# Patient Record
Sex: MALE | Race: WHITE | NOT HISPANIC OR LATINO | Employment: UNEMPLOYED | URBAN - NONMETROPOLITAN AREA
[De-identification: names, ages, dates, MRNs, and addresses within clinical notes are randomized per-mention and may not be internally consistent; named-entity substitution may affect disease eponyms.]

---

## 2017-01-01 ENCOUNTER — INFUSION (OUTPATIENT)
Dept: ONCOLOGY | Facility: HOSPITAL | Age: 62
End: 2017-01-01

## 2017-01-01 ENCOUNTER — OFFICE VISIT (OUTPATIENT)
Dept: ONCOLOGY | Facility: CLINIC | Age: 62
End: 2017-01-01

## 2017-01-01 ENCOUNTER — APPOINTMENT (OUTPATIENT)
Dept: GENERAL RADIOLOGY | Facility: HOSPITAL | Age: 62
End: 2017-01-01

## 2017-01-01 ENCOUNTER — APPOINTMENT (OUTPATIENT)
Dept: CT IMAGING | Facility: HOSPITAL | Age: 62
End: 2017-01-01

## 2017-01-01 ENCOUNTER — TELEPHONE (OUTPATIENT)
Dept: GASTROENTEROLOGY | Facility: CLINIC | Age: 62
End: 2017-01-01

## 2017-01-01 ENCOUNTER — HOSPITAL ENCOUNTER (OUTPATIENT)
Dept: OTHER | Facility: HOSPITAL | Age: 62
Setting detail: RADIATION/ONCOLOGY SERIES
Discharge: HOME | End: 2017-01-20
Attending: INTERNAL MEDICINE | Admitting: INTERNAL MEDICINE

## 2017-01-01 ENCOUNTER — HOSPITAL ENCOUNTER (INPATIENT)
Facility: HOSPITAL | Age: 62
LOS: 6 days | Discharge: HOME HEALTH | End: 2017-07-13
Attending: EMERGENCY MEDICINE | Admitting: FAMILY MEDICINE

## 2017-01-01 ENCOUNTER — ANESTHESIA (OUTPATIENT)
Dept: GASTROENTEROLOGY | Facility: HOSPITAL | Age: 62
End: 2017-01-01

## 2017-01-01 ENCOUNTER — TELEPHONE (OUTPATIENT)
Dept: OBSTETRICS AND GYNECOLOGY | Facility: CLINIC | Age: 62
End: 2017-01-01

## 2017-01-01 ENCOUNTER — LAB REQUISITION (OUTPATIENT)
Dept: LAB | Facility: HOSPITAL | Age: 62
End: 2017-01-01

## 2017-01-01 ENCOUNTER — APPOINTMENT (OUTPATIENT)
Dept: ONCOLOGY | Facility: HOSPITAL | Age: 62
End: 2017-01-01

## 2017-01-01 ENCOUNTER — APPOINTMENT (OUTPATIENT)
Dept: ONCOLOGY | Facility: CLINIC | Age: 62
End: 2017-01-01

## 2017-01-01 ENCOUNTER — TELEPHONE (OUTPATIENT)
Dept: ONCOLOGY | Facility: CLINIC | Age: 62
End: 2017-01-01

## 2017-01-01 ENCOUNTER — LAB (OUTPATIENT)
Dept: ONCOLOGY | Facility: HOSPITAL | Age: 62
End: 2017-01-01

## 2017-01-01 ENCOUNTER — ANESTHESIA EVENT (OUTPATIENT)
Dept: GASTROENTEROLOGY | Facility: HOSPITAL | Age: 62
End: 2017-01-01

## 2017-01-01 ENCOUNTER — DOCUMENTATION (OUTPATIENT)
Dept: RADIATION ONCOLOGY | Facility: HOSPITAL | Age: 62
End: 2017-01-01

## 2017-01-01 ENCOUNTER — HOSPITAL ENCOUNTER (EMERGENCY)
Facility: HOSPITAL | Age: 62
Discharge: HOME | End: 2017-08-25
Attending: EMERGENCY MEDICINE | Admitting: EMERGENCY MEDICINE

## 2017-01-01 ENCOUNTER — HOSPITAL ENCOUNTER (OUTPATIENT)
Dept: ULTRASOUND IMAGING | Facility: HOSPITAL | Age: 62
Discharge: HOME | End: 2017-03-29
Attending: INTERNAL MEDICINE

## 2017-01-01 ENCOUNTER — HOSPITAL ENCOUNTER (OUTPATIENT)
Dept: OTHER | Facility: HOSPITAL | Age: 62
Discharge: HOME | End: 2017-01-06
Attending: INTERNAL MEDICINE | Admitting: INTERNAL MEDICINE

## 2017-01-01 ENCOUNTER — OFFICE VISIT (OUTPATIENT)
Dept: SURGERY | Facility: CLINIC | Age: 62
End: 2017-01-01

## 2017-01-01 ENCOUNTER — OFFICE VISIT (OUTPATIENT)
Dept: CARDIAC SURGERY | Facility: CLINIC | Age: 62
End: 2017-01-01

## 2017-01-01 ENCOUNTER — OFFICE VISIT (OUTPATIENT)
Dept: FAMILY MEDICINE CLINIC | Facility: CLINIC | Age: 62
End: 2017-01-01

## 2017-01-01 ENCOUNTER — TELEPHONE (OUTPATIENT)
Dept: ONCOLOGY | Facility: HOSPITAL | Age: 62
End: 2017-01-01

## 2017-01-01 ENCOUNTER — APPOINTMENT (OUTPATIENT)
Dept: ULTRASOUND IMAGING | Facility: HOSPITAL | Age: 62
End: 2017-01-01

## 2017-01-01 ENCOUNTER — TELEPHONE (OUTPATIENT)
Dept: FAMILY MEDICINE CLINIC | Facility: CLINIC | Age: 62
End: 2017-01-01

## 2017-01-01 ENCOUNTER — PREP FOR SURGERY (OUTPATIENT)
Dept: OTHER | Facility: HOSPITAL | Age: 62
End: 2017-01-01

## 2017-01-01 ENCOUNTER — HOSPITAL ENCOUNTER (OUTPATIENT)
Dept: GENERAL RADIOLOGY | Facility: HOSPITAL | Age: 62
Discharge: HOME | End: 2017-08-01
Admitting: INTERNAL MEDICINE

## 2017-01-01 ENCOUNTER — HOSPITAL ENCOUNTER (INPATIENT)
Facility: HOSPITAL | Age: 62
LOS: 3 days | Discharge: HOME HEALTH | End: 2017-07-21
Attending: EMERGENCY MEDICINE | Admitting: FAMILY MEDICINE

## 2017-01-01 ENCOUNTER — OFFICE VISIT (OUTPATIENT)
Dept: GASTROENTEROLOGY | Facility: CLINIC | Age: 62
End: 2017-01-01

## 2017-01-01 ENCOUNTER — HOSPITAL ENCOUNTER (OUTPATIENT)
Dept: PHYSICAL THERAPY | Facility: HOSPITAL | Age: 62
Setting detail: THERAPIES SERIES
Discharge: HOME | End: 2017-01-20
Attending: FAMILY MEDICINE | Admitting: FAMILY MEDICINE

## 2017-01-01 ENCOUNTER — HOSPITAL ENCOUNTER (INPATIENT)
Facility: HOSPITAL | Age: 62
LOS: 2 days | Discharge: HOME HEALTH | End: 2017-08-10
Attending: FAMILY MEDICINE | Admitting: FAMILY MEDICINE

## 2017-01-01 ENCOUNTER — DOCUMENTATION (OUTPATIENT)
Dept: NUTRITION | Facility: HOSPITAL | Age: 62
End: 2017-01-01

## 2017-01-01 ENCOUNTER — HOSPITAL ENCOUNTER (OUTPATIENT)
Facility: HOSPITAL | Age: 62
Setting detail: HOSPITAL OUTPATIENT SURGERY
Discharge: HOME | End: 2017-03-29
Attending: INTERNAL MEDICINE | Admitting: INTERNAL MEDICINE

## 2017-01-01 VITALS
SYSTOLIC BLOOD PRESSURE: 120 MMHG | HEIGHT: 70 IN | OXYGEN SATURATION: 96 % | DIASTOLIC BLOOD PRESSURE: 68 MMHG | HEART RATE: 92 BPM

## 2017-01-01 VITALS
SYSTOLIC BLOOD PRESSURE: 146 MMHG | BODY MASS INDEX: 18.04 KG/M2 | DIASTOLIC BLOOD PRESSURE: 67 MMHG | HEIGHT: 70 IN | OXYGEN SATURATION: 97 % | RESPIRATION RATE: 19 BRPM | TEMPERATURE: 98.1 F | WEIGHT: 126 LBS | HEART RATE: 101 BPM

## 2017-01-01 VITALS
TEMPERATURE: 97.6 F | HEART RATE: 90 BPM | SYSTOLIC BLOOD PRESSURE: 141 MMHG | RESPIRATION RATE: 20 BRPM | DIASTOLIC BLOOD PRESSURE: 94 MMHG

## 2017-01-01 VITALS
SYSTOLIC BLOOD PRESSURE: 121 MMHG | BODY MASS INDEX: 23.99 KG/M2 | HEIGHT: 70 IN | HEART RATE: 69 BPM | TEMPERATURE: 97.2 F | OXYGEN SATURATION: 95 % | RESPIRATION RATE: 18 BRPM | WEIGHT: 167.55 LBS | DIASTOLIC BLOOD PRESSURE: 66 MMHG

## 2017-01-01 VITALS
BODY MASS INDEX: 21.42 KG/M2 | HEART RATE: 85 BPM | RESPIRATION RATE: 18 BRPM | TEMPERATURE: 97.4 F | HEIGHT: 70 IN | SYSTOLIC BLOOD PRESSURE: 148 MMHG | WEIGHT: 149.6 LBS | DIASTOLIC BLOOD PRESSURE: 92 MMHG

## 2017-01-01 VITALS
DIASTOLIC BLOOD PRESSURE: 61 MMHG | HEIGHT: 70 IN | WEIGHT: 171 LBS | SYSTOLIC BLOOD PRESSURE: 119 MMHG | HEART RATE: 86 BPM | TEMPERATURE: 97.7 F | OXYGEN SATURATION: 94 % | BODY MASS INDEX: 24.48 KG/M2

## 2017-01-01 VITALS
HEIGHT: 70 IN | BODY MASS INDEX: 23.91 KG/M2 | SYSTOLIC BLOOD PRESSURE: 120 MMHG | DIASTOLIC BLOOD PRESSURE: 70 MMHG | HEART RATE: 83 BPM | WEIGHT: 167 LBS | OXYGEN SATURATION: 99 %

## 2017-01-01 VITALS
BODY MASS INDEX: 24.91 KG/M2 | HEIGHT: 70 IN | DIASTOLIC BLOOD PRESSURE: 58 MMHG | SYSTOLIC BLOOD PRESSURE: 114 MMHG | WEIGHT: 174 LBS

## 2017-01-01 VITALS
TEMPERATURE: 98.1 F | RESPIRATION RATE: 16 BRPM | HEART RATE: 88 BPM | SYSTOLIC BLOOD PRESSURE: 120 MMHG | DIASTOLIC BLOOD PRESSURE: 65 MMHG

## 2017-01-01 VITALS
RESPIRATION RATE: 20 BRPM | HEART RATE: 76 BPM | SYSTOLIC BLOOD PRESSURE: 115 MMHG | DIASTOLIC BLOOD PRESSURE: 75 MMHG | TEMPERATURE: 98.1 F

## 2017-01-01 VITALS
RESPIRATION RATE: 20 BRPM | SYSTOLIC BLOOD PRESSURE: 129 MMHG | TEMPERATURE: 97.4 F | TEMPERATURE: 97.8 F | DIASTOLIC BLOOD PRESSURE: 74 MMHG | SYSTOLIC BLOOD PRESSURE: 131 MMHG | DIASTOLIC BLOOD PRESSURE: 62 MMHG | HEART RATE: 78 BPM | HEART RATE: 80 BPM | RESPIRATION RATE: 18 BRPM

## 2017-01-01 VITALS
RESPIRATION RATE: 18 BRPM | BODY MASS INDEX: 24.11 KG/M2 | TEMPERATURE: 98.4 F | WEIGHT: 168.4 LBS | HEIGHT: 70 IN | SYSTOLIC BLOOD PRESSURE: 128 MMHG | HEART RATE: 87 BPM | DIASTOLIC BLOOD PRESSURE: 61 MMHG

## 2017-01-01 VITALS — DIASTOLIC BLOOD PRESSURE: 61 MMHG | HEART RATE: 79 BPM | SYSTOLIC BLOOD PRESSURE: 108 MMHG | TEMPERATURE: 97.8 F

## 2017-01-01 VITALS
HEART RATE: 83 BPM | DIASTOLIC BLOOD PRESSURE: 60 MMHG | TEMPERATURE: 97.9 F | OXYGEN SATURATION: 99 % | SYSTOLIC BLOOD PRESSURE: 106 MMHG

## 2017-01-01 VITALS
RESPIRATION RATE: 16 BRPM | HEART RATE: 76 BPM | TEMPERATURE: 98 F | SYSTOLIC BLOOD PRESSURE: 124 MMHG | DIASTOLIC BLOOD PRESSURE: 65 MMHG

## 2017-01-01 VITALS
OXYGEN SATURATION: 97 % | HEART RATE: 83 BPM | DIASTOLIC BLOOD PRESSURE: 72 MMHG | SYSTOLIC BLOOD PRESSURE: 122 MMHG | HEIGHT: 70 IN | TEMPERATURE: 97.4 F | RESPIRATION RATE: 25 BRPM

## 2017-01-01 VITALS
SYSTOLIC BLOOD PRESSURE: 122 MMHG | RESPIRATION RATE: 18 BRPM | DIASTOLIC BLOOD PRESSURE: 64 MMHG | HEART RATE: 84 BPM | TEMPERATURE: 97.6 F

## 2017-01-01 VITALS
DIASTOLIC BLOOD PRESSURE: 68 MMHG | BODY MASS INDEX: 22.07 KG/M2 | SYSTOLIC BLOOD PRESSURE: 115 MMHG | OXYGEN SATURATION: 94 % | HEIGHT: 70 IN | RESPIRATION RATE: 18 BRPM | HEART RATE: 88 BPM | TEMPERATURE: 98.2 F | WEIGHT: 154.2 LBS

## 2017-01-01 VITALS
SYSTOLIC BLOOD PRESSURE: 108 MMHG | OXYGEN SATURATION: 93 % | DIASTOLIC BLOOD PRESSURE: 60 MMHG | WEIGHT: 177.3 LBS | HEIGHT: 70 IN | HEART RATE: 80 BPM | BODY MASS INDEX: 25.38 KG/M2

## 2017-01-01 VITALS
SYSTOLIC BLOOD PRESSURE: 118 MMHG | HEART RATE: 113 BPM | WEIGHT: 151 LBS | DIASTOLIC BLOOD PRESSURE: 60 MMHG | BODY MASS INDEX: 21.62 KG/M2 | OXYGEN SATURATION: 94 % | HEIGHT: 70 IN

## 2017-01-01 VITALS
RESPIRATION RATE: 20 BRPM | BODY MASS INDEX: 24.97 KG/M2 | HEART RATE: 84 BPM | WEIGHT: 174 LBS | SYSTOLIC BLOOD PRESSURE: 110 MMHG | TEMPERATURE: 98.2 F | DIASTOLIC BLOOD PRESSURE: 66 MMHG

## 2017-01-01 VITALS
RESPIRATION RATE: 20 BRPM | WEIGHT: 149.03 LBS | TEMPERATURE: 97.8 F | DIASTOLIC BLOOD PRESSURE: 75 MMHG | HEART RATE: 94 BPM | BODY MASS INDEX: 21.34 KG/M2 | HEIGHT: 70 IN | OXYGEN SATURATION: 93 % | SYSTOLIC BLOOD PRESSURE: 130 MMHG

## 2017-01-01 VITALS
TEMPERATURE: 97.8 F | HEART RATE: 80 BPM | SYSTOLIC BLOOD PRESSURE: 133 MMHG | DIASTOLIC BLOOD PRESSURE: 55 MMHG | RESPIRATION RATE: 18 BRPM

## 2017-01-01 VITALS
DIASTOLIC BLOOD PRESSURE: 76 MMHG | SYSTOLIC BLOOD PRESSURE: 120 MMHG | HEART RATE: 86 BPM | TEMPERATURE: 97.8 F | RESPIRATION RATE: 20 BRPM

## 2017-01-01 VITALS
TEMPERATURE: 97.5 F | DIASTOLIC BLOOD PRESSURE: 89 MMHG | SYSTOLIC BLOOD PRESSURE: 142 MMHG | HEART RATE: 98 BPM | RESPIRATION RATE: 24 BRPM | WEIGHT: 147.2 LBS | BODY MASS INDEX: 21.12 KG/M2

## 2017-01-01 VITALS
BODY MASS INDEX: 25.94 KG/M2 | SYSTOLIC BLOOD PRESSURE: 109 MMHG | RESPIRATION RATE: 18 BRPM | WEIGHT: 180.8 LBS | DIASTOLIC BLOOD PRESSURE: 54 MMHG | HEART RATE: 86 BPM | TEMPERATURE: 98.3 F

## 2017-01-01 VITALS
TEMPERATURE: 98.5 F | HEIGHT: 70 IN | OXYGEN SATURATION: 91 % | SYSTOLIC BLOOD PRESSURE: 128 MMHG | HEART RATE: 101 BPM | RESPIRATION RATE: 16 BRPM | WEIGHT: 140 LBS | DIASTOLIC BLOOD PRESSURE: 72 MMHG | BODY MASS INDEX: 20.04 KG/M2

## 2017-01-01 VITALS
HEART RATE: 76 BPM | DIASTOLIC BLOOD PRESSURE: 69 MMHG | TEMPERATURE: 98.1 F | SYSTOLIC BLOOD PRESSURE: 125 MMHG | RESPIRATION RATE: 16 BRPM

## 2017-01-01 VITALS
WEIGHT: 174.2 LBS | HEART RATE: 88 BPM | BODY MASS INDEX: 25 KG/M2 | DIASTOLIC BLOOD PRESSURE: 61 MMHG | SYSTOLIC BLOOD PRESSURE: 104 MMHG

## 2017-01-01 VITALS — DIASTOLIC BLOOD PRESSURE: 69 MMHG | HEART RATE: 74 BPM | TEMPERATURE: 98.1 F | SYSTOLIC BLOOD PRESSURE: 116 MMHG

## 2017-01-01 VITALS
BODY MASS INDEX: 24.44 KG/M2 | HEART RATE: 80 BPM | RESPIRATION RATE: 18 BRPM | SYSTOLIC BLOOD PRESSURE: 120 MMHG | WEIGHT: 170.7 LBS | DIASTOLIC BLOOD PRESSURE: 62 MMHG | HEIGHT: 70 IN | TEMPERATURE: 98.1 F

## 2017-01-01 DIAGNOSIS — F34.1 DYSTHYMIA: ICD-10-CM

## 2017-01-01 DIAGNOSIS — A04.72 CLOSTRIDIUM DIFFICILE COLITIS: ICD-10-CM

## 2017-01-01 DIAGNOSIS — C90.00 MULTIPLE MYELOMA, STAGE 3 (HCC): ICD-10-CM

## 2017-01-01 DIAGNOSIS — Z45.2 ENCOUNTER FOR VENOUS ACCESS DEVICE CARE: Primary | ICD-10-CM

## 2017-01-01 DIAGNOSIS — R18.8 CIRRHOSIS OF LIVER WITH ASCITES, UNSPECIFIED HEPATIC CIRRHOSIS TYPE (HCC): ICD-10-CM

## 2017-01-01 DIAGNOSIS — I73.9 PVD (PERIPHERAL VASCULAR DISEASE) (HCC): Primary | ICD-10-CM

## 2017-01-01 DIAGNOSIS — R26.9 ABNORMALITY OF GAIT AND MOBILITY: ICD-10-CM

## 2017-01-01 DIAGNOSIS — K72.10 END STAGE LIVER DISEASE (HCC): ICD-10-CM

## 2017-01-01 DIAGNOSIS — C90.00 MULTIPLE MYELOMA, STAGE 3 (HCC): Primary | ICD-10-CM

## 2017-01-01 DIAGNOSIS — R29.898 SEVERE MUSCLE DECONDITIONING: ICD-10-CM

## 2017-01-01 DIAGNOSIS — D64.9 ANEMIA, UNSPECIFIED TYPE: Primary | ICD-10-CM

## 2017-01-01 DIAGNOSIS — E46 MALNOURISHED (HCC): ICD-10-CM

## 2017-01-01 DIAGNOSIS — Z45.2 ENCOUNTER FOR VENOUS ACCESS DEVICE CARE: ICD-10-CM

## 2017-01-01 DIAGNOSIS — D64.9 ANEMIA, UNSPECIFIED TYPE: ICD-10-CM

## 2017-01-01 DIAGNOSIS — R64 CACHEXIA (HCC): ICD-10-CM

## 2017-01-01 DIAGNOSIS — Z78.9 IMPAIRED MOBILITY AND ADLS: ICD-10-CM

## 2017-01-01 DIAGNOSIS — K59.1 FUNCTIONAL DIARRHEA: ICD-10-CM

## 2017-01-01 DIAGNOSIS — R18.8 OTHER ASCITES: ICD-10-CM

## 2017-01-01 DIAGNOSIS — J90 RECURRENT RIGHT PLEURAL EFFUSION: ICD-10-CM

## 2017-01-01 DIAGNOSIS — E83.42 HYPOMAGNESEMIA: ICD-10-CM
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
## 2017-01-01 DIAGNOSIS — B18.2 CHRONIC HEPATITIS C WITHOUT HEPATIC COMA (HCC): ICD-10-CM

## 2017-01-01 DIAGNOSIS — R55 SYNCOPE, UNSPECIFIED SYNCOPE TYPE: ICD-10-CM

## 2017-01-01 DIAGNOSIS — K74.60 HEPATIC CIRRHOSIS, UNSPECIFIED HEPATIC CIRRHOSIS TYPE (HCC): ICD-10-CM

## 2017-01-01 DIAGNOSIS — R29.898 SEVERE MUSCLE DECONDITIONING: Primary | ICD-10-CM

## 2017-01-01 DIAGNOSIS — R10.30 LOWER ABDOMINAL PAIN: Primary | ICD-10-CM

## 2017-01-01 DIAGNOSIS — R11.2 INTRACTABLE VOMITING WITH NAUSEA, UNSPECIFIED VOMITING TYPE: ICD-10-CM

## 2017-01-01 DIAGNOSIS — S22.080D COMPRESSION FRACTURE OF T12 VERTEBRA, WITH ROUTINE HEALING, SUBSEQUENT ENCOUNTER: ICD-10-CM

## 2017-01-01 DIAGNOSIS — E87.6 HYPOKALEMIA: ICD-10-CM

## 2017-01-01 DIAGNOSIS — S33.5XXA SPRAIN OF LUMBAR SPINE, INITIAL ENCOUNTER: ICD-10-CM

## 2017-01-01 DIAGNOSIS — I73.9 PERIPHERAL VASCULAR DISEASE (HCC): Primary | ICD-10-CM

## 2017-01-01 DIAGNOSIS — D72.829 LEUKOCYTOSIS, UNSPECIFIED TYPE: ICD-10-CM

## 2017-01-01 DIAGNOSIS — E87.6 HYPOKALEMIA: Primary | ICD-10-CM

## 2017-01-01 DIAGNOSIS — Z74.09 IMPAIRED MOBILITY AND ADLS: ICD-10-CM

## 2017-01-01 DIAGNOSIS — R82.4 KETONURIA: ICD-10-CM

## 2017-01-01 DIAGNOSIS — E83.42 HYPOMAGNESEMIA SYNDROME: Primary | ICD-10-CM

## 2017-01-01 DIAGNOSIS — K74.60 CIRRHOSIS OF LIVER WITH ASCITES, UNSPECIFIED HEPATIC CIRRHOSIS TYPE (HCC): ICD-10-CM

## 2017-01-01 DIAGNOSIS — K40.90 UNILATERAL INGUINAL HERNIA WITHOUT OBSTRUCTION OR GANGRENE, RECURRENCE NOT SPECIFIED: ICD-10-CM

## 2017-01-01 DIAGNOSIS — A09 INFECTIOUS COLITIS: Primary | ICD-10-CM

## 2017-01-01 DIAGNOSIS — G89.3 CHRONIC PAIN DUE TO NEOPLASM: ICD-10-CM

## 2017-01-01 DIAGNOSIS — K40.90 UNILATERAL INGUINAL HERNIA WITHOUT OBSTRUCTION OR GANGRENE, RECURRENCE NOT SPECIFIED: Primary | ICD-10-CM

## 2017-01-01 DIAGNOSIS — K52.9 COLITIS: ICD-10-CM

## 2017-01-01 DIAGNOSIS — E78.00 PURE HYPERCHOLESTEROLEMIA: ICD-10-CM

## 2017-01-01 DIAGNOSIS — G89.29 CHRONIC MIDLINE BACK PAIN, UNSPECIFIED BACK LOCATION: ICD-10-CM

## 2017-01-01 DIAGNOSIS — F17.200 TOBACCO DEPENDENCE: ICD-10-CM

## 2017-01-01 DIAGNOSIS — K52.9 COLITIS: Primary | ICD-10-CM

## 2017-01-01 DIAGNOSIS — J96.01 ACUTE RESPIRATORY FAILURE WITH HYPOXIA (HCC): ICD-10-CM

## 2017-01-01 DIAGNOSIS — M62.81 MUSCLE WEAKNESS (GENERALIZED): ICD-10-CM

## 2017-01-01 DIAGNOSIS — Z71.6 TOBACCO ABUSE COUNSELING: ICD-10-CM

## 2017-01-01 DIAGNOSIS — M54.9 CHRONIC MIDLINE BACK PAIN, UNSPECIFIED BACK LOCATION: ICD-10-CM

## 2017-01-01 DIAGNOSIS — D69.6 THROMBOCYTOPENIA (HCC): ICD-10-CM

## 2017-01-01 DIAGNOSIS — N39.0 URINARY TRACT INFECTION, SITE UNSPECIFIED: Primary | ICD-10-CM

## 2017-01-01 DIAGNOSIS — Z74.09 IMPAIRED PHYSICAL MOBILITY: ICD-10-CM

## 2017-01-01 DIAGNOSIS — M47.812 DJD (DEGENERATIVE JOINT DISEASE), CERVICAL: ICD-10-CM

## 2017-01-01 DIAGNOSIS — M47.816 OSTEOARTHRITIS OF LUMBAR SPINE, UNSPECIFIED SPINAL OSTEOARTHRITIS COMPLICATION STATUS: ICD-10-CM

## 2017-01-01 LAB
A2 MACROGLOB SERPL-MCNC: 378 MG/DL (ref 110–276)
ABO + RH BLD: NORMAL
ABO GROUP BLD: NORMAL
ADV 40+41 DNA STL QL NAA+NON-PROBE: NOT DETECTED
AFP-TM SERPL-MCNC: 6.4 NG/ML (ref 0–8.3)
ALBUMIN 24H MFR UR ELPH: 25.6 %
ALBUMIN 24H MFR UR ELPH: 51.5 %
ALBUMIN 24H MFR UR ELPH: 57.6 %
ALBUMIN SERPL ELPH-MCNC: 2.6 G/DL (ref 2.9–4.4)
ALBUMIN SERPL-MCNC: 2.4 G/DL (ref 2.9–4.4)
ALBUMIN SERPL-MCNC: 2.5 G/DL (ref 2.9–4.4)
ALBUMIN SERPL-MCNC: 2.5 G/DL (ref 2.9–4.4)
ALBUMIN SERPL-MCNC: 2.5 G/DL (ref 3.4–4.8)
ALBUMIN SERPL-MCNC: 2.6 G/DL (ref 3.4–4.8)
ALBUMIN SERPL-MCNC: 2.7 G/DL (ref 3.4–4.8)
ALBUMIN SERPL-MCNC: 2.9 G/DL (ref 3.4–4.8)
ALBUMIN SERPL-MCNC: 2.9 G/DL (ref 3.4–4.8)
ALBUMIN SERPL-MCNC: 2.9 GM/DL (ref 3.4–4.8)
ALBUMIN SERPL-MCNC: 3 G/DL (ref 3.4–4.8)
ALBUMIN SERPL-MCNC: 3.1 G/DL (ref 3.4–4.8)
ALBUMIN SERPL-MCNC: 3.2 G/DL (ref 3.4–4.8)
ALBUMIN SERPL-MCNC: 3.3 G/DL (ref 3.4–4.8)
ALBUMIN SERPL-MCNC: ABNORMAL G/DL
ALBUMIN/GLOB SERPL: 0.8 G/DL (ref 1.1–1.8)
ALBUMIN/GLOB SERPL: 0.8 {RATIO} (ref 0.7–1.7)
ALBUMIN/GLOB SERPL: 0.8 {RATIO} (ref 0.7–1.7)
ALBUMIN/GLOB SERPL: 0.9 G/DL (ref 1.1–1.8)
ALBUMIN/GLOB SERPL: 0.9 {RATIO} (ref 0.7–1.7)
ALBUMIN/GLOB SERPL: 0.9 {RATIO} (ref 0.7–1.7)
ALBUMIN/GLOB SERPL: 1 G/DL (ref 1.1–1.8)
ALBUMIN/GLOB SERPL: 1.1 G/DL (ref 1.1–1.8)
ALBUMIN/GLOB SERPL: 1.2 G/DL (ref 1.1–1.8)
ALBUMIN/GLOB SERPL: ABNORMAL {RATIO}
ALP SERPL-CCNC: 101 U/L (ref 38–126)
ALP SERPL-CCNC: 102 U/L (ref 38–126)
ALP SERPL-CCNC: 102 U/L (ref 38–126)
ALP SERPL-CCNC: 105 U/L (ref 38–126)
ALP SERPL-CCNC: 105 U/L (ref 38–126)
ALP SERPL-CCNC: 106 U/L (ref 38–126)
ALP SERPL-CCNC: 107 U/L (ref 38–126)
ALP SERPL-CCNC: 108 U/L (ref 38–126)
ALP SERPL-CCNC: 112 U/L (ref 38–126)
ALP SERPL-CCNC: 114 U/L (ref 38–126)
ALP SERPL-CCNC: 115 U/L (ref 38–126)
ALP SERPL-CCNC: 125 U/L (ref 38–126)
ALP SERPL-CCNC: 132 U/L (ref 38–126)
ALP SERPL-CCNC: 150 U/L (ref 38–126)
ALP SERPL-CCNC: 151 U/L (ref 38–126)
ALP SERPL-CCNC: 159 U/L (ref 38–126)
ALP SERPL-CCNC: 86 U/L (ref 38–126)
ALP SERPL-CCNC: 92 U/L (ref 38–126)
ALP SERPL-CCNC: 94 U/L (ref 38–126)
ALP SERPL-CCNC: 95 U/L (ref 38–126)
ALP SERPL-CCNC: 96 U/L (ref 38–126)
ALP SERPL-CCNC: 96 U/L (ref 38–126)
ALP SERPL-CCNC: 97 U/L (ref 38–126)
ALP SERPL-CCNC: 97 U/L (ref 38–126)
ALPHA1 GLOB 24H MFR UR ELPH: 2.9 %
ALPHA1 GLOB 24H MFR UR ELPH: 3.6 %
ALPHA1 GLOB 24H MFR UR ELPH: 7.1 %
ALPHA1 GLOB FLD ELPH-MCNC: 0.3 G/DL (ref 0–0.4)
ALPHA1 GLOB FLD ELPH-MCNC: 0.4 G/DL (ref 0–0.4)
ALPHA1 GLOB FLD ELPH-MCNC: 0.4 G/DL (ref 0–0.4)
ALPHA1 GLOB FLD ELPH-MCNC: ABNORMAL G/DL
ALPHA1 GLOB SERPL ELPH-MCNC: 0.4 G/DL (ref 0–0.4)
ALPHA2 GLOB 24H MFR UR ELPH: 1 %
ALPHA2 GLOB 24H MFR UR ELPH: 2.9 %
ALPHA2 GLOB 24H MFR UR ELPH: 7.2 %
ALPHA2 GLOB SERPL ELPH-MCNC: 0.6 G/DL (ref 0.4–1)
ALPHA2 GLOB SERPL ELPH-MCNC: 0.6 G/DL (ref 0.4–1)
ALPHA2 GLOB SERPL ELPH-MCNC: 0.7 G/DL (ref 0.4–1)
ALPHA2 GLOB SERPL ELPH-MCNC: 0.7 G/DL (ref 0.4–1)
ALPHA2 GLOB SERPL ELPH-MCNC: ABNORMAL G/DL
ALT SERPL W P-5'-P-CCNC: 21 IU/L (ref 0–55)
ALT SERPL W P-5'-P-CCNC: 21 U/L (ref 21–72)
ALT SERPL W P-5'-P-CCNC: 22 U/L (ref 21–72)
ALT SERPL W P-5'-P-CCNC: 22 U/L (ref 21–72)
ALT SERPL W P-5'-P-CCNC: 24 U/L (ref 21–72)
ALT SERPL W P-5'-P-CCNC: 25 U/L (ref 21–72)
ALT SERPL W P-5'-P-CCNC: 26 U/L (ref 21–72)
ALT SERPL W P-5'-P-CCNC: 27 U/L (ref 21–72)
ALT SERPL W P-5'-P-CCNC: 29 U/L (ref 21–72)
ALT SERPL W P-5'-P-CCNC: 30 U/L (ref 21–72)
ALT SERPL W P-5'-P-CCNC: 31 U/L (ref 21–72)
ALT SERPL W P-5'-P-CCNC: 32 U/L (ref 21–72)
ALT SERPL W P-5'-P-CCNC: 34 U/L (ref 21–72)
ALT SERPL W P-5'-P-CCNC: 35 U/L (ref 21–72)
ALT SERPL W P-5'-P-CCNC: 37 U/L (ref 21–72)
ALT SERPL W P-5'-P-CCNC: 40 U/L (ref 21–72)
ALT SERPL W P-5'-P-CCNC: 41 U/L (ref 21–72)
ALT SERPL W P-5'-P-CCNC: 44 U/L (ref 21–72)
ALT SERPL W P-5'-P-CCNC: 45 U/L (ref 21–72)
ALT SERPL W P-5'-P-CCNC: 49 U/L (ref 21–72)
ALT SERPL-CCNC: 27 U/L (ref 21–72)
AMMONIA BLD-SCNC: 19 UMOL/L (ref 9–30)
AMMONIA BLD-SCNC: <9 UMOL/L (ref 9–30)
AMPHET+METHAMPHET UR QL: NEGATIVE
ANION GAP SERPL CALCULATED.3IONS-SCNC: 10 MMOL/L (ref 5–15)
ANION GAP SERPL CALCULATED.3IONS-SCNC: 10 MMOL/L (ref 5–15)
ANION GAP SERPL CALCULATED.3IONS-SCNC: 11 MMOL/L (ref 5–15)
ANION GAP SERPL CALCULATED.3IONS-SCNC: 13 MMOL/L (ref 5–15)
ANION GAP SERPL CALCULATED.3IONS-SCNC: 4 MMOL/L (ref 5–15)
ANION GAP SERPL CALCULATED.3IONS-SCNC: 5 MMOL/L (ref 5–15)
ANION GAP SERPL CALCULATED.3IONS-SCNC: 6 MMOL/L (ref 5–15)
ANION GAP SERPL CALCULATED.3IONS-SCNC: 7 MMOL/L (ref 5–15)
ANION GAP SERPL CALCULATED.3IONS-SCNC: 8 MMOL/L (ref 5–15)
ANION GAP SERPL CALCULATED.3IONS-SCNC: 9 MMOL/L (ref 5–15)
ANISOCYTOSIS BLD QL: NORMAL
APO A-I SERPL-MCNC: 153 MG/DL (ref 101–178)
APTT PPP: 56.6 SECONDS (ref 20–40.3)
APTT PPP: 69.6 SECONDS (ref 20–40.3)
AST SERPL-CCNC: 26 U/L (ref 17–59)
AST SERPL-CCNC: 28 U/L (ref 17–59)
AST SERPL-CCNC: 29 U/L (ref 17–59)
AST SERPL-CCNC: 29 U/L (ref 17–59)
AST SERPL-CCNC: 30 U/L (ref 17–59)
AST SERPL-CCNC: 32 U/L (ref 17–59)
AST SERPL-CCNC: 33 U/L (ref 17–59)
AST SERPL-CCNC: 34 U/L (ref 17–59)
AST SERPL-CCNC: 36 U/L (ref 17–59)
AST SERPL-CCNC: 37 U/L (ref 17–59)
AST SERPL-CCNC: 38 U/L (ref 17–59)
AST SERPL-CCNC: 39 U/L (ref 17–59)
AST SERPL-CCNC: 40 U/L (ref 17–59)
AST SERPL-CCNC: 42 U/L (ref 17–59)
AST SERPL-CCNC: 46 U/L (ref 17–59)
AST SERPL-CCNC: 47 U/L (ref 17–59)
AST SERPL-CCNC: 53 U/L (ref 17–59)
AST SERPL-CCNC: 62 U/L (ref 17–59)
AST SERPL-CCNC: 70 U/L (ref 17–59)
ASTRO TYP 1-8 RNA STL QL NAA+NON-PROBE: NOT DETECTED
B-GLOBULIN 24H MFR UR ELPH: 3.5 %
B-GLOBULIN MFR UR ELPH: 10.3 %
B-GLOBULIN MFR UR ELPH: 9.5 %
B-GLOBULIN SERPL ELPH-MCNC: 0.7 G/DL (ref 0.7–1.3)
B-GLOBULIN SERPL ELPH-MCNC: 0.7 G/DL (ref 0.7–1.3)
B-GLOBULIN SERPL ELPH-MCNC: 0.8 G/DL (ref 0.7–1.3)
B-GLOBULIN SERPL ELPH-MCNC: 0.8 G/DL (ref 0.7–1.3)
B-GLOBULIN SERPL ELPH-MCNC: ABNORMAL G/DL
B2 MICROGLOB SERPL-MCNC: 2.7 MG/L (ref 0.6–2.4)
B2 MICROGLOB SERPL-MCNC: 2.8 MG/L (ref 0.6–2.4)
B2 MICROGLOB SERPL-MCNC: 5.7 MG/L (ref 0.6–2.4)
BACTERIA FLD CULT: NORMAL
BACTERIA FLD CULT: NORMAL
BACTERIA SPEC AEROBE CULT: NORMAL
BACTERIA UR QL AUTO: ABNORMAL /HPF
BARBITURATES UR QL SCN: NEGATIVE
BASOPHILS # BLD AUTO: 0.01 10*3/MM3 (ref 0–0.2)
BASOPHILS # BLD AUTO: 0.02 10*3/MM3 (ref 0–0.2)
BASOPHILS # BLD AUTO: 0.02 X1000/UL (ref 0–0.2)
BASOPHILS # BLD AUTO: 0.03 10*3/MM3 (ref 0–0.2)
BASOPHILS # BLD AUTO: 0.04 10*3/MM3 (ref 0–0.2)
BASOPHILS # BLD AUTO: 0.04 10*3/MM3 (ref 0–0.2)
BASOPHILS NFR BLD AUTO: 0 % (ref 0–2)
BASOPHILS NFR BLD AUTO: 0.1 % (ref 0–2)
BASOPHILS NFR BLD AUTO: 0.2 % (ref 0–2)
BASOPHILS NFR BLD AUTO: 0.3 % (ref 0–2)
BASOPHILS NFR BLD AUTO: 0.4 % (ref 0–2)
BASOPHILS NFR BLD AUTO: 0.5 % (ref 0–2)
BASOPHILS NFR BLD AUTO: 0.5 % (ref 0–2)
BASOPHILS NFR BLD AUTO: 0.6 % (ref 0–2)
BASOPHILS NFR BLD AUTO: 0.6 % (ref 0–2)
BASOPHILS NFR BLD AUTO: 0.9 % (ref 0–2)
BENZODIAZ UR QL SCN: NEGATIVE
BH BB BLOOD EXPIRATION DATE: NORMAL
BH BB BLOOD TYPE BARCODE: 6200
BH BB DISPENSE STATUS: NORMAL
BH BB PRODUCT CODE: NORMAL
BH BB UNIT NUMBER: NORMAL
BH CV LOWER ARTERIAL LEFT ABI RATIO: 0.68
BH CV LOWER ARTERIAL LEFT DORSALIS PEDIS SYS MAX: 92 MMHG
BH CV LOWER ARTERIAL LEFT POST TIBIAL SYS MAX: 86 MMHG
BH CV LOWER ARTERIAL RIGHT ABI RATIO: 0.7
BH CV LOWER ARTERIAL RIGHT DORSALIS PEDIS SYS MAX: 94 MMHG
BH CV LOWER ARTERIAL RIGHT POST TIBIAL SYS MAX: 95 MMHG
BILIRUB SERPL-MCNC: 0.3 MG/DL (ref 0.2–1.3)
BILIRUB SERPL-MCNC: 0.3 MG/DL (ref 0.2–1.3)
BILIRUB SERPL-MCNC: 0.3 MG/DL (ref 0–1.2)
BILIRUB SERPL-MCNC: 0.4 MG/DL (ref 0.2–1.3)
BILIRUB SERPL-MCNC: 0.5 MG/DL (ref 0.2–1.3)
BILIRUB SERPL-MCNC: 0.6 MG/DL (ref 0.2–1.3)
BILIRUB SERPL-MCNC: 0.7 MG/DL (ref 0.2–1.3)
BILIRUB SERPL-MCNC: 0.8 MG/DL (ref 0.2–1.3)
BILIRUB SERPL-MCNC: 0.9 MG/DL (ref 0.2–1.3)
BILIRUB SERPL-MCNC: 1 MG/DL (ref 0.2–1.3)
BILIRUB SERPL-MCNC: 1.1 MG/DL (ref 0.2–1.3)
BILIRUB SERPL-MCNC: 1.2 MG/DL (ref 0.2–1.3)
BILIRUB SERPL-MCNC: 1.2 MG/DL (ref 0.2–1.3)
BILIRUB UR QL STRIP: ABNORMAL
BILIRUB UR QL STRIP: NEGATIVE
BLD GP AB SCN SERPL QL: NEGATIVE
BUN BLD-MCNC: 10 MG/DL (ref 7–21)
BUN BLD-MCNC: 10 MG/DL (ref 7–21)
BUN BLD-MCNC: 13 MG/DL (ref 7–21)
BUN BLD-MCNC: 14 MG/DL (ref 7–21)
BUN BLD-MCNC: 14 MG/DL (ref 7–21)
BUN BLD-MCNC: 15 MG/DL (ref 7–21)
BUN BLD-MCNC: 16 MG/DL (ref 7–21)
BUN BLD-MCNC: 16 MG/DL (ref 7–21)
BUN BLD-MCNC: 17 MG/DL (ref 7–21)
BUN BLD-MCNC: 18 MG/DL (ref 7–21)
BUN BLD-MCNC: 19 MG/DL (ref 7–21)
BUN BLD-MCNC: 19 MG/DL (ref 7–21)
BUN BLD-MCNC: 21 MG/DL (ref 7–21)
BUN BLD-MCNC: 22 MG/DL (ref 7–21)
BUN BLD-MCNC: 26 MG/DL (ref 7–21)
BUN BLD-MCNC: 28 MG/DL (ref 7–21)
BUN BLD-MCNC: 9 MG/DL (ref 7–21)
BUN SERPL-MCNC: 14 MG/DL (ref 7–21)
BUN/CREAT SERPL: 17.3 (ref 7–25)
BUN/CREAT SERPL: 17.4 (ref 7–25)
BUN/CREAT SERPL: 17.6 (ref 7–25)
BUN/CREAT SERPL: 18.8 (ref 7–25)
BUN/CREAT SERPL: 18.8 (ref 7–25)
BUN/CREAT SERPL: 19.1 (ref 7–25)
BUN/CREAT SERPL: 20.3 (ref 7–25)
BUN/CREAT SERPL: 20.5 (ref 7–25)
BUN/CREAT SERPL: 21.3 (ref 7–25)
BUN/CREAT SERPL: 21.3 (ref 7–25)
BUN/CREAT SERPL: 22.2 (ref 7–25)
BUN/CREAT SERPL: 22.4 (ref 7–25)
BUN/CREAT SERPL: 22.7 (ref 7–25)
BUN/CREAT SERPL: 23.3 (ref 7–25)
BUN/CREAT SERPL: 25.9 (ref 7–25)
BUN/CREAT SERPL: 25.9 (ref 7–25)
BUN/CREAT SERPL: 26.5 (ref 7–25)
BUN/CREAT SERPL: 28 (ref 7–25)
BUN/CREAT SERPL: 28.8 (ref 7–25)
BUN/CREAT SERPL: 28.8 (ref 7–25)
BUN/CREAT SERPL: 29.8 (ref 7–25)
BUN/CREAT SERPL: 30.6 (ref 7–25)
BUN/CREAT SERPL: 33.3 (ref 7–25)
BUN/CREAT SERPL: 36.2 (ref 7–25)
BUN/CREAT SERPL: 37.9 (ref 7–25)
C CAYETANENSIS DNA STL QL NAA+NON-PROBE: NOT DETECTED
C DIFF TOX GENS STL QL NAA+PROBE: DETECTED
CALCIUM SERPL-MCNC: 8.6 MG/DL (ref 8.4–10.2)
CALCIUM SPEC-SCNC: 7.6 MG/DL (ref 8.4–10.2)
CALCIUM SPEC-SCNC: 7.7 MG/DL (ref 8.4–10.2)
CALCIUM SPEC-SCNC: 7.8 MG/DL (ref 8.4–10.2)
CALCIUM SPEC-SCNC: 7.9 MG/DL (ref 8.4–10.2)
CALCIUM SPEC-SCNC: 8 MG/DL (ref 8.4–10.2)
CALCIUM SPEC-SCNC: 8.1 MG/DL (ref 8.4–10.2)
CALCIUM SPEC-SCNC: 8.3 MG/DL (ref 8.4–10.2)
CALCIUM SPEC-SCNC: 8.5 MG/DL (ref 8.4–10.2)
CALCIUM SPEC-SCNC: 8.7 MG/DL (ref 8.4–10.2)
CALCIUM SPEC-SCNC: 8.7 MG/DL (ref 8.4–10.2)
CALCIUM SPEC-SCNC: 8.8 MG/DL (ref 8.4–10.2)
CALCIUM SPEC-SCNC: 8.9 MG/DL (ref 8.4–10.2)
CALCIUM SPEC-SCNC: 9 MG/DL (ref 8.4–10.2)
CAMPY SP DNA.DIARRHEA STL QL NAA+PROBE: NOT DETECTED
CANNABINOIDS SERPL QL: NEGATIVE
CHLORIDE SERPL-SCNC: 100 MMOL/L (ref 95–110)
CHLORIDE SERPL-SCNC: 101 MMOL/L (ref 95–110)
CHLORIDE SERPL-SCNC: 102 MMOL/L (ref 95–110)
CHLORIDE SERPL-SCNC: 103 MMOL/L (ref 95–110)
CHLORIDE SERPL-SCNC: 103 MMOL/L (ref 95–110)
CHLORIDE SERPL-SCNC: 94 MMOL/L (ref 95–110)
CHLORIDE SERPL-SCNC: 98 MMOL/L (ref 95–110)
CHLORIDE SERPL-SCNC: 99 MMOL/L (ref 95–110)
CLARITY UR: ABNORMAL
CLARITY UR: ABNORMAL
CLARITY UR: CLEAR
CLARITY UR: CLEAR
CO2 SERPL-SCNC: 19 MMOL/L (ref 22–31)
CO2 SERPL-SCNC: 19 MMOL/L (ref 22–31)
CO2 SERPL-SCNC: 21 MMOL/L (ref 22–31)
CO2 SERPL-SCNC: 22 MMOL/L (ref 22–31)
CO2 SERPL-SCNC: 23 MMOL/L (ref 22–31)
CO2 SERPL-SCNC: 23 MMOL/L (ref 22–31)
CO2 SERPL-SCNC: 24 MMOL/L (ref 22–31)
CO2 SERPL-SCNC: 25 MMOL/L (ref 22–31)
CO2 SERPL-SCNC: 26 MMOL/L (ref 22–31)
CO2 SERPL-SCNC: 26 MMOL/L (ref 22–31)
CO2 SERPL-SCNC: 27 MMOL/L (ref 22–31)
COCAINE UR QL: NEGATIVE
COD CRY URNS QL: ABNORMAL /HPF
COLOR UR: ABNORMAL
COLOR UR: ABNORMAL
COLOR UR: YELLOW
COLOR UR: YELLOW
CONV AUTO IG#: 0.02 X1000/UL (ref 0.01–0.02)
CONV AUTO IG%: 0.4 % (ref 0–0.5)
CONV TOTAL COUNTED: 100
CRE SCREEN PCR: NOT DETECTED
CREAT BLD-MCNC: 0.43 MG/DL (ref 0.7–1.3)
CREAT BLD-MCNC: 0.47 MG/DL (ref 0.7–1.3)
CREAT BLD-MCNC: 0.47 MG/DL (ref 0.7–1.3)
CREAT BLD-MCNC: 0.48 MG/DL (ref 0.7–1.3)
CREAT BLD-MCNC: 0.49 MG/DL (ref 0.7–1.3)
CREAT BLD-MCNC: 0.51 MG/DL (ref 0.7–1.3)
CREAT BLD-MCNC: 0.52 MG/DL (ref 0.7–1.3)
CREAT BLD-MCNC: 0.54 MG/DL (ref 0.7–1.3)
CREAT BLD-MCNC: 0.57 MG/DL (ref 0.7–1.3)
CREAT BLD-MCNC: 0.57 MG/DL (ref 0.7–1.3)
CREAT BLD-MCNC: 0.58 MG/DL (ref 0.7–1.3)
CREAT BLD-MCNC: 0.58 MG/DL (ref 0.7–1.3)
CREAT BLD-MCNC: 0.59 MG/DL (ref 0.7–1.3)
CREAT BLD-MCNC: 0.62 MG/DL (ref 0.7–1.3)
CREAT BLD-MCNC: 0.66 MG/DL (ref 0.7–1.3)
CREAT BLD-MCNC: 0.67 MG/DL (ref 0.7–1.3)
CREAT BLD-MCNC: 0.69 MG/DL (ref 0.7–1.3)
CREAT BLD-MCNC: 0.72 MG/DL (ref 0.7–1.3)
CREAT BLD-MCNC: 0.75 MG/DL (ref 0.7–1.3)
CREAT BLD-MCNC: 0.8 MG/DL (ref 0.7–1.3)
CREAT BLD-MCNC: 0.83 MG/DL (ref 0.7–1.3)
CREAT BLD-MCNC: 0.86 MG/DL (ref 0.7–1.3)
CREAT BLD-MCNC: 0.93 MG/DL (ref 0.7–1.3)
CREAT BLD-MCNC: 1.04 MG/DL (ref 0.7–1.3)
CREAT BLD-MCNC: 1.08 MG/DL (ref 0.7–1.3)
CREAT SERPL-MCNC: 0.9 MG/DL (ref 0.7–1.3)
CREAT UR-MCNC: 56.2 MG/DL
CRP SERPL-MCNC: 2.4 MG/DL (ref 0–1)
CRYPTOSP STL CULT: NOT DETECTED
D-LACTATE SERPL-SCNC: 0.9 MMOL/L (ref 0.5–2)
DEPRECATED RDW RBC AUTO: 50.8 FL (ref 35.1–43.9)
DEPRECATED RDW RBC AUTO: 51.6 FL (ref 35.1–43.9)
DEPRECATED RDW RBC AUTO: 52.1 FL (ref 35.1–43.9)
DEPRECATED RDW RBC AUTO: 52.4 FL (ref 35.1–43.9)
DEPRECATED RDW RBC AUTO: 52.6 FL (ref 35.1–43.9)
DEPRECATED RDW RBC AUTO: 52.7 FL (ref 35.1–43.9)
DEPRECATED RDW RBC AUTO: 52.8 FL (ref 35.1–43.9)
DEPRECATED RDW RBC AUTO: 53.2 FL (ref 35.1–43.9)
DEPRECATED RDW RBC AUTO: 53.2 FL (ref 35.1–43.9)
DEPRECATED RDW RBC AUTO: 53.3 FL (ref 35.1–43.9)
DEPRECATED RDW RBC AUTO: 53.3 FL (ref 35.1–43.9)
DEPRECATED RDW RBC AUTO: 53.4 FL (ref 35.1–43.9)
DEPRECATED RDW RBC AUTO: 53.9 FL (ref 35.1–43.9)
DEPRECATED RDW RBC AUTO: 54.5 FL (ref 35.1–43.9)
DEPRECATED RDW RBC AUTO: 54.9 FL (ref 35.1–43.9)
DEPRECATED RDW RBC AUTO: 55.1 FL (ref 35.1–43.9)
DEPRECATED RDW RBC AUTO: 55.3 FL (ref 35.1–43.9)
DEPRECATED RDW RBC AUTO: 55.9 FL (ref 35.1–43.9)
DEPRECATED RDW RBC AUTO: 55.9 FL (ref 35.1–43.9)
DEPRECATED RDW RBC AUTO: 56.5 FL (ref 35.1–43.9)
DEPRECATED RDW RBC AUTO: 56.6 FL (ref 35.1–43.9)
DEPRECATED RDW RBC AUTO: 56.9 FL (ref 35.1–43.9)
DEPRECATED RDW RBC AUTO: 57.8 FL (ref 35.1–43.9)
DEPRECATED RDW RBC AUTO: 57.8 FL (ref 35.1–43.9)
DEPRECATED RDW RBC AUTO: 59 FL (ref 35.1–43.9)
DEPRECATED RDW RBC AUTO: 61 FL (ref 35.1–43.9)
E COLI DNA SPEC QL NAA+PROBE: NOT DETECTED
E HISTOLYT AG STL-ACNC: NOT DETECTED
EAEC PAA PLAS AGGR+AATA ST NAA+NON-PRB: NOT DETECTED
EC STX1+STX2 GENES STL QL NAA+NON-PROBE: NOT DETECTED
EOSINOPHIL # BLD AUTO: 0 10*3/MM3 (ref 0–0.7)
EOSINOPHIL # BLD AUTO: 0.01 10*3/MM3 (ref 0–0.7)
EOSINOPHIL # BLD AUTO: 0.03 10*3/MM3 (ref 0–0.7)
EOSINOPHIL # BLD AUTO: 0.05 10*3/MM3 (ref 0–0.7)
EOSINOPHIL # BLD AUTO: 0.06 10*3/MM3 (ref 0–0.7)
EOSINOPHIL # BLD AUTO: 0.06 10*3/MM3 (ref 0–0.7)
EOSINOPHIL # BLD AUTO: 0.1 10*3/MM3 (ref 0–0.7)
EOSINOPHIL # BLD AUTO: 0.12 10*3/MM3 (ref 0–0.7)
EOSINOPHIL # BLD AUTO: 0.14 10*3/MM3 (ref 0–0.7)
EOSINOPHIL # BLD AUTO: 0.17 10*3/MM3 (ref 0–0.7)
EOSINOPHIL # BLD AUTO: 0.19 10*3/MM3 (ref 0–0.7)
EOSINOPHIL # BLD AUTO: 0.2 10*3/MM3 (ref 0–0.7)
EOSINOPHIL # BLD AUTO: 0.2 10*3/MM3 (ref 0–0.7)
EOSINOPHIL # BLD AUTO: 0.26 10*3/MM3 (ref 0–0.7)
EOSINOPHIL # BLD AUTO: 0.27 10*3/MM3 (ref 0–0.7)
EOSINOPHIL # BLD AUTO: 0.27 10*3/MM3 (ref 0–0.7)
EOSINOPHIL # BLD AUTO: 0.28 10*3/MM3 (ref 0–0.7)
EOSINOPHIL # BLD AUTO: 0.29 10*3/MM3 (ref 0–0.7)
EOSINOPHIL # BLD AUTO: 0.37 10*3/MM3 (ref 0–0.7)
EOSINOPHIL # BLD AUTO: 0.38 10*3/MM3 (ref 0–0.7)
EOSINOPHIL # BLD AUTO: 0.38 10*3/MM3 (ref 0–0.7)
EOSINOPHIL # BLD AUTO: 0.39 10*3/MM3 (ref 0–0.7)
EOSINOPHIL # BLD AUTO: 0.39 X1000/UL (ref 0–0.7)
EOSINOPHIL # BLD AUTO: 0.42 10*3/MM3 (ref 0–0.7)
EOSINOPHIL # BLD MANUAL: 0.27 10*3/MM3 (ref 0–0.7)
EOSINOPHIL NFR BLD AUTO: 0 % (ref 0–7)
EOSINOPHIL NFR BLD AUTO: 0.1 % (ref 0–7)
EOSINOPHIL NFR BLD AUTO: 0.3 % (ref 0–7)
EOSINOPHIL NFR BLD AUTO: 0.4 % (ref 0–7)
EOSINOPHIL NFR BLD AUTO: 0.7 % (ref 0–7)
EOSINOPHIL NFR BLD AUTO: 1 % (ref 0–7)
EOSINOPHIL NFR BLD AUTO: 1.2 % (ref 0–7)
EOSINOPHIL NFR BLD AUTO: 1.4 % (ref 0–7)
EOSINOPHIL NFR BLD AUTO: 1.5 % (ref 0–7)
EOSINOPHIL NFR BLD AUTO: 2 % (ref 0–7)
EOSINOPHIL NFR BLD AUTO: 2.5 % (ref 0–7)
EOSINOPHIL NFR BLD AUTO: 2.5 % (ref 0–7)
EOSINOPHIL NFR BLD AUTO: 2.8 % (ref 0–7)
EOSINOPHIL NFR BLD AUTO: 3.2 % (ref 0–7)
EOSINOPHIL NFR BLD AUTO: 4 % (ref 0–7)
EOSINOPHIL NFR BLD AUTO: 4.7 % (ref 0–7)
EOSINOPHIL NFR BLD AUTO: 5.2 % (ref 0–7)
EOSINOPHIL NFR BLD AUTO: 6.2 % (ref 0–7)
EOSINOPHIL NFR BLD AUTO: 6.4 % (ref 0–7)
EOSINOPHIL NFR BLD AUTO: 7.4 % (ref 0–7)
EOSINOPHIL NFR BLD AUTO: 8 % (ref 0–7)
EOSINOPHIL NFR BLD AUTO: 8.2 % (ref 0–7)
EOSINOPHIL NFR BLD AUTO: 8.3 % (ref 0–7)
EOSINOPHIL NFR BLD AUTO: 8.5 % (ref 0–7)
EOSINOPHIL NFR BLD MANUAL: 10 % (ref 0–7)
EOSINOPHIL NFR BLD MANUAL: 6 % (ref 0–7)
EPEC EAE GENE STL QL NAA+NON-PROBE: NOT DETECTED
ERYTHROCYTE [DISTWIDTH] IN BLOOD BY AUTOMATED COUNT: 14.4 % (ref 11.5–14.5)
ERYTHROCYTE [DISTWIDTH] IN BLOOD BY AUTOMATED COUNT: 14.8 % (ref 11.5–14.5)
ERYTHROCYTE [DISTWIDTH] IN BLOOD BY AUTOMATED COUNT: 14.8 % (ref 11.5–14.5)
ERYTHROCYTE [DISTWIDTH] IN BLOOD BY AUTOMATED COUNT: 15 % (ref 11.5–14.5)
ERYTHROCYTE [DISTWIDTH] IN BLOOD BY AUTOMATED COUNT: 15.1 % (ref 11.5–14.5)
ERYTHROCYTE [DISTWIDTH] IN BLOOD BY AUTOMATED COUNT: 15.7 % (ref 11.5–14.5)
ERYTHROCYTE [DISTWIDTH] IN BLOOD BY AUTOMATED COUNT: 15.8 % (ref 11.5–14.5)
ERYTHROCYTE [DISTWIDTH] IN BLOOD BY AUTOMATED COUNT: 15.9 % (ref 11.5–14.5)
ERYTHROCYTE [DISTWIDTH] IN BLOOD BY AUTOMATED COUNT: 15.9 % (ref 11.5–14.5)
ERYTHROCYTE [DISTWIDTH] IN BLOOD BY AUTOMATED COUNT: 16.1 % (ref 11.5–14.5)
ERYTHROCYTE [DISTWIDTH] IN BLOOD BY AUTOMATED COUNT: 16.2 % (ref 11.5–14.5)
ERYTHROCYTE [DISTWIDTH] IN BLOOD BY AUTOMATED COUNT: 16.2 % (ref 11.5–14.5)
ERYTHROCYTE [DISTWIDTH] IN BLOOD BY AUTOMATED COUNT: 16.3 % (ref 11.5–14.5)
ERYTHROCYTE [DISTWIDTH] IN BLOOD BY AUTOMATED COUNT: 16.4 % (ref 11.5–14.5)
ERYTHROCYTE [DISTWIDTH] IN BLOOD BY AUTOMATED COUNT: 16.7 % (ref 11.5–14.5)
ERYTHROCYTE [DISTWIDTH] IN BLOOD BY AUTOMATED COUNT: 16.9 % (ref 11.5–14.5)
ERYTHROCYTE [DISTWIDTH] IN BLOOD BY AUTOMATED COUNT: 16.9 % (ref 11.5–14.5)
ERYTHROCYTE [DISTWIDTH] IN BLOOD BY AUTOMATED COUNT: 17 % (ref 11.5–14.5)
ERYTHROCYTE [DISTWIDTH] IN BLOOD BY AUTOMATED COUNT: 17 % (ref 11.5–14.5)
ERYTHROCYTE [DISTWIDTH] IN BLOOD BY AUTOMATED COUNT: 17.1 % (ref 11.5–14.5)
ERYTHROCYTE [DISTWIDTH] IN BLOOD BY AUTOMATED COUNT: 17.1 % (ref 11.5–14.5)
ERYTHROCYTE [DISTWIDTH] IN BLOOD BY AUTOMATED COUNT: 17.2 % (ref 11.5–14.5)
ERYTHROCYTE [DISTWIDTH] IN BLOOD: 17.6 % (ref 11.5–14.5)
ETEC LTA+ST1A+ST1B TOX ST NAA+NON-PROBE: NOT DETECTED
ETHANOL BLD-MCNC: <10 MG/DL (ref 0–10)
ETHANOL UR QL: <0.01 %
FIBROSIS SCORING:: ABNORMAL
FIBROSIS STAGE SERPL QL: ABNORMAL
G LAMBLIA DNA SPEC QL NAA+PROBE: NOT DETECTED
GAMMA GLOB 24H MFR UR ELPH: 22.8 %
GAMMA GLOB 24H MFR UR ELPH: 27.4 %
GAMMA GLOB 24H MFR UR ELPH: 66.9 %
GAMMA GLOB SERPL ELPH-MCNC: 1 G/DL (ref 0.4–1.8)
GAMMA GLOB SERPL ELPH-MCNC: 1.1 G/DL (ref 0.4–1.8)
GAMMA GLOB SERPL ELPH-MCNC: 1.2 G/DL (ref 0.4–1.8)
GAMMA GLOB SERPL ELPH-MCNC: 1.3 G/DL (ref 0.4–1.8)
GAMMA GLOB SERPL ELPH-MCNC: ABNORMAL G/DL
GFR SERPL CREATININE-BSD FRML MDRD: 106 ML/MIN/1.73 (ref 49–113)
GFR SERPL CREATININE-BSD FRML MDRD: 111 ML/MIN/1.73 (ref 49–113)
GFR SERPL CREATININE-BSD FRML MDRD: 116 ML/MIN/1.73 (ref 49–113)
GFR SERPL CREATININE-BSD FRML MDRD: 120 ML/MIN/1.73 (ref 60–113)
GFR SERPL CREATININE-BSD FRML MDRD: 122 ML/MIN/1.73 (ref 60–113)
GFR SERPL CREATININE-BSD FRML MDRD: 131 ML/MIN/1.73 (ref 60–113)
GFR SERPL CREATININE-BSD FRML MDRD: 139 ML/MIN/1.73 (ref 49–113)
GFR SERPL CREATININE-BSD FRML MDRD: 142 ML/MIN/1.73 (ref 49–113)
GFR SERPL CREATININE-BSD FRML MDRD: 142 ML/MIN/1.73 (ref 60–113)
GFR SERPL CREATININE-BSD FRML MDRD: 145 ML/MIN/1.73 (ref 49–113)
GFR SERPL CREATININE-BSD FRML MDRD: 145 ML/MIN/1.73 (ref 60–113)
GFR SERPL CREATININE-BSD FRML MDRD: 161 ML/MIN/1.73 (ref 49–113)
GFR SERPL CREATININE-BSD FRML MDRD: 177 ML/MIN/1.73 (ref 49–113)
GFR SERPL CREATININE-BSD FRML MDRD: 201 ML/MIN/1.73 (ref 49–113)
GFR SERPL CREATININE-BSD FRML MDRD: 69 ML/MIN/1.73 (ref 60–113)
GFR SERPL CREATININE-BSD FRML MDRD: 72 ML/MIN/1.73 (ref 49–113)
GFR SERPL CREATININE-BSD FRML MDRD: 82 ML/MIN/1.73 (ref 49–113)
GFR SERPL CREATININE-BSD FRML MDRD: 90 ML/MIN/1.73 (ref 49–113)
GFR SERPL CREATININE-BSD FRML MDRD: 94 ML/MIN/1.73 (ref 49–113)
GFR SERPL CREATININE-BSD FRML MDRD: 98 ML/MIN/1.73 (ref 49–113)
GFR SERPL CREATININE-BSD FRML MDRD: >150 ML/MIN/1.73 (ref 60–113)
GGT SERPL-CCNC: 50 IU/L (ref 0–65)
GLOBULIN SER CALC-MCNC: 2.7 G/DL (ref 2.2–3.9)
GLOBULIN SER CALC-MCNC: 3 G/DL (ref 2.2–3.9)
GLOBULIN SER CALC-MCNC: 3.2 G/DL (ref 2.2–3.9)
GLOBULIN SER CALC-MCNC: ABNORMAL G/DL
GLOBULIN SER-MCNC: 3.1 G/DL (ref 2.2–3.9)
GLOBULIN UR ELPH-MCNC: 2.6 GM/DL (ref 2.3–3.5)
GLOBULIN UR ELPH-MCNC: 2.7 GM/DL (ref 2.3–3.5)
GLOBULIN UR ELPH-MCNC: 2.8 GM/DL (ref 2.3–3.5)
GLOBULIN UR ELPH-MCNC: 2.9 GM/DL (ref 2.3–3.5)
GLOBULIN UR ELPH-MCNC: 3 GM/DL (ref 2.3–3.5)
GLOBULIN UR ELPH-MCNC: 3.1 GM/DL (ref 2.3–3.5)
GLOBULIN UR ELPH-MCNC: 3.3 GM/DL (ref 2.3–3.5)
GLUCOSE BLD-MCNC: 101 MG/DL (ref 60–100)
GLUCOSE BLD-MCNC: 101 MG/DL (ref 60–100)
GLUCOSE BLD-MCNC: 102 MG/DL (ref 60–100)
GLUCOSE BLD-MCNC: 107 MG/DL (ref 60–100)
GLUCOSE BLD-MCNC: 109 MG/DL (ref 60–100)
GLUCOSE BLD-MCNC: 112 MG/DL (ref 60–100)
GLUCOSE BLD-MCNC: 114 MG/DL (ref 60–100)
GLUCOSE BLD-MCNC: 122 MG/DL (ref 60–100)
GLUCOSE BLD-MCNC: 124 MG/DL (ref 60–100)
GLUCOSE BLD-MCNC: 129 MG/DL (ref 60–100)
GLUCOSE BLD-MCNC: 134 MG/DL (ref 60–100)
GLUCOSE BLD-MCNC: 140 MG/DL (ref 60–100)
GLUCOSE BLD-MCNC: 149 MG/DL (ref 60–100)
GLUCOSE BLD-MCNC: 151 MG/DL (ref 60–100)
GLUCOSE BLD-MCNC: 152 MG/DL (ref 60–100)
GLUCOSE BLD-MCNC: 154 MG/DL (ref 60–100)
GLUCOSE BLD-MCNC: 166 MG/DL (ref 60–100)
GLUCOSE BLD-MCNC: 194 MG/DL (ref 60–100)
GLUCOSE BLD-MCNC: 221 MG/DL (ref 60–100)
GLUCOSE BLD-MCNC: 247 MG/DL (ref 60–100)
GLUCOSE BLD-MCNC: 86 MG/DL (ref 60–100)
GLUCOSE BLD-MCNC: 88 MG/DL (ref 60–100)
GLUCOSE BLD-MCNC: 88 MG/DL (ref 60–100)
GLUCOSE BLD-MCNC: 90 MG/DL (ref 60–100)
GLUCOSE BLD-MCNC: 90 MG/DL (ref 60–100)
GLUCOSE SERPL-MCNC: 175 MG/DL (ref 60–100)
GLUCOSE UR STRIP-MCNC: NEGATIVE MG/DL
GRAM STN SPEC: NORMAL
GRANULOCYTES # BLD AUTO: 3.36 X1000/UL (ref 2–8.6)
GRANULOCYTES NFR BLD AUTO: 63.3 % (ref 37–80)
HAPTOGLOB SERPL-MCNC: 93 MG/DL (ref 34–200)
HCT VFR BLD AUTO: 22.5 % (ref 39–49)
HCT VFR BLD AUTO: 24.1 % (ref 39–49)
HCT VFR BLD AUTO: 26.3 % (ref 39–49)
HCT VFR BLD AUTO: 27.3 % (ref 39–49)
HCT VFR BLD AUTO: 28.1 % (ref 39–49)
HCT VFR BLD AUTO: 28.5 % (ref 39–49)
HCT VFR BLD AUTO: 28.7 % (ref 39–49)
HCT VFR BLD AUTO: 29.8 % (ref 39–49)
HCT VFR BLD AUTO: 30.3 % (ref 39–49)
HCT VFR BLD AUTO: 30.6 % (ref 39–49)
HCT VFR BLD AUTO: 30.7 % (ref 39–49)
HCT VFR BLD AUTO: 31 % (ref 39–49)
HCT VFR BLD AUTO: 31.2 % (ref 39–49)
HCT VFR BLD AUTO: 31.2 % (ref 39–49)
HCT VFR BLD AUTO: 31.5 % (ref 39–49)
HCT VFR BLD AUTO: 31.9 % (ref 39–49)
HCT VFR BLD AUTO: 31.9 % (ref 39–49)
HCT VFR BLD AUTO: 32.1 % (ref 39–49)
HCT VFR BLD AUTO: 32.3 % (ref 39–49)
HCT VFR BLD AUTO: 32.6 % (ref 39–49)
HCT VFR BLD AUTO: 33.4 % (ref 39–49)
HCT VFR BLD AUTO: 33.4 % (ref 39–49)
HCT VFR BLD AUTO: 33.7 % (ref 39–49)
HCT VFR BLD AUTO: 33.9 % (ref 39–49)
HCT VFR BLD AUTO: 34.4 % (ref 39–49)
HCT VFR BLD AUTO: 36.6 % (ref 39–49)
HCT VFR BLD CALC: 21.6 % (ref 39–49)
HCV AB SER QL: ABNORMAL
HCV GENTYP SERPL NAA+PROBE: NORMAL
HCV RNA SERPL NAA+PROBE-ACNC: NORMAL IU/ML
HGB BLD-MCNC: 10.2 G/DL (ref 13.7–17.3)
HGB BLD-MCNC: 10.4 G/DL (ref 13.7–17.3)
HGB BLD-MCNC: 10.5 G/DL (ref 13.7–17.3)
HGB BLD-MCNC: 10.6 G/DL (ref 13.7–17.3)
HGB BLD-MCNC: 10.6 G/DL (ref 13.7–17.3)
HGB BLD-MCNC: 10.7 G/DL (ref 13.7–17.3)
HGB BLD-MCNC: 10.7 G/DL (ref 13.7–17.3)
HGB BLD-MCNC: 10.8 G/DL (ref 13.7–17.3)
HGB BLD-MCNC: 10.9 G/DL (ref 13.7–17.3)
HGB BLD-MCNC: 11 G/DL (ref 13.7–17.3)
HGB BLD-MCNC: 11.1 G/DL (ref 13.7–17.3)
HGB BLD-MCNC: 11.3 G/DL (ref 13.7–17.3)
HGB BLD-MCNC: 11.5 G/DL (ref 13.7–17.3)
HGB BLD-MCNC: 11.5 G/DL (ref 13.7–17.3)
HGB BLD-MCNC: 11.7 G/DL (ref 13.7–17.3)
HGB BLD-MCNC: 12 G/DL (ref 13.7–17.3)
HGB BLD-MCNC: 13.2 G/DL (ref 13.7–17.3)
HGB BLD-MCNC: 7.4 GM/DL (ref 13.7–17.3)
HGB BLD-MCNC: 7.5 G/DL (ref 13.7–17.3)
HGB BLD-MCNC: 8.2 G/DL (ref 13.7–17.3)
HGB BLD-MCNC: 9 G/DL (ref 13.7–17.3)
HGB BLD-MCNC: 9.2 G/DL (ref 13.7–17.3)
HGB BLD-MCNC: 9.7 G/DL (ref 13.7–17.3)
HGB BLD-MCNC: 9.8 G/DL (ref 13.7–17.3)
HGB BLD-MCNC: 9.8 G/DL (ref 13.7–17.3)
HGB UR QL STRIP.AUTO: ABNORMAL
HGB UR QL STRIP.AUTO: ABNORMAL
HGB UR QL STRIP.AUTO: NEGATIVE
HGB UR QL STRIP.AUTO: NEGATIVE
HIV 1 & 2 AB SER-IMP: ABNORMAL
HIV 1 & 2 AB SER-IMP: ABNORMAL
HOLD SPECIMEN: NORMAL
HYALINE CASTS UR QL AUTO: ABNORMAL /LPF
IGA SERPL-MCNC: 120 MG/DL (ref 61–437)
IGA SERPL-MCNC: 23 MG/DL (ref 61–437)
IGA SERPL-MCNC: 23 MG/DL (ref 61–437)
IGA SERPL-MCNC: 50 MG/DL (ref 61–437)
IGA SERPL-MCNC: 79 MG/DL (ref 61–437)
IGG SERPL-MCNC: 1025 MG/DL (ref 700–1600)
IGG SERPL-MCNC: 1216 MG/DL (ref 700–1600)
IGG SERPL-MCNC: 1219 MG/DL (ref 700–1600)
IGG SERPL-MCNC: 1230 MG/DL (ref 700–1600)
IGG SERPL-MCNC: 708 MG/DL (ref 700–1600)
IGG SERPL-MCNC: 708 MG/DL (ref 700–1600)
IGM SERPL-MCNC: 17 MG/DL (ref 20–172)
IGM SERPL-MCNC: 17 MG/DL (ref 20–172)
IGM SERPL-MCNC: 23 MG/DL (ref 20–172)
IGM SERPL-MCNC: 38 MG/DL (ref 20–172)
IGM SERPL-MCNC: 60 MG/DL (ref 20–172)
IMM GRANULOCYTES # BLD: 0.01 10*3/MM3 (ref 0–0.02)
IMM GRANULOCYTES # BLD: 0.02 10*3/MM3 (ref 0–0.02)
IMM GRANULOCYTES # BLD: 0.03 10*3/MM3 (ref 0–0.02)
IMM GRANULOCYTES # BLD: 0.04 10*3/MM3 (ref 0–0.02)
IMM GRANULOCYTES # BLD: 0.05 10*3/MM3 (ref 0–0.02)
IMM GRANULOCYTES # BLD: 0.08 10*3/MM3 (ref 0–0.02)
IMM GRANULOCYTES NFR BLD: 0.1 % (ref 0–0.5)
IMM GRANULOCYTES NFR BLD: 0.2 % (ref 0–0.5)
IMM GRANULOCYTES NFR BLD: 0.3 % (ref 0–0.5)
IMM GRANULOCYTES NFR BLD: 0.4 % (ref 0–0.5)
IMM GRANULOCYTES NFR BLD: 0.5 % (ref 0–0.5)
IMM GRANULOCYTES NFR BLD: 0.5 % (ref 0–0.5)
IMM GRANULOCYTES NFR BLD: 0.8 % (ref 0–0.5)
IMP STRAIN: NOT DETECTED
INR PPP: 1.14 (ref 0.8–1.2)
INR PPP: 1.15 (ref 0.8–1.2)
INR PPP: 2.27 (ref 0.8–1.2)
INTERPRETATION SERPL IEP-IMP: ABNORMAL
INTERPRETATION UR IFE-IMP: ABNORMAL
INTERPRETATION UR IFE-IMP: ABNORMAL
KAPPA LC FREE SER-MCNC: 4.25 MG/DL (ref 0.33–1.94)
KAPPA LC SERPL-MCNC: 22.9 MG/L (ref 3.3–19.4)
KAPPA LC SERPL-MCNC: 32 MG/L (ref 3.3–19.4)
KAPPA LC SERPL-MCNC: 51.6 MG/L (ref 3.3–19.4)
KAPPA LC/LAMBDA SER: 0.02 {RATIO} (ref 0.26–1.65)
KAPPA LC/LAMBDA SER: 0.05 {RATIO} (ref 0.26–1.65)
KAPPA LC/LAMBDA SER: 0.07 {RATIO} (ref 0.26–1.65)
KAPPA LC/LAMBDA SER: 0.09 {RATIO} (ref 0.26–1.65)
KETONES UR QL STRIP: ABNORMAL
KETONES UR QL STRIP: NEGATIVE
KPC STRAIN: NOT DETECTED
L PNEUMO1 AG UR QL IA: NEGATIVE
LAB AP CASE REPORT: NORMAL
LAB AP DIAGNOSIS COMMENT: NORMAL
LABORATORY COMMENT REPORT: ABNORMAL
LAMBDA LC FREE SERPL-MCNC: 278 MG/DL (ref 0.57–2.63)
LAMBDA LC FREE SERPL-MCNC: 429.8 MG/L (ref 5.7–26.3)
LAMBDA LC FREE SERPL-MCNC: 485 MG/L (ref 5.71–26.3)
LAMBDA LC FREE SERPL-MCNC: 553.3 MG/L (ref 5.7–26.3)
LDH FLD-CCNC: 289 U/L
LDH SERPL-CCNC: 409 U/L (ref 313–618)
LDH SERPL-CCNC: 420 U/L (ref 313–618)
LDH SERPL-CCNC: 440 U/L (ref 313–618)
LEUKOCYTE ESTERASE UR QL STRIP.AUTO: ABNORMAL
LEUKOCYTE ESTERASE UR QL STRIP.AUTO: NEGATIVE
LIMITATIONS:: ABNORMAL
LIPASE SERPL-CCNC: 65 U/L (ref 23–300)
LIPASE SERPL-CCNC: 91 U/L (ref 23–300)
LIPASE SERPL-CCNC: 97 U/L (ref 23–300)
LIVER FIBR SCORE SERPL CALC.FIBROSURE: 0.57 (ref 0–0.21)
LYMPHOCYTES # BLD AUTO: 0.28 10*3/MM3 (ref 0.6–4.2)
LYMPHOCYTES # BLD AUTO: 0.64 10*3/MM3 (ref 0.6–4.2)
LYMPHOCYTES # BLD AUTO: 0.66 10*3/MM3 (ref 0.6–4.2)
LYMPHOCYTES # BLD AUTO: 0.81 10*3/MM3 (ref 0.6–4.2)
LYMPHOCYTES # BLD AUTO: 0.97 10*3/MM3 (ref 0.6–4.2)
LYMPHOCYTES # BLD AUTO: 0.98 10*3/MM3 (ref 0.6–4.2)
LYMPHOCYTES # BLD AUTO: 0.98 10*3/MM3 (ref 0.6–4.2)
LYMPHOCYTES # BLD AUTO: 0.99 10*3/MM3 (ref 0.6–4.2)
LYMPHOCYTES # BLD AUTO: 1.02 10*3/MM3 (ref 0.6–4.2)
LYMPHOCYTES # BLD AUTO: 1.09 10*3/MM3 (ref 0.6–4.2)
LYMPHOCYTES # BLD AUTO: 1.11 10*3/MM3 (ref 0.6–4.2)
LYMPHOCYTES # BLD AUTO: 1.12 10*3/MM3 (ref 0.6–4.2)
LYMPHOCYTES # BLD AUTO: 1.17 10*3/MM3 (ref 0.6–4.2)
LYMPHOCYTES # BLD AUTO: 1.18 10*3/MM3 (ref 0.6–4.2)
LYMPHOCYTES # BLD AUTO: 1.23 10*3/MM3 (ref 0.6–4.2)
LYMPHOCYTES # BLD AUTO: 1.23 X1000/UL (ref 0.6–4.2)
LYMPHOCYTES # BLD AUTO: 1.24 10*3/MM3 (ref 0.6–4.2)
LYMPHOCYTES # BLD AUTO: 1.24 10*3/MM3 (ref 0.6–4.2)
LYMPHOCYTES # BLD AUTO: 1.29 10*3/MM3 (ref 0.6–4.2)
LYMPHOCYTES # BLD AUTO: 1.3 10*3/MM3 (ref 0.6–4.2)
LYMPHOCYTES # BLD AUTO: 1.34 10*3/MM3 (ref 0.6–4.2)
LYMPHOCYTES # BLD AUTO: 1.41 10*3/MM3 (ref 0.6–4.2)
LYMPHOCYTES # BLD AUTO: 1.43 10*3/MM3 (ref 0.6–4.2)
LYMPHOCYTES # BLD AUTO: 1.44 10*3/MM3 (ref 0.6–4.2)
LYMPHOCYTES # BLD AUTO: 1.47 10*3/MM3 (ref 0.6–4.2)
LYMPHOCYTES # BLD AUTO: 1.57 10*3/MM3 (ref 0.6–4.2)
LYMPHOCYTES # BLD MANUAL: 1.24 10*3/MM3 (ref 0.6–4.2)
LYMPHOCYTES NFR BLD AUTO: 10.1 % (ref 10–50)
LYMPHOCYTES NFR BLD AUTO: 10.3 % (ref 10–50)
LYMPHOCYTES NFR BLD AUTO: 10.5 % (ref 10–50)
LYMPHOCYTES NFR BLD AUTO: 12.1 % (ref 10–50)
LYMPHOCYTES NFR BLD AUTO: 12.1 % (ref 10–50)
LYMPHOCYTES NFR BLD AUTO: 12.8 % (ref 10–50)
LYMPHOCYTES NFR BLD AUTO: 13.5 % (ref 10–50)
LYMPHOCYTES NFR BLD AUTO: 13.6 % (ref 10–50)
LYMPHOCYTES NFR BLD AUTO: 13.9 % (ref 10–50)
LYMPHOCYTES NFR BLD AUTO: 13.9 % (ref 10–50)
LYMPHOCYTES NFR BLD AUTO: 14.1 % (ref 10–50)
LYMPHOCYTES NFR BLD AUTO: 14.6 % (ref 10–50)
LYMPHOCYTES NFR BLD AUTO: 14.6 % (ref 10–50)
LYMPHOCYTES NFR BLD AUTO: 15 % (ref 10–50)
LYMPHOCYTES NFR BLD AUTO: 16.2 % (ref 10–50)
LYMPHOCYTES NFR BLD AUTO: 16.2 % (ref 10–50)
LYMPHOCYTES NFR BLD AUTO: 18.2 % (ref 10–50)
LYMPHOCYTES NFR BLD AUTO: 19.6 % (ref 10–50)
LYMPHOCYTES NFR BLD AUTO: 21.3 % (ref 10–50)
LYMPHOCYTES NFR BLD AUTO: 22.6 % (ref 10–50)
LYMPHOCYTES NFR BLD AUTO: 23.2 % (ref 10–50)
LYMPHOCYTES NFR BLD AUTO: 25.6 % (ref 10–50)
LYMPHOCYTES NFR BLD AUTO: 27.7 % (ref 10–50)
LYMPHOCYTES NFR BLD AUTO: 5.4 % (ref 10–50)
LYMPHOCYTES NFR BLD AUTO: 6.8 % (ref 10–50)
LYMPHOCYTES NFR BLD AUTO: 9 % (ref 10–50)
LYMPHOCYTES NFR BLD MANUAL: 24 % (ref 10–50)
LYMPHOCYTES NFR BLD MANUAL: 27 % (ref 10–50)
LYMPHOCYTES NFR BLD MANUAL: 8 % (ref 0–12)
Lab: ABNORMAL
Lab: NORMAL
M PROTEIN 24H MFR UR ELPH: 16.7 %
M PROTEIN 24H MFR UR ELPH: 20.9 %
M PROTEIN 24H MFR UR ELPH: 65 %
M PROTEIN 24H UR ELPH-MRATE: 197 MG/24 HR
M PROTEIN 24H UR ELPH-MRATE: 220 MG/24 HR
M PROTEIN 24H UR ELPH-MRATE: 4544.5 MG/24 HR
M PROTEIN SERPL ELPH-MCNC: 0.5 G/DL
M-SPIKE: 0.4 G/DL
M-SPIKE: 0.5 G/DL
M-SPIKE: ABNORMAL G/DL
M-SPIKE: ABNORMAL G/DL
MAGNESIUM SERPL-MCNC: 1.4 MG/DL (ref 1.6–2.3)
MAGNESIUM SERPL-MCNC: 1.5 MG/DL (ref 1.6–2.3)
MAGNESIUM SERPL-MCNC: 1.8 MG/DL (ref 1.6–2.3)
MCH RBC QN AUTO: 30 PG (ref 26.5–34)
MCH RBC QN AUTO: 30.1 PG (ref 26.5–34)
MCH RBC QN AUTO: 30.1 PG (ref 26.5–34)
MCH RBC QN AUTO: 30.2 PG (ref 26.5–34)
MCH RBC QN AUTO: 30.3 PG (ref 26.5–34)
MCH RBC QN AUTO: 30.4 PG (ref 26.5–34)
MCH RBC QN AUTO: 30.5 PG (ref 26.5–34)
MCH RBC QN AUTO: 30.6 PG (ref 26.5–34)
MCH RBC QN AUTO: 30.8 PG (ref 26.5–34)
MCH RBC QN AUTO: 31.3 PG (ref 26.5–34)
MCH RBC QN AUTO: 31.6 PG (ref 26.5–34)
MCH RBC QN AUTO: 32.3 PG (ref 26.5–34)
MCH RBC QN AUTO: 33 PG (ref 26.5–34)
MCH RBC QN AUTO: 33.4 PG (ref 26.5–34)
MCH RBC QN AUTO: 33.5 PG (ref 26.5–34)
MCH RBC QN AUTO: 33.6 PG (ref 26.5–34)
MCH RBC QN AUTO: 33.7 PG (ref 26.5–34)
MCH RBC QN AUTO: 33.7 PG (ref 26.5–34)
MCH RBC QN AUTO: 33.8 PG (ref 26.5–34)
MCH RBC QN AUTO: 34.3 PG (ref 26.5–34)
MCH RBC QN AUTO: 34.9 PG (ref 26.5–34)
MCH RBC QN: 34.4 PG (ref 26–34)
MCHC RBC AUTO-ENTMCNC: 33 G/DL (ref 31.5–36.3)
MCHC RBC AUTO-ENTMCNC: 33.3 G/DL (ref 31.5–36.3)
MCHC RBC AUTO-ENTMCNC: 33.3 G/DL (ref 31.5–36.3)
MCHC RBC AUTO-ENTMCNC: 33.5 G/DL (ref 31.5–36.3)
MCHC RBC AUTO-ENTMCNC: 33.6 G/DL (ref 31.5–36.3)
MCHC RBC AUTO-ENTMCNC: 33.7 G/DL (ref 31.5–36.3)
MCHC RBC AUTO-ENTMCNC: 33.8 G/DL (ref 31.5–36.3)
MCHC RBC AUTO-ENTMCNC: 34 G/DL (ref 31.5–36.3)
MCHC RBC AUTO-ENTMCNC: 34 G/DL (ref 31.5–36.3)
MCHC RBC AUTO-ENTMCNC: 34.1 G/DL (ref 31.5–36.3)
MCHC RBC AUTO-ENTMCNC: 34.2 G/DL (ref 31.5–36.3)
MCHC RBC AUTO-ENTMCNC: 34.4 G/DL (ref 31.5–36.3)
MCHC RBC AUTO-ENTMCNC: 34.5 G/DL (ref 31.5–36.3)
MCHC RBC AUTO-ENTMCNC: 34.6 G/DL (ref 31.5–36.3)
MCHC RBC AUTO-ENTMCNC: 34.9 G/DL (ref 31.5–36.3)
MCHC RBC AUTO-ENTMCNC: 34.9 G/DL (ref 31.5–36.3)
MCHC RBC AUTO-ENTMCNC: 35 G/DL (ref 31.5–36.3)
MCHC RBC AUTO-ENTMCNC: 35.2 G/DL (ref 31.5–36.3)
MCHC RBC AUTO-ENTMCNC: 36.1 G/DL (ref 31.5–36.3)
MCHC RBC-ENTMCNC: 34.3 GM/DL (ref 31.5–36.3)
MCV RBC AUTO: 100.4 FL (ref 80–98)
MCV RBC AUTO: 100.8 FL (ref 80–98)
MCV RBC AUTO: 87.6 FL (ref 80–98)
MCV RBC AUTO: 88.2 FL (ref 80–98)
MCV RBC AUTO: 88.3 FL (ref 80–98)
MCV RBC AUTO: 88.4 FL (ref 80–98)
MCV RBC AUTO: 88.4 FL (ref 80–98)
MCV RBC AUTO: 88.6 FL (ref 80–98)
MCV RBC AUTO: 88.7 FL (ref 80–98)
MCV RBC AUTO: 89.3 FL (ref 80–98)
MCV RBC AUTO: 89.4 FL (ref 80–98)
MCV RBC AUTO: 89.4 FL (ref 80–98)
MCV RBC AUTO: 90.1 FL (ref 80–98)
MCV RBC AUTO: 90.1 FL (ref 80–98)
MCV RBC AUTO: 90.2 FL (ref 80–98)
MCV RBC AUTO: 90.4 FL (ref 80–98)
MCV RBC AUTO: 90.9 FL (ref 80–98)
MCV RBC AUTO: 91 FL (ref 80–98)
MCV RBC AUTO: 94.5 FL (ref 80–98)
MCV RBC AUTO: 94.6 FL (ref 80–98)
MCV RBC AUTO: 96.5 FL (ref 80–98)
MCV RBC AUTO: 97.9 FL (ref 80–98)
MCV RBC AUTO: 98.9 FL (ref 80–98)
MCV RBC AUTO: 99 FL (ref 80–98)
MCV RBC AUTO: 99.6 FL (ref 80–98)
MCV RBC AUTO: 99.7 FL (ref 80–98)
MCV RBC: 100.5 FL (ref 80–98)
METHADONE UR QL SCN: NEGATIVE
MONOCYTES # BLD AUTO: 0.15 10*3/MM3 (ref 0–0.9)
MONOCYTES # BLD AUTO: 0.28 X1000/UL (ref 0–0.9)
MONOCYTES # BLD AUTO: 0.3 10*3/MM3 (ref 0–0.9)
MONOCYTES # BLD AUTO: 0.34 10*3/MM3 (ref 0–0.9)
MONOCYTES # BLD AUTO: 0.37 10*3/MM3 (ref 0–0.9)
MONOCYTES # BLD AUTO: 0.37 10*3/MM3 (ref 0–0.9)
MONOCYTES # BLD AUTO: 0.38 10*3/MM3 (ref 0–0.9)
MONOCYTES # BLD AUTO: 0.45 10*3/MM3 (ref 0–0.9)
MONOCYTES # BLD AUTO: 0.46 10*3/MM3 (ref 0–0.9)
MONOCYTES # BLD AUTO: 0.49 10*3/MM3 (ref 0–0.9)
MONOCYTES # BLD AUTO: 0.49 10*3/MM3 (ref 0–0.9)
MONOCYTES # BLD AUTO: 0.51 10*3/MM3 (ref 0–0.9)
MONOCYTES # BLD AUTO: 0.66 10*3/MM3 (ref 0–0.9)
MONOCYTES # BLD AUTO: 0.68 10*3/MM3 (ref 0–0.9)
MONOCYTES # BLD AUTO: 0.69 10*3/MM3 (ref 0–0.9)
MONOCYTES # BLD AUTO: 0.7 10*3/MM3 (ref 0–0.9)
MONOCYTES # BLD AUTO: 0.72 10*3/MM3 (ref 0–0.9)
MONOCYTES # BLD AUTO: 0.77 10*3/MM3 (ref 0–0.9)
MONOCYTES # BLD AUTO: 0.8 10*3/MM3 (ref 0–0.9)
MONOCYTES # BLD AUTO: 0.81 10*3/MM3 (ref 0–0.9)
MONOCYTES # BLD AUTO: 0.83 10*3/MM3 (ref 0–0.9)
MONOCYTES # BLD AUTO: 0.92 10*3/MM3 (ref 0–0.9)
MONOCYTES # BLD AUTO: 0.99 10*3/MM3 (ref 0–0.9)
MONOCYTES # BLD AUTO: 1.06 10*3/MM3 (ref 0–0.9)
MONOCYTES # BLD AUTO: 1.16 10*3/MM3 (ref 0–0.9)
MONOCYTES # BLD AUTO: 1.19 10*3/MM3 (ref 0–0.9)
MONOCYTES # BLD AUTO: 1.23 10*3/MM3 (ref 0–0.9)
MONOCYTES NFR BLD AUTO: 10.2 % (ref 0–12)
MONOCYTES NFR BLD AUTO: 11 % (ref 0–12)
MONOCYTES NFR BLD AUTO: 11.1 % (ref 0–12)
MONOCYTES NFR BLD AUTO: 14.8 % (ref 0–12)
MONOCYTES NFR BLD AUTO: 3.6 % (ref 0–12)
MONOCYTES NFR BLD AUTO: 5.3 % (ref 0–12)
MONOCYTES NFR BLD AUTO: 5.4 % (ref 0–12)
MONOCYTES NFR BLD AUTO: 5.6 % (ref 0–12)
MONOCYTES NFR BLD AUTO: 6.7 % (ref 0–12)
MONOCYTES NFR BLD AUTO: 6.7 % (ref 0–12)
MONOCYTES NFR BLD AUTO: 6.8 % (ref 0–12)
MONOCYTES NFR BLD AUTO: 6.9 % (ref 0–12)
MONOCYTES NFR BLD AUTO: 7.5 % (ref 0–12)
MONOCYTES NFR BLD AUTO: 7.6 % (ref 0–12)
MONOCYTES NFR BLD AUTO: 7.7 % (ref 0–12)
MONOCYTES NFR BLD AUTO: 7.8 % (ref 0–12)
MONOCYTES NFR BLD AUTO: 8.1 % (ref 0–12)
MONOCYTES NFR BLD AUTO: 8.2 % (ref 0–12)
MONOCYTES NFR BLD AUTO: 8.5 % (ref 0–12)
MONOCYTES NFR BLD AUTO: 8.6 % (ref 0–12)
MONOCYTES NFR BLD AUTO: 8.6 % (ref 0–12)
MONOCYTES NFR BLD AUTO: 9.2 % (ref 0–12)
MONOCYTES NFR BLD AUTO: 9.4 % (ref 0–12)
MONOCYTES NFR BLD MANUAL: 5 % (ref 0–12)
MUCOUS THREADS URNS QL MICRO: ABNORMAL /HPF
MYCOPLASMAE PNEUMONIAE BY PCR: NEGATIVE
NDM STRAIN: NOT DETECTED
NECROINFLAMM ACTIVITY SCORING:: ABNORMAL
NECROINFLAMMATORY ACT GRADE SERPL QL: ABNORMAL
NECROINFLAMMATORY ACT SCORE SERPL: 0.12 (ref 0–0.17)
NEUTROPHILS # BLD AUTO: 10.81 10*3/MM3 (ref 2–8.6)
NEUTROPHILS # BLD AUTO: 11.35 10*3/MM3 (ref 2–8.6)
NEUTROPHILS # BLD AUTO: 19.12 10*3/MM3 (ref 2–8.6)
NEUTROPHILS # BLD AUTO: 2.52 10*3/MM3 (ref 2–8.6)
NEUTROPHILS # BLD AUTO: 2.58 10*3/MM3 (ref 2–8.6)
NEUTROPHILS # BLD AUTO: 2.7 10*3/MM3 (ref 2–8.6)
NEUTROPHILS # BLD AUTO: 2.76 10*3/MM3 (ref 2–8.6)
NEUTROPHILS # BLD AUTO: 3.17 10*3/MM3 (ref 2–8.6)
NEUTROPHILS # BLD AUTO: 3.42 10*3/MM3 (ref 2–8.6)
NEUTROPHILS # BLD AUTO: 3.45 10*3/MM3 (ref 2–8.6)
NEUTROPHILS # BLD AUTO: 3.5 10*3/MM3 (ref 2–8.6)
NEUTROPHILS # BLD AUTO: 3.6 10*3/MM3 (ref 2–8.6)
NEUTROPHILS # BLD AUTO: 3.97 10*3/MM3 (ref 2–8.6)
NEUTROPHILS # BLD AUTO: 4.94 10*3/MM3 (ref 2–8.6)
NEUTROPHILS # BLD AUTO: 5.21 10*3/MM3 (ref 2–8.6)
NEUTROPHILS # BLD AUTO: 5.56 10*3/MM3 (ref 2–8.6)
NEUTROPHILS # BLD AUTO: 5.69 10*3/MM3 (ref 2–8.6)
NEUTROPHILS # BLD AUTO: 5.9 10*3/MM3 (ref 2–8.6)
NEUTROPHILS # BLD AUTO: 6.01 10*3/MM3 (ref 2–8.6)
NEUTROPHILS # BLD AUTO: 6.24 10*3/MM3 (ref 2–8.6)
NEUTROPHILS # BLD AUTO: 7.14 10*3/MM3 (ref 2–8.6)
NEUTROPHILS # BLD AUTO: 7.31 10*3/MM3 (ref 2–8.6)
NEUTROPHILS # BLD AUTO: 8.26 10*3/MM3 (ref 2–8.6)
NEUTROPHILS # BLD AUTO: 8.37 10*3/MM3 (ref 2–8.6)
NEUTROPHILS # BLD AUTO: 8.87 10*3/MM3 (ref 2–8.6)
NEUTROPHILS # BLD AUTO: 9.97 10*3/MM3 (ref 2–8.6)
NEUTROPHILS NFR BLD AUTO: 54.8 % (ref 37–80)
NEUTROPHILS NFR BLD AUTO: 55.3 % (ref 37–80)
NEUTROPHILS NFR BLD AUTO: 61.1 % (ref 37–80)
NEUTROPHILS NFR BLD AUTO: 63.1 % (ref 37–80)
NEUTROPHILS NFR BLD AUTO: 63.8 % (ref 37–80)
NEUTROPHILS NFR BLD AUTO: 66.4 % (ref 37–80)
NEUTROPHILS NFR BLD AUTO: 68.6 % (ref 37–80)
NEUTROPHILS NFR BLD AUTO: 70.3 % (ref 37–80)
NEUTROPHILS NFR BLD AUTO: 71.5 % (ref 37–80)
NEUTROPHILS NFR BLD AUTO: 72.7 % (ref 37–80)
NEUTROPHILS NFR BLD AUTO: 73.1 % (ref 37–80)
NEUTROPHILS NFR BLD AUTO: 73.4 % (ref 37–80)
NEUTROPHILS NFR BLD AUTO: 74.1 % (ref 37–80)
NEUTROPHILS NFR BLD AUTO: 74.4 % (ref 37–80)
NEUTROPHILS NFR BLD AUTO: 74.4 % (ref 37–80)
NEUTROPHILS NFR BLD AUTO: 76.2 % (ref 37–80)
NEUTROPHILS NFR BLD AUTO: 76.3 % (ref 37–80)
NEUTROPHILS NFR BLD AUTO: 77.5 % (ref 37–80)
NEUTROPHILS NFR BLD AUTO: 78.1 % (ref 37–80)
NEUTROPHILS NFR BLD AUTO: 78.2 % (ref 37–80)
NEUTROPHILS NFR BLD AUTO: 82.4 % (ref 37–80)
NEUTROPHILS NFR BLD AUTO: 82.5 % (ref 37–80)
NEUTROPHILS NFR BLD AUTO: 83.1 % (ref 37–80)
NEUTROPHILS NFR BLD AUTO: 87.7 % (ref 37–80)
NEUTROPHILS NFR BLD AUTO: 88.8 % (ref 37–80)
NEUTROPHILS NFR BLD MANUAL: 58 % (ref 37–80)
NEUTS BAND NFR BLD MANUAL: 1 % (ref 0–5)
NEUTS SEG NFR BLD MANUAL: 60 % (ref 37–80)
NITRITE UR QL STRIP: NEGATIVE
NITRITE UR QL STRIP: POSITIVE
NOROVIRUS GI+II RNA STL QL NAA+NON-PROBE: NOT DETECTED
NRBC BLD AUTO-RTO: 0 % (ref 0–0.2)
NRBC BLD MANUAL-RTO: 0 /100 WBC (ref 0–0)
NRBC SPEC MANUAL: 0 X1000/UL
OPIATES UR QL: NEGATIVE
OSMOLALITY UR: 525 MOSM/KG (ref 38–1400)
OVALOCYTES BLD QL SMEAR: NORMAL
OXA 48 STRAIN: NOT DETECTED
OXYCODONE UR QL SCN: NEGATIVE
P SHIGELLOIDES DNA STL QL NAA+NON-PROBE: NOT DETECTED
PATH REPORT.FINAL DX SPEC: NORMAL
PATH REPORT.GROSS SPEC: NORMAL
PH UR STRIP.AUTO: 5.5 [PH] (ref 5–9)
PH UR STRIP.AUTO: 5.5 [PH] (ref 5–9)
PH UR STRIP.AUTO: 7 [PH] (ref 5–9)
PH UR STRIP.AUTO: 7.5 [PH] (ref 5–9)
PHOSPHATE SERPL-MCNC: 3.3 MG/DL (ref 2.4–4.4)
PLAT MORPH BLD: NORMAL
PLATELET # BLD AUTO: 106 10*3/MM3 (ref 150–450)
PLATELET # BLD AUTO: 106 10*3/MM3 (ref 150–450)
PLATELET # BLD AUTO: 110 10*3/MM3 (ref 150–450)
PLATELET # BLD AUTO: 112 10*3/MM3 (ref 150–450)
PLATELET # BLD AUTO: 114 10*3/MM3 (ref 150–450)
PLATELET # BLD AUTO: 115 10*3/MM3 (ref 150–450)
PLATELET # BLD AUTO: 115 10*3/MM3 (ref 150–450)
PLATELET # BLD AUTO: 117 10*3/MM3 (ref 150–450)
PLATELET # BLD AUTO: 117 10*3/MM3 (ref 150–450)
PLATELET # BLD AUTO: 118 10*3/MM3 (ref 150–450)
PLATELET # BLD AUTO: 120 10*3/MM3 (ref 150–450)
PLATELET # BLD AUTO: 123 10*3/MM3 (ref 150–450)
PLATELET # BLD AUTO: 124 10*3/MM3 (ref 150–450)
PLATELET # BLD AUTO: 129 10*3/MM3 (ref 150–450)
PLATELET # BLD AUTO: 129 10*3/MM3 (ref 150–450)
PLATELET # BLD AUTO: 130 10*3/MM3 (ref 150–450)
PLATELET # BLD AUTO: 133 10*3/MM3 (ref 150–450)
PLATELET # BLD AUTO: 134 10*3/MM3 (ref 150–450)
PLATELET # BLD AUTO: 136 10*3/MM3 (ref 150–450)
PLATELET # BLD AUTO: 148 10*3/MM3 (ref 150–450)
PLATELET # BLD AUTO: 157 10*3/MM3 (ref 150–450)
PLATELET # BLD AUTO: 161 10*3/MM3 (ref 150–450)
PLATELET # BLD AUTO: 55 10*3/MM3 (ref 150–450)
PLATELET # BLD AUTO: 58 10*3/MM3 (ref 150–450)
PLATELET # BLD AUTO: 72 10*3/MM3 (ref 150–450)
PLATELET # BLD AUTO: 98 10*3/MM3 (ref 150–450)
PLATELET # BLD: 112 X1000/MM3 (ref 150–450)
PLATELET BLD QL SMEAR: ABNORMAL
PMV BLD AUTO: 10.2 FL (ref 8–12)
PMV BLD AUTO: 10.5 FL (ref 8–12)
PMV BLD AUTO: 10.5 FL (ref 8–12)
PMV BLD AUTO: 10.6 FL (ref 8–12)
PMV BLD AUTO: 10.7 FL (ref 8–12)
PMV BLD AUTO: 10.7 FL (ref 8–12)
PMV BLD AUTO: 10.8 FL (ref 8–12)
PMV BLD AUTO: 10.9 FL (ref 8–12)
PMV BLD AUTO: 10.9 FL (ref 8–12)
PMV BLD AUTO: 11.1 FL (ref 8–12)
PMV BLD AUTO: 11.1 FL (ref 8–12)
PMV BLD AUTO: 11.2 FL (ref 8–12)
PMV BLD AUTO: 11.3 FL (ref 8–12)
PMV BLD AUTO: 11.6 FL (ref 8–12)
PMV BLD AUTO: 11.6 FL (ref 8–12)
PMV BLD AUTO: 11.9 FL (ref 8–12)
PMV BLD AUTO: 12.1 FL (ref 8–12)
PMV BLD AUTO: 12.1 FL (ref 8–12)
PMV BLD AUTO: 12.4 FL (ref 8–12)
PMV BLD AUTO: ABNORMAL FL (ref 8–12)
PMV BLD: 9.4 FL (ref 8–12)
POTASSIUM BLD-SCNC: 2.4 MMOL/L (ref 3.5–5.1)
POTASSIUM BLD-SCNC: 2.6 MMOL/L (ref 3.5–5.1)
POTASSIUM BLD-SCNC: 3.3 MMOL/L (ref 3.5–5.1)
POTASSIUM BLD-SCNC: 3.4 MMOL/L (ref 3.5–5.1)
POTASSIUM BLD-SCNC: 3.5 MMOL/L (ref 3.5–5.1)
POTASSIUM BLD-SCNC: 3.6 MMOL/L (ref 3.5–5.1)
POTASSIUM BLD-SCNC: 3.7 MMOL/L (ref 3.5–5.1)
POTASSIUM BLD-SCNC: 3.8 MMOL/L (ref 3.5–5.1)
POTASSIUM BLD-SCNC: 3.9 MMOL/L (ref 3.5–5.1)
POTASSIUM BLD-SCNC: 4 MMOL/L (ref 3.5–5.1)
POTASSIUM BLD-SCNC: 4.1 MMOL/L (ref 3.5–5.1)
POTASSIUM BLD-SCNC: 4.2 MMOL/L (ref 3.5–5.1)
POTASSIUM BLD-SCNC: 4.3 MMOL/L (ref 3.5–5.1)
POTASSIUM BLD-SCNC: 4.3 MMOL/L (ref 3.5–5.1)
POTASSIUM BLD-SCNC: 4.7 MMOL/L (ref 3.5–5.1)
POTASSIUM BLD-SCNC: 4.8 MMOL/L (ref 3.5–5.1)
POTASSIUM SERPL-SCNC: 4.1 MMOL/L (ref 3.5–5.1)
PROT 24H UR-MRATE: 1051 MG/24 HR (ref 30–150)
PROT 24H UR-MRATE: 1182.2 MG/24 HR (ref 30–150)
PROT 24H UR-MRATE: 6991.6 MG/24 HR (ref 30–150)
PROT FLD-MCNC: <2 G/DL
PROT SERPL-MCNC: 5.1 G/DL (ref 6.3–8.6)
PROT SERPL-MCNC: 5.2 G/DL (ref 6.3–8.6)
PROT SERPL-MCNC: 5.2 G/DL (ref 6.3–8.6)
PROT SERPL-MCNC: 5.2 G/DL (ref 6–8.5)
PROT SERPL-MCNC: 5.3 G/DL (ref 6.3–8.6)
PROT SERPL-MCNC: 5.4 G/DL (ref 6.3–8.6)
PROT SERPL-MCNC: 5.4 G/DL (ref 6–8.5)
PROT SERPL-MCNC: 5.5 G/DL (ref 6.3–8.6)
PROT SERPL-MCNC: 5.6 G/DL (ref 6.3–8.6)
PROT SERPL-MCNC: 5.6 G/DL (ref 6.3–8.6)
PROT SERPL-MCNC: 5.7 G/DL (ref 6.3–8.6)
PROT SERPL-MCNC: 5.7 G/DL (ref 6.3–8.6)
PROT SERPL-MCNC: 5.7 G/DL (ref 6–8.5)
PROT SERPL-MCNC: 5.7 G/DL (ref 6–8.5)
PROT SERPL-MCNC: 5.8 G/DL (ref 6.3–8.6)
PROT SERPL-MCNC: 5.8 G/DL (ref 6.3–8.6)
PROT SERPL-MCNC: 5.9 G/DL (ref 6.3–8.6)
PROT SERPL-MCNC: 6 G/DL (ref 6.3–8.6)
PROT SERPL-MCNC: 6.1 G/DL (ref 6.3–8.6)
PROT SERPL-MCNC: 6.1 GM/DL (ref 6.3–8.6)
PROT SERPL-MCNC: 6.2 G/DL (ref 6.3–8.6)
PROT SERPL-MCNC: 6.2 G/DL (ref 6.3–8.6)
PROT SERPL-MCNC: ABNORMAL G/DL
PROT UR QL STRIP: ABNORMAL
PROT UR-MCNC: 116.8 MG/DL
PROT UR-MCNC: 499.4 MG/DL
PROT UR-MCNC: 51.4 MG/DL
PROT UR-MCNC: 87 MG/DL
PROTHROMBIN TIME: 14.5 SECONDS (ref 11.1–15.3)
PROTHROMBIN TIME: 14.6 SECONDS (ref 11.1–15.3)
PROTHROMBIN TIME: 25.2 SECONDS (ref 11.1–15.3)
RBC # BLD AUTO: 2.24 10*6/MM3 (ref 4.37–5.74)
RBC # BLD AUTO: 2.39 10*6/MM3 (ref 4.37–5.74)
RBC # BLD AUTO: 2.66 10*6/MM3 (ref 4.37–5.74)
RBC # BLD AUTO: 2.74 10*6/MM3 (ref 4.37–5.74)
RBC # BLD AUTO: 2.88 10*6/MM3 (ref 4.37–5.74)
RBC # BLD AUTO: 2.91 10*6/MM3 (ref 4.37–5.74)
RBC # BLD AUTO: 2.97 10*6/MM3 (ref 4.37–5.74)
RBC # BLD AUTO: 3.01 10*6/MM3 (ref 4.37–5.74)
RBC # BLD AUTO: 3.17 10*6/MM3 (ref 4.37–5.74)
RBC # BLD AUTO: 3.25 10*6/MM3 (ref 4.37–5.74)
RBC # BLD AUTO: 3.35 10*6/MM3 (ref 4.37–5.74)
RBC # BLD AUTO: 3.46 10*6/MM3 (ref 4.37–5.74)
RBC # BLD AUTO: 3.49 10*6/MM3 (ref 4.37–5.74)
RBC # BLD AUTO: 3.5 10*6/MM3 (ref 4.37–5.74)
RBC # BLD AUTO: 3.53 10*6/MM3 (ref 4.37–5.74)
RBC # BLD AUTO: 3.54 10*6/MM3 (ref 4.37–5.74)
RBC # BLD AUTO: 3.56 10*6/MM3 (ref 4.37–5.74)
RBC # BLD AUTO: 3.57 10*6/MM3 (ref 4.37–5.74)
RBC # BLD AUTO: 3.62 10*6/MM3 (ref 4.37–5.74)
RBC # BLD AUTO: 3.66 10*6/MM3 (ref 4.37–5.74)
RBC # BLD AUTO: 3.73 10*6/MM3 (ref 4.37–5.74)
RBC # BLD AUTO: 3.74 10*6/MM3 (ref 4.37–5.74)
RBC # BLD AUTO: 3.78 10*6/MM3 (ref 4.37–5.74)
RBC # BLD AUTO: 3.8 10*6/MM3 (ref 4.37–5.74)
RBC # BLD AUTO: 3.9 10*6/MM3 (ref 4.37–5.74)
RBC # BLD AUTO: 4.18 10*6/MM3 (ref 4.37–5.74)
RBC # BLD: 2.15 MEGA/MM3 (ref 4.37–5.74)
RBC # UR: ABNORMAL /HPF
RBC MORPH BLD: ABNORMAL
RBC MORPH BLD: NORMAL
REF LAB TEST METHOD: ABNORMAL
RH BLD: POSITIVE
RV RNA STL NAA+PROBE: NOT DETECTED
S PNEUM AG SPEC QL LA: NEGATIVE
SALMONELLA DNA SPEC QL NAA+PROBE: NOT DETECTED
SAPO I+II+IV+V RNA STL QL NAA+NON-PROBE: NOT DETECTED
SHIGELLA SP+EIEC IPAH ST NAA+NON-PROBE: NOT DETECTED
SMALL PLATELETS BLD QL SMEAR: NORMAL
SODIUM BLD-SCNC: 128 MMOL/L (ref 137–145)
SODIUM BLD-SCNC: 129 MMOL/L (ref 137–145)
SODIUM BLD-SCNC: 129 MMOL/L (ref 137–145)
SODIUM BLD-SCNC: 130 MMOL/L (ref 137–145)
SODIUM BLD-SCNC: 131 MMOL/L (ref 137–145)
SODIUM BLD-SCNC: 132 MMOL/L (ref 137–145)
SODIUM BLD-SCNC: 133 MMOL/L (ref 137–145)
SODIUM BLD-SCNC: 134 MMOL/L (ref 137–145)
SODIUM BLD-SCNC: 134 MMOL/L (ref 137–145)
SODIUM BLD-SCNC: 135 MMOL/L (ref 137–145)
SODIUM BLD-SCNC: 135 MMOL/L (ref 137–145)
SODIUM SERPL-SCNC: 134 MMOL/L (ref 137–145)
SODIUM UR-SCNC: 161 MMOL/L (ref 30–90)
SP GR FLD: 1.01
SP GR UR STRIP: 1.01 (ref 1–1.03)
SP GR UR STRIP: 1.01 (ref 1–1.03)
SP GR UR STRIP: 1.02 (ref 1–1.03)
SP GR UR STRIP: 1.03 (ref 1–1.03)
SQUAMOUS #/AREA URNS HPF: ABNORMAL /HPF
TEST INFORMATION: NORMAL
TOTAL IGE SMQN RAST: 982 IU/ML (ref 0–100)
TOTAL VOLUME: 1400 ML
TROPONIN I SERPL-MCNC: 0.07 NG/ML
UNIT  ABO: NORMAL
UNIT  RH: NORMAL
UPPER ARTERIAL LEFT ARM BRACHIAL SYS MAX: 129 MMHG
UPPER ARTERIAL RIGHT ARM BRACHIAL SYS MAX: 135 MMHG
UROBILINOGEN UR QL STRIP: ABNORMAL
V CHOLERAE DNA SPEC QL NAA+PROBE: NOT DETECTED
VARIANT LYMPHS NFR BLD MANUAL: 1 % (ref 0–0)
VIBRIO DNA SPEC NAA+PROBE: NOT DETECTED
VIM STRAIN: NOT DETECTED
WBC # BLD: 5.3 X1000/UL (ref 3.2–9.8)
WBC MORPH BLD: NORMAL
WBC NRBC COR # BLD: 10.71 10*3/MM3 (ref 3.2–9.8)
WBC NRBC COR # BLD: 11.14 10*3/MM3 (ref 3.2–9.8)
WBC NRBC COR # BLD: 11.64 10*3/MM3 (ref 3.2–9.8)
WBC NRBC COR # BLD: 12.08 10*3/MM3 (ref 3.2–9.8)
WBC NRBC COR # BLD: 13.11 10*3/MM3 (ref 3.2–9.8)
WBC NRBC COR # BLD: 13.67 10*3/MM3 (ref 3.2–9.8)
WBC NRBC COR # BLD: 21.54 10*3/MM3 (ref 3.2–9.8)
WBC NRBC COR # BLD: 4.11 10*3/MM3 (ref 3.2–9.8)
WBC NRBC COR # BLD: 4.38 10*3/MM3 (ref 3.2–9.8)
WBC NRBC COR # BLD: 4.58 10*3/MM3 (ref 3.2–9.8)
WBC NRBC COR # BLD: 4.6 10*3/MM3 (ref 3.2–9.8)
WBC NRBC COR # BLD: 4.66 10*3/MM3 (ref 3.2–9.8)
WBC NRBC COR # BLD: 4.9 10*3/MM3 (ref 3.2–9.8)
WBC NRBC COR # BLD: 4.96 10*3/MM3 (ref 3.2–9.8)
WBC NRBC COR # BLD: 4.99 10*3/MM3 (ref 3.2–9.8)
WBC NRBC COR # BLD: 5.74 10*3/MM3 (ref 3.2–9.8)
WBC NRBC COR # BLD: 5.98 10*3/MM3 (ref 3.2–9.8)
WBC NRBC COR # BLD: 6.33 10*3/MM3 (ref 3.2–9.8)
WBC NRBC COR # BLD: 7.18 10*3/MM3 (ref 3.2–9.8)
WBC NRBC COR # BLD: 7.34 10*3/MM3 (ref 3.2–9.8)
WBC NRBC COR # BLD: 7.6 10*3/MM3 (ref 3.2–9.8)
WBC NRBC COR # BLD: 8.07 10*3/MM3 (ref 3.2–9.8)
WBC NRBC COR # BLD: 8.26 10*3/MM3 (ref 3.2–9.8)
WBC NRBC COR # BLD: 8.5 10*3/MM3 (ref 3.2–9.8)
WBC NRBC COR # BLD: 9.6 10*3/MM3 (ref 3.2–9.8)
WBC NRBC COR # BLD: 9.6 10*3/MM3 (ref 3.2–9.8)
WBC UR QL AUTO: ABNORMAL /HPF
WHOLE BLOOD HOLD SPECIMEN: NORMAL
YERSINIA STL CULT: NOT DETECTED

## 2017-01-01 PROCEDURE — 83883 ASSAY NEPHELOMETRY NOT SPEC: CPT | Performed by: INTERNAL MEDICINE

## 2017-01-01 PROCEDURE — 97110 THERAPEUTIC EXERCISES: CPT

## 2017-01-01 PROCEDURE — 83735 ASSAY OF MAGNESIUM: CPT

## 2017-01-01 PROCEDURE — G8987 SELF CARE CURRENT STATUS: HCPCS

## 2017-01-01 PROCEDURE — P9016 RBC LEUKOCYTES REDUCED: HCPCS | Performed by: INTERNAL MEDICINE

## 2017-01-01 PROCEDURE — 99213 OFFICE O/P EST LOW 20 MIN: CPT | Performed by: FAMILY MEDICINE

## 2017-01-01 PROCEDURE — 72100 X-RAY EXAM L-S SPINE 2/3 VWS: CPT

## 2017-01-01 PROCEDURE — 85025 COMPLETE CBC W/AUTO DIFF WBC: CPT | Performed by: INTERNAL MEDICINE

## 2017-01-01 PROCEDURE — 97530 THERAPEUTIC ACTIVITIES: CPT

## 2017-01-01 PROCEDURE — 71010 HC CHEST PA OR AP: CPT

## 2017-01-01 PROCEDURE — 25010000002 BORTEZOMIB PER 0.1 MG: Performed by: INTERNAL MEDICINE

## 2017-01-01 PROCEDURE — 85025 COMPLETE CBC W/AUTO DIFF WBC: CPT | Performed by: FAMILY MEDICINE

## 2017-01-01 PROCEDURE — 80053 COMPREHEN METABOLIC PANEL: CPT | Performed by: FAMILY MEDICINE

## 2017-01-01 PROCEDURE — 84132 ASSAY OF SERUM POTASSIUM: CPT | Performed by: FAMILY MEDICINE

## 2017-01-01 PROCEDURE — 25010000002 HEPARIN FLUSH (PORCINE) 100 UNIT/ML SOLUTION: Performed by: FAMILY MEDICINE

## 2017-01-01 PROCEDURE — 80307 DRUG TEST PRSMV CHEM ANLYZR: CPT | Performed by: INTERNAL MEDICINE

## 2017-01-01 PROCEDURE — 77075 RADEX OSSEOUS SURVEY COMPL: CPT

## 2017-01-01 PROCEDURE — 80053 COMPREHEN METABOLIC PANEL: CPT | Performed by: INTERNAL MEDICINE

## 2017-01-01 PROCEDURE — 25010000002 MORPHINE SULFATE (PF) 2 MG/ML SOLUTION: Performed by: FAMILY MEDICINE

## 2017-01-01 PROCEDURE — 84165 PROTEIN E-PHORESIS SERUM: CPT | Performed by: INTERNAL MEDICINE

## 2017-01-01 PROCEDURE — 97535 SELF CARE MNGMENT TRAINING: CPT

## 2017-01-01 PROCEDURE — 84166 PROTEIN E-PHORESIS/URINE/CSF: CPT | Performed by: INTERNAL MEDICINE

## 2017-01-01 PROCEDURE — 25010000002 THIAMINE PER 100 MG: Performed by: FAMILY MEDICINE

## 2017-01-01 PROCEDURE — 84155 ASSAY OF PROTEIN SERUM: CPT | Performed by: FAMILY MEDICINE

## 2017-01-01 PROCEDURE — 25010000002 ONDANSETRON PER 1 MG: Performed by: FAMILY MEDICINE

## 2017-01-01 PROCEDURE — 99212 OFFICE O/P EST SF 10 MIN: CPT | Performed by: SURGERY

## 2017-01-01 PROCEDURE — 99252 IP/OBS CONSLTJ NEW/EST SF 35: CPT | Performed by: SURGERY

## 2017-01-01 PROCEDURE — 25010000002 MIDAZOLAM PER 1 MG: Performed by: NURSE ANESTHETIST, CERTIFIED REGISTERED

## 2017-01-01 PROCEDURE — 63710000001 DEXAMETHASONE PER 0.25 MG: Performed by: FAMILY MEDICINE

## 2017-01-01 PROCEDURE — 82977 ASSAY OF GGT: CPT | Performed by: INTERNAL MEDICINE

## 2017-01-01 PROCEDURE — 87581 M.PNEUMON DNA AMP PROBE: CPT | Performed by: FAMILY MEDICINE

## 2017-01-01 PROCEDURE — G8979 MOBILITY GOAL STATUS: HCPCS | Performed by: PHYSICAL THERAPIST

## 2017-01-01 PROCEDURE — 93010 ELECTROCARDIOGRAM REPORT: CPT | Performed by: INTERNAL MEDICINE

## 2017-01-01 PROCEDURE — 83605 ASSAY OF LACTIC ACID: CPT | Performed by: FAMILY MEDICINE

## 2017-01-01 PROCEDURE — 82785 ASSAY OF IGE: CPT | Performed by: INTERNAL MEDICINE

## 2017-01-01 PROCEDURE — 99222 1ST HOSP IP/OBS MODERATE 55: CPT | Performed by: FAMILY MEDICINE

## 2017-01-01 PROCEDURE — 97162 PT EVAL MOD COMPLEX 30 MIN: CPT | Performed by: PHYSICAL THERAPIST

## 2017-01-01 PROCEDURE — 87522 HEPATITIS C REVRS TRNSCRPJ: CPT | Performed by: INTERNAL MEDICINE

## 2017-01-01 PROCEDURE — 94799 UNLISTED PULMONARY SVC/PX: CPT

## 2017-01-01 PROCEDURE — 25810000003 SODIUM CHLORIDE 0.9 % WITH KCL 20 MEQ 20-0.9 MEQ/L-% SOLUTION: Performed by: FAMILY MEDICINE

## 2017-01-01 PROCEDURE — 83690 ASSAY OF LIPASE: CPT | Performed by: EMERGENCY MEDICINE

## 2017-01-01 PROCEDURE — 82140 ASSAY OF AMMONIA: CPT | Performed by: FAMILY MEDICINE

## 2017-01-01 PROCEDURE — 99214 OFFICE O/P EST MOD 30 MIN: CPT | Performed by: INTERNAL MEDICINE

## 2017-01-01 PROCEDURE — 93005 ELECTROCARDIOGRAM TRACING: CPT | Performed by: EMERGENCY MEDICINE

## 2017-01-01 PROCEDURE — 96401 CHEMO ANTI-NEOPL SQ/IM: CPT | Performed by: INTERNAL MEDICINE

## 2017-01-01 PROCEDURE — 74176 CT ABD & PELVIS W/O CONTRAST: CPT

## 2017-01-01 PROCEDURE — 25010000002 LEVOFLOXACIN PER 250 MG: Performed by: EMERGENCY MEDICINE

## 2017-01-01 PROCEDURE — 84315 BODY FLUID SPECIFIC GRAVITY: CPT | Performed by: FAMILY MEDICINE

## 2017-01-01 PROCEDURE — 84484 ASSAY OF TROPONIN QUANT: CPT | Performed by: EMERGENCY MEDICINE

## 2017-01-01 PROCEDURE — 94760 N-INVAS EAR/PLS OXIMETRY 1: CPT

## 2017-01-01 PROCEDURE — 81001 URINALYSIS AUTO W/SCOPE: CPT | Performed by: EMERGENCY MEDICINE

## 2017-01-01 PROCEDURE — 85025 COMPLETE CBC W/AUTO DIFF WBC: CPT | Performed by: EMERGENCY MEDICINE

## 2017-01-01 PROCEDURE — 99213 OFFICE O/P EST LOW 20 MIN: CPT | Performed by: NURSE PRACTITIONER

## 2017-01-01 PROCEDURE — 25010000002 HEPARIN FLUSH (PORCINE) 100 UNIT/ML SOLUTION: Performed by: INTERNAL MEDICINE

## 2017-01-01 PROCEDURE — 99284 EMERGENCY DEPT VISIT MOD MDM: CPT

## 2017-01-01 PROCEDURE — 82570 ASSAY OF URINE CREATININE: CPT | Performed by: INTERNAL MEDICINE

## 2017-01-01 PROCEDURE — 88342 IMHCHEM/IMCYTCHM 1ST ANTB: CPT | Performed by: PATHOLOGY

## 2017-01-01 PROCEDURE — 99215 OFFICE O/P EST HI 40 MIN: CPT | Performed by: INTERNAL MEDICINE

## 2017-01-01 PROCEDURE — 75989 ABSCESS DRAINAGE UNDER X-RAY: CPT

## 2017-01-01 PROCEDURE — 43239 EGD BIOPSY SINGLE/MULTIPLE: CPT | Performed by: INTERNAL MEDICINE

## 2017-01-01 PROCEDURE — 85610 PROTHROMBIN TIME: CPT | Performed by: EMERGENCY MEDICINE

## 2017-01-01 PROCEDURE — 25010000002 ENOXAPARIN PER 10 MG: Performed by: INTERNAL MEDICINE

## 2017-01-01 PROCEDURE — 36591 DRAW BLOOD OFF VENOUS DEVICE: CPT | Performed by: INTERNAL MEDICINE

## 2017-01-01 PROCEDURE — 80053 COMPREHEN METABOLIC PANEL: CPT | Performed by: EMERGENCY MEDICINE

## 2017-01-01 PROCEDURE — 82247 BILIRUBIN TOTAL: CPT | Performed by: INTERNAL MEDICINE

## 2017-01-01 PROCEDURE — 84157 ASSAY OF PROTEIN OTHER: CPT | Performed by: FAMILY MEDICINE

## 2017-01-01 PROCEDURE — 96401 CHEMO ANTI-NEOPL SQ/IM: CPT

## 2017-01-01 PROCEDURE — 25010000002 HYDROMORPHONE PER 4 MG: Performed by: FAMILY MEDICINE

## 2017-01-01 PROCEDURE — 81050 URINALYSIS VOLUME MEASURE: CPT | Performed by: INTERNAL MEDICINE

## 2017-01-01 PROCEDURE — 96402 CHEMO HORMON ANTINEOPL SQ/IM: CPT | Performed by: INTERNAL MEDICINE

## 2017-01-01 PROCEDURE — 97116 GAIT TRAINING THERAPY: CPT

## 2017-01-01 PROCEDURE — G0480 DRUG TEST DEF 1-7 CLASSES: HCPCS

## 2017-01-01 PROCEDURE — 80307 DRUG TEST PRSMV CHEM ANLYZR: CPT | Performed by: EMERGENCY MEDICINE

## 2017-01-01 PROCEDURE — 36415 COLL VENOUS BLD VENIPUNCTURE: CPT

## 2017-01-01 PROCEDURE — 25010000002 MORPHINE PER 10 MG: Performed by: EMERGENCY MEDICINE

## 2017-01-01 PROCEDURE — 99232 SBSQ HOSP IP/OBS MODERATE 35: CPT | Performed by: FAMILY MEDICINE

## 2017-01-01 PROCEDURE — 87205 SMEAR GRAM STAIN: CPT | Performed by: STUDENT IN AN ORGANIZED HEALTH CARE EDUCATION/TRAINING PROGRAM

## 2017-01-01 PROCEDURE — 71020 HC CHEST PA AND LATERAL: CPT

## 2017-01-01 PROCEDURE — 86900 BLOOD TYPING SEROLOGIC ABO: CPT | Performed by: INTERNAL MEDICINE

## 2017-01-01 PROCEDURE — 87086 URINE CULTURE/COLONY COUNT: CPT | Performed by: FAMILY MEDICINE

## 2017-01-01 PROCEDURE — 25010000002 MIDAZOLAM PER 1 MG: Performed by: RADIOLOGY

## 2017-01-01 PROCEDURE — 93005 ELECTROCARDIOGRAM TRACING: CPT | Performed by: FAMILY MEDICINE

## 2017-01-01 PROCEDURE — 25010000002 FENTANYL CITRATE (PF) 100 MCG/2ML SOLUTION: Performed by: RADIOLOGY

## 2017-01-01 PROCEDURE — 45385 COLONOSCOPY W/LESION REMOVAL: CPT | Performed by: INTERNAL MEDICINE

## 2017-01-01 PROCEDURE — 25010000002 PROPOFOL 10 MG/ML EMULSION: Performed by: NURSE ANESTHETIST, CERTIFIED REGISTERED

## 2017-01-01 PROCEDURE — 84300 ASSAY OF URINE SODIUM: CPT | Performed by: FAMILY MEDICINE

## 2017-01-01 PROCEDURE — 84156 ASSAY OF PROTEIN URINE: CPT | Performed by: INTERNAL MEDICINE

## 2017-01-01 PROCEDURE — 0W993ZZ DRAINAGE OF RIGHT PLEURAL CAVITY, PERCUTANEOUS APPROACH: ICD-10-PCS | Performed by: RADIOLOGY

## 2017-01-01 PROCEDURE — 99213 OFFICE O/P EST LOW 20 MIN: CPT | Performed by: STUDENT IN AN ORGANIZED HEALTH CARE EDUCATION/TRAINING PROGRAM

## 2017-01-01 PROCEDURE — P9041 ALBUMIN (HUMAN),5%, 50ML: HCPCS | Performed by: FAMILY MEDICINE

## 2017-01-01 PROCEDURE — 85007 BL SMEAR W/DIFF WBC COUNT: CPT | Performed by: INTERNAL MEDICINE

## 2017-01-01 PROCEDURE — 99238 HOSP IP/OBS DSCHRG MGMT 30/<: CPT | Performed by: FAMILY MEDICINE

## 2017-01-01 PROCEDURE — 87999 UNLISTED MICROBIOLOGY PX: CPT | Performed by: FAMILY MEDICINE

## 2017-01-01 PROCEDURE — 81001 URINALYSIS AUTO W/SCOPE: CPT | Performed by: FAMILY MEDICINE

## 2017-01-01 PROCEDURE — 84460 ALANINE AMINO (ALT) (SGPT): CPT | Performed by: INTERNAL MEDICINE

## 2017-01-01 PROCEDURE — 70450 CT HEAD/BRAIN W/O DYE: CPT

## 2017-01-01 PROCEDURE — 86334 IMMUNOFIX E-PHORESIS SERUM: CPT | Performed by: INTERNAL MEDICINE

## 2017-01-01 PROCEDURE — 83735 ASSAY OF MAGNESIUM: CPT | Performed by: FAMILY MEDICINE

## 2017-01-01 PROCEDURE — 25010000002 ALBUMIN HUMAN 5% PER 50 ML: Performed by: FAMILY MEDICINE

## 2017-01-01 PROCEDURE — G0463 HOSPITAL OUTPT CLINIC VISIT: HCPCS | Performed by: INTERNAL MEDICINE

## 2017-01-01 PROCEDURE — G8988 SELF CARE GOAL STATUS: HCPCS

## 2017-01-01 PROCEDURE — 87902 NFCT AGT GNTYP ALYS HEP C: CPT | Performed by: INTERNAL MEDICINE

## 2017-01-01 PROCEDURE — 86335 IMMUNFIX E-PHORSIS/URINE/CSF: CPT | Performed by: INTERNAL MEDICINE

## 2017-01-01 PROCEDURE — 74177 CT ABD & PELVIS W/CONTRAST: CPT

## 2017-01-01 PROCEDURE — 25010000002 MAGNESIUM SULFATE PER 500 MG OF MAGNESIUM: Performed by: FAMILY MEDICINE

## 2017-01-01 PROCEDURE — 84155 ASSAY OF PROTEIN SERUM: CPT | Performed by: INTERNAL MEDICINE

## 2017-01-01 PROCEDURE — 0 IOPAMIDOL 61 % SOLUTION: Performed by: EMERGENCY MEDICINE

## 2017-01-01 PROCEDURE — 82232 ASSAY OF BETA-2 PROTEIN: CPT | Performed by: INTERNAL MEDICINE

## 2017-01-01 PROCEDURE — 99223 1ST HOSP IP/OBS HIGH 75: CPT | Performed by: STUDENT IN AN ORGANIZED HEALTH CARE EDUCATION/TRAINING PROGRAM

## 2017-01-01 PROCEDURE — 36591 DRAW BLOOD OFF VENOUS DEVICE: CPT

## 2017-01-01 PROCEDURE — 97116 GAIT TRAINING THERAPY: CPT | Performed by: PHYSICAL THERAPIST

## 2017-01-01 PROCEDURE — 85007 BL SMEAR W/DIFF WBC COUNT: CPT | Performed by: EMERGENCY MEDICINE

## 2017-01-01 PROCEDURE — G8979 MOBILITY GOAL STATUS: HCPCS

## 2017-01-01 PROCEDURE — 88305 TISSUE EXAM BY PATHOLOGIST: CPT | Performed by: INTERNAL MEDICINE

## 2017-01-01 PROCEDURE — 25010000002 LEVOFLOXACIN PER 250 MG: Performed by: FAMILY MEDICINE

## 2017-01-01 PROCEDURE — 88305 TISSUE EXAM BY PATHOLOGIST: CPT | Performed by: PATHOLOGY

## 2017-01-01 PROCEDURE — 86923 COMPATIBILITY TEST ELECTRIC: CPT | Performed by: INTERNAL MEDICINE

## 2017-01-01 PROCEDURE — 82140 ASSAY OF AMMONIA: CPT | Performed by: INTERNAL MEDICINE

## 2017-01-01 PROCEDURE — 83615 LACTATE (LD) (LDH) ENZYME: CPT | Performed by: INTERNAL MEDICINE

## 2017-01-01 PROCEDURE — 93005 ELECTROCARDIOGRAM TRACING: CPT | Performed by: INTERNAL MEDICINE

## 2017-01-01 PROCEDURE — 83615 LACTATE (LD) (LDH) ENZYME: CPT | Performed by: FAMILY MEDICINE

## 2017-01-01 PROCEDURE — 87449 NOS EACH ORGANISM AG IA: CPT | Performed by: FAMILY MEDICINE

## 2017-01-01 PROCEDURE — 76700 US EXAM ABDOM COMPLETE: CPT

## 2017-01-01 PROCEDURE — 99255 IP/OBS CONSLTJ NEW/EST HI 80: CPT | Performed by: INTERNAL MEDICINE

## 2017-01-01 PROCEDURE — 84100 ASSAY OF PHOSPHORUS: CPT

## 2017-01-01 PROCEDURE — 0W9G3ZZ DRAINAGE OF PERITONEAL CAVITY, PERCUTANEOUS APPROACH: ICD-10-PCS | Performed by: RADIOLOGY

## 2017-01-01 PROCEDURE — 72040 X-RAY EXAM NECK SPINE 2-3 VW: CPT

## 2017-01-01 PROCEDURE — 36415 COLL VENOUS BLD VENIPUNCTURE: CPT | Performed by: INTERNAL MEDICINE

## 2017-01-01 PROCEDURE — 86901 BLOOD TYPING SEROLOGIC RH(D): CPT | Performed by: INTERNAL MEDICINE

## 2017-01-01 PROCEDURE — 83735 ASSAY OF MAGNESIUM: CPT | Performed by: STUDENT IN AN ORGANIZED HEALTH CARE EDUCATION/TRAINING PROGRAM

## 2017-01-01 PROCEDURE — 87015 SPECIMEN INFECT AGNT CONCNTJ: CPT | Performed by: STUDENT IN AN ORGANIZED HEALTH CARE EDUCATION/TRAINING PROGRAM

## 2017-01-01 PROCEDURE — G8978 MOBILITY CURRENT STATUS: HCPCS | Performed by: PHYSICAL THERAPIST

## 2017-01-01 PROCEDURE — 97166 OT EVAL MOD COMPLEX 45 MIN: CPT

## 2017-01-01 PROCEDURE — 88342 IMHCHEM/IMCYTCHM 1ST ANTB: CPT | Performed by: INTERNAL MEDICINE

## 2017-01-01 PROCEDURE — 25010000002 KETOROLAC TROMETHAMINE PER 15 MG: Performed by: EMERGENCY MEDICINE

## 2017-01-01 PROCEDURE — 87070 CULTURE OTHR SPECIMN AEROBIC: CPT | Performed by: STUDENT IN AN ORGANIZED HEALTH CARE EDUCATION/TRAINING PROGRAM

## 2017-01-01 PROCEDURE — 82105 ALPHA-FETOPROTEIN SERUM: CPT | Performed by: INTERNAL MEDICINE

## 2017-01-01 PROCEDURE — 25010000002 CEFTRIAXONE: Performed by: EMERGENCY MEDICINE

## 2017-01-01 PROCEDURE — 87070 CULTURE OTHR SPECIMN AEROBIC: CPT | Performed by: EMERGENCY MEDICINE

## 2017-01-01 PROCEDURE — 87086 URINE CULTURE/COLONY COUNT: CPT | Performed by: EMERGENCY MEDICINE

## 2017-01-01 PROCEDURE — 87040 BLOOD CULTURE FOR BACTERIA: CPT | Performed by: FAMILY MEDICINE

## 2017-01-01 PROCEDURE — 80053 COMPREHEN METABOLIC PANEL: CPT

## 2017-01-01 PROCEDURE — 25010000002 FUROSEMIDE PER 20 MG: Performed by: FAMILY MEDICINE

## 2017-01-01 PROCEDURE — P9046 ALBUMIN (HUMAN), 25%, 20 ML: HCPCS | Performed by: FAMILY MEDICINE

## 2017-01-01 PROCEDURE — 97163 PT EVAL HIGH COMPLEX 45 MIN: CPT

## 2017-01-01 PROCEDURE — 72072 X-RAY EXAM THORAC SPINE 3VWS: CPT

## 2017-01-01 PROCEDURE — 87081 CULTURE SCREEN ONLY: CPT | Performed by: FAMILY MEDICINE

## 2017-01-01 PROCEDURE — 96365 THER/PROPH/DIAG IV INF INIT: CPT

## 2017-01-01 PROCEDURE — 87899 AGENT NOS ASSAY W/OPTIC: CPT | Performed by: FAMILY MEDICINE

## 2017-01-01 PROCEDURE — 99232 SBSQ HOSP IP/OBS MODERATE 35: CPT | Performed by: STUDENT IN AN ORGANIZED HEALTH CARE EDUCATION/TRAINING PROGRAM

## 2017-01-01 PROCEDURE — 25010000002 ONDANSETRON PER 1 MG: Performed by: EMERGENCY MEDICINE

## 2017-01-01 PROCEDURE — 99213 OFFICE O/P EST LOW 20 MIN: CPT | Performed by: INTERNAL MEDICINE

## 2017-01-01 PROCEDURE — 76942 ECHO GUIDE FOR BIOPSY: CPT

## 2017-01-01 PROCEDURE — 85025 COMPLETE CBC W/AUTO DIFF WBC: CPT

## 2017-01-01 PROCEDURE — 25010000002 MAGNESIUM SULFATE 2 GM/50ML SOLUTION: Performed by: FAMILY MEDICINE

## 2017-01-01 PROCEDURE — 25010000002 ALBUMIN HUMAN 25% PER 50 ML: Performed by: FAMILY MEDICINE

## 2017-01-01 PROCEDURE — 83935 ASSAY OF URINE OSMOLALITY: CPT | Performed by: FAMILY MEDICINE

## 2017-01-01 PROCEDURE — 87205 SMEAR GRAM STAIN: CPT | Performed by: EMERGENCY MEDICINE

## 2017-01-01 PROCEDURE — G8978 MOBILITY CURRENT STATUS: HCPCS

## 2017-01-01 PROCEDURE — 83010 ASSAY OF HAPTOGLOBIN QUANT: CPT | Performed by: INTERNAL MEDICINE

## 2017-01-01 PROCEDURE — 96366 THER/PROPH/DIAG IV INF ADDON: CPT

## 2017-01-01 PROCEDURE — 86140 C-REACTIVE PROTEIN: CPT | Performed by: FAMILY MEDICINE

## 2017-01-01 PROCEDURE — 99212 OFFICE O/P EST SF 10 MIN: CPT | Performed by: NURSE PRACTITIONER

## 2017-01-01 PROCEDURE — 85730 THROMBOPLASTIN TIME PARTIAL: CPT | Performed by: EMERGENCY MEDICINE

## 2017-01-01 PROCEDURE — 74000 HC ABDOMEN KUB: CPT

## 2017-01-01 PROCEDURE — 36430 TRANSFUSION BLD/BLD COMPNT: CPT | Performed by: INTERNAL MEDICINE

## 2017-01-01 PROCEDURE — 87015 SPECIMEN INFECT AGNT CONCNTJ: CPT | Performed by: EMERGENCY MEDICINE

## 2017-01-01 PROCEDURE — 86850 RBC ANTIBODY SCREEN: CPT | Performed by: INTERNAL MEDICINE

## 2017-01-01 RX ORDER — SPIRONOLACTONE 50 MG/1
50 TABLET, FILM COATED ORAL 2 TIMES DAILY
Status: DISCONTINUED | OUTPATIENT
Start: 2017-01-01 | End: 2017-01-01 | Stop reason: HOSPADM

## 2017-01-01 RX ORDER — FUROSEMIDE 40 MG/1
40 TABLET ORAL DAILY
Status: DISCONTINUED | OUTPATIENT
Start: 2017-01-01 | End: 2017-01-01 | Stop reason: HOSPADM

## 2017-01-01 RX ORDER — SODIUM CHLORIDE 0.9 % (FLUSH) 0.9 %
10 SYRINGE (ML) INJECTION AS NEEDED
Status: DISCONTINUED | OUTPATIENT
Start: 2017-01-01 | End: 2017-01-01 | Stop reason: HOSPADM

## 2017-01-01 RX ORDER — DICLOFENAC SODIUM 1 MG/ML
1 SOLUTION/ DROPS OPHTHALMIC 2 TIMES DAILY
Qty: 2.5 ML | Refills: 0 | Status: SHIPPED | OUTPATIENT
Start: 2017-01-01 | End: 2017-01-01

## 2017-01-01 RX ORDER — SODIUM, POTASSIUM,MAG SULFATES 17.5-3.13G
1 SOLUTION, RECONSTITUTED, ORAL ORAL EVERY 12 HOURS
Qty: 1 BOTTLE | Refills: 0 | Status: SHIPPED | OUTPATIENT
Start: 2017-01-01 | End: 2017-01-01 | Stop reason: CLARIF

## 2017-01-01 RX ORDER — BORTEZOMIB 3.5 MG/1
1.04 INJECTION, POWDER, LYOPHILIZED, FOR SOLUTION INTRAVENOUS; SUBCUTANEOUS ONCE
Status: COMPLETED | OUTPATIENT
Start: 2017-01-01 | End: 2017-01-01

## 2017-01-01 RX ORDER — SODIUM CHLORIDE 0.9 % (FLUSH) 0.9 %
10 SYRINGE (ML) INJECTION AS NEEDED
Status: CANCELLED | OUTPATIENT
Start: 2017-01-01

## 2017-01-01 RX ORDER — PROCHLORPERAZINE MALEATE 10 MG
10 TABLET ORAL EVERY 6 HOURS PRN
Qty: 60 TABLET | Refills: 1 | Status: SHIPPED | OUTPATIENT
Start: 2017-01-01 | End: 2017-01-01 | Stop reason: SDUPTHER

## 2017-01-01 RX ORDER — POTASSIUM CHLORIDE 750 MG/1
40 CAPSULE, EXTENDED RELEASE ORAL AS NEEDED
Status: DISCONTINUED | OUTPATIENT
Start: 2017-01-01 | End: 2017-01-01 | Stop reason: HOSPADM

## 2017-01-01 RX ORDER — GABAPENTIN 400 MG/1
400 CAPSULE ORAL 3 TIMES DAILY
Status: DISCONTINUED | OUTPATIENT
Start: 2017-01-01 | End: 2017-01-01 | Stop reason: HOSPADM

## 2017-01-01 RX ORDER — NALOXONE HCL 0.4 MG/ML
0.4 VIAL (ML) INJECTION
Status: CANCELLED | OUTPATIENT
Start: 2017-01-01

## 2017-01-01 RX ORDER — LEVOFLOXACIN 750 MG/1
750 TABLET ORAL EVERY 24 HOURS
Qty: 6 TABLET | Refills: 0 | Status: SHIPPED | OUTPATIENT
Start: 2017-01-01 | End: 2017-01-01 | Stop reason: HOSPADM

## 2017-01-01 RX ORDER — ONDANSETRON 2 MG/ML
4 INJECTION INTRAMUSCULAR; INTRAVENOUS EVERY 6 HOURS PRN
Status: DISCONTINUED | OUTPATIENT
Start: 2017-01-01 | End: 2017-01-01 | Stop reason: HOSPADM

## 2017-01-01 RX ORDER — ONDANSETRON 2 MG/ML
4 INJECTION INTRAMUSCULAR; INTRAVENOUS EVERY 6 HOURS PRN
Status: CANCELLED | OUTPATIENT
Start: 2017-01-01

## 2017-01-01 RX ORDER — SUCRALFATE ORAL 1 G/10ML
1 SUSPENSION ORAL DAILY
Status: DISCONTINUED | OUTPATIENT
Start: 2017-01-01 | End: 2017-01-01 | Stop reason: HOSPADM

## 2017-01-01 RX ORDER — GABAPENTIN 400 MG/1
400 CAPSULE ORAL 3 TIMES DAILY
Qty: 90 CAPSULE | Refills: 2 | Status: SHIPPED | OUTPATIENT
Start: 2017-01-01 | End: 2017-01-01 | Stop reason: HOSPADM

## 2017-01-01 RX ORDER — ATORVASTATIN CALCIUM 10 MG/1
10 TABLET, FILM COATED ORAL DAILY
Status: DISCONTINUED | OUTPATIENT
Start: 2017-01-01 | End: 2017-01-01 | Stop reason: HOSPADM

## 2017-01-01 RX ORDER — SODIUM CHLORIDE 0.9 % (FLUSH) 0.9 %
1-10 SYRINGE (ML) INJECTION AS NEEDED
Status: DISCONTINUED | OUTPATIENT
Start: 2017-01-01 | End: 2017-01-01 | Stop reason: HOSPADM

## 2017-01-01 RX ORDER — ONDANSETRON 4 MG/1
4 TABLET, FILM COATED ORAL EVERY 8 HOURS PRN
Qty: 30 TABLET | Refills: 0 | Status: SHIPPED | OUTPATIENT
Start: 2017-01-01

## 2017-01-01 RX ORDER — POTASSIUM CHLORIDE 750 MG/1
40 CAPSULE, EXTENDED RELEASE ORAL ONCE
Status: COMPLETED | OUTPATIENT
Start: 2017-01-01 | End: 2017-01-01

## 2017-01-01 RX ORDER — NAPROXEN 500 MG/1
500 TABLET ORAL 2 TIMES DAILY WITH MEALS
Status: DISCONTINUED | OUTPATIENT
Start: 2017-01-01 | End: 2017-01-01 | Stop reason: HOSPADM

## 2017-01-01 RX ORDER — SODIUM CHLORIDE AND POTASSIUM CHLORIDE 150; 900 MG/100ML; MG/100ML
75 INJECTION, SOLUTION INTRAVENOUS CONTINUOUS
Status: DISCONTINUED | OUTPATIENT
Start: 2017-01-01 | End: 2017-01-01

## 2017-01-01 RX ORDER — POTASSIUM CHLORIDE 1.5 G/1.77G
40 POWDER, FOR SOLUTION ORAL ONCE
Status: COMPLETED | OUTPATIENT
Start: 2017-01-01 | End: 2017-01-01

## 2017-01-01 RX ORDER — NALOXONE HCL 0.4 MG/ML
0.4 VIAL (ML) INJECTION
Status: DISCONTINUED | OUTPATIENT
Start: 2017-01-01 | End: 2017-01-01

## 2017-01-01 RX ORDER — SODIUM CHLORIDE 9 MG/ML
125 INJECTION, SOLUTION INTRAVENOUS CONTINUOUS
Status: DISCONTINUED | OUTPATIENT
Start: 2017-01-01 | End: 2017-01-01

## 2017-01-01 RX ORDER — SODIUM CHLORIDE 1000 MG
2 TABLET, SOLUBLE MISCELLANEOUS 2 TIMES DAILY WITH MEALS
Status: DISCONTINUED | OUTPATIENT
Start: 2017-01-01 | End: 2017-01-01 | Stop reason: HOSPADM

## 2017-01-01 RX ORDER — LORAZEPAM 2 MG/ML
2 INJECTION INTRAMUSCULAR
Status: DISCONTINUED | OUTPATIENT
Start: 2017-01-01 | End: 2017-01-01

## 2017-01-01 RX ORDER — PROCHLORPERAZINE MALEATE 10 MG
10 TABLET ORAL EVERY 6 HOURS PRN
Status: DISCONTINUED | OUTPATIENT
Start: 2017-01-01 | End: 2017-01-01 | Stop reason: HOSPADM

## 2017-01-01 RX ORDER — MAGNESIUM SULFATE HEPTAHYDRATE 40 MG/ML
2 INJECTION, SOLUTION INTRAVENOUS ONCE
Status: COMPLETED | OUTPATIENT
Start: 2017-01-01 | End: 2017-01-01

## 2017-01-01 RX ORDER — POTASSIUM CHLORIDE 7.45 MG/ML
10 INJECTION INTRAVENOUS
Status: DISCONTINUED | OUTPATIENT
Start: 2017-01-01 | End: 2017-01-01 | Stop reason: SDUPTHER

## 2017-01-01 RX ORDER — NALOXONE HCL 0.4 MG/ML
0.4 VIAL (ML) INJECTION
Status: DISCONTINUED | OUTPATIENT
Start: 2017-01-01 | End: 2017-01-01 | Stop reason: HOSPADM

## 2017-01-01 RX ORDER — 0.9 % SODIUM CHLORIDE 0.9 %
10 VIAL (ML) INJECTION EVERY 12 HOURS SCHEDULED
Status: DISCONTINUED | OUTPATIENT
Start: 2017-01-01 | End: 2017-01-01

## 2017-01-01 RX ORDER — SUCRALFATE ORAL 1 G/10ML
1 SUSPENSION ORAL DAILY
Qty: 30 G | Refills: 3 | Status: SHIPPED | OUTPATIENT
Start: 2017-01-01 | End: 2017-01-01 | Stop reason: SDUPTHER

## 2017-01-01 RX ORDER — KETOROLAC TROMETHAMINE 30 MG/ML
30 INJECTION, SOLUTION INTRAMUSCULAR; INTRAVENOUS ONCE
Status: COMPLETED | OUTPATIENT
Start: 2017-01-01 | End: 2017-01-01

## 2017-01-01 RX ORDER — HYDROMORPHONE HYDROCHLORIDE 2 MG/1
2 TABLET ORAL 3 TIMES DAILY PRN
Qty: 90 TABLET | Refills: 0 | Status: SHIPPED | OUTPATIENT
Start: 2017-01-01

## 2017-01-01 RX ORDER — METRONIDAZOLE 500 MG/1
500 TABLET ORAL EVERY 8 HOURS SCHEDULED
Qty: 18 TABLET | Refills: 0 | Status: SHIPPED | OUTPATIENT
Start: 2017-01-01 | End: 2017-01-01 | Stop reason: HOSPADM

## 2017-01-01 RX ORDER — SODIUM CHLORIDE 9 MG/ML
75 INJECTION, SOLUTION INTRAVENOUS CONTINUOUS
Status: DISCONTINUED | OUTPATIENT
Start: 2017-01-01 | End: 2017-01-01

## 2017-01-01 RX ORDER — ACYCLOVIR 400 MG/1
400 TABLET ORAL DAILY
COMMUNITY
Start: 2017-01-01 | End: 2017-01-01 | Stop reason: SDUPTHER

## 2017-01-01 RX ORDER — BORTEZOMIB 3.5 MG/1
1.04 INJECTION, POWDER, LYOPHILIZED, FOR SOLUTION INTRAVENOUS; SUBCUTANEOUS ONCE
Status: CANCELLED | OUTPATIENT
Start: 2017-01-01

## 2017-01-01 RX ORDER — LEVOFLOXACIN 750 MG/1
750 TABLET ORAL EVERY 24 HOURS
Status: DISCONTINUED | OUTPATIENT
Start: 2017-01-01 | End: 2017-01-01 | Stop reason: HOSPADM

## 2017-01-01 RX ORDER — POTASSIUM CHLORIDE 29.8 MG/ML
20 INJECTION INTRAVENOUS
Status: DISCONTINUED | OUTPATIENT
Start: 2017-01-01 | End: 2017-01-01

## 2017-01-01 RX ORDER — ACYCLOVIR 400 MG/1
400 TABLET ORAL DAILY
Qty: 30 TABLET | Refills: 0 | Status: SHIPPED | OUTPATIENT
Start: 2017-01-01 | End: 2017-01-01 | Stop reason: HOSPADM

## 2017-01-01 RX ORDER — LORAZEPAM 2 MG/1
2 TABLET ORAL
Status: DISCONTINUED | OUTPATIENT
Start: 2017-01-01 | End: 2017-01-01

## 2017-01-01 RX ORDER — ATORVASTATIN CALCIUM 10 MG/1
10 TABLET, FILM COATED ORAL DAILY
Qty: 30 TABLET | Refills: 3 | Status: SHIPPED | OUTPATIENT
Start: 2017-01-01 | End: 2017-01-01 | Stop reason: ALTCHOICE

## 2017-01-01 RX ORDER — POTASSIUM CHLORIDE 7.45 MG/ML
10 INJECTION INTRAVENOUS
Status: DISCONTINUED | OUTPATIENT
Start: 2017-01-01 | End: 2017-01-01 | Stop reason: HOSPADM

## 2017-01-01 RX ORDER — SULFAMETHOXAZOLE AND TRIMETHOPRIM 800; 160 MG/1; MG/1
1 TABLET ORAL ONCE
Status: COMPLETED | OUTPATIENT
Start: 2017-01-01 | End: 2017-01-01

## 2017-01-01 RX ORDER — METRONIDAZOLE 500 MG/1
500 TABLET ORAL 3 TIMES DAILY
Qty: 21 TABLET | Refills: 0 | Status: SHIPPED | OUTPATIENT
Start: 2017-01-01 | End: 2017-01-01

## 2017-01-01 RX ORDER — POTASSIUM CHLORIDE 1.5 G/1.77G
20 POWDER, FOR SOLUTION ORAL DAILY
Status: DISCONTINUED | OUTPATIENT
Start: 2017-01-01 | End: 2017-01-01 | Stop reason: HOSPADM

## 2017-01-01 RX ORDER — SERTRALINE HYDROCHLORIDE 100 MG/1
200 TABLET, FILM COATED ORAL DAILY
Qty: 60 TABLET | Refills: 2 | Status: SHIPPED | OUTPATIENT
Start: 2017-01-01 | End: 2017-01-01 | Stop reason: SDUPTHER

## 2017-01-01 RX ORDER — SODIUM CHLORIDE AND POTASSIUM CHLORIDE 150; 900 MG/100ML; MG/100ML
75 INJECTION, SOLUTION INTRAVENOUS CONTINUOUS
Status: CANCELLED | OUTPATIENT
Start: 2017-01-01

## 2017-01-01 RX ORDER — POTASSIUM CHLORIDE 20 MEQ/1
20 TABLET, EXTENDED RELEASE ORAL DAILY
Qty: 30 TABLET | Refills: 0 | Status: SHIPPED | OUTPATIENT
Start: 2017-01-01

## 2017-01-01 RX ORDER — MIDAZOLAM HYDROCHLORIDE 1 MG/ML
INJECTION INTRAMUSCULAR; INTRAVENOUS
Status: COMPLETED | OUTPATIENT
Start: 2017-01-01 | End: 2017-01-01

## 2017-01-01 RX ORDER — MAGNESIUM SULFATE HEPTAHYDRATE 40 MG/ML
2 INJECTION, SOLUTION INTRAVENOUS ONCE
Status: CANCELLED | OUTPATIENT
Start: 2017-01-01

## 2017-01-01 RX ORDER — BISACODYL 10 MG
10 SUPPOSITORY, RECTAL RECTAL DAILY PRN
Status: DISCONTINUED | OUTPATIENT
Start: 2017-01-01 | End: 2017-01-01 | Stop reason: HOSPADM

## 2017-01-01 RX ORDER — ALBUMIN (HUMAN) 12.5 G/50ML
25 SOLUTION INTRAVENOUS ONCE
Status: COMPLETED | OUTPATIENT
Start: 2017-01-01 | End: 2017-01-01

## 2017-01-01 RX ORDER — FAMOTIDINE 20 MG/1
20 TABLET, FILM COATED ORAL 2 TIMES DAILY
Status: DISCONTINUED | OUTPATIENT
Start: 2017-01-01 | End: 2017-01-01 | Stop reason: HOSPADM

## 2017-01-01 RX ORDER — FUROSEMIDE 10 MG/ML
40 INJECTION INTRAMUSCULAR; INTRAVENOUS ONCE
Status: DISCONTINUED | OUTPATIENT
Start: 2017-01-01 | End: 2017-01-01 | Stop reason: HOSPADM

## 2017-01-01 RX ORDER — DEXAMETHASONE 4 MG/1
4 TABLET ORAL
Status: DISCONTINUED | OUTPATIENT
Start: 2017-01-01 | End: 2017-01-01 | Stop reason: HOSPADM

## 2017-01-01 RX ORDER — SERTRALINE HYDROCHLORIDE 100 MG/1
100 TABLET, FILM COATED ORAL DAILY
Qty: 30 TABLET | Refills: 2 | Status: SHIPPED | OUTPATIENT
Start: 2017-01-01 | End: 2017-01-01 | Stop reason: SDUPTHER

## 2017-01-01 RX ORDER — ACYCLOVIR 400 MG/1
400 TABLET ORAL DAILY
Status: DISCONTINUED | OUTPATIENT
Start: 2017-01-01 | End: 2017-01-01 | Stop reason: HOSPADM

## 2017-01-01 RX ORDER — DEXTROSE AND SODIUM CHLORIDE 5; .45 G/100ML; G/100ML
30 INJECTION, SOLUTION INTRAVENOUS CONTINUOUS PRN
Status: DISCONTINUED | OUTPATIENT
Start: 2017-01-01 | End: 2017-01-01 | Stop reason: HOSPADM

## 2017-01-01 RX ORDER — CIPROFLOXACIN 750 MG/1
750 TABLET, FILM COATED ORAL 2 TIMES DAILY
Qty: 12 TABLET | Refills: 0 | Status: SHIPPED | OUTPATIENT
Start: 2017-01-01 | End: 2017-01-01

## 2017-01-01 RX ORDER — 0.9 % SODIUM CHLORIDE 0.9 %
10 VIAL (ML) INJECTION ONCE
Status: DISCONTINUED | OUTPATIENT
Start: 2017-01-01 | End: 2017-01-01 | Stop reason: HOSPADM

## 2017-01-01 RX ORDER — SODIUM CHLORIDE 9 MG/ML
125 INJECTION, SOLUTION INTRAVENOUS CONTINUOUS
Status: DISCONTINUED | OUTPATIENT
Start: 2017-01-01 | End: 2017-01-01 | Stop reason: HOSPADM

## 2017-01-01 RX ORDER — METRONIDAZOLE 500 MG/1
500 TABLET ORAL EVERY 8 HOURS SCHEDULED
Status: DISCONTINUED | OUTPATIENT
Start: 2017-01-01 | End: 2017-01-01

## 2017-01-01 RX ORDER — RANITIDINE 75 MG/1
150 TABLET, FILM COATED ORAL 2 TIMES DAILY
Qty: 120 TABLET | Refills: 2 | Status: SHIPPED | OUTPATIENT
Start: 2017-01-01

## 2017-01-01 RX ORDER — DEXAMETHASONE 4 MG/1
4 TABLET ORAL
COMMUNITY
Start: 2017-01-01 | End: 2017-01-01 | Stop reason: HOSPADM

## 2017-01-01 RX ORDER — NICOTINE 21 MG/24HR
1 PATCH, TRANSDERMAL 24 HOURS TRANSDERMAL EVERY 24 HOURS
Status: CANCELLED | OUTPATIENT
Start: 2017-01-01

## 2017-01-01 RX ORDER — NICOTINE 21 MG/24HR
1 PATCH, TRANSDERMAL 24 HOURS TRANSDERMAL EVERY 24 HOURS
Status: DISCONTINUED | OUTPATIENT
Start: 2017-01-01 | End: 2017-01-01 | Stop reason: HOSPADM

## 2017-01-01 RX ORDER — FUROSEMIDE 10 MG/ML
20 INJECTION INTRAMUSCULAR; INTRAVENOUS ONCE
Status: COMPLETED | OUTPATIENT
Start: 2017-01-01 | End: 2017-01-01

## 2017-01-01 RX ORDER — LEVOFLOXACIN 5 MG/ML
750 INJECTION, SOLUTION INTRAVENOUS ONCE
Status: COMPLETED | OUTPATIENT
Start: 2017-01-01 | End: 2017-01-01

## 2017-01-01 RX ORDER — LORAZEPAM 1 MG/1
1 TABLET ORAL
Status: DISCONTINUED | OUTPATIENT
Start: 2017-01-01 | End: 2017-01-01

## 2017-01-01 RX ORDER — MORPHINE SULFATE 4 MG/ML
4 INJECTION, SOLUTION INTRAMUSCULAR; INTRAVENOUS ONCE
Status: COMPLETED | OUTPATIENT
Start: 2017-01-01 | End: 2017-01-01

## 2017-01-01 RX ORDER — POLYETHYLENE GLYCOL 3350 17 G/17G
17 POWDER, FOR SOLUTION ORAL DAILY
Status: DISCONTINUED | OUTPATIENT
Start: 2017-01-01 | End: 2017-01-01 | Stop reason: HOSPADM

## 2017-01-01 RX ORDER — LEVOFLOXACIN 5 MG/ML
500 INJECTION, SOLUTION INTRAVENOUS EVERY 24 HOURS
Status: DISCONTINUED | OUTPATIENT
Start: 2017-01-01 | End: 2017-01-01

## 2017-01-01 RX ORDER — FAMOTIDINE 20 MG/1
20 TABLET, FILM COATED ORAL DAILY
Status: DISCONTINUED | OUTPATIENT
Start: 2017-01-01 | End: 2017-01-01 | Stop reason: HOSPADM

## 2017-01-01 RX ORDER — POTASSIUM CHLORIDE 1.5 G/1.77G
40 POWDER, FOR SOLUTION ORAL AS NEEDED
Status: DISCONTINUED | OUTPATIENT
Start: 2017-01-01 | End: 2017-01-01 | Stop reason: HOSPADM

## 2017-01-01 RX ORDER — PROCHLORPERAZINE MALEATE 10 MG
10 TABLET ORAL EVERY 6 HOURS PRN
Qty: 60 TABLET | Refills: 1 | Status: SHIPPED | OUTPATIENT
Start: 2017-01-01

## 2017-01-01 RX ORDER — BORTEZOMIB 3.5 MG/1
2 INJECTION, POWDER, LYOPHILIZED, FOR SOLUTION INTRAVENOUS; SUBCUTANEOUS ONCE
Status: COMPLETED | OUTPATIENT
Start: 2017-01-01 | End: 2017-01-01

## 2017-01-01 RX ORDER — MORPHINE SULFATE 2 MG/ML
1 INJECTION, SOLUTION INTRAMUSCULAR; INTRAVENOUS EVERY 4 HOURS PRN
Status: DISCONTINUED | OUTPATIENT
Start: 2017-01-01 | End: 2017-01-01

## 2017-01-01 RX ORDER — SULFAMETHOXAZOLE AND TRIMETHOPRIM 800; 160 MG/1; MG/1
400 TABLET ORAL
COMMUNITY
Start: 2017-01-01 | End: 2017-01-01

## 2017-01-01 RX ORDER — SERTRALINE HYDROCHLORIDE 100 MG/1
200 TABLET, FILM COATED ORAL DAILY
Qty: 60 TABLET | Refills: 2 | Status: SHIPPED | OUTPATIENT
Start: 2017-01-01

## 2017-01-01 RX ORDER — ACETAMINOPHEN 325 MG/1
650 TABLET ORAL EVERY 4 HOURS PRN
Status: CANCELLED | OUTPATIENT
Start: 2017-01-01

## 2017-01-01 RX ORDER — MORPHINE SULFATE 2 MG/ML
1 INJECTION, SOLUTION INTRAMUSCULAR; INTRAVENOUS EVERY 4 HOURS PRN
Status: DISCONTINUED | OUTPATIENT
Start: 2017-01-01 | End: 2017-01-01 | Stop reason: HOSPADM

## 2017-01-01 RX ORDER — LORAZEPAM 2 MG/ML
1 INJECTION INTRAMUSCULAR
Status: DISCONTINUED | OUTPATIENT
Start: 2017-01-01 | End: 2017-01-01

## 2017-01-01 RX ORDER — NYSTATIN AND TRIAMCINOLONE ACETONIDE 100000; 1 [USP'U]/G; MG/G
OINTMENT TOPICAL EVERY 12 HOURS SCHEDULED
Status: DISCONTINUED | OUTPATIENT
Start: 2017-01-01 | End: 2017-01-01 | Stop reason: HOSPADM

## 2017-01-01 RX ORDER — POTASSIUM CHLORIDE 750 MG/1
40 CAPSULE, EXTENDED RELEASE ORAL ONCE
Status: CANCELLED | OUTPATIENT
Start: 2017-01-01

## 2017-01-01 RX ORDER — SODIUM CHLORIDE 0.9 % (FLUSH) 0.9 %
1-10 SYRINGE (ML) INJECTION AS NEEDED
Status: CANCELLED | OUTPATIENT
Start: 2017-01-01

## 2017-01-01 RX ORDER — RANITIDINE HCL 75 MG
75 TABLET ORAL 2 TIMES DAILY
Qty: 60 TABLET | Refills: 3 | Status: SHIPPED | OUTPATIENT
Start: 2017-01-01 | End: 2017-01-01

## 2017-01-01 RX ORDER — VARENICLINE TARTRATE 0.5 MG/1
0.5 TABLET, FILM COATED ORAL 2 TIMES DAILY
Qty: 30 TABLET | Refills: 0 | Status: SHIPPED | OUTPATIENT
Start: 2017-01-01 | End: 2017-01-01 | Stop reason: HOSPADM

## 2017-01-01 RX ORDER — ACETAMINOPHEN 325 MG/1
650 TABLET ORAL EVERY 4 HOURS PRN
Status: DISCONTINUED | OUTPATIENT
Start: 2017-01-01 | End: 2017-01-01 | Stop reason: HOSPADM

## 2017-01-01 RX ORDER — ONDANSETRON 2 MG/ML
4 INJECTION INTRAMUSCULAR; INTRAVENOUS ONCE
Status: COMPLETED | OUTPATIENT
Start: 2017-01-01 | End: 2017-01-01

## 2017-01-01 RX ORDER — MORPHINE SULFATE 2 MG/ML
2 INJECTION, SOLUTION INTRAMUSCULAR; INTRAVENOUS EVERY 4 HOURS PRN
Status: DISCONTINUED | OUTPATIENT
Start: 2017-01-01 | End: 2017-01-01

## 2017-01-01 RX ORDER — LEVOFLOXACIN 750 MG/1
750 TABLET ORAL EVERY 24 HOURS
Status: DISCONTINUED | OUTPATIENT
Start: 2017-01-01 | End: 2017-01-01

## 2017-01-01 RX ORDER — HYDROCODONE BITARTRATE AND ACETAMINOPHEN 5; 325 MG/1; MG/1
1 TABLET ORAL EVERY 8 HOURS PRN
Status: DISCONTINUED | OUTPATIENT
Start: 2017-01-01 | End: 2017-01-01 | Stop reason: HOSPADM

## 2017-01-01 RX ORDER — MIDAZOLAM HYDROCHLORIDE 1 MG/ML
INJECTION INTRAMUSCULAR; INTRAVENOUS AS NEEDED
Status: DISCONTINUED | OUTPATIENT
Start: 2017-01-01 | End: 2017-01-01 | Stop reason: SURG

## 2017-01-01 RX ORDER — ATORVASTATIN CALCIUM 10 MG/1
10 TABLET, FILM COATED ORAL DAILY
Qty: 30 TABLET | Refills: 3 | Status: SHIPPED | OUTPATIENT
Start: 2017-01-01 | End: 2017-01-01 | Stop reason: SDUPTHER

## 2017-01-01 RX ORDER — FENTANYL CITRATE 50 UG/ML
INJECTION, SOLUTION INTRAMUSCULAR; INTRAVENOUS
Status: COMPLETED | OUTPATIENT
Start: 2017-01-01 | End: 2017-01-01

## 2017-01-01 RX ORDER — HYDROCODONE BITARTRATE AND ACETAMINOPHEN 5; 325 MG/1; MG/1
1 TABLET ORAL EVERY 6 HOURS PRN
Status: DISCONTINUED | OUTPATIENT
Start: 2017-01-01 | End: 2017-01-01

## 2017-01-01 RX ORDER — ALBUMIN, HUMAN INJ 5% 5 %
12.5 SOLUTION INTRAVENOUS ONCE
Status: COMPLETED | OUTPATIENT
Start: 2017-01-01 | End: 2017-01-01

## 2017-01-01 RX ORDER — LIDOCAINE HYDROCHLORIDE 10 MG/ML
0.1 INJECTION, SOLUTION EPIDURAL; INFILTRATION; INTRACAUDAL; PERINEURAL ONCE AS NEEDED
Status: CANCELLED | OUTPATIENT
Start: 2017-01-01

## 2017-01-01 RX ORDER — RANITIDINE 75 MG/1
TABLET, FILM COATED ORAL
COMMUNITY
Start: 2017-01-01 | End: 2017-01-01 | Stop reason: SDUPTHER

## 2017-01-01 RX ORDER — SPIRONOLACTONE 50 MG/1
50 TABLET, FILM COATED ORAL 2 TIMES DAILY
Qty: 60 TABLET | Refills: 0 | Status: SHIPPED | OUTPATIENT
Start: 2017-01-01 | End: 2017-01-01 | Stop reason: SDUPTHER

## 2017-01-01 RX ORDER — POLYETHYLENE GLYCOL 3350 17 G/17G
17 POWDER, FOR SOLUTION ORAL DAILY
Qty: 527 G | Refills: 1 | Status: SHIPPED | OUTPATIENT
Start: 2017-01-01

## 2017-01-01 RX ORDER — BISACODYL 5 MG/1
5 TABLET, DELAYED RELEASE ORAL DAILY PRN
Status: DISCONTINUED | OUTPATIENT
Start: 2017-01-01 | End: 2017-01-01 | Stop reason: HOSPADM

## 2017-01-01 RX ORDER — PROPOFOL 10 MG/ML
VIAL (ML) INTRAVENOUS AS NEEDED
Status: DISCONTINUED | OUTPATIENT
Start: 2017-01-01 | End: 2017-01-01 | Stop reason: SURG

## 2017-01-01 RX ORDER — FUROSEMIDE 40 MG/1
40 TABLET ORAL DAILY
Qty: 30 TABLET | Refills: 0 | Status: SHIPPED | OUTPATIENT
Start: 2017-01-01 | End: 2017-01-01 | Stop reason: SDUPTHER

## 2017-01-01 RX ORDER — FUROSEMIDE 40 MG/1
40 TABLET ORAL DAILY
Qty: 30 TABLET | Refills: 2 | Status: SHIPPED | OUTPATIENT
Start: 2017-01-01

## 2017-01-01 RX ORDER — METRONIDAZOLE 500 MG/1
500 TABLET ORAL EVERY 8 HOURS SCHEDULED
Status: DISCONTINUED | OUTPATIENT
Start: 2017-01-01 | End: 2017-01-01 | Stop reason: HOSPADM

## 2017-01-01 RX ORDER — SULFAMETHOXAZOLE AND TRIMETHOPRIM 800; 160 MG/1; MG/1
1 TABLET ORAL 2 TIMES DAILY
Qty: 20 TABLET | Refills: 0 | Status: SHIPPED | OUTPATIENT
Start: 2017-01-01

## 2017-01-01 RX ORDER — FAMOTIDINE 40 MG/1
40 TABLET, FILM COATED ORAL DAILY
Status: DISCONTINUED | OUTPATIENT
Start: 2017-01-01 | End: 2017-01-01 | Stop reason: HOSPADM

## 2017-01-01 RX ORDER — GABAPENTIN 400 MG/1
400 CAPSULE ORAL 3 TIMES DAILY
Qty: 90 CAPSULE | Refills: 2 | Status: SHIPPED | OUTPATIENT
Start: 2017-01-01 | End: 2017-01-01 | Stop reason: SDUPTHER

## 2017-01-01 RX ORDER — LORAZEPAM 2 MG/1
2 TABLET ORAL EVERY 6 HOURS PRN
Status: DISCONTINUED | OUTPATIENT
Start: 2017-01-01 | End: 2017-01-01 | Stop reason: HOSPADM

## 2017-01-01 RX ORDER — LEVOFLOXACIN 750 MG/1
750 TABLET ORAL EVERY 24 HOURS
Qty: 4 TABLET | Refills: 0 | Status: ON HOLD | OUTPATIENT
Start: 2017-01-01 | End: 2017-01-01

## 2017-01-01 RX ORDER — SPIRONOLACTONE 50 MG/1
50 TABLET, FILM COATED ORAL 2 TIMES DAILY
Qty: 60 TABLET | Refills: 0 | Status: SHIPPED | OUTPATIENT
Start: 2017-01-01 | End: 2017-08-30

## 2017-01-01 RX ORDER — ACETAMINOPHEN 325 MG/1
650 TABLET ORAL ONCE
Status: DISCONTINUED | OUTPATIENT
Start: 2017-01-01 | End: 2017-01-01 | Stop reason: HOSPADM

## 2017-01-01 RX ORDER — DEXTROSE AND SODIUM CHLORIDE 5; .45 G/100ML; G/100ML
30 INJECTION, SOLUTION INTRAVENOUS CONTINUOUS PRN
Status: CANCELLED | OUTPATIENT
Start: 2017-01-01

## 2017-01-01 RX ORDER — HYDRALAZINE HYDROCHLORIDE 20 MG/ML
10 INJECTION INTRAMUSCULAR; INTRAVENOUS EVERY 6 HOURS PRN
Status: DISCONTINUED | OUTPATIENT
Start: 2017-01-01 | End: 2017-01-01 | Stop reason: HOSPADM

## 2017-01-01 RX ORDER — LIDOCAINE HYDROCHLORIDE 10 MG/ML
0.1 INJECTION, SOLUTION EPIDURAL; INFILTRATION; INTRACAUDAL; PERINEURAL ONCE AS NEEDED
Status: DISCONTINUED | OUTPATIENT
Start: 2017-01-01 | End: 2017-01-01 | Stop reason: HOSPADM

## 2017-01-01 RX ORDER — POTASSIUM CHLORIDE 750 MG/1
20 CAPSULE, EXTENDED RELEASE ORAL ONCE
Status: COMPLETED | OUTPATIENT
Start: 2017-01-01 | End: 2017-01-01

## 2017-01-01 RX ORDER — METRONIDAZOLE 500 MG/1
500 TABLET ORAL 3 TIMES DAILY
Qty: 18 TABLET | Refills: 0 | Status: SHIPPED | OUTPATIENT
Start: 2017-01-01 | End: 2017-01-01

## 2017-01-01 RX ORDER — SUCRALFATE ORAL 1 G/10ML
1 SUSPENSION ORAL DAILY
Qty: 30 G | Refills: 3 | Status: SHIPPED | OUTPATIENT
Start: 2017-01-01

## 2017-01-01 RX ORDER — MORPHINE SULFATE 2 MG/ML
1 INJECTION, SOLUTION INTRAMUSCULAR; INTRAVENOUS EVERY 4 HOURS PRN
Status: CANCELLED | OUTPATIENT
Start: 2017-01-01 | End: 2017-01-01

## 2017-01-01 RX ORDER — POLYETHYLENE GLYCOL 3350 17 G/17G
17 POWDER, FOR SOLUTION ORAL DAILY
Qty: 527 G | Refills: 1 | Status: SHIPPED | OUTPATIENT
Start: 2017-01-01 | End: 2017-01-01 | Stop reason: SDUPTHER

## 2017-01-01 RX ADMIN — ATORVASTATIN CALCIUM 10 MG: 10 TABLET, FILM COATED ORAL at 09:45

## 2017-01-01 RX ADMIN — LEVOFLOXACIN 750 MG: 750 TABLET, FILM COATED ORAL at 11:41

## 2017-01-01 RX ADMIN — HYDROCODONE BITARTRATE AND ACETAMINOPHEN 1 TABLET: 5; 325 TABLET ORAL at 04:09

## 2017-01-01 RX ADMIN — METRONIDAZOLE 500 MG: 500 TABLET ORAL at 13:10

## 2017-01-01 RX ADMIN — HYDROMORPHONE HYDROCHLORIDE 1 MG: 1 INJECTION, SOLUTION INTRAMUSCULAR; INTRAVENOUS; SUBCUTANEOUS at 15:15

## 2017-01-01 RX ADMIN — SODIUM CHLORIDE, PRESERVATIVE FREE 500 UNITS: 5 INJECTION INTRAVENOUS at 17:42

## 2017-01-01 RX ADMIN — ACYCLOVIR 400 MG: 400 TABLET ORAL at 08:36

## 2017-01-01 RX ADMIN — BORTEZOMIB 2 MG: 3.5 INJECTION, POWDER, LYOPHILIZED, FOR SOLUTION INTRAVENOUS; SUBCUTANEOUS at 14:45

## 2017-01-01 RX ADMIN — MORPHINE SULFATE 2 MG: 2 INJECTION, SOLUTION INTRAMUSCULAR; INTRAVENOUS at 02:30

## 2017-01-01 RX ADMIN — Medication: at 10:13

## 2017-01-01 RX ADMIN — POTASSIUM CHLORIDE 20 MEQ: 750 CAPSULE, EXTENDED RELEASE ORAL at 15:49

## 2017-01-01 RX ADMIN — ATORVASTATIN CALCIUM 10 MG: 10 TABLET, FILM COATED ORAL at 08:02

## 2017-01-01 RX ADMIN — SPIRONOLACTONE 50 MG: 50 TABLET, FILM COATED ORAL at 10:07

## 2017-01-01 RX ADMIN — BORTEZOMIB 2 MG: 3.5 INJECTION, POWDER, LYOPHILIZED, FOR SOLUTION INTRAVENOUS; SUBCUTANEOUS at 11:56

## 2017-01-01 RX ADMIN — MORPHINE SULFATE 2 MG: 2 INJECTION, SOLUTION INTRAMUSCULAR; INTRAVENOUS at 23:05

## 2017-01-01 RX ADMIN — FUROSEMIDE 40 MG: 40 TABLET ORAL at 21:36

## 2017-01-01 RX ADMIN — NICOTINE 1 PATCH: 21 PATCH TRANSDERMAL at 17:10

## 2017-01-01 RX ADMIN — METRONIDAZOLE 500 MG: 500 TABLET ORAL at 13:09

## 2017-01-01 RX ADMIN — NAPROXEN 500 MG: 500 TABLET ORAL at 09:45

## 2017-01-01 RX ADMIN — KETOROLAC TROMETHAMINE 30 MG: 30 INJECTION, SOLUTION INTRAMUSCULAR; INTRAVENOUS at 00:11

## 2017-01-01 RX ADMIN — NYSTATIN AND TRIAMCINOLONE ACETONIDE: 100000; 1 OINTMENT TOPICAL at 21:40

## 2017-01-01 RX ADMIN — ALBUMIN (HUMAN) 25 G: 0.25 INJECTION, SOLUTION INTRAVENOUS at 15:15

## 2017-01-01 RX ADMIN — METRONIDAZOLE 500 MG: 500 TABLET ORAL at 22:35

## 2017-01-01 RX ADMIN — FAMOTIDINE 40 MG: 40 TABLET ORAL at 08:37

## 2017-01-01 RX ADMIN — BORTEZOMIB 2 MG: 3.5 INJECTION, POWDER, LYOPHILIZED, FOR SOLUTION INTRAVENOUS; SUBCUTANEOUS at 12:03

## 2017-01-01 RX ADMIN — SODIUM CHLORIDE TAB 1 GM 2 G: 1 TAB at 10:07

## 2017-01-01 RX ADMIN — GABAPENTIN 400 MG: 400 CAPSULE ORAL at 20:19

## 2017-01-01 RX ADMIN — SPIRONOLACTONE 50 MG: 50 TABLET, FILM COATED ORAL at 17:44

## 2017-01-01 RX ADMIN — NICOTINE 1 PATCH: 21 PATCH TRANSDERMAL at 05:54

## 2017-01-01 RX ADMIN — ACYCLOVIR 400 MG: 400 TABLET ORAL at 08:23

## 2017-01-01 RX ADMIN — SULFAMETHOXAZOLE AND TRIMETHOPRIM 160 MG: 800; 160 TABLET ORAL at 23:31

## 2017-01-01 RX ADMIN — POTASSIUM CHLORIDE 40 MEQ: 750 CAPSULE, EXTENDED RELEASE ORAL at 08:46

## 2017-01-01 RX ADMIN — SUCRALFATE 1 G: 1 SUSPENSION ORAL at 09:47

## 2017-01-01 RX ADMIN — Medication 10 ML: at 12:07

## 2017-01-01 RX ADMIN — Medication: at 12:56

## 2017-01-01 RX ADMIN — SPIRONOLACTONE 50 MG: 50 TABLET, FILM COATED ORAL at 08:23

## 2017-01-01 RX ADMIN — METRONIDAZOLE 500 MG: 500 TABLET ORAL at 21:43

## 2017-01-01 RX ADMIN — ACYCLOVIR 400 MG: 400 TABLET ORAL at 08:43

## 2017-01-01 RX ADMIN — FUROSEMIDE 40 MG: 40 TABLET ORAL at 10:07

## 2017-01-01 RX ADMIN — SODIUM CHLORIDE, PRESERVATIVE FREE 500 UNITS: 5 INJECTION INTRAVENOUS at 12:21

## 2017-01-01 RX ADMIN — ACYCLOVIR 400 MG: 400 TABLET ORAL at 08:22

## 2017-01-01 RX ADMIN — Medication: at 20:03

## 2017-01-01 RX ADMIN — DEXAMETHASONE 4 MG: 4 TABLET ORAL at 09:37

## 2017-01-01 RX ADMIN — IOPAMIDOL 100 ML: 612 INJECTION, SOLUTION INTRAVENOUS at 00:50

## 2017-01-01 RX ADMIN — GABAPENTIN 400 MG: 400 CAPSULE ORAL at 15:03

## 2017-01-01 RX ADMIN — LEVOFLOXACIN 500 MG: 5 INJECTION, SOLUTION INTRAVENOUS at 01:02

## 2017-01-01 RX ADMIN — Medication: at 18:25

## 2017-01-01 RX ADMIN — FUROSEMIDE 40 MG: 40 TABLET ORAL at 08:43

## 2017-01-01 RX ADMIN — SPIRONOLACTONE 50 MG: 50 TABLET, FILM COATED ORAL at 08:27

## 2017-01-01 RX ADMIN — MORPHINE SULFATE 2 MG: 2 INJECTION, SOLUTION INTRAMUSCULAR; INTRAVENOUS at 01:13

## 2017-01-01 RX ADMIN — HYDROMORPHONE HYDROCHLORIDE 1 MG: 1 INJECTION, SOLUTION INTRAMUSCULAR; INTRAVENOUS; SUBCUTANEOUS at 13:47

## 2017-01-01 RX ADMIN — ATORVASTATIN CALCIUM 10 MG: 10 TABLET, FILM COATED ORAL at 08:42

## 2017-01-01 RX ADMIN — SUCRALFATE 1 G: 1 SUSPENSION ORAL at 08:27

## 2017-01-01 RX ADMIN — ONDANSETRON 4 MG: 2 INJECTION INTRAMUSCULAR; INTRAVENOUS at 12:03

## 2017-01-01 RX ADMIN — SODIUM CHLORIDE TAB 1 GM 2 G: 1 TAB at 08:42

## 2017-01-01 RX ADMIN — ACYCLOVIR 400 MG: 400 TABLET ORAL at 08:02

## 2017-01-01 RX ADMIN — Medication: at 15:26

## 2017-01-01 RX ADMIN — Medication 10 ML: at 11:41

## 2017-01-01 RX ADMIN — METRONIDAZOLE 500 MG: 500 INJECTION, SOLUTION INTRAVENOUS at 05:16

## 2017-01-01 RX ADMIN — FAMOTIDINE 40 MG: 40 TABLET ORAL at 09:37

## 2017-01-01 RX ADMIN — SODIUM CHLORIDE, PRESERVATIVE FREE 500 UNITS: 5 INJECTION INTRAVENOUS at 10:01

## 2017-01-01 RX ADMIN — FAMOTIDINE 20 MG: 20 TABLET ORAL at 10:07

## 2017-01-01 RX ADMIN — SPIRONOLACTONE 50 MG: 50 TABLET, FILM COATED ORAL at 21:35

## 2017-01-01 RX ADMIN — METRONIDAZOLE 500 MG: 500 TABLET ORAL at 15:33

## 2017-01-01 RX ADMIN — SUCRALFATE 1 G: 1 SUSPENSION ORAL at 08:23

## 2017-01-01 RX ADMIN — SODIUM CHLORIDE 75 ML/HR: 9 INJECTION, SOLUTION INTRAVENOUS at 05:14

## 2017-01-01 RX ADMIN — POTASSIUM CHLORIDE 40 MEQ: 750 CAPSULE, EXTENDED RELEASE ORAL at 06:15

## 2017-01-01 RX ADMIN — BORTEZOMIB 2 MG: 3.5 INJECTION, POWDER, LYOPHILIZED, FOR SOLUTION INTRAVENOUS; SUBCUTANEOUS at 13:39

## 2017-01-01 RX ADMIN — GABAPENTIN 400 MG: 400 CAPSULE ORAL at 08:42

## 2017-01-01 RX ADMIN — GABAPENTIN 400 MG: 400 CAPSULE ORAL at 15:33

## 2017-01-01 RX ADMIN — FAMOTIDINE 20 MG: 20 TABLET ORAL at 17:46

## 2017-01-01 RX ADMIN — SERTRALINE HYDROCHLORIDE 200 MG: 50 TABLET ORAL at 08:38

## 2017-01-01 RX ADMIN — FUROSEMIDE 40 MG: 40 TABLET ORAL at 08:24

## 2017-01-01 RX ADMIN — HYDROCODONE BITARTRATE AND ACETAMINOPHEN 1 TABLET: 5; 325 TABLET ORAL at 00:16

## 2017-01-01 RX ADMIN — SODIUM CHLORIDE, PRESERVATIVE FREE 500 UNITS: 5 INJECTION INTRAVENOUS at 13:48

## 2017-01-01 RX ADMIN — NICOTINE 1 PATCH: 21 PATCH TRANSDERMAL at 21:36

## 2017-01-01 RX ADMIN — SODIUM CHLORIDE TAB 1 GM 2 G: 1 TAB at 08:27

## 2017-01-01 RX ADMIN — FAMOTIDINE 40 MG: 40 TABLET ORAL at 08:02

## 2017-01-01 RX ADMIN — ACYCLOVIR 400 MG: 400 TABLET ORAL at 09:38

## 2017-01-01 RX ADMIN — FAMOTIDINE 20 MG: 20 TABLET ORAL at 17:26

## 2017-01-01 RX ADMIN — SODIUM CHLORIDE, PRESERVATIVE FREE 500 UNITS: 5 INJECTION INTRAVENOUS at 10:47

## 2017-01-01 RX ADMIN — Medication 10 ML: at 12:06

## 2017-01-01 RX ADMIN — HYDROCODONE BITARTRATE AND ACETAMINOPHEN 1 TABLET: 5; 325 TABLET ORAL at 15:38

## 2017-01-01 RX ADMIN — NICOTINE 1 PATCH: 21 PATCH TRANSDERMAL at 17:04

## 2017-01-01 RX ADMIN — SPIRONOLACTONE 50 MG: 50 TABLET, FILM COATED ORAL at 18:30

## 2017-01-01 RX ADMIN — LEVOFLOXACIN 750 MG: 750 TABLET, FILM COATED ORAL at 08:27

## 2017-01-01 RX ADMIN — NAPROXEN 500 MG: 500 TABLET ORAL at 17:15

## 2017-01-01 RX ADMIN — NYSTATIN AND TRIAMCINOLONE ACETONIDE: 100000; 1 OINTMENT TOPICAL at 08:52

## 2017-01-01 RX ADMIN — SUCRALFATE 1 G: 1 SUSPENSION ORAL at 08:43

## 2017-01-01 RX ADMIN — GABAPENTIN 400 MG: 400 CAPSULE ORAL at 22:09

## 2017-01-01 RX ADMIN — POTASSIUM CHLORIDE 40 MEQ: 1.5 POWDER, FOR SOLUTION ORAL at 11:06

## 2017-01-01 RX ADMIN — Medication: at 17:09

## 2017-01-01 RX ADMIN — SPIRONOLACTONE 50 MG: 50 TABLET, FILM COATED ORAL at 08:22

## 2017-01-01 RX ADMIN — SPIRONOLACTONE 50 MG: 50 TABLET, FILM COATED ORAL at 17:23

## 2017-01-01 RX ADMIN — HYDROCODONE BITARTRATE AND ACETAMINOPHEN 1 TABLET: 5; 325 TABLET ORAL at 17:03

## 2017-01-01 RX ADMIN — Medication 10 ML: at 08:56

## 2017-01-01 RX ADMIN — ACYCLOVIR 400 MG: 400 TABLET ORAL at 10:06

## 2017-01-01 RX ADMIN — SUCRALFATE 1 G: 1 SUSPENSION ORAL at 10:10

## 2017-01-01 RX ADMIN — NICOTINE 1 PATCH: 21 PATCH TRANSDERMAL at 05:16

## 2017-01-01 RX ADMIN — ATORVASTATIN CALCIUM 10 MG: 10 TABLET, FILM COATED ORAL at 08:27

## 2017-01-01 RX ADMIN — NICOTINE 1 PATCH: 21 PATCH TRANSDERMAL at 18:06

## 2017-01-01 RX ADMIN — MORPHINE SULFATE 2 MG: 2 INJECTION, SOLUTION INTRAMUSCULAR; INTRAVENOUS at 04:14

## 2017-01-01 RX ADMIN — FAMOTIDINE 40 MG: 40 TABLET ORAL at 21:36

## 2017-01-01 RX ADMIN — Medication 10 ML: at 14:57

## 2017-01-01 RX ADMIN — SODIUM CHLORIDE, PRESERVATIVE FREE 500 UNITS: 5 INJECTION INTRAVENOUS at 10:35

## 2017-01-01 RX ADMIN — SODIUM CHLORIDE, PRESERVATIVE FREE 500 UNITS: 5 INJECTION INTRAVENOUS at 12:27

## 2017-01-01 RX ADMIN — GABAPENTIN 400 MG: 400 CAPSULE ORAL at 08:02

## 2017-01-01 RX ADMIN — SERTRALINE HYDROCHLORIDE 200 MG: 50 TABLET ORAL at 09:52

## 2017-01-01 RX ADMIN — SPIRONOLACTONE 50 MG: 50 TABLET, FILM COATED ORAL at 09:46

## 2017-01-01 RX ADMIN — BORTEZOMIB 2 MG: 3.5 INJECTION, POWDER, LYOPHILIZED, FOR SOLUTION INTRAVENOUS; SUBCUTANEOUS at 10:58

## 2017-01-01 RX ADMIN — FUROSEMIDE 40 MG: 40 TABLET ORAL at 08:22

## 2017-01-01 RX ADMIN — MORPHINE SULFATE 2 MG: 2 INJECTION, SOLUTION INTRAMUSCULAR; INTRAVENOUS at 05:55

## 2017-01-01 RX ADMIN — Medication: at 08:52

## 2017-01-01 RX ADMIN — Medication: at 22:09

## 2017-01-01 RX ADMIN — SPIRONOLACTONE 50 MG: 50 TABLET, FILM COATED ORAL at 08:36

## 2017-01-01 RX ADMIN — SODIUM CHLORIDE, PRESERVATIVE FREE 500 UNITS: 5 INJECTION INTRAVENOUS at 10:08

## 2017-01-01 RX ADMIN — SODIUM CHLORIDE TAB 1 GM 2 G: 1 TAB at 17:51

## 2017-01-01 RX ADMIN — Medication 10 ML: at 13:11

## 2017-01-01 RX ADMIN — MORPHINE SULFATE 2 MG: 2 INJECTION, SOLUTION INTRAMUSCULAR; INTRAVENOUS at 14:31

## 2017-01-01 RX ADMIN — SPIRONOLACTONE 50 MG: 50 TABLET, FILM COATED ORAL at 17:09

## 2017-01-01 RX ADMIN — NICOTINE 1 PATCH: 21 PATCH TRANSDERMAL at 17:44

## 2017-01-01 RX ADMIN — ACYCLOVIR 400 MG: 400 TABLET ORAL at 09:52

## 2017-01-01 RX ADMIN — SUCRALFATE 1 G: 1 SUSPENSION ORAL at 08:37

## 2017-01-01 RX ADMIN — SUCRALFATE 1 G: 1 SUSPENSION ORAL at 09:53

## 2017-01-01 RX ADMIN — MORPHINE SULFATE 2 MG: 2 INJECTION, SOLUTION INTRAMUSCULAR; INTRAVENOUS at 04:08

## 2017-01-01 RX ADMIN — Medication: at 15:36

## 2017-01-01 RX ADMIN — SODIUM CHLORIDE, PRESERVATIVE FREE 500 UNITS: 5 INJECTION INTRAVENOUS at 10:25

## 2017-01-01 RX ADMIN — SERTRALINE HYDROCHLORIDE 200 MG: 50 TABLET ORAL at 08:27

## 2017-01-01 RX ADMIN — ALBUMIN HUMAN 12.5 G: 0.05 INJECTION, SOLUTION INTRAVENOUS at 06:23

## 2017-01-01 RX ADMIN — NYSTATIN AND TRIAMCINOLONE ACETONIDE: 100000; 1 OINTMENT TOPICAL at 12:44

## 2017-01-01 RX ADMIN — GABAPENTIN 400 MG: 400 CAPSULE ORAL at 09:53

## 2017-01-01 RX ADMIN — ATORVASTATIN CALCIUM 10 MG: 10 TABLET, FILM COATED ORAL at 10:06

## 2017-01-01 RX ADMIN — NYSTATIN AND TRIAMCINOLONE ACETONIDE: 100000; 1 OINTMENT TOPICAL at 09:00

## 2017-01-01 RX ADMIN — FUROSEMIDE 40 MG: 40 TABLET ORAL at 09:53

## 2017-01-01 RX ADMIN — MIDAZOLAM 1 MG: 1 INJECTION INTRAMUSCULAR; INTRAVENOUS at 11:22

## 2017-01-01 RX ADMIN — FAMOTIDINE 20 MG: 20 TABLET ORAL at 18:30

## 2017-01-01 RX ADMIN — SUCRALFATE 1 G: 1 SUSPENSION ORAL at 21:35

## 2017-01-01 RX ADMIN — ONDANSETRON 4 MG: 2 INJECTION INTRAMUSCULAR; INTRAVENOUS at 10:47

## 2017-01-01 RX ADMIN — SERTRALINE HYDROCHLORIDE 200 MG: 50 TABLET ORAL at 09:45

## 2017-01-01 RX ADMIN — ACYCLOVIR 400 MG: 400 TABLET ORAL at 09:45

## 2017-01-01 RX ADMIN — MIDAZOLAM 1 MG: 1 INJECTION INTRAMUSCULAR; INTRAVENOUS at 11:27

## 2017-01-01 RX ADMIN — SPIRONOLACTONE 50 MG: 50 TABLET, FILM COATED ORAL at 17:03

## 2017-01-01 RX ADMIN — METRONIDAZOLE 500 MG: 500 TABLET ORAL at 05:46

## 2017-01-01 RX ADMIN — Medication: at 20:58

## 2017-01-01 RX ADMIN — Medication: at 17:15

## 2017-01-01 RX ADMIN — POTASSIUM CHLORIDE 40 MEQ: 750 CAPSULE, EXTENDED RELEASE ORAL at 02:08

## 2017-01-01 RX ADMIN — SPIRONOLACTONE 50 MG: 50 TABLET, FILM COATED ORAL at 08:41

## 2017-01-01 RX ADMIN — LEVOFLOXACIN 750 MG: 750 TABLET, FILM COATED ORAL at 21:28

## 2017-01-01 RX ADMIN — ACYCLOVIR 400 MG: 400 TABLET ORAL at 21:37

## 2017-01-01 RX ADMIN — MORPHINE SULFATE 2 MG: 2 INJECTION, SOLUTION INTRAMUSCULAR; INTRAVENOUS at 14:56

## 2017-01-01 RX ADMIN — DEXAMETHASONE 4 MG: 4 TABLET ORAL at 08:22

## 2017-01-01 RX ADMIN — DEXAMETHASONE 4 MG: 4 TABLET ORAL at 08:23

## 2017-01-01 RX ADMIN — GABAPENTIN 400 MG: 400 CAPSULE ORAL at 08:27

## 2017-01-01 RX ADMIN — Medication 10 ML: at 10:08

## 2017-01-01 RX ADMIN — ENOXAPARIN SODIUM 40 MG: 40 INJECTION SUBCUTANEOUS at 08:37

## 2017-01-01 RX ADMIN — FAMOTIDINE 20 MG: 20 TABLET ORAL at 08:38

## 2017-01-01 RX ADMIN — BORTEZOMIB 2 MG: 3.5 INJECTION, POWDER, LYOPHILIZED, FOR SOLUTION INTRAVENOUS; SUBCUTANEOUS at 10:29

## 2017-01-01 RX ADMIN — SODIUM CHLORIDE TAB 1 GM 2 G: 1 TAB at 18:30

## 2017-01-01 RX ADMIN — METRONIDAZOLE 500 MG: 500 TABLET ORAL at 05:24

## 2017-01-01 RX ADMIN — SODIUM CHLORIDE 125 ML/HR: 9 INJECTION, SOLUTION INTRAVENOUS at 12:02

## 2017-01-01 RX ADMIN — NICOTINE 1 PATCH: 21 PATCH TRANSDERMAL at 17:15

## 2017-01-01 RX ADMIN — Medication 20 ML: at 00:15

## 2017-01-01 RX ADMIN — ACYCLOVIR 400 MG: 400 TABLET ORAL at 08:27

## 2017-01-01 RX ADMIN — SPIRONOLACTONE 50 MG: 50 TABLET, FILM COATED ORAL at 08:44

## 2017-01-01 RX ADMIN — DEXAMETHASONE 4 MG: 4 TABLET ORAL at 08:02

## 2017-01-01 RX ADMIN — SUCRALFATE 1 G: 1 SUSPENSION ORAL at 08:45

## 2017-01-01 RX ADMIN — Medication: at 08:24

## 2017-01-01 RX ADMIN — BORTEZOMIB 2 MG: 3.5 INJECTION, POWDER, LYOPHILIZED, FOR SOLUTION INTRAVENOUS; SUBCUTANEOUS at 11:09

## 2017-01-01 RX ADMIN — ENOXAPARIN SODIUM 40 MG: 40 INJECTION SUBCUTANEOUS at 15:28

## 2017-01-01 RX ADMIN — ATORVASTATIN CALCIUM 10 MG: 10 TABLET, FILM COATED ORAL at 08:36

## 2017-01-01 RX ADMIN — Medication 10 ML: at 10:56

## 2017-01-01 RX ADMIN — LEVOFLOXACIN 750 MG: 750 TABLET, FILM COATED ORAL at 13:09

## 2017-01-01 RX ADMIN — ONDANSETRON 4 MG: 2 INJECTION INTRAMUSCULAR; INTRAVENOUS at 01:26

## 2017-01-01 RX ADMIN — SUCRALFATE 1 G: 1 SUSPENSION ORAL at 17:27

## 2017-01-01 RX ADMIN — BORTEZOMIB 2 MG: 3.5 INJECTION, POWDER, LYOPHILIZED, FOR SOLUTION INTRAVENOUS; SUBCUTANEOUS at 09:30

## 2017-01-01 RX ADMIN — Medication 10 ML: at 09:00

## 2017-01-01 RX ADMIN — FUROSEMIDE 20 MG: 10 INJECTION, SOLUTION INTRAVENOUS at 15:49

## 2017-01-01 RX ADMIN — LEVOFLOXACIN 500 MG: 5 INJECTION, SOLUTION INTRAVENOUS at 21:40

## 2017-01-01 RX ADMIN — SERTRALINE HYDROCHLORIDE 200 MG: 50 TABLET ORAL at 08:42

## 2017-01-01 RX ADMIN — ENOXAPARIN SODIUM 40 MG: 40 INJECTION SUBCUTANEOUS at 08:42

## 2017-01-01 RX ADMIN — Medication 10 ML: at 08:36

## 2017-01-01 RX ADMIN — POTASSIUM CHLORIDE 20 MEQ: 1.5 POWDER, FOR SOLUTION ORAL at 11:31

## 2017-01-01 RX ADMIN — FOLIC ACID 100 ML/HR: 5 INJECTION, SOLUTION INTRAMUSCULAR; INTRAVENOUS; SUBCUTANEOUS at 08:41

## 2017-01-01 RX ADMIN — SPIRONOLACTONE 50 MG: 50 TABLET, FILM COATED ORAL at 08:38

## 2017-01-01 RX ADMIN — MORPHINE SULFATE 1 MG: 2 INJECTION, SOLUTION INTRAMUSCULAR; INTRAVENOUS at 22:08

## 2017-01-01 RX ADMIN — NYSTATIN AND TRIAMCINOLONE ACETONIDE: 100000; 1 OINTMENT TOPICAL at 21:14

## 2017-01-01 RX ADMIN — NYSTATIN AND TRIAMCINOLONE ACETONIDE: 100000; 1 OINTMENT TOPICAL at 10:12

## 2017-01-01 RX ADMIN — FUROSEMIDE 40 MG: 40 TABLET ORAL at 08:42

## 2017-01-01 RX ADMIN — SPIRONOLACTONE 50 MG: 50 TABLET, FILM COATED ORAL at 17:18

## 2017-01-01 RX ADMIN — MORPHINE SULFATE 1 MG: 2 INJECTION, SOLUTION INTRAMUSCULAR; INTRAVENOUS at 04:40

## 2017-01-01 RX ADMIN — HYDROCODONE BITARTRATE AND ACETAMINOPHEN 1 TABLET: 5; 325 TABLET ORAL at 10:06

## 2017-01-01 RX ADMIN — Medication: at 17:22

## 2017-01-01 RX ADMIN — BORTEZOMIB 2 MG: 3.5 INJECTION, POWDER, LYOPHILIZED, FOR SOLUTION INTRAVENOUS; SUBCUTANEOUS at 10:44

## 2017-01-01 RX ADMIN — METRONIDAZOLE 500 MG: 500 INJECTION, SOLUTION INTRAVENOUS at 05:17

## 2017-01-01 RX ADMIN — SPIRONOLACTONE 50 MG: 50 TABLET, FILM COATED ORAL at 08:02

## 2017-01-01 RX ADMIN — IOPAMIDOL 93 ML: 612 INJECTION, SOLUTION INTRAVENOUS at 13:28

## 2017-01-01 RX ADMIN — MIDAZOLAM 0.5 MG: 1 INJECTION INTRAMUSCULAR; INTRAVENOUS at 12:58

## 2017-01-01 RX ADMIN — FUROSEMIDE 40 MG: 40 TABLET ORAL at 09:46

## 2017-01-01 RX ADMIN — Medication: at 08:40

## 2017-01-01 RX ADMIN — DEXAMETHASONE 4 MG: 4 TABLET ORAL at 09:45

## 2017-01-01 RX ADMIN — SPIRONOLACTONE 50 MG: 50 TABLET, FILM COATED ORAL at 09:53

## 2017-01-01 RX ADMIN — SERTRALINE HYDROCHLORIDE 200 MG: 50 TABLET ORAL at 09:35

## 2017-01-01 RX ADMIN — ATORVASTATIN CALCIUM 10 MG: 10 TABLET, FILM COATED ORAL at 08:22

## 2017-01-01 RX ADMIN — LEVOFLOXACIN 500 MG: 5 INJECTION, SOLUTION INTRAVENOUS at 21:39

## 2017-01-01 RX ADMIN — FUROSEMIDE 40 MG: 40 TABLET ORAL at 09:37

## 2017-01-01 RX ADMIN — FAMOTIDINE 20 MG: 20 TABLET ORAL at 09:53

## 2017-01-01 RX ADMIN — MORPHINE SULFATE 2 MG: 2 INJECTION, SOLUTION INTRAMUSCULAR; INTRAVENOUS at 18:05

## 2017-01-01 RX ADMIN — SPIRONOLACTONE 50 MG: 50 TABLET, FILM COATED ORAL at 17:46

## 2017-01-01 RX ADMIN — SERTRALINE HYDROCHLORIDE 200 MG: 50 TABLET ORAL at 08:44

## 2017-01-01 RX ADMIN — ATORVASTATIN CALCIUM 10 MG: 10 TABLET, FILM COATED ORAL at 21:36

## 2017-01-01 RX ADMIN — GABAPENTIN 400 MG: 400 CAPSULE ORAL at 09:37

## 2017-01-01 RX ADMIN — POTASSIUM CHLORIDE 40 MEQ: 750 CAPSULE, EXTENDED RELEASE ORAL at 22:03

## 2017-01-01 RX ADMIN — MORPHINE SULFATE 2 MG: 2 INJECTION, SOLUTION INTRAMUSCULAR; INTRAVENOUS at 09:59

## 2017-01-01 RX ADMIN — MORPHINE SULFATE 2 MG: 2 INJECTION, SOLUTION INTRAMUSCULAR; INTRAVENOUS at 21:47

## 2017-01-01 RX ADMIN — METRONIDAZOLE 500 MG: 500 INJECTION, SOLUTION INTRAVENOUS at 13:20

## 2017-01-01 RX ADMIN — METRONIDAZOLE 500 MG: 500 INJECTION, SOLUTION INTRAVENOUS at 21:29

## 2017-01-01 RX ADMIN — ONDANSETRON 4 MG: 2 INJECTION INTRAMUSCULAR; INTRAVENOUS at 13:09

## 2017-01-01 RX ADMIN — BORTEZOMIB 2 MG: 3.5 INJECTION, POWDER, LYOPHILIZED, FOR SOLUTION INTRAVENOUS; SUBCUTANEOUS at 12:25

## 2017-01-01 RX ADMIN — LORAZEPAM 2 MG: 2 TABLET ORAL at 00:01

## 2017-01-01 RX ADMIN — BORTEZOMIB 2 MG: 3.5 INJECTION, POWDER, LYOPHILIZED, FOR SOLUTION INTRAVENOUS; SUBCUTANEOUS at 12:46

## 2017-01-01 RX ADMIN — FUROSEMIDE 40 MG: 40 TABLET ORAL at 08:36

## 2017-01-01 RX ADMIN — Medication 10 ML: at 10:10

## 2017-01-01 RX ADMIN — FOLIC ACID 100 ML/HR: 5 INJECTION, SOLUTION INTRAMUSCULAR; INTRAVENOUS; SUBCUTANEOUS at 23:53

## 2017-01-01 RX ADMIN — HYDROMORPHONE HYDROCHLORIDE 1 MG: 1 INJECTION, SOLUTION INTRAMUSCULAR; INTRAVENOUS; SUBCUTANEOUS at 21:02

## 2017-01-01 RX ADMIN — METRONIDAZOLE 500 MG: 500 TABLET ORAL at 05:54

## 2017-01-01 RX ADMIN — METRONIDAZOLE 500 MG: 500 INJECTION, SOLUTION INTRAVENOUS at 22:47

## 2017-01-01 RX ADMIN — BORTEZOMIB 2 MG: 3.5 INJECTION, POWDER, LYOPHILIZED, FOR SOLUTION INTRAVENOUS; SUBCUTANEOUS at 10:10

## 2017-01-01 RX ADMIN — GABAPENTIN 400 MG: 400 CAPSULE ORAL at 20:03

## 2017-01-01 RX ADMIN — POTASSIUM CHLORIDE 40 MEQ: 1.5 POWDER, FOR SOLUTION ORAL at 09:15

## 2017-01-01 RX ADMIN — NAPROXEN 500 MG: 500 TABLET ORAL at 17:23

## 2017-01-01 RX ADMIN — POLYETHYLENE GLYCOL 3350 17 G: 17 POWDER, FOR SOLUTION ORAL at 21:46

## 2017-01-01 RX ADMIN — FUROSEMIDE 40 MG: 40 TABLET ORAL at 08:02

## 2017-01-01 RX ADMIN — SERTRALINE HYDROCHLORIDE 200 MG: 50 TABLET ORAL at 21:36

## 2017-01-01 RX ADMIN — SPIRONOLACTONE 50 MG: 50 TABLET, FILM COATED ORAL at 17:26

## 2017-01-01 RX ADMIN — GABAPENTIN 400 MG: 400 CAPSULE ORAL at 08:36

## 2017-01-01 RX ADMIN — GABAPENTIN 400 MG: 400 CAPSULE ORAL at 20:43

## 2017-01-01 RX ADMIN — HYDROCODONE BITARTRATE AND ACETAMINOPHEN 1 TABLET: 5; 325 TABLET ORAL at 08:43

## 2017-01-01 RX ADMIN — PROPOFOL 50 MG: 10 INJECTION, EMULSION INTRAVENOUS at 11:22

## 2017-01-01 RX ADMIN — LEVOFLOXACIN 750 MG: 750 TABLET, FILM COATED ORAL at 21:43

## 2017-01-01 RX ADMIN — Medication 10 ML: at 09:10

## 2017-01-01 RX ADMIN — ATORVASTATIN CALCIUM 10 MG: 10 TABLET, FILM COATED ORAL at 08:23

## 2017-01-01 RX ADMIN — LEVOFLOXACIN 750 MG: 750 TABLET, FILM COATED ORAL at 10:08

## 2017-01-01 RX ADMIN — POTASSIUM CHLORIDE 40 MEQ: 750 CAPSULE, EXTENDED RELEASE ORAL at 17:02

## 2017-01-01 RX ADMIN — GABAPENTIN 400 MG: 400 CAPSULE ORAL at 08:22

## 2017-01-01 RX ADMIN — SUCRALFATE 1 G: 1 SUSPENSION ORAL at 08:02

## 2017-01-01 RX ADMIN — GABAPENTIN 400 MG: 400 CAPSULE ORAL at 15:26

## 2017-01-01 RX ADMIN — Medication: at 09:00

## 2017-01-01 RX ADMIN — Medication 10 ML: at 10:25

## 2017-01-01 RX ADMIN — METRONIDAZOLE 500 MG: 500 TABLET ORAL at 23:05

## 2017-01-01 RX ADMIN — SODIUM CHLORIDE, PRESERVATIVE FREE 500 UNITS: 5 INJECTION INTRAVENOUS at 11:47

## 2017-01-01 RX ADMIN — SODIUM CHLORIDE, PRESERVATIVE FREE 500 UNITS: 5 INJECTION INTRAVENOUS at 15:20

## 2017-01-01 RX ADMIN — SPIRONOLACTONE 50 MG: 50 TABLET, FILM COATED ORAL at 18:05

## 2017-01-01 RX ADMIN — SERTRALINE HYDROCHLORIDE 200 MG: 50 TABLET ORAL at 17:26

## 2017-01-01 RX ADMIN — NICOTINE 1 PATCH: 21 PATCH TRANSDERMAL at 05:46

## 2017-01-01 RX ADMIN — FAMOTIDINE 20 MG: 20 TABLET ORAL at 17:51

## 2017-01-01 RX ADMIN — SODIUM CHLORIDE 10 ML: 9 INJECTION, SOLUTION INTRAMUSCULAR; INTRAVENOUS; SUBCUTANEOUS at 12:21

## 2017-01-01 RX ADMIN — FENTANYL CITRATE 25 MCG: 50 INJECTION, SOLUTION INTRAMUSCULAR; INTRAVENOUS at 12:58

## 2017-01-01 RX ADMIN — PROPOFOL 30 MG: 10 INJECTION, EMULSION INTRAVENOUS at 11:28

## 2017-01-01 RX ADMIN — GABAPENTIN 400 MG: 400 CAPSULE ORAL at 17:46

## 2017-01-01 RX ADMIN — HYDROMORPHONE HYDROCHLORIDE 1 MG: 1 INJECTION, SOLUTION INTRAMUSCULAR; INTRAVENOUS; SUBCUTANEOUS at 09:22

## 2017-01-01 RX ADMIN — Medication 10 ML: at 15:19

## 2017-01-01 RX ADMIN — Medication 10 ML: at 21:02

## 2017-01-01 RX ADMIN — Medication: at 09:49

## 2017-01-01 RX ADMIN — Medication: at 21:44

## 2017-01-01 RX ADMIN — SPIRONOLACTONE 50 MG: 50 TABLET, FILM COATED ORAL at 09:37

## 2017-01-01 RX ADMIN — GABAPENTIN 400 MG: 400 CAPSULE ORAL at 21:28

## 2017-01-01 RX ADMIN — GABAPENTIN 400 MG: 400 CAPSULE ORAL at 17:03

## 2017-01-01 RX ADMIN — BISACODYL 5 MG: 5 TABLET, COATED ORAL at 05:16

## 2017-01-01 RX ADMIN — DEXTROSE AND SODIUM CHLORIDE 30 ML/HR: 5; 450 INJECTION, SOLUTION INTRAVENOUS at 10:55

## 2017-01-01 RX ADMIN — MORPHINE SULFATE 1 MG: 2 INJECTION, SOLUTION INTRAMUSCULAR; INTRAVENOUS at 21:38

## 2017-01-01 RX ADMIN — GABAPENTIN 400 MG: 400 CAPSULE ORAL at 21:36

## 2017-01-01 RX ADMIN — Medication: at 17:46

## 2017-01-01 RX ADMIN — LEVOFLOXACIN 750 MG: 750 TABLET, FILM COATED ORAL at 12:27

## 2017-01-01 RX ADMIN — CEFTRIAXONE 1 G: 1 INJECTION, POWDER, FOR SOLUTION INTRAMUSCULAR; INTRAVENOUS at 23:31

## 2017-01-01 RX ADMIN — MORPHINE SULFATE 2 MG: 2 INJECTION, SOLUTION INTRAMUSCULAR; INTRAVENOUS at 20:43

## 2017-01-01 RX ADMIN — POLYETHYLENE GLYCOL 3350 17 G: 17 POWDER, FOR SOLUTION ORAL at 09:57

## 2017-01-01 RX ADMIN — MORPHINE SULFATE 4 MG: 4 INJECTION, SOLUTION INTRAMUSCULAR; INTRAVENOUS at 01:25

## 2017-01-01 RX ADMIN — SODIUM CHLORIDE, PRESERVATIVE FREE 500 UNITS: 5 INJECTION INTRAVENOUS at 10:22

## 2017-01-01 RX ADMIN — GABAPENTIN 400 MG: 400 CAPSULE ORAL at 21:29

## 2017-01-01 RX ADMIN — GABAPENTIN 400 MG: 400 CAPSULE ORAL at 08:24

## 2017-01-01 RX ADMIN — GABAPENTIN 400 MG: 400 CAPSULE ORAL at 09:46

## 2017-01-01 RX ADMIN — LEVOFLOXACIN 750 MG: 5 INJECTION, SOLUTION INTRAVENOUS at 02:00

## 2017-01-01 RX ADMIN — SERTRALINE HYDROCHLORIDE 200 MG: 50 TABLET ORAL at 08:22

## 2017-01-01 RX ADMIN — MAGNESIUM SULFATE HEPTAHYDRATE 2 G: 40 INJECTION, SOLUTION INTRAVENOUS at 15:29

## 2017-01-01 RX ADMIN — DEXAMETHASONE 4 MG: 4 TABLET ORAL at 08:37

## 2017-01-01 RX ADMIN — ACETAMINOPHEN 650 MG: 325 TABLET ORAL at 20:32

## 2017-01-01 RX ADMIN — GABAPENTIN 400 MG: 400 CAPSULE ORAL at 17:08

## 2017-01-01 RX ADMIN — SODIUM CHLORIDE, PRESERVATIVE FREE 500 UNITS: 5 INJECTION INTRAVENOUS at 12:06

## 2017-01-01 RX ADMIN — FUROSEMIDE 40 MG: 40 TABLET ORAL at 08:38

## 2017-01-01 RX ADMIN — GABAPENTIN 400 MG: 400 CAPSULE ORAL at 21:14

## 2017-01-01 RX ADMIN — GABAPENTIN 400 MG: 400 CAPSULE ORAL at 21:43

## 2017-01-01 RX ADMIN — NAPROXEN 500 MG: 500 TABLET ORAL at 08:22

## 2017-01-01 RX ADMIN — FAMOTIDINE 40 MG: 40 TABLET ORAL at 09:45

## 2017-01-01 RX ADMIN — SERTRALINE HYDROCHLORIDE 200 MG: 50 TABLET ORAL at 08:02

## 2017-01-01 RX ADMIN — METRONIDAZOLE 500 MG: 500 INJECTION, SOLUTION INTRAVENOUS at 04:58

## 2017-01-01 RX ADMIN — Medication 10 ML: at 01:26

## 2017-01-01 RX ADMIN — BORTEZOMIB 2 MG: 3.5 INJECTION, POWDER, LYOPHILIZED, FOR SOLUTION INTRAVENOUS; SUBCUTANEOUS at 11:40

## 2017-01-01 RX ADMIN — SERTRALINE HYDROCHLORIDE 200 MG: 50 TABLET ORAL at 08:36

## 2017-01-01 RX ADMIN — SODIUM CHLORIDE, PRESERVATIVE FREE 500 UNITS: 5 INJECTION INTRAVENOUS at 12:07

## 2017-01-01 RX ADMIN — FAMOTIDINE 40 MG: 40 TABLET ORAL at 08:24

## 2017-01-01 RX ADMIN — ATORVASTATIN CALCIUM 10 MG: 10 TABLET, FILM COATED ORAL at 09:53

## 2017-01-01 RX ADMIN — NAPROXEN 500 MG: 500 TABLET ORAL at 09:50

## 2017-01-01 RX ADMIN — METRONIDAZOLE 500 MG: 500 TABLET ORAL at 14:28

## 2017-01-01 RX ADMIN — METRONIDAZOLE 500 MG: 500 INJECTION, SOLUTION INTRAVENOUS at 11:11

## 2017-01-01 RX ADMIN — METRONIDAZOLE 500 MG: 500 TABLET ORAL at 21:14

## 2017-01-01 RX ADMIN — ATORVASTATIN CALCIUM 10 MG: 10 TABLET, FILM COATED ORAL at 09:35

## 2017-01-01 RX ADMIN — SERTRALINE HYDROCHLORIDE 200 MG: 50 TABLET ORAL at 08:24

## 2017-01-01 RX ADMIN — SERTRALINE HYDROCHLORIDE 200 MG: 50 TABLET ORAL at 10:06

## 2017-01-01 RX ADMIN — NYSTATIN AND TRIAMCINOLONE ACETONIDE: 100000; 1 OINTMENT TOPICAL at 20:19

## 2017-01-01 RX ADMIN — GABAPENTIN 400 MG: 400 CAPSULE ORAL at 18:05

## 2017-01-01 RX ADMIN — FAMOTIDINE 20 MG: 20 TABLET ORAL at 08:27

## 2017-01-01 RX ADMIN — MORPHINE SULFATE 2 MG: 2 INJECTION, SOLUTION INTRAMUSCULAR; INTRAVENOUS at 14:32

## 2017-01-01 RX ADMIN — MORPHINE SULFATE 2 MG: 2 INJECTION, SOLUTION INTRAMUSCULAR; INTRAVENOUS at 21:39

## 2017-01-01 RX ADMIN — NICOTINE 1 PATCH: 21 PATCH TRANSDERMAL at 17:02

## 2017-01-01 RX ADMIN — FUROSEMIDE 40 MG: 40 TABLET ORAL at 17:26

## 2017-01-01 RX ADMIN — METRONIDAZOLE 500 MG: 500 TABLET ORAL at 05:56

## 2017-01-01 RX ADMIN — SODIUM CHLORIDE, PRESERVATIVE FREE 500 UNITS: 5 INJECTION INTRAVENOUS at 11:40

## 2017-01-01 RX ADMIN — FAMOTIDINE 40 MG: 40 TABLET ORAL at 08:22

## 2017-01-01 RX ADMIN — Medication 10 ML: at 10:01

## 2017-01-01 RX ADMIN — Medication 10 ML: at 10:22

## 2017-01-01 RX ADMIN — Medication: at 14:10

## 2017-01-01 RX ADMIN — GABAPENTIN 400 MG: 400 CAPSULE ORAL at 15:36

## 2017-01-01 RX ADMIN — BORTEZOMIB 2 MG: 3.5 INJECTION, POWDER, LYOPHILIZED, FOR SOLUTION INTRAVENOUS; SUBCUTANEOUS at 11:47

## 2017-01-01 RX ADMIN — GABAPENTIN 400 MG: 400 CAPSULE ORAL at 10:06

## 2017-01-01 RX ADMIN — SODIUM CHLORIDE, PRESERVATIVE FREE 10 ML: 5 INJECTION INTRAVENOUS at 10:46

## 2017-01-01 RX ADMIN — MORPHINE SULFATE 4 MG: 4 INJECTION, SOLUTION INTRAMUSCULAR; INTRAVENOUS at 12:03

## 2017-01-01 RX ADMIN — LEVOFLOXACIN 750 MG: 750 TABLET, FILM COATED ORAL at 08:42

## 2017-01-01 RX ADMIN — FAMOTIDINE 20 MG: 20 TABLET ORAL at 08:41

## 2017-01-01 RX ADMIN — GABAPENTIN 400 MG: 400 CAPSULE ORAL at 17:23

## 2017-01-01 RX ADMIN — POTASSIUM CHLORIDE AND SODIUM CHLORIDE 75 ML/HR: 900; 150 INJECTION, SOLUTION INTRAVENOUS at 15:29

## 2017-01-01 RX ADMIN — METRONIDAZOLE 500 MG: 500 INJECTION, SOLUTION INTRAVENOUS at 20:43

## 2017-01-01 RX ADMIN — SPIRONOLACTONE 50 MG: 50 TABLET, FILM COATED ORAL at 17:15

## 2017-01-01 RX ADMIN — BORTEZOMIB 2 MG: 3.5 INJECTION, POWDER, LYOPHILIZED, FOR SOLUTION INTRAVENOUS; SUBCUTANEOUS at 11:20

## 2017-01-01 RX ADMIN — NAPROXEN 500 MG: 500 TABLET ORAL at 17:09

## 2017-01-01 RX ADMIN — FUROSEMIDE 40 MG: 40 TABLET ORAL at 08:27

## 2017-01-01 RX ADMIN — HYDROMORPHONE HYDROCHLORIDE 1 MG: 1 INJECTION, SOLUTION INTRAMUSCULAR; INTRAVENOUS; SUBCUTANEOUS at 15:52

## 2017-01-01 RX ADMIN — FAMOTIDINE 20 MG: 20 TABLET ORAL at 08:43

## 2017-01-01 RX ADMIN — HYDROMORPHONE HYDROCHLORIDE 1 MG: 1 INJECTION, SOLUTION INTRAMUSCULAR; INTRAVENOUS; SUBCUTANEOUS at 01:58

## 2017-01-01 RX ADMIN — Medication: at 17:04

## 2017-01-01 RX ADMIN — NICOTINE 1 PATCH: 21 PATCH TRANSDERMAL at 18:20

## 2017-01-12 PROBLEM — I73.9 PERIPHERAL VASCULAR DISEASE (HCC): Status: ACTIVE | Noted: 2017-01-01

## 2017-01-12 NOTE — PATIENT INSTRUCTIONS
If signs and symptoms of ischemia should occur including but not limited to pale/blue discoloration of limb, increasing pain with ambulation or at rest, or a non-healing wound. Patient is to notify Heart and Vascular center for immediate evaluation.    Return 6mos - JENSEN

## 2017-01-12 NOTE — MR AVS SNAPSHOT
Mickey Tracy Lelo   1/12/2017 1:40 PM   Office Visit    Dept Phone:  [phone]   Encounter #:  [id number]    Provider:  ALYSHA Branch   Department:  [facility] CARDIOTHORACIC AND VASCULAR SURGERY                Your Full Care Plan              Today's Medication Changes          These changes are accurate as of: 1/12/17  3:24 PM.  If you have any questions, ask your nurse or doctor.               Medication(s)that have changed:     sucralfate 1 GM/10ML suspension   Commonly known as:  CARAFATE   Take 10 mL by mouth Daily.   What changed:  Another medication with the same name was removed. Continue taking this medication, and follow the directions you see here.   Changed by:  Ara Bach MD         Stop taking medication(s)listed here:     HEARTBURN TREATMENT 24 HOUR 15 MG capsule   Generic drug:  lansoprazole   Stopped by:  ALYSHA Branch           polyethylene glycol 236 G solution   Commonly known as:  GoLYTELY   Stopped by:  ALYSHA Branch                Where to Get Your Medications      These medications were sent to [facility] - [address] - [phone]  - [phone] [address] [address]     Phone:  [phone]     raNITIdine 75 MG tablet                  Your Updated Medication List          This list is accurate as of: 1/12/17  3:24 PM.  Always use your most recent med list.                atorvastatin 10 MG tablet   Commonly known as:  LIPITOR   TAKE 1 TABLET BY MOUTH DAILY       furosemide 40 MG tablet   Commonly known as:  LASIX       gabapentin 400 MG capsule   Commonly known as:  NEURONTIN   Take 1 capsule by mouth 3 (Three) Times a Day.       raNITIdine 75 MG tablet   Commonly known as:  ZANTAC   Take 1 tablet by mouth 2 (Two) Times a Day.       sertraline 100 MG tablet   Commonly known as:  ZOLOFT   Take 1 tablet by mouth Daily.       spironolactone 50 MG tablet   Commonly  known as:  ALDACTONE       sucralfate 1 GM/10ML suspension   Commonly known as:  CARAFATE   Take 10 mL by mouth Daily.               You Were Diagnosed With        Codes Comments    Peripheral vascular disease    -  Primary ICD-10-CM: I73.9  ICD-9-CM: 443.9       Instructions    If signs and symptoms of ischemia should occur including but not limited to pale/blue discoloration of limb, increasing pain with ambulation or at rest, or a non-healing wound. Patient is to notify Heart and Vascular center for immediate evaluation.    Return 6mos - JENSEN         Patient Instructions History      Upcoming Appointments     Visit Type Date Time Department    OFFICE VISIT 1/12/2017  1:40 PM MG[facility] CT VAS SURGERY MAD    OFFICE VISIT 1/18/2017 10:00 AM MGW GEN SURGERY [facility]    LAB 1/23/2017 10:00 AM  MAD OP INFUSION    FOLLOW UP 1/27/2017  9:30 AM MGW ONC [place]    LAB 1/27/2017  9:00 AM  MAD OP INFUSION    INFUSION 1 HR 1/27/2017 10:00 AM [facility] OP INFUSION    OFFICE VISIT 2/8/2017  1:45 PM MGW FM RESIDENT MAD    OFFICE VISIT 2/16/2017 10:30 AM MGW GASTROENT  [facility] JENSEN 7/12/2017 10:00 AM MGW CT VAS SURGERY MAD    OFFICE VISIT 7/12/2017 10:40 AM MGW CT VAS SURGERY MAD      MyChart Signup     Our records indicate that you have declined [facility] [employer] signup. If you would like to sign up for [employer], please email [facility] [email or web address] [email or web address] or call [phone] to obtain an activation code.             Other Info from Your Visit           Your Appointments     Jan 18, 2017 10:00 AM CST   Office Visit with Moy Garner MD   [facility] MEDICAL [facility] GENERAL SURGERY (--)    [address] [place] [address] Dr  [facility] [address] [place] [address] [phone]           Arrive 15 minutes prior to appointment.            Jan 23, 2017 10:00 AM CST   LAB with [facility] OP INFU PROCEDURE CHAIR   [facility] OP INFUSION ([place])    [address] [facility] [address] [phone]            Jan  "27, 2017  9:00 AM CST   LAB with NURSE NISHA VALERA   [facility] OP INFUSION ([place])    [address] [place] [address] [phone]            Jan 27, 2017  9:30 AM CST   Follow Up with Rafa Mendoza MD   [facility] HEMATOLOGY AND ONCOLOGY ([address] [address] [phone]           Arrive 15 minutes prior to appointment.            Jan 27, 2017 10:00 AM CST   INFUSION with NISHA VALERA OP INFU CHAIR 1   [facility] OP INFUSION ([place])    [address] [place] [address] [phone]              Allergies     Codeine      Other      MRSA COLONIZATION    Penicillins        Reason for Visit     Peripheral Vascular Disease 6 month follow up       Vital Signs     Blood Pressure Pulse Temperature Height Weight Oxygen Saturation    119/61 (BP Location: Left arm) 86 97.7 °F (36.5 °C) (Oral) 70\" (177.8 cm) 171 lb (77.6 kg) 94%    Body Mass Index Smoking Status                24.54 kg/m2 Current Every Day Smoker          Problems and Diagnoses Noted     Peripheral vascular disease        "

## 2017-01-13 NOTE — PROGRESS NOTES
Subjective   Patient ID: Mickey Lind is a 61 y.o. male is here today for follow-up PVD.    History of Present Illness  The following portions of the patient's history were reviewed and updated as appropriate: allergies, current medications, past family history, past medical history, past social history, past surgical history and problem list.  Peripheral Vascular Disease:  Claudication less than 50 feet, No rest pain, No ischemic tissue loss  PCP: Tyson    61yr old male with PMH significant for chronic severe thrombocytopenia, splenomegaly, cirrhosis, Stg 4 Hep C, alcoholism.  Referred for lifestyle limiting claudication.  Currently not optimal candidate for endovascular intervention secondary to thrombocytopenia and the need for use of antiplatelet medications. No ischemic tissue loss. Now with loss of function of LLE causing falls. Recent dx of Multiple Myeloma stage III, workup for bone marrow transplant per [place].     11/30/15: JENSEN: RIGHT 0.45 Bi/Monophasic (dp/pt) LEFT 0.51 Bi/Mono/Absent (pt)  3/8/16: JENSNE: RIGHT 0.50 Bi/Monophasic (dp/pt) LEFT 0.37 Bi/Mono/Absent (pt)  6/16/16: JENSEN: RIGHT 0.49 Bi/Monophasic (pop-pt) LEFT 0.39 Bi/Mono/Absent (pt)  1/12/17: JENSEN: RIGHT 0.12 Bi/Monophasic (pop-pt) LEFT 0.08 Bi/Mono/Absent (pt)      Current Outpatient Prescriptions:   •  atorvastatin (LIPITOR) 10 MG tablet, TAKE 1 TABLET BY MOUTH DAILY, Disp: 30 tablet, Rfl: 3  •  furosemide (LASIX) 40 MG tablet, Take 40 mg by mouth daily., Disp: , Rfl:   •  gabapentin (NEURONTIN) 400 MG capsule, Take 1 capsule by mouth 3 (Three) Times a Day., Disp: 90 capsule, Rfl: 2  •  sertraline (ZOLOFT) 100 MG tablet, Take 1 tablet by mouth Daily., Disp: 30 tablet, Rfl: 2  •  spironolactone (ALDACTONE) 50 MG tablet, Take 50 mg by mouth 2 (two) times a day., Disp: , Rfl:   •  sucralfate (CARAFATE) 1 GM/10ML suspension, Take 10 mL by mouth Daily., Disp: 30 g, Rfl: 3  •  raNITIdine (ZANTAC) 75 MG tablet, Take 1 tablet by mouth 2 (Two)  Times a Day., Disp: 60 tablet, Rfl: 3    Review of Systems   Constitution: Positive for weakness.   Cardiovascular: Positive for claudication.   Respiratory: Negative for shortness of breath.    Hematologic/Lymphatic: Negative for bleeding problem.   Skin: Positive for color change.   Musculoskeletal: Positive for falls and muscle weakness.   Neurological: Positive for loss of balance and numbness. Negative for paresthesias.   All other systems reviewed and are negative.       Objective   Physical Exam   Constitutional: He is oriented to person, place, and time. He appears cachectic.   Neck: Carotid bruit is not present.   Cardiovascular: Normal rate.    Pulses:       Dorsalis pedis pulses are 1+ on the right side, and 1+ on the left side.        Posterior tibial pulses are 1+ on the right side, and 0 on the left side.   Pulmonary/Chest: Effort normal.   Abdominal: Soft. He exhibits no distension.   Musculoskeletal: Normal range of motion.   Neurological: He is alert and oriented to person, place, and time. No cranial nerve deficit.   Skin: Skin is warm and dry. There is pallor.   Vitals reviewed.    Hospital Outpatient Visit on 01/06/2017   Component Date Value Ref Range Status   • WBC 01/06/2017 5.3  3.2 - 9.8 x1000/uL Final   • RBC 01/06/2017 2.15* 4.37 - 5.74 jacinto/mm3 Final   • Hemoglobin 01/06/2017 7.4* 13.7 - 17.3 gm/dl Final   • Hematocrit 01/06/2017 21.6* 39.0 - 49.0 % Final   • MCV 01/06/2017 100.5* 80.0 - 98.0 fl Final   • MCH 01/06/2017 34.4* 26.0 - 34.0 pg Final   • MCHC 01/06/2017 34.3  31.5 - 36.3 gm/dl Final   • RDW 01/06/2017 17.6* 11.5 - 14.5 % Final   • Platelets 01/06/2017 112* 150 - 450 x1000/mm3 Final   • MPV 01/06/2017 9.4  8.0 - 12.0 fl Final   • Neutrophil Rel % 01/06/2017 63.3  37.0 - 80.0 % Final   • Lymphocyte Rel % 01/06/2017 23.2  10.0 - 50.0 % Final   • Monocyte Rel % 01/06/2017 5.3  0.0 - 12.0 % Final   • Eosinophil Rel % 01/06/2017 7.4* 0.0 - 7.0 % Final   • Basophil Rel % 01/06/2017  0.4  0.0 - 2.0 % Final   • Immature Granulocyte Rel % 01/06/2017 0.40  0.00 - 0.50 % Final   • Neutrophils Absolute 01/06/2017 3.36  2.00 - 8.60 x1000/uL Final   • Lymphocytes Absolute 01/06/2017 1.23  0.60 - 4.20 x1000/uL Final   • Monocytes Absolute 01/06/2017 0.28  0.00 - 0.90 x1000/uL Final   • Eosinophils Absolute 01/06/2017 0.39  0.00 - 0.70 x1000/uL Final   • Basophils Absolute 01/06/2017 0.02  0.00 - 0.20 x1000/uL Final   • Immature Granulocytes Absolute 01/06/2017 0.020  0.005 - 0.022 x1000/uL Final   • nRBC 01/06/2017 0.0  0.0 - 0.2 % Final   • nRBC 01/06/2017 0.000  x1000/uL Final   • Sodium 01/06/2017 134* 137 - 145 mmol/L Final   • Potassium 01/06/2017 4.1  3.5 - 5.1 mmol/L Final   • Chloride 01/06/2017 102  95 - 110 mmol/L Final   • CO2 01/06/2017 24  22 - 31 mmol/L Final   • Anion Gap 01/06/2017 8.0  5.0 - 15.0 mmol/L Final   • Glucose 01/06/2017 175* 60 - 100 mg/dl Final   • BUN 01/06/2017 14  7 - 21 mg/dl Final   • Creatinine 01/06/2017 0.9  0.7 - 1.3 mg/dl Final   • GFR MDRD Non  01/06/2017 86  49 - 113 mL/min/1.73 sq.M Final    Comment: Invalid if creatinine is changing or the patient is on dialysis. Use AA  result if patient is -American, non AA result otherwise.     • GFR MDRD  01/06/2017 103  49 - 113 mL/min/1.73 sq.M Final   • Calcium 01/06/2017 8.6  8.4 - 10.2 mg/dl Final   • Total Protein 01/06/2017 6.1* 6.3 - 8.6 gm/dl Final   • Albumin 01/06/2017 2.9* 3.4 - 4.8 gm/dl Final   • Total Bilirubin 01/06/2017 0.7  0.2 - 1.3 mg/dl Final   • Alkaline Phosphatase 01/06/2017 95  38 - 126 U/L Final   • AST (SGOT) 01/06/2017 30  17 - 59 U/L Final   • ALT (SGPT) 01/06/2017 27  21 - 72 U/L Final   • LDH 01/06/2017 420  313 - 618 U/L Final   • Neutrophil Rel % 01/06/2017 60  37 - 80 % Final   • Lymphocyte Rel % 01/06/2017 24  10 - 50 % Final   • Monocyte Rel % 01/06/2017 5  0 - 12 % Final   • Eosinophil Rel % 01/06/2017 10* 0 - 7 % Final   • Atypical Lymphocytes Rel  % 01/06/2017 1* 0 - 0 % Final   • RBC Morphology 01/06/2017 Mod Anisocytosis Few Polychromia Few Tear Drop Cells Few Ovalocytes   Final   • Platelet Estimate 01/06/2017 Decreased   Final   • Total Counted 01/06/2017 100   Final   • IgG 01/06/2017 1230  700 - 1600 mg/dL Final    Comment: Performed at:  [address]  [id number]  : Jerad Mcgregor PhD, Phone:  [phone]     • Beta-2 01/06/2017 5.7* 0.6 - 2.4 mg/L Final    Comment: Performed at:  [address]  [id number]  : Jerad Mcgregor PhD, Phone:  [phone]     • Free Light Chain, Kappa 01/06/2017 4.25* 0.33 - 1.94 mg/dL Final   • Free Lambda Light Chains 01/06/2017 278.00* 0.57 - 2.63 mg/dL Final   • Kappa/Lambda FluidC Ratio 01/06/2017 0.02* 0.26 - 1.65 Final    Comment: Performed by [facility],  [address] [place] [address] [phone]  [email or web address], Eric Saleh MD - Lab. Director     • IgG 01/06/2017 1219  700 - 1600 mg/dL Final   • IgA 01/06/2017 50* 61 - 437 mg/dL Final    Result confirmed on concentration.   • IgM 01/06/2017 23  20 - 172 mg/dL Final    Result confirmed on concentration.   • Total Protein 01/06/2017 5.7* 6.0 - 8.5 g/dL Final   • Albumin 01/06/2017 2.6* 2.9 - 4.4 g/dL Final   • Alpha-1-Globulin 01/06/2017 0.4  0.0 - 0.4 g/dL Final   • Alpha-2-Globulin 01/06/2017 0.6  0.4 - 1.0 g/dL Final   • Beta Globulin 01/06/2017 0.8  0.7 - 1.3 g/dL Final   • Gamma Globulin 01/06/2017 1.2  0.4 - 1.8 g/dL Final   • M-Eduardo 01/06/2017 0.5* Not Observed g/dL Final    M-SPIKE INCLUDES BOTH MONOCLONAL PROTEINS.   • Globulin 01/06/2017 3.1  2.2 - 3.9 g/dL Final   • A/G Ratio 01/06/2017 0.9  0.7 - 1.7 Final   • Immunofixation Reflex, Serum 01/06/2017 Comment   Final    Comment: Immunofixation shows IgG monoclonal protein with lambda light chains  specificity and monoclonal free lambda light chains (Bence-Harris  Protein).     • Please note 01/06/2017  Comment   Final    Comment: Protein electrophoresis scan will follow via computer, mail, or   delivery.  Performed at:  [facility] [employer]  [id number]  : Jerad Mcgregor PhD, Phone:  [phone]     • Total Protein, Urine 01/09/2017 499.4  Not Estab. mg/dL Final    Comment: Results confirmed on  dilution.     • Protein, 24H Urine 01/09/2017 6991.6* 30.0 - 150.0 mg/24 hr Final   • Albumin, U 01/09/2017 25.6  % Final   • Alpha-1-Globulin, U 01/09/2017 2.9  % Final   • Alpha-2-Globulin, U 01/09/2017 1.0  % Final   • Beta Globulin, U 01/09/2017 3.5  % Final   • Gamma Globulin, Urine 01/09/2017 66.9  % Final   • M-Eduardo, % 01/09/2017 65.0* Not Observed % Final   • M-Eduardo, mg/24 hr 01/09/2017 4544.5* Not Observed mg/24 hr Final   • Please note 01/09/2017 Comment   Final    Comment: Protein electrophoresis scan will follow via computer, mail, or   delivery.     • Total Volume 01/09/2017 1400  mL Final    Comment: Performed at:  [address]  [id number]  : Jerad Mcgregor PhD, Phone:  [phone]     Admission on 12/14/2016, Discharged on 12/14/2016   Component Date Value Ref Range Status   • WBC 12/14/2016 4.1  3.2 - 9.8 x1000/uL Final   • RBC 12/14/2016 2.54* 4.37 - 5.74 jacinto/mm3 Final   • Hemoglobin 12/14/2016 8.5* 13.7 - 17.3 gm/dl Final   • Hematocrit 12/14/2016 25.2* 39.0 - 49.0 % Final   • MCV 12/14/2016 99.2* 80.0 - 98.0 fl Final   • MCH 12/14/2016 33.5  26.0 - 34.0 pg Final   • MCHC 12/14/2016 33.7  31.5 - 36.3 gm/dl Final   • RDW 12/14/2016 18.8* 11.5 - 14.5 % Final   • Platelets 12/14/2016 100* 150 - 450 x1000/mm3 Final   • MPV 12/14/2016 9.8  8.0 - 12.0 fl Final   • Neutrophil Rel % 12/14/2016 58.4  37.0 - 80.0 % Final   • Lymphocyte Rel % 12/14/2016 20.6  10.0 - 50.0 % Final   • Monocyte Rel % 12/14/2016 8.5  0.0 - 12.0 % Final   • Eosinophil Rel % 12/14/2016 11.6* 0.0 - 7.0 % Final   • Basophil Rel %  12/14/2016 0.7  0.0 - 2.0 % Final   • Immature Granulocyte Rel % 12/14/2016 0.20  0.00 - 0.50 % Final   • Neutrophils Absolute 12/14/2016 2.41  2.00 - 8.60 x1000/uL Final   • Lymphocytes Absolute 12/14/2016 0.85  0.60 - 4.20 x1000/uL Final   • Monocytes Absolute 12/14/2016 0.35  0.00 - 0.90 x1000/uL Final   • Eosinophils Absolute 12/14/2016 0.48  0.00 - 0.70 x1000/uL Final   • Basophils Absolute 12/14/2016 0.03  0.00 - 0.20 x1000/uL Final   • Immature Granulocytes Absolute 12/14/2016 0.010  0.005 - 0.022 x1000/uL Final   • nRBC 12/14/2016 0.0  0.0 - 0.2 % Final   • nRBC 12/14/2016 0.000  x1000/uL Final   • Protime 12/14/2016 15.4* 11.1 - 15.3 seconds Final   • INR 12/14/2016 1.2  0.8 - 1.2 Final    Comment: Therapeutic range for most indications is 2.0 - 3.0 INR or 2.5 - 3.5 for  mechanical   heart valves.     • Sodium 12/14/2016 134* 137 - 145 mmol/L Final   • Potassium 12/14/2016 4.3  3.5 - 5.1 mmol/L Final   • Chloride 12/14/2016 98  95 - 110 mmol/L Final   • CO2 12/14/2016 27  22 - 31 mmol/L Final   • Anion Gap 12/14/2016 9.0  5.0 - 15.0 mmol/L Final   • Glucose 12/14/2016 123* 60 - 100 mg/dl Final   • BUN 12/14/2016 17  7 - 21 mg/dl Final   • Creatinine 12/14/2016 0.9  0.7 - 1.3 mg/dl Final   • GFR MDRD Non  12/14/2016 86  49 - 113 mL/min/1.73 sq.M Final    Comment: Invalid if creatinine is changing or the patient is on dialysis. Use AA  result if patient is -American, non AA result otherwise.     • GFR MDRD  12/14/2016 103  49 - 113 mL/min/1.73 sq.M Final   • Calcium 12/14/2016 8.9  8.4 - 10.2 mg/dl Final   • Total Protein 12/14/2016 6.4  6.3 - 8.6 gm/dl Final   • Albumin 12/14/2016 3.0* 3.4 - 4.8 gm/dl Final   • Total Bilirubin 12/14/2016 0.8  0.2 - 1.3 mg/dl Final   • Alkaline Phosphatase 12/14/2016 90  38 - 126 U/L Final   • AST (SGOT) 12/14/2016 35  17 - 59 U/L Final   • ALT (SGPT) 12/14/2016 31  21 - 72 U/L Final   • Lipase 12/14/2016 303* 23 - 300 U/L Final   •  Color, UA 12/14/2016 YELLOW  STRAW,YELLOW,DK YELLOW,KIRA Final   • Appearance 12/14/2016 CLEAR  CLEAR Final   • Specific Gravity, UA 12/14/2016 1.010  1.003 - 1.030 Final   • pH, UA 12/14/2016 7.0  5.0 - 9.0 pH Units Final    pH Normal: 5.0 - 9.0    • Leukocytes, UA 12/14/2016 NEGATIVE  NEGATIVE Final   • Nitrite, UA 12/14/2016 NEGATIVE  NEGATIVE Final   • Protein, UA 12/14/2016 2+* NEGATIVE Final   • Glucose, Urine 12/14/2016 NEGATIVE  NEGATIVE mg/dl Final   • Ketones, UA 12/14/2016 NEGATIVE  NEGATIVE Final   • Urobilinogen, UA 12/14/2016 2.0* 0.2 EU/dl Final   • Blood, UA 12/14/2016 NEGATIVE  NEGATIVE Final   • Lactate 12/14/2016 1.0  0.5 - 2.0 mmol/L Final   • WBC, UA 12/14/2016 0-2  NONE SEEN,0-2,3-5  /HPF Final   • RBC, UA 12/14/2016 NONE SEEN  NONE SEEN,0-2,3-5  /HPF Final   • Epithelial cells 12/14/2016 3-5  NONE SEEN,0-2,3-5  /HPF Final   • Bacteria, UA 12/14/2016 NONE SEEN  NONE SEEN Final   • PTT 12/14/2016 33.6  20.0 - 40.3 seconds Final    The recommended Heparin therapeutic range is 68 - 97 seconds.   • DOES A PREVIOUS ABORH EXIST? 12/14/2016 PREVIOUS TYPE ON FILE   Final   • ABO Type 12/14/2016 A   Final   • RH type 12/14/2016 POS   Final   • Antibody Screen Interpretation 12/14/2016 NEG   Final         Assessment/Plan   Independent Review of Radiographic Studies:    Detailed discussion regarding risks, benefits, and treatment plan.  Patient understands, agrees, and wishes to proceed with plan.  Progressing slowly. Currently under evaluation for bone marrow transplant for multiple myeloma.    1. Peripheral vascular disease  Severe peripheral arterial occlusive disease to BLE. NO ischemic tissue loss. Waveforms stable from previous. Findings discussed with Dr. Douglas.  Currently revascularization is not the best option as he is extremely high risk d/t comorbidities. Will continue to follow if able to receive transplant and respond appropriately will consider to proceed with invasive evaluation at  that time.  - Ankle Brachial Index; Future (6mos)  If signs and symptoms of ischemia should occur including but not limited to pale/blue discoloration of limb, increasing pain with ambulation or at rest, or a non-healing wound. Patient is to notify Heart and Vascular center for immediate evaluation.

## 2017-01-18 PROBLEM — Z45.2 ENCOUNTER FOR VENOUS ACCESS DEVICE CARE: Status: ACTIVE | Noted: 2017-01-01

## 2017-01-18 NOTE — MR AVS SNAPSHOT
Mickey Tracy Lelo   1/18/2017 10:00 AM   Office Visit    Dept Phone:  [phone]   Encounter #:  [id number]    Provider:  Moy Garner MD   Department:  [facility] GENERAL SURGERY                Your Full Care Plan              Your Updated Medication List          This list is accurate as of: 1/18/17 10:40 AM.  Always use your most recent med list.                acyclovir 400 MG tablet   Commonly known as:  ZOVIRAX       atorvastatin 10 MG tablet   Commonly known as:  LIPITOR   TAKE 1 TABLET BY MOUTH DAILY       dexamethasone 4 MG tablet   Commonly known as:  DECADRON       furosemide 40 MG tablet   Commonly known as:  LASIX       gabapentin 400 MG capsule   Commonly known as:  NEURONTIN   Take 1 capsule by mouth 3 (Three) Times a Day.       raNITIdine 75 MG tablet   Commonly known as:  ZANTAC   Take 1 tablet by mouth 2 (Two) Times a Day.       sertraline 100 MG tablet   Commonly known as:  ZOLOFT   Take 1 tablet by mouth Daily.       spironolactone 50 MG tablet   Commonly known as:  ALDACTONE       sucralfate 1 GM/10ML suspension   Commonly known as:  CARAFATE   Take 10 mL by mouth Daily.       sulfamethoxazole-trimethoprim 800-160 MG per tablet   Commonly known as:  BACTRIM DS,SEPTRA DS               Instructions     None    Patient Instructions History      Upcoming Appointments     Visit Type Date Time Department    OFFICE VISIT 1/18/2017 10:00 AM [facility] GEN SURGERY MAD    LAB 1/23/2017 10:00 AM  MAD OP INFUSION    FOLLOW UP 1/27/2017  9:30 AM MG[facility] ONC [place]    LAB 1/27/2017  9:00 AM  MAD OP INFUSION    INFUSION 1 HR 1/27/2017 10:00 AM  MAD OP INFUSION    OFFICE VISIT 2/8/2017  1:45 PM MGW FM RESIDENT MAD    OFFICE VISIT 2/16/2017 10:30 AM MGW GASTROENT  MAD    OFFICE VISIT 5/17/2017  9:20 AM MGW GEN SURGERY MAD     MAD JENSEN 7/12/2017 10:00 AM MGW CT VAS SURGERY MAD    OFFICE VISIT 7/12/2017 10:40 AM MGW CT VAS SURGERY MAD      MyChart Signup     Our  "records indicate that you have declined [facility] signup. If you would like to sign up for [employer], please email [email or web address] or call [phone] to obtain an activation code.             Other Info from Your Visit           Your Appointments     Jan 23, 2017 10:00 AM CST   LAB with  MORAIMA OP INFU PROCEDURE CHAIR   [facility] OP INFUSION ([address] [address] [phone]            Jan 27, 2017  9:00 AM CST   LAB with NURSE  MORAIMA   [facility] OP INFUSION ([address] [address] [phone]            Jan 27, 2017  9:30 AM CST   Follow Up with Rafa Mendoza MD   [facility] HEMATOLOGY AND ONCOLOGY ([address]  [place] [address] [phone]           Arrive 15 minutes prior to appointment.            Jan 27, 2017 10:00 AM CST   INFUSION with  MORAIMA OP INFU CHAIR 1   [facility] OP INFUSION ([address] [address] [phone]            Feb 08, 2017  1:45 PM CST   Office Visit with Ara Bach MD   [facility] FAMILY MEDICINE (--)    [address] [place] [address] [phone]           Arrive 15 minutes prior to appointment.              Allergies     Codeine      Other      MRSA COLONIZATION    Penicillins        Reason for Visit     Follow-up Recheck right inguinal hernia      Vital Signs     Blood Pressure Height Weight Body Mass Index Smoking Status       114/58 70\" (177.8 cm) 174 lb (78.9 kg) 24.97 kg/m2 Current Every Day Smoker         "

## 2017-01-18 NOTE — PROGRESS NOTES
Chief Complaint   Patient presents with   • Follow-up     Recheck right inguinal hernia     HPI    Currently undergoing treatment for lymphoma. Considered for bone marrow transplantation. Hernia is unchanged since last visit. No hx of incarceration or obstruction. Felt to be a poor operative candidate.    Past Medical History   Diagnosis Date   • Abdominal pain    • Alcoholic liver damage    • Alcoholic polyneuropathy    • Amblyopia      refractive os   • Ascites    • Ascites    • Astigmatism    • Cellulitis of left lower leg    • Chronic hepatitis C    • Chronic viral hepatitis C    • Cirrhosis of liver    • Claudication    • Encounter for immunization    • Generalized edema    • History of colon polyps    • Hypermetropia    • Inguinal hernia      - Rih      • Low back pain    • Major depressive disorder      recurrent , moderate   • Pain in joint, lower leg    • Peripheral vascular disease    • Recurrent major depressive episodes    • Tinea unguium    • Tobacco use      Past Surgical History   Procedure Laterality Date   • Cardiac catheterization       (Successful PCI to a totally occluded RCA with a 2.5 x 28 and a 2.5 x 8 mm Xience stent. Nonobstructive disease in the left coronary artery system.)   11/24/2012    • Colonoscopy  10/13/2016   • Endoscopy  10/13/2016       Current Outpatient Prescriptions:     •  atorvastatin (LIPITOR) 10 MG tablet, TAKE 1 TABLET BY MOUTH DAILY, Disp: 30 tablet, Rfl: 3  •  furosemide (LASIX) 40 MG tablet, Take 40 mg by mouth daily., Disp: , Rfl:   •  gabapentin (NEURONTIN) 400 MG capsule, Take 1 capsule by mouth 3 (Three) Times a Day., Disp: 90 capsule, Rfl: 2  •  raNITIdine (ZANTAC) 75 MG tablet, Take 1 tablet by mouth 2 (Two) Times a Day., Disp: 60 tablet, Rfl: 3  •  sertraline (ZOLOFT) 100 MG tablet, Take 1 tablet by mouth Daily., Disp: 30 tablet, Rfl: 2  •  spironolactone (ALDACTONE) 50 MG tablet, Take 50 mg by mouth 2 (two) times a day., Disp: , Rfl:   •  sucralfate (CARAFATE)  1 GM/10ML suspension, Take 10 mL by mouth Daily., Disp: 30 g, Rfl: 3  •  acyclovir (ZOVIRAX) 400 MG tablet, Take 400 mg by mouth., Disp: , Rfl:   •  dexamethasone (DECADRON) 4 MG tablet, Take 4 mg by mouth., Disp: , Rfl:   •  sulfamethoxazole-trimethoprim (BACTRIM DS,SEPTRA DS) 800-160 MG per tablet, Take 400 mg by mouth., Disp: , Rfl:     Allergies   Allergen Reactions   • Codeine    • Other      MRSA COLONIZATION   • Penicillins      Family History   Problem Relation Age of Onset   • Cancer Mother    • Cancer Father    • Other Father      ischemic heart disease   • Other Son      depressive disorder   • Diabetes Maternal Grandmother    • Cancer Paternal Grandfather        Social History   • Marital status:      Social History Main Topics   • Smoking status: Current Every Day Smoker   • Smokeless tobacco: No      Comment: Smokes approx 1 ppd of Cigarettes.smoking since last 45 yr   • Alcohol use No      Comment: no alcohol since October 2015   • Drug use: No     Physical Exam   Abdominal: Soft. Bowel sounds are normal. He exhibits distension (with ascites). He exhibits no mass. There is no tenderness. There is no rebound and no guarding. A hernia is present. Hernia confirmed positive in the right inguinal area.     ASSESSMENT    Mickey was seen today for follow-up.    Diagnoses and all orders for this visit:    Unilateral inguinal hernia without obstruction or gangrene, recurrence not specified      PLAN  1. Recheck in 4 months  2. Poor operative candidate    Signed by Moy Garner MD

## 2017-01-18 NOTE — LETTER
January 18, 2017     Kenton Mehta MD  [facility] [address] [place] [address]    Patient: Mickey Lind   YOB: 1955   Date of Visit: 1/18/2017       Dear Dr. Tyson MD:    Thank you for referring Mickey Lind to me for evaluation. Below are the relevant portions of my assessment and plan of care.    If you have questions, please do not hesitate to call me. I look forward to following Mickey along with you.         Sincerely,        Moy Garner MD        CC: No Recipients  Moy Garner MD  1/18/2017 11:46 PM  Signed  Chief Complaint   Patient presents with   • Follow-up     Recheck right inguinal hernia     HPI    Currently undergoing treatment for lymphoma. Considered for bone marrow transplantation. Hernia is unchanged since last visit. No hx of incarceration or obstruction. Felt to be a poor operative candidate.    Past Medical History   Diagnosis Date   • Abdominal pain    • Alcoholic liver damage    • Alcoholic polyneuropathy    • Amblyopia      refractive os   • Ascites    • Ascites    • Astigmatism    • Cellulitis of left lower leg    • Chronic hepatitis C    • Chronic viral hepatitis C    • Cirrhosis of liver    • Claudication    • Encounter for immunization    • Generalized edema    • History of colon polyps    • Hypermetropia    • Inguinal hernia      - Rih      • Low back pain    • Major depressive disorder      recurrent , moderate   • Pain in joint, lower leg    • Peripheral vascular disease    • Recurrent major depressive episodes    • Tinea unguium    • Tobacco use      Past Surgical History   Procedure Laterality Date   • Cardiac catheterization       (Successful PCI to a totally occluded RCA with a 2.5 x 28 and a 2.5 x 8 mm Xience stent. Nonobstructive disease in the left coronary artery system.)   11/24/2012    • Colonoscopy  10/13/2016   • Endoscopy  10/13/2016       Current Outpatient Prescriptions:     •  atorvastatin (LIPITOR) 10 MG tablet, TAKE 1 TABLET BY MOUTH DAILY,  Disp: 30 tablet, Rfl: 3  •  furosemide (LASIX) 40 MG tablet, Take 40 mg by mouth daily., Disp: , Rfl:   •  gabapentin (NEURONTIN) 400 MG capsule, Take 1 capsule by mouth 3 (Three) Times a Day., Disp: 90 capsule, Rfl: 2  •  raNITIdine (ZANTAC) 75 MG tablet, Take 1 tablet by mouth 2 (Two) Times a Day., Disp: 60 tablet, Rfl: 3  •  sertraline (ZOLOFT) 100 MG tablet, Take 1 tablet by mouth Daily., Disp: 30 tablet, Rfl: 2  •  spironolactone (ALDACTONE) 50 MG tablet, Take 50 mg by mouth 2 (two) times a day., Disp: , Rfl:   •  sucralfate (CARAFATE) 1 GM/10ML suspension, Take 10 mL by mouth Daily., Disp: 30 g, Rfl: 3  •  acyclovir (ZOVIRAX) 400 MG tablet, Take 400 mg by mouth., Disp: , Rfl:   •  dexamethasone (DECADRON) 4 MG tablet, Take 4 mg by mouth., Disp: , Rfl:   •  sulfamethoxazole-trimethoprim (BACTRIM DS,SEPTRA DS) 800-160 MG per tablet, Take 400 mg by mouth., Disp: , Rfl:     Allergies   Allergen Reactions   • Codeine    • Other      MRSA COLONIZATION   • Penicillins      Family History   Problem Relation Age of Onset   • Cancer Mother    • Cancer Father    • Other Father      ischemic heart disease   • Other Son      depressive disorder   • Diabetes Maternal Grandmother    • Cancer Paternal Grandfather        Social History   • Marital status:      Social History Main Topics   • Smoking status: Current Every Day Smoker   • Smokeless tobacco: No      Comment: Smokes approx 1 ppd of Cigarettes.smoking since last 45 yr   • Alcohol use No      Comment: no alcohol since October 2015   • Drug use: No     Physical Exam   Abdominal: Soft. Bowel sounds are normal. He exhibits distension (with ascites). He exhibits no mass. There is no tenderness. There is no rebound and no guarding. A hernia is present. Hernia confirmed positive in the right inguinal area.     ASSESSMENT    Mickey was seen today for follow-up.    Diagnoses and all orders for this visit:    Unilateral inguinal hernia without obstruction or gangrene,  recurrence not specified      PLAN  1. Recheck in 4 months  2. Poor operative candidate    Signed by Moy Garner MD

## 2017-01-20 PROBLEM — C90.00 MULTIPLE MYELOMA, STAGE 3 (HCC): Status: ACTIVE | Noted: 2017-01-01

## 2017-01-26 NOTE — PROGRESS NOTES
DATE OF VISIT: 1/26/2017    REASON FOR VISIT:  Newly diagnosed IgG lambda multiple myeloma on treatment.    HISTORY OF PRESENT ILLNESS:    61-year-old male with a past medical history significant for chronic thrombocytopenia and splenomegaly due to hepatitis C, status posttreatment with Harvoni  by Dr. David was diagnosed with a bone marrow biopsy by Dr. Villagran with a multiple myeloma in December 2016.  Patient was last seen in clinic on January 11, 2017 at that point patient was referred to [facility] bone marrow transplant team to get evaluated if his candidate for bone marrow transplant.  Patient started his treatment for multiple myeloma with Velcade and Decadron on January 23, 2017.  He is here to get day 4 of Velcade today.  Complains of shortness of breath with exertion.  Complains of excessive fatigue.  Denies any blood in the stool or urine.  Denies any new skin lesions.    PAST MEDICAL HISTORY:    Past Medical History   Diagnosis Date   • Abdominal pain    • Alcoholic liver damage    • Alcoholic polyneuropathy    • Amblyopia      refractive os   • Ascites    • Ascites    • Astigmatism    • Cellulitis of left lower leg    • Chronic hepatitis C    • Chronic viral hepatitis C    • Cirrhosis of liver    • Claudication    • Encounter for immunization    • Generalized edema    • History of colon polyps    • Hypermetropia    • Inguinal hernia      - Rih      • Low back pain    • Major depressive disorder      recurrent , moderate   • Pain in joint, lower leg    • Peripheral vascular disease    • Recurrent major depressive episodes    • Tinea unguium    • Tobacco use        SOCIAL HISTORY:    Social History   Substance Use Topics   • Smoking status: Current Every Day Smoker   • Smokeless tobacco: None      Comment: Smokes approx 1 ppd of Cigarettes.smoking since last 45 yr   • Alcohol use No      Comment: no alcohol since October 2015       Surgical History :  Past Surgical History   Procedure Laterality Date  "  • Cardiac catheterization       (Successful PCI to a totally occluded RCA with a 2.5 x 28 and a 2.5 x 8 mm Xience stent. Nonobstructive disease in the left coronary artery system.)   11/24/2012    • Colonoscopy  10/13/2016   • Endoscopy  10/13/2016       ALLERGIES:    Allergies   Allergen Reactions   • Codeine    • Other      MRSA COLONIZATION   • Penicillins        REVIEW OF SYSTEMS:      CONSTITUTIONAL: Positive for severe fatigue.  No fever, chills, or night sweats.     HEENT:  No epistaxis, mouth sores, or difficulty swallowing.    RESPIRATORY:  Denies any shortness of breath or cough at present.    CARDIOVASCULAR:  No chest pain or palpitations.    GASTROINTESTINAL:  Plans of chronic generalized abdominal pain.  Complains of nausea and episode of vomiting with her last Velcade injection.  No blood in the stool.    GENITOURINARY:  No dysuria or hematuria.    MUSCULOSKELETAL:  Complains of chronic leg pain.  Denies any new back pain.    NEUROLOGICAL: Positive chronic tingling and numbness in the lower extremity. No new headache or dizziness.     LYMPHATICS:  Denies any abnormal swollen and anywhere in the body.    SKIN:  Denies any new skin rash.    PHYSICAL EXAMINATION:      VITAL SIGNS:    Visit Vitals   • /61   • Pulse 87   • Temp 98.4 °F (36.9 °C)   • Resp 18   • Ht 70\" (177.8 cm)   • Wt 168 lb 6.4 oz (76.4 kg)   • BMI 24.16 kg/m2       GENERAL:  Not in any distress.     EYES:  Mild pallor. No icterus.    NECK:  No adenopathy.    RESPIRATORY:  Fair air entry bilaterally. No rhonchi or wheezing.    CARDIOVASCULAR:  S1, S2. Regular rate and rhythm.    ABDOMEN:  Soft, distended tenderness on deep palpation which is underlies. Bowel sounds present.    EXTREMITIES:  No edema.    NEUROLOGICAL:  Alert, awake, oriented x3. No deficits.    DIAGNOSTIC DATA:    GLUCOSE   Date Value Ref Range Status   01/26/2017 194 (H) 60 - 100 mg/dL Final   01/06/2017 175 (H) 60 - 100 mg/dl Final     SODIUM   Date Value Ref " Range Status   01/26/2017 135 (L) 137 - 145 mmol/L Final   01/06/2017 134 (L) 137 - 145 mmol/L Final     POTASSIUM   Date Value Ref Range Status   01/26/2017 4.3 3.5 - 5.1 mmol/L Final   01/06/2017 4.1 3.5 - 5.1 mmol/L Final     CO2   Date Value Ref Range Status   01/26/2017 19.0 (L) 22.0 - 31.0 mmol/L Final   01/06/2017 24 22 - 31 mmol/L Final     CHLORIDE   Date Value Ref Range Status   01/26/2017 103 95 - 110 mmol/L Final   01/06/2017 102 95 - 110 mmol/L Final     ANION GAP   Date Value Ref Range Status   01/26/2017 13.0 5.0 - 15.0 mmol/L Final   01/06/2017 8.0 5.0 - 15.0 mmol/L Final     CREATININE   Date Value Ref Range Status   01/26/2017 1.04 0.70 - 1.30 mg/dL Final   01/06/2017 0.9 0.7 - 1.3 mg/dl Final     BUN   Date Value Ref Range Status   01/26/2017 18 7 - 21 mg/dL Final   01/06/2017 14 7 - 21 mg/dl Final     BUN/CREATININE RATIO   Date Value Ref Range Status   01/26/2017 17.3 7.0 - 25.0 Final     CALCIUM   Date Value Ref Range Status   01/26/2017 8.5 8.4 - 10.2 mg/dL Final   01/06/2017 8.6 8.4 - 10.2 mg/dl Final     EGFR NON  AMER   Date Value Ref Range Status   01/26/2017 72 49 - 113 mL/min/1.73 Final     ALKALINE PHOSPHATASE   Date Value Ref Range Status   01/26/2017 92 38 - 126 U/L Final   01/06/2017 95 38 - 126 U/L Final     TOTAL PROTEIN   Date Value Ref Range Status   01/26/2017 6.0 (L) 6.3 - 8.6 g/dL Final   01/06/2017 6.1 (L) 6.3 - 8.6 gm/dl Final     ALT (SGPT)   Date Value Ref Range Status   01/26/2017 27 21 - 72 U/L Final   01/06/2017 27 21 - 72 U/L Final     AST (SGOT)   Date Value Ref Range Status   01/26/2017 29 17 - 59 U/L Final   01/06/2017 30 17 - 59 U/L Final     TOTAL BILIRUBIN   Date Value Ref Range Status   01/26/2017 0.5 0.2 - 1.3 mg/dL Final   01/06/2017 0.7 0.2 - 1.3 mg/dl Final     ALBUMIN   Date Value Ref Range Status   01/26/2017 3.00 (L) 3.40 - 4.80 g/dL Final   01/06/2017 2.9 (L) 3.4 - 4.8 gm/dl Final   01/06/2017 2.6 (L) 2.9 - 4.4 g/dL Final     GLOBULIN   Date Value  Ref Range Status   01/26/2017 3.0 2.3 - 3.5 gm/dL Final     A/G RATIO   Date Value Ref Range Status   01/26/2017 1.0 (L) 1.1 - 1.8 g/dL Final   01/06/2017 0.9 0.7 - 1.7 Final     Lab Results   Component Value Date    WBC 5.74 01/26/2017    HGB 8.2 (L) 01/26/2017    HCT 24.1 (L) 01/26/2017    .8 (H) 01/26/2017     (L) 01/26/2017     Lab Results   Component Value Date    NEUTROABS 3.50 01/26/2017    IRON 95 07/19/2016    TIBC 331 07/19/2016    LABIRON 28.7 07/19/2016    FERRITIN 657.0 (H) 11/11/2016    WXIXGALA20 636 07/19/2016    FOLATE >20.00 07/19/2016       Pathology :   Bone marrow biopsy (12/9/16) :  FINAL DIAGNOSIS:  PERIPHERAL SMEAR, REVIEW:       ANEMIA, MODERATE, WITH INADEQUATE RETICULOCYTE RESPONSE.        BONE MARROW ASPIRATION AND BIOPSY FROM LEFT POSTERIOR ILIAC CREST:       PLASMA CELL DYSCRASIA, CONSISTENT WITH MULTIPLE MYELOMA.          ASSESSMENT AND PLAN:        1.  IgG lambda multiple myeloma, ISS stage III.  Patient was diagnosed in December 2016 with a bone marrow biopsy.  Patient was recently started on Velcade and dexamethasone on January 23, 2017.  Patient is here to get cycle 1 day 4 of Velcade.  Plan is continue with the same chemotherapy for 2 cycles and repeat myeloma workup at that point.  Patient was also evaluated at [facility] bone marrow transplant team, and is not deemed candidate for bone marrow transplant.  We will see him back in clinic on day 1 of cycle 2.  Patient was encourage to continue taking his acyclovir, Bactrim and Decadron as prescribed.  Patient was instructed to come to the emergency room if he starts having fever greater than 100.4    2.  Symptomatic anemia with a hemoglobin of 8.2.  Since patient is complaining of severe fatigue and exertional shortness of breath which is progressively getting worse.  We will go head with 1 unit of PRBC today.  Patient was explained risk and benefit of blood transfusion and is willing to undergo blood  transfusion.    3.  Nausea and episode of vomiting: Secondary to Velcade.  We will send a prescription for Compazine to his pharmacy.    4.  Prescriptions: Patient is enough prescription for Bactrim, acyclovir and Decadron.    5. Health maintenance: Unfortunately patient continues to smoke, patient was counseled about smoking cessation.  He had a colonoscopy last 5 years.  He remains full code.     Rafa Mendoza MD  1/26/2017  11:34 AM

## 2017-01-26 NOTE — MR AVS SNAPSHOT
Mickey Lind   1/26/2017 10:00 AM   Office Visit    Dept Phone:  [phone]   Encounter #:  [id number]    Provider:  Rafa Mendoza MD   Department:  [facility] HEMATOLOGY AND ONCOLOGY                Your Full Care Plan              Today's Medication Changes          These changes are accurate as of: 1/26/17  3:31 PM.  If you have any questions, ask your nurse or doctor.               New Medication(s)Ordered:     prochlorperazine 10 MG tablet   Commonly known as:  COMPAZINE   Take 1 tablet by mouth Every 6 (Six) Hours As Needed for nausea or vomiting.   Started by:  Rafa Mendoza MD            Where to Get Your Medications      These medications were sent to [facility] - [place], [place] - [address] [facility] - [phone]  - [phone] [address] [address]     Phone:  [phone]     prochlorperazine 10 MG tablet                  Your Updated Medication List          This list is accurate as of: 1/26/17  3:31 PM.  Always use your most recent med list.                acyclovir 400 MG tablet   Commonly known as:  ZOVIRAX       atorvastatin 10 MG tablet   Commonly known as:  LIPITOR   TAKE 1 TABLET BY MOUTH DAILY       dexamethasone 4 MG tablet   Commonly known as:  DECADRON       furosemide 40 MG tablet   Commonly known as:  LASIX       gabapentin 400 MG capsule   Commonly known as:  NEURONTIN   Take 1 capsule by mouth 3 (Three) Times a Day.       prochlorperazine 10 MG tablet   Commonly known as:  COMPAZINE   Take 1 tablet by mouth Every 6 (Six) Hours As Needed for nausea or vomiting.       raNITIdine 75 MG tablet   Commonly known as:  ZANTAC   Take 1 tablet by mouth 2 (Two) Times a Day.       sertraline 100 MG tablet   Commonly known as:  ZOLOFT   Take 1 tablet by mouth Daily.       spironolactone 50 MG tablet   Commonly known as:  ALDACTONE       sucralfate 1 GM/10ML suspension   Commonly known as:  CARAFATE   Take 10 mL by mouth Daily.       sulfamethoxazole-trimethoprim 800-160 MG per tablet   Commonly known as:  BACTRIM DS,SEPTRA DS               You Were Diagnosed With        Codes Comments    Anemia, unspecified type    -  Primary ICD-10-CM: D64.9  ICD-9-CM: 285.9       Instructions     None    Patient Instructions History      Upcoming Appointments     Visit Type Date Time Department    FOLLOW UP 1/26/2017 10:00 AM [facility] ONC [facility]    VAD FLUSH 1/26/2017  9:30 AM  [facility] OP INFUSION    INFUSION 1 HR 1/26/2017 11:15 AM  [facility] OP INFUSION    INFUSION 1 HR 1/30/2017  9:15 AM [facility] OP INFUSION    OFFICE VISIT 2/8/2017  1:45 PM [facility] FM RESIDENT MAD    OFFICE VISIT 2/16/2017 10:30 AM [facility] GASTROENT  MAD    OFFICE VISIT 5/17/2017  9:20 AM [facility] GEN SURGERY MAD     MAD JENSEN 7/12/2017 10:00 AM MGW CT VAS SURGERY MAD    OFFICE VISIT 7/12/2017 10:40 AM MG[facility] CT VAS SURGERY MAD      MyChart Signup     Our records indicate that you have declined [facility] MyChart signup. If you would like to sign up for [place], please email [email or web address] or call [phone] to obtain an activation code.             Other Info from Your Visit           Your Appointments     Jan 30, 2017  9:15 AM CST   INFUSION with [facility] OP INFU CHAIR 6   [facility])    [address] [place] [address] [phone]            Feb 08, 2017  1:45 PM CST   Office Visit with Ara Bach MD   [facility] FAMILY MEDICINE (--)    [address] [facility] [address] [phone]           Arrive 15 minutes prior to appointment.            Feb 16, 2017 10:30 AM CST   Office Visit with Jose Daniel David MD   [facility] GASTROENTEROLOGY (--)    [address] [place] [address] [address]  [address] [place] [id number] [phone]           Arrive 15 minutes prior to appointment.            May 17, 2017  9:20 AM CDT   Office Visit with Moy Garner MD   [facility] GENERAL  "SURGERY (--)    [address] [place] [address] [address]  [address]  [facility] [address] [phone]           Arrive 15 minutes prior to appointment.            Jul 12, 2017 10:00 AM CDT   VASCULAR ULTRASOUND VISIT with [facility]ORA[facility] JENSEN ROOM   [facility] CARDIOTHORACIC AND VASCULAR SURGERY (--)    [address] [place] [address] [address]  [address] [place] [address] [phone]              Allergies     Codeine      Other      MRSA COLONIZATION    Penicillins        Reason for Visit     Follow-up           Vital Signs     Blood Pressure Pulse Temperature Respirations Height Weight    128/61 87 98.4 °F (36.9 °C) 18 70\" (177.8 cm) 168 lb 6.4 oz (76.4 kg)    Body Mass Index Smoking Status                24.16 kg/m2 Current Every Day Smoker          Problems and Diagnoses Noted     Anemia    -  Primary        "

## 2017-02-08 NOTE — PROGRESS NOTES
I have reviewed the notes, assessments, and/or procedures performed. I concur with her/his documentation of Mickey Lind.     Andres Dodd, DO

## 2017-02-08 NOTE — PROGRESS NOTES
Subjective:     Mickey Lind is a 61 y.o. male who presents for follow up for Ig G lambda multiple myeloma, stage 3 followed by Dr. Mendoza (currently on chemo), chronic thrombocytopenia and splenomegaly due to hepatitis C, status posttreatment with Harvoni by Dr. David was diagnosed with a bone marrow biopsy by Dr. Villagran with a multiple myeloma in December 2016. Oon January 11, 2017 patient was referred to [place] bone marrow transplant team to get evaluated if his candidate for bone marrow transplant and was considered to be a poor candidate. Patient started his treatment for multiple myeloma with Velcade and Decadron on January 23, 2017.  Complains of excessive fatigue. Denies any blood in the stool or urine. Denies any new skin lesions.     Back Pain: Patient presents for presents with low back problems.  Symptoms have been present for several months and include pain in lower back (aching in character; 8/10 in severity). Initial inciting event: none. Symptoms are worst: morning. Alleviating factors identifiable by patient are sitting. Exacerbating factors identifiable by patient are bending backwards, bending forwards, bending sideways, running, walking and walking uphill. Treatments so far initiated by patient: neurontin Previous lower back problems: yes, seondary to MM. Previous workup: on file. Previous treatments: see med list.        Preventative:  Over the past 2 weeks, have you felt down, depressed, or hopeless?No   Over the past 2 weeks, have you felt little interest or pleasure in doing things?No  Clinical depression screening refused by patient.Not Indicated     On osteoporosis therapy?Not Indicated     Past Medical Hx:  Past Medical History   Diagnosis Date   • Abdominal pain    • Alcoholic liver damage    • Alcoholic polyneuropathy    • Amblyopia      refractive os   • Ascites    • Ascites    • Astigmatism    • Cellulitis of left lower leg    • Chronic hepatitis C    • Chronic viral  hepatitis C    • Cirrhosis of liver    • Claudication    • Encounter for immunization    • Generalized edema    • History of colon polyps    • Hypermetropia    • Inguinal hernia      - Rih      • Low back pain    • Major depressive disorder      recurrent , moderate   • Pain in joint, lower leg    • Peripheral vascular disease    • Recurrent major depressive episodes    • Tinea unguium    • Tobacco use        Past Surgical Hx:  Past Surgical History   Procedure Laterality Date   • Cardiac catheterization       (Successful PCI to a totally occluded RCA with a 2.5 x 28 and a 2.5 x 8 mm Xience stent. Nonobstructive disease in the left coronary artery system.)   11/24/2012    • Colonoscopy  10/13/2016   • Endoscopy  10/13/2016       Health Maintenance:  Health Maintenance   Topic Date Due   • LIPID PANEL  10/26/2016   • COLONOSCOPY  10/13/2026   • TDAP/TD VACCINES (2 - Td) 11/08/2026   • PNEUMOCOCCAL VACCINE (19-64 MEDIUM RISK)  Completed   • HEPATITIS C SCREENING  Completed   • INFLUENZA VACCINE  Completed   • ZOSTER VACCINE  Completed       Current Meds:    Current Outpatient Prescriptions:   •  acyclovir (ZOVIRAX) 400 MG tablet, Take 400 mg by mouth., Disp: , Rfl:   •  atorvastatin (LIPITOR) 10 MG tablet, Take 1 tablet by mouth Daily., Disp: 30 tablet, Rfl: 3  •  dexamethasone (DECADRON) 4 MG tablet, Take 4 mg by mouth., Disp: , Rfl:   •  furosemide (LASIX) 40 MG tablet, Take 40 mg by mouth daily., Disp: , Rfl:   •  gabapentin (NEURONTIN) 400 MG capsule, Take 1 capsule by mouth 3 (Three) Times a Day., Disp: 90 capsule, Rfl: 2  •  prochlorperazine (COMPAZINE) 10 MG tablet, Take 1 tablet by mouth Every 6 (Six) Hours As Needed for nausea or vomiting., Disp: 60 tablet, Rfl: 1  •  sertraline (ZOLOFT) 100 MG tablet, Take 1 tablet by mouth Daily., Disp: 30 tablet, Rfl: 2  •  spironolactone (ALDACTONE) 50 MG tablet, Take 50 mg by mouth 2 (two) times a day., Disp: , Rfl:   •  sucralfate (CARAFATE) 1 GM/10ML suspension, Take  10 mL by mouth Daily., Disp: 30 g, Rfl: 3  •  sulfamethoxazole-trimethoprim (BACTRIM DS,SEPTRA DS) 800-160 MG per tablet, Take 400 mg by mouth., Disp: , Rfl:     Allergies:  Codeine; Other; and Penicillins    Family Hx:  Family History   Problem Relation Age of Onset   • Cancer Mother    • Cancer Father    • Other Father      ischemic heart disease   • Other Son      depressive disorder   • Diabetes Maternal Grandmother    • Cancer Paternal Grandfather         Social History:  Social History     Social History   • Marital status:      Spouse name: N/A   • Number of children: N/A   • Years of education: N/A     Occupational History   • Not on file.     Social History Main Topics   • Smoking status: Current Every Day Smoker   • Smokeless tobacco: Not on file      Comment: Smokes approx 1 ppd of Cigarettes.smoking since last 45 yr   • Alcohol use No      Comment: no alcohol since October 2015   • Drug use: Not on file   • Sexual activity: Not on file     Other Topics Concern   • Not on file     Social History Narrative       Review of Systems   Constitutional: Positive for fatigue. Negative for activity change, appetite change and fever.   HENT: Negative for ear pain and sore throat.    Eyes: Negative for pain and visual disturbance.   Respiratory: Negative for cough and shortness of breath.    Cardiovascular: Negative for chest pain and palpitations.   Gastrointestinal: Positive for abdominal distention and nausea. Negative for abdominal pain, blood in stool, constipation, rectal pain and vomiting.   Endocrine: Negative for cold intolerance and heat intolerance.   Genitourinary: Negative for difficulty urinating, dysuria and hematuria.   Musculoskeletal: Positive for back pain and myalgias. Negative for arthralgias and gait problem.   Skin: Negative for color change and rash.   Neurological: Negative for dizziness, weakness and headaches.   Hematological: Negative for adenopathy. Does not bruise/bleed easily.  "  Psychiatric/Behavioral: Positive for decreased concentration and dysphoric mood. Negative for agitation, confusion, sleep disturbance and suicidal ideas.         Objective:     Visit Vitals   • /70   • Pulse 83   • Ht 70\" (177.8 cm)   • Wt 167 lb (75.8 kg)   • SpO2 99%   • BMI 23.96 kg/m2       Physical Exam   Constitutional: He is oriented to person, place, and time. He appears well-developed and well-nourished.   Patient limited to his walking. He is needing assistance with his walking. Gait disturbance noted today in office.   HENT:   Head: Normocephalic and atraumatic.   Eyes: EOM are normal. Pupils are equal, round, and reactive to light.   Neck: Normal range of motion. Neck supple.   Cardiovascular: Normal rate, regular rhythm and normal heart sounds.    Pulmonary/Chest: Effort normal and breath sounds normal.   Abdominal: Soft. Bowel sounds are normal. He exhibits no distension. There is no tenderness. There is no rebound.   Musculoskeletal:        Lumbar back: He exhibits decreased range of motion, tenderness, bony tenderness and pain. He exhibits no swelling, no edema, no deformity, no laceration, no spasm and normal pulse.   Neurological: He is alert and oriented to person, place, and time.   Skin: Skin is warm.   Psychiatric: He has a normal mood and affect. His behavior is normal. Judgment and thought content normal.   Nursing note and vitals reviewed.       Assessment/Plan:   Mickey was seen today for liver follow-up and pain.    Diagnoses and all orders for this visit:    Multiple myeloma, stage 3    Dysthymia    Hepatic cirrhosis, unspecified hepatic cirrhosis type    End stage liver disease    Unilateral inguinal hernia without obstruction or gangrene, recurrence not specified    Pure hypercholesterolemia    Other orders  -     gabapentin (NEURONTIN) 400 MG capsule; Take 1 capsule by mouth 3 (Three) Times a Day.  -     sertraline (ZOLOFT) 100 MG tablet; Take 1 tablet by mouth Daily.  -     " atorvastatin (LIPITOR) 10 MG tablet; Take 1 tablet by mouth Daily.      Will hold off on PT until he is treated for his MM with chemo as they are making him more fatigue. Script for wheelchair given today. Refills on meds given today.   Advised to follow Dr. Mendoza's recommendations. Patient was encourage to continue taking his acyclovir, Bactrim and Decadron as prescribed. Patient was instructed to come to the emergency room if he starts having fever greater than 100.4     Unfortunately patient continues to smoke, patient was counseled about smoking cessation. He had a colonoscopy last 5 years. He remains full code.    Follow-up:     Return in about 3 months (around 5/8/2017) for Recheck.      GOALS:  Control hernia    Preventative:  Male Preventative: Exercises regularly  Vaccines:   Tetanus vaccine: up to date  Annual influenza vaccine: up to date       Smoking cessation counseling was provided.  occasional/rare  eat more fruits and vegetables, decrease soda or juice intake, increase water intake, increase physical activity, reduce screen time, reduce portion size, cut out extra servings, reduce fast food intake, family to eat at dinner table more often, keep TV off during meals, plan meals, eat breakfast and have 3 meals a day    RISK SCORE: 4          This document has been electronically signed by Ara Bach MD on February 8, 2017 2:48 PM    Ara Bach MD, RODOLFO, PGY-2  [address] [address] [phone]

## 2017-02-08 NOTE — PROGRESS NOTES
Case discussed with family medicine resident and agree with assessment and plan.     Andres Dodd, DO

## 2017-02-13 NOTE — PROGRESS NOTES
DATE OF VISIT: 2/13/2017    REASON FOR VISIT:  Newly diagnosed IgG lambda multiple myeloma on treatment.    HISTORY OF PRESENT ILLNESS:    61-year-old male with a past medical history significant for chronic thrombocytopenia and splenomegaly due to hepatitis C, status posttreatment with Harvoni  by Dr. David was diagnosed with a bone marrow biopsy by Dr. Mitchell with a multiple myeloma in December 2016.  Patient started his treatment for multiple myeloma with Velcade and Decadron on January 23, 2017.  Patient is here to get his second cycle day 1 of Velcade today.  Denies any blood in the stool or urine.  Complains of constipation.  No new headache or dizziness.      PAST MEDICAL HISTORY:    Past Medical History   Diagnosis Date   • Abdominal pain    • Alcoholic liver damage    • Alcoholic polyneuropathy    • Amblyopia      refractive os   • Ascites    • Ascites    • Astigmatism    • Cellulitis of left lower leg    • Chronic hepatitis C    • Chronic viral hepatitis C    • Cirrhosis of liver    • Claudication    • Encounter for immunization    • Generalized edema    • History of colon polyps    • Hypermetropia    • Inguinal hernia      - Rih      • Low back pain    • Major depressive disorder      recurrent , moderate   • Pain in joint, lower leg    • Peripheral vascular disease    • Recurrent major depressive episodes    • Tinea unguium    • Tobacco use        SOCIAL HISTORY:    Social History   Substance Use Topics   • Smoking status: Current Every Day Smoker   • Smokeless tobacco: None      Comment: Smokes approx 1 ppd of Cigarettes.smoking since last 45 yr   • Alcohol use No      Comment: no alcohol since October 2015       Surgical History :  Past Surgical History   Procedure Laterality Date   • Cardiac catheterization       (Successful PCI to a totally occluded RCA with a 2.5 x 28 and a 2.5 x 8 mm Xience stent. Nonobstructive disease in the left coronary artery system.)   11/24/2012    • Colonoscopy   "10/13/2016   • Endoscopy  10/13/2016       ALLERGIES:    Allergies   Allergen Reactions   • Codeine    • Other      MRSA COLONIZATION   • Penicillins        REVIEW OF SYSTEMS:      CONSTITUTIONAL: Positive for severe fatigue.  No fever, chills, or night sweats.     HEENT:  No epistaxis, mouth sores, or difficulty swallowing.    RESPIRATORY:  Denies any shortness of breath or cough at present.    CARDIOVASCULAR:  No chest pain or palpitations.    GASTROINTESTINAL:  Complains of chronic generalized abdominal pain.  Complains of nausea and episode of vomiting with her last Velcade injection.  No blood in the stool.    GENITOURINARY:  No dysuria or hematuria.    MUSCULOSKELETAL:  Complains of chronic leg pain.  Denies any new back pain.    NEUROLOGICAL: Positive chronic tingling and numbness in the lower extremity. No new headache or dizziness.     LYMPHATICS:  Denies any abnormal swollen and anywhere in the body.    SKIN:  Denies any new skin rash.    PHYSICAL EXAMINATION:      VITAL SIGNS:    Visit Vitals   • /62   • Pulse 80   • Temp 98.1 °F (36.7 °C)   • Resp 18   • Ht 70\" (177.8 cm)   • Wt 170 lb 11.2 oz (77.4 kg)   • BMI 24.49 kg/m2       GENERAL:  Not in any distress.     EYES:  Mild pallor. No icterus.    NECK:  No adenopathy.    RESPIRATORY:  Fair air entry bilaterally. No rhonchi or wheezing.    CARDIOVASCULAR:  S1, S2. Regular rate and rhythm.    ABDOMEN:  Soft, mild tenderness on deep palpation which is unchanged. Bowel sounds present.    EXTREMITIES:  No edema.    NEUROLOGICAL:  Alert, awake, oriented x3. No motor or sensory deficits.    DIAGNOSTIC DATA:    GLUCOSE   Date Value Ref Range Status   02/13/2017 101 (H) 60 - 100 mg/dL Final   01/06/2017 175 (H) 60 - 100 mg/dl Final     SODIUM   Date Value Ref Range Status   02/13/2017 133 (L) 137 - 145 mmol/L Final   01/06/2017 134 (L) 137 - 145 mmol/L Final     POTASSIUM   Date Value Ref Range Status   02/13/2017 3.8 3.5 - 5.1 mmol/L Final   01/06/2017 " 4.1 3.5 - 5.1 mmol/L Final     CO2   Date Value Ref Range Status   02/13/2017 24.0 22.0 - 31.0 mmol/L Final   01/06/2017 24 22 - 31 mmol/L Final     CHLORIDE   Date Value Ref Range Status   02/13/2017 101 95 - 110 mmol/L Final   01/06/2017 102 95 - 110 mmol/L Final     ANION GAP   Date Value Ref Range Status   02/13/2017 8.0 5.0 - 15.0 mmol/L Final   01/06/2017 8.0 5.0 - 15.0 mmol/L Final     CREATININE   Date Value Ref Range Status   02/13/2017 0.72 0.70 - 1.30 mg/dL Final   01/06/2017 0.9 0.7 - 1.3 mg/dl Final     BUN   Date Value Ref Range Status   02/13/2017 16 7 - 21 mg/dL Final   01/06/2017 14 7 - 21 mg/dl Final     BUN/CREATININE RATIO   Date Value Ref Range Status   02/13/2017 22.2 7.0 - 25.0 Final     CALCIUM   Date Value Ref Range Status   02/13/2017 8.7 8.4 - 10.2 mg/dL Final   01/06/2017 8.6 8.4 - 10.2 mg/dl Final     EGFR NON  AMER   Date Value Ref Range Status   02/13/2017 111 49 - 113 mL/min/1.73 Final     ALKALINE PHOSPHATASE   Date Value Ref Range Status   02/13/2017 112 38 - 126 U/L Final   01/06/2017 95 38 - 126 U/L Final     TOTAL PROTEIN   Date Value Ref Range Status   02/13/2017 6.1 (L) 6.3 - 8.6 g/dL Final   01/06/2017 6.1 (L) 6.3 - 8.6 gm/dl Final     ALT (SGPT)   Date Value Ref Range Status   02/13/2017 29 21 - 72 U/L Final   01/06/2017 27 21 - 72 U/L Final     AST (SGOT)   Date Value Ref Range Status   02/13/2017 34 17 - 59 U/L Final   01/06/2017 30 17 - 59 U/L Final     TOTAL BILIRUBIN   Date Value Ref Range Status   02/13/2017 0.6 0.2 - 1.3 mg/dL Final   01/06/2017 0.7 0.2 - 1.3 mg/dl Final     ALBUMIN   Date Value Ref Range Status   02/13/2017 3.20 (L) 3.40 - 4.80 g/dL Final   01/06/2017 2.9 (L) 3.4 - 4.8 gm/dl Final   01/06/2017 2.6 (L) 2.9 - 4.4 g/dL Final     GLOBULIN   Date Value Ref Range Status   02/13/2017 2.9 2.3 - 3.5 gm/dL Final     A/G RATIO   Date Value Ref Range Status   02/13/2017 1.1 1.1 - 1.8 g/dL Final   01/06/2017 0.9 0.7 - 1.7 Final     Lab Results   Component  Value Date    WBC 4.38 02/13/2017    HGB 9.0 (L) 02/13/2017    HCT 26.3 (L) 02/13/2017    MCV 98.9 (H) 02/13/2017     (L) 02/13/2017     Lab Results   Component Value Date    NEUTROABS 2.76 02/13/2017    IRON 95 07/19/2016    TIBC 331 07/19/2016    LABIRON 28.7 07/19/2016    FERRITIN 657.0 (H) 11/11/2016    [id number] 636 07/19/2016    FOLATE >20.00 07/19/2016       Pathology :   Bone marrow biopsy (12/9/16) :  FINAL DIAGNOSIS:  PERIPHERAL SMEAR, REVIEW:       ANEMIA, MODERATE, WITH INADEQUATE RETICULOCYTE RESPONSE.        BONE MARROW ASPIRATION AND BIOPSY FROM LEFT POSTERIOR ILIAC CREST:       PLASMA CELL DYSCRASIA, CONSISTENT WITH MULTIPLE MYELOMA.          ASSESSMENT AND PLAN:        1.  IgG lambda multiple myeloma, ISS stage III.  Patient was diagnosed in December 2016 with a bone marrow biopsy.  Patient was recently started on Velcade and dexamethasone on January 23, 2017.  Patient is here to get cycle 2 day 1 of Velcade. Patient was also evaluated at [place] bone marrow transplant team, and is not deemed candidate for bone marrow transplant.  We will repeat myeloma workup on day 1 of cycle 3.  Patient was encourage to continue taking his acyclovir, Bactrim and Decadron as prescribed.  Patient was instructed to come to the emergency room if he starts having fever greater than 100.4    2.  Nausea and episode of vomiting: Secondary to Velcade.  We will send a prescription for Compazine to his pharmacy.    3.  Prescriptions: Patient is enough prescription for Bactrim, acyclovir and Decadron.    4. Health maintenance: Unfortunately patient continues to smoke, patient was counseled about smoking cessation.  He had a colonoscopy last 5 years.  He remains full code.    5.  Constipation: Patient was encouraged to start using bowel regimen consisting of MiraLAX.  Prescription for MiraLAX has been sent to his pharmacy today.     Rafa Mendoza MD  2/13/2017  4:42 PM

## 2017-02-16 PROBLEM — D64.9 ANEMIA: Status: ACTIVE | Noted: 2017-01-01

## 2017-02-16 PROBLEM — B18.2 CHRONIC HEPATITIS C WITHOUT HEPATIC COMA (HCC): Status: ACTIVE | Noted: 2017-01-01

## 2017-02-16 NOTE — PROGRESS NOTES
"LCSW spoke individually with patient as he presents for treatment this day. SW notified by RN of pt presentation noting  concern with his  emotional and cognitive functioning. Pt.diagnosed in December 2016 with Stage III multiple myeloma, he has multiple co morbid conditions that include end stage liver disease. He was seen by Dr. David today and Dr. Mendoza yesterday. Pt. Willingly conversed with SW in private.  Pt. Presents frail and unkept and visibly weak with hands slightly shaky .   He ambulates slowly and without assistance. Pt.  Presents alert and oriented x 4 when speaking with Sw. His voice tone is soft, thought processes slowed but goal directed and relevant.    He states he was somewhat confused upon arrival and experienced temporary lack of memory of being here the day prior and describes his emotions as overwhelmed.  Pt. Shares that his wife is in the hospital and he is facing multiple appointments and psychosocial stressors that likely account for his confusion. No psychotic symptoms were observed or reported.      He lives at home with his wife, son/ Daughter in law and 7 month old grandson that he describes as his scott and focus in life.   Patient denies any thoughts of self inflicted harm, he denies any plans or intent and states no ideations of harm to others. Pt. Self reports a history of alcohol abuse, stating he quit drinking around October 2016 and has not consumed alcohol since his diagnosis. Pt. Shared that he is not sleeping well and that on some nights he only sleeps 30-40 minutes at a time, he shares that for a few nights he did not sleep at all. He describes symptoms consistent with anxiety and experiencing racing thoughts and can't get his thoughts to \"turn off\".  SW discussed behavior modifications that may assist with sleep hygiene and encouraged pt to follow up with PCP regarding his sleep disturbance.  SW provided education related to sleep and symptoms of depression and anxiety. Pt " reports history of depression and is prescribed Zoloft 100 mg daily by PCP. Pt. Was encouraged to continue compliance with all medications, follow up with PCP  and encouraged to participate in supportive  with undersigned.    Patient responded receptive to SW feedback.  SW will continue to follow and assess, pt returned to infusion area and underwent his treatment and subsequently departed without incident.

## 2017-02-16 NOTE — PROGRESS NOTES
[facility]      Chief Complaint:   Chief Complaint   Patient presents with   • Abdominal Pain   • Cirrhosis     3 month follow up       Subjective     HPI:   Patient with history of hepatitis C and EtOH abuse.  Patient's last alcoholic drink was on Christmas of 2016.  Patient only had 1 beer at that time and prior to that many months before having another drink.  Patient was treated for hepatitis C with Harvoni.  Patient continues of abdominal pain and anemia.  Patient a colonoscopy last year which was poor prep.  Patient also had grade 1 varices.    Plan; we'll repeat lab work including hepatitis C workup.  Will schedule patient for ultrasound the abdomen also do EGD and colonoscopy to evaluate for source of bleeding.        Past Medical History:   Past Medical History   Diagnosis Date   • Abdominal pain    • Alcoholic liver damage    • Alcoholic polyneuropathy    • Amblyopia      refractive os   • Ascites    • Ascites    • Astigmatism    • Cellulitis of left lower leg    • Chronic hepatitis C    • Chronic viral hepatitis C    • Cirrhosis of liver    • Claudication    • Encounter for immunization    • Generalized edema    • History of colon polyps    • Hypermetropia    • Inguinal hernia      - Rih      • Low back pain    • Major depressive disorder      recurrent , moderate   • Pain in joint, lower leg    • Peripheral vascular disease    • Recurrent major depressive episodes    • Tinea unguium    • Tobacco use        Family History:  Family History   Problem Relation Age of Onset   • Cancer Mother    • Cancer Father    • Other Father      ischemic heart disease   • Other Son      depressive disorder   • Diabetes Maternal Grandmother    • Cancer Paternal Grandfather        Social History:   reports that he has been smoking.  He does not have any smokeless tobacco history on file. He reports that he does not drink alcohol.    Medications:   Current Outpatient Prescriptions   Medication Sig  Dispense Refill   • acyclovir (ZOVIRAX) 400 MG tablet Take 400 mg by mouth.     • atorvastatin (LIPITOR) 10 MG tablet Take 1 tablet by mouth Daily. 30 tablet 3   • dexamethasone (DECADRON) 4 MG tablet Take 4 mg by mouth.     • furosemide (LASIX) 40 MG tablet Take 40 mg by mouth daily.     • gabapentin (NEURONTIN) 400 MG capsule Take 1 capsule by mouth 3 (Three) Times a Day. 90 capsule 2   • polyethylene glycol (MIRALAX) powder Take 17 g by mouth Daily. 527 g 1   • prochlorperazine (COMPAZINE) 10 MG tablet Take 1 tablet by mouth Every 6 (Six) Hours As Needed for nausea or vomiting. 60 tablet 1   • sertraline (ZOLOFT) 100 MG tablet Take 1 tablet by mouth Daily. 30 tablet 2   • spironolactone (ALDACTONE) 50 MG tablet Take 50 mg by mouth 2 (two) times a day.     • sucralfate (CARAFATE) 1 GM/10ML suspension Take 10 mL by mouth Daily. 30 g 3   • sulfamethoxazole-trimethoprim (BACTRIM DS,SEPTRA DS) 800-160 MG per tablet Take 400 mg by mouth.     • sodium-potassium-magnesium sulfates (SUPREP) solution oral solution Take 1 bottle by mouth Every 12 (Twelve) Hours. 1 bottle 0     No current facility-administered medications for this visit.        Allergies:  Codeine; Other; and Penicillins    ROS:    Review of Systems   HENT: Negative for congestion, sore throat and trouble swallowing.    Respiratory: Negative for apnea, cough, choking, chest tightness, shortness of breath, wheezing and stridor.    Cardiovascular: Negative for chest pain, palpitations and leg swelling.   Gastrointestinal: Negative for abdominal distention, abdominal pain, anal bleeding, blood in stool, constipation, diarrhea, nausea, rectal pain and vomiting.   Skin: Negative for color change, pallor, rash and wound.   Neurological: Negative for dizziness, syncope, weakness and headaches.   Psychiatric/Behavioral: Negative for agitation, behavioral problems, confusion and decreased concentration.     Objective     Blood pressure 104/61, pulse 88, weight 174 lb  3.2 oz (79 kg).    Physical Exam   Constitutional: He is oriented to person, place, and time. He appears well-developed and well-nourished. No distress.   HENT:   Head: Normocephalic and atraumatic.   Cardiovascular: Normal rate, regular rhythm, normal heart sounds and intact distal pulses.  Exam reveals no gallop and no friction rub.    No murmur heard.  Pulmonary/Chest: Breath sounds normal. No respiratory distress. He has no wheezes. He has no rales. He exhibits no tenderness.   Abdominal: Soft. Bowel sounds are normal. He exhibits no distension and no mass. There is no tenderness. There is no rebound and no guarding. No hernia.   Musculoskeletal: Normal range of motion. He exhibits no edema.   Neurological: He is alert and oriented to person, place, and time.   Skin: Skin is warm and dry. No rash noted. He is not diaphoretic. No erythema. No pallor.   Psychiatric: He has a normal mood and affect. His behavior is normal. Judgment and thought content normal.        Assessment/Plan   Mickey was seen today for abdominal pain and cirrhosis.    Diagnoses and all orders for this visit:    Anemia, unspecified type  -     Case Request; Standing  -     lidocaine PF (XYLOCAINE) 1 % injection 0.1 mL; Inject 0.1 mL into the skin 1 (One) Time As Needed (for IV access).  -     dextrose 5 % and sodium chloride 0.45 % infusion; Infuse 30 mL/hr into a venous catheter Continuous As Needed (Start Prior to Procedure).  -     Case Request  -     sodium-potassium-magnesium sulfates (SUPREP) solution oral solution; Take 1 bottle by mouth Every 12 (Twelve) Hours.  -     AFP Tumor Marker  -     Comprehensive Metabolic Panel  -     CBC & Differential  -     HCV FibroSURE  -     Ethanol  -     Hepatitis C Genotype; Future  -     Hepatitis C RNA, Quantitative, PCR (graph)  -     Urine Drug Screen  -     US Abdomen Complete    Chronic hepatitis C without hepatic coma  -     sodium-potassium-magnesium sulfates (SUPREP) solution oral solution;  Take 1 bottle by mouth Every 12 (Twelve) Hours.  -     AFP Tumor Marker  -     Comprehensive Metabolic Panel  -     CBC & Differential  -     HCV FibroSURE  -     Ethanol  -     Hepatitis C Genotype; Future  -     Hepatitis C RNA, Quantitative, PCR (graph)  -     Urine Drug Screen  -     US Abdomen Complete    Other orders  -     Implement Anesthesia Orders Day of Procedure; Standing  -     Obtain Informed Consent; Standing  -     Verify Informed Consent; Standing  -     POC Glucose Fingerstick; Standing        ESOPHAGOGASTRODUODENOSCOPY (N/A), COLONOSCOPY (N/A)     Diagnosis Plan   1. Anemia, unspecified type  Case Request    lidocaine PF (XYLOCAINE) 1 % injection 0.1 mL    dextrose 5 % and sodium chloride 0.45 % infusion    Case Request    sodium-potassium-magnesium sulfates (SUPREP) solution oral solution    AFP Tumor Marker    Comprehensive Metabolic Panel    CBC & Differential    HCV FibroSURE    Ethanol    Hepatitis C Genotype    Hepatitis C RNA, Quantitative, PCR (graph)    Urine Drug Screen    US Abdomen Complete   2. Chronic hepatitis C without hepatic coma  sodium-potassium-magnesium sulfates (SUPREP) solution oral solution    AFP Tumor Marker    Comprehensive Metabolic Panel    CBC & Differential    HCV FibroSURE    Ethanol    Hepatitis C Genotype    Hepatitis C RNA, Quantitative, PCR (graph)    Urine Drug Screen    US Abdomen Complete       Anticipated Surgical Procedure:  Orders Placed This Encounter   Procedures   • US Abdomen Complete     Order Specific Question:   Reason for Exam:     Answer:   hep c   • AFP Tumor Marker   • Comprehensive Metabolic Panel   • HCV FibroSURE   • Ethanol   • Hepatitis C Genotype     Standing Status:   Future     Standing Expiration Date:   2/16/2018   • Hepatitis C RNA, Quantitative, PCR (graph)   • Urine Drug Screen       The risks, benefits, and alternatives of this procedure have been discussed with the patient or the responsible party- the patient understands and  agrees to proceed.

## 2017-02-20 NOTE — TELEPHONE ENCOUNTER
----- Message from Mary Barboza LPN sent at 2/20/2017  1:46 PM CST -----  Contact: [phone]  02/20/2017, 1237 - Please return telephone call [phone] regarding Colonoscopy.

## 2017-03-06 NOTE — PROGRESS NOTES
DATE OF VISIT: 3/6/2017    REASON FOR VISIT:  IgG lambda multiple myeloma on treatment.    HISTORY OF PRESENT ILLNESS:    61-year-old male with a past medical history significant for chronic thrombocytopenia and splenomegaly due to hepatitis C, status posttreatment with Harvoni  by Dr. David was diagnosed with a bone marrow biopsy by Dr. Mitchell with a multiple myeloma in December 2016.  Patient started his treatment for multiple myeloma with Velcade and Decadron on January 23, 2017.  Patient is here to get his third cycle day 1 of Velcade today.  Denies any blood in the stool or urine.  Complains of constipation.  No new headache or dizziness. Complains of chronic diffuse abdominal pain ongoing for around 6-7 months.      PAST MEDICAL HISTORY:    Past Medical History   Diagnosis Date   • Abdominal pain    • Alcoholic liver damage    • Alcoholic polyneuropathy    • Amblyopia      refractive os   • Ascites    • Ascites    • Astigmatism    • Cellulitis of left lower leg    • Chronic hepatitis C    • Chronic viral hepatitis C    • Cirrhosis of liver    • Claudication    • Encounter for immunization    • Generalized edema    • History of colon polyps    • Hypermetropia    • Inguinal hernia      - Rih      • Low back pain    • Major depressive disorder      recurrent , moderate   • Pain in joint, lower leg    • Peripheral vascular disease    • Recurrent major depressive episodes    • Tinea unguium    • Tobacco use        SOCIAL HISTORY:    Social History   Substance Use Topics   • Smoking status: Current Every Day Smoker   • Smokeless tobacco: None      Comment: Smokes approx 1 ppd of Cigarettes.smoking since last 45 yr   • Alcohol use No      Comment: no alcohol since October 2015       Surgical History :  Past Surgical History   Procedure Laterality Date   • Cardiac catheterization       (Successful PCI to a totally occluded RCA with a 2.5 x 28 and a 2.5 x 8 mm Xience stent. Nonobstructive disease in the left coronary  artery system.)   11/24/2012    • Endoscopy  10/13/2016   • Colonoscopy  10/13/2016   • Upper gastrointestinal endoscopy  10/13/2016       ALLERGIES:    Allergies   Allergen Reactions   • Codeine    • Other      MRSA COLONIZATION   • Penicillins        REVIEW OF SYSTEMS:      CONSTITUTIONAL: Positive for severe fatigue.  No fever, chills, or night sweats.     HEENT:  No epistaxis, mouth sores, or difficulty swallowing.    RESPIRATORY:  Denies any shortness of breath or cough at present.    CARDIOVASCULAR:  No chest pain or palpitations.    GASTROINTESTINAL:  Complains of chronic generalized abdominal pain.  Complains of nausea and episode of vomiting with her last Velcade injection. Had Constipation which finally resolved couple of days ago.  No blood in the stool.    GENITOURINARY:  No dysuria or hematuria.    MUSCULOSKELETAL:  Complains of chronic leg pain.  Denies any new back pain.    NEUROLOGICAL: Positive chronic tingling and numbness in the lower extremity. No new headache or dizziness.     LYMPHATICS:  Denies any abnormal swollen and anywhere in the body.        PHYSICAL EXAMINATION:      VITAL SIGNS:    Visit Vitals   • /54   • Pulse 86   • Temp 98.3 °F (36.8 °C)   • Resp 18   • Wt 180 lb 12.8 oz (82 kg)   • BMI 25.94 kg/m2       GENERAL:  Not in any distress.     EYES:  Mild pallor. No icterus.    NECK:  No adenopathy in cervical or supraclavicular area.    RESPIRATORY:  Fair air entry bilaterally. No rhonchi or wheezing.    CARDIOVASCULAR:  S1, S2. Regular rate and rhythm.    ABDOMEN:  Soft, mild tenderness on deep palpation which is unchanged. Bowel sounds present.    EXTREMITIES:  No edema.    NEUROLOGICAL:  Alert, awake, oriented x3. No motor or sensory deficits.    DIAGNOSTIC DATA:    GLUCOSE   Date Value Ref Range Status   03/06/2017 151 (H) 60 - 100 mg/dL Final   01/06/2017 175 (H) 60 - 100 mg/dl Final     SODIUM   Date Value Ref Range Status   03/06/2017 131 (L) 137 - 145 mmol/L Final    01/06/2017 134 (L) 137 - 145 mmol/L Final     POTASSIUM   Date Value Ref Range Status   03/06/2017 3.9 3.5 - 5.1 mmol/L Final   01/06/2017 4.1 3.5 - 5.1 mmol/L Final     CO2   Date Value Ref Range Status   03/06/2017 22.0 22.0 - 31.0 mmol/L Final   01/06/2017 24 22 - 31 mmol/L Final     CHLORIDE   Date Value Ref Range Status   03/06/2017 100 95 - 110 mmol/L Final   01/06/2017 102 95 - 110 mmol/L Final     ANION GAP   Date Value Ref Range Status   03/06/2017 9.0 5.0 - 15.0 mmol/L Final   01/06/2017 8.0 5.0 - 15.0 mmol/L Final     CREATININE   Date Value Ref Range Status   03/06/2017 0.69 (L) 0.70 - 1.30 mg/dL Final   01/06/2017 0.9 0.7 - 1.3 mg/dl Final     BUN   Date Value Ref Range Status   03/06/2017 14 7 - 21 mg/dL Final   01/06/2017 14 7 - 21 mg/dl Final     BUN/CREATININE RATIO   Date Value Ref Range Status   03/06/2017 20.3 7.0 - 25.0 Final     CALCIUM   Date Value Ref Range Status   03/06/2017 8.3 (L) 8.4 - 10.2 mg/dL Final   01/06/2017 8.6 8.4 - 10.2 mg/dl Final     EGFR NON  AMER   Date Value Ref Range Status   03/06/2017 116 (H) 49 - 113 mL/min/1.73 Final     ALKALINE PHOSPHATASE   Date Value Ref Range Status   03/06/2017 108 38 - 126 U/L Final   01/06/2017 95 38 - 126 U/L Final     TOTAL PROTEIN   Date Value Ref Range Status   03/06/2017 5.6 (L) 6.3 - 8.6 g/dL Final   01/06/2017 6.1 (L) 6.3 - 8.6 gm/dl Final     ALT (SGPT)   Date Value Ref Range Status   03/06/2017 34 21 - 72 U/L Final   01/06/2017 27 21 - 72 U/L Final     AST (SGOT)   Date Value Ref Range Status   03/06/2017 33 17 - 59 U/L Final   01/06/2017 30 17 - 59 U/L Final     TOTAL BILIRUBIN   Date Value Ref Range Status   03/06/2017 1.2 0.2 - 1.3 mg/dL Final   01/06/2017 0.7 0.2 - 1.3 mg/dl Final     ALBUMIN   Date Value Ref Range Status   03/06/2017 3.00 (L) 3.40 - 4.80 g/dL Final   01/06/2017 2.9 (L) 3.4 - 4.8 gm/dl Final   01/06/2017 2.6 (L) 2.9 - 4.4 g/dL Final     GLOBULIN   Date Value Ref Range Status   03/06/2017 2.6 2.3 - 3.5  gm/dL Final     A/G RATIO   Date Value Ref Range Status   03/06/2017 1.2 1.1 - 1.8 g/dL Final   01/06/2017 0.9 0.7 - 1.7 Final     Lab Results   Component Value Date    WBC 6.33 03/06/2017    HGB 9.8 (L) 03/06/2017    HCT 28.5 (L) 03/06/2017    MCV 97.9 03/06/2017     (L) 03/06/2017     Lab Results   Component Value Date    NEUTROABS 4.94 03/06/2017    IRON 95 07/19/2016    TIBC 331 07/19/2016    LABIRON 28.7 07/19/2016    FERRITIN 657.0 (H) 11/11/2016    GVDXDQBG12 636 07/19/2016    FOLATE >20.00 07/19/2016       Pathology :   Bone marrow biopsy (12/9/16) :  FINAL DIAGNOSIS:  PERIPHERAL SMEAR, REVIEW:       ANEMIA, MODERATE, WITH INADEQUATE RETICULOCYTE RESPONSE.        BONE MARROW ASPIRATION AND BIOPSY FROM LEFT POSTERIOR ILIAC CREST:       PLASMA CELL DYSCRASIA, CONSISTENT WITH MULTIPLE MYELOMA.          ASSESSMENT AND PLAN:        1.  IgG lambda multiple myeloma, ISS stage III.  Patient was diagnosed in December 2016 with a bone marrow biopsy.  Patient was recently started on Velcade and dexamethasone on January 23, 2017.  Patient is here to get cycle 3 day 1 of Velcade. Patient was also evaluated at [place] bone marrow transplant team, and is not deemed candidate for bone marrow transplant.  Myeloma workup has been repeated today, we will follow-up on the result of blood work.  Patient was encourage to continue taking his acyclovir, Bactrim and Decadron as prescribed.  Patient was instructed to come to the emergency room if he starts having fever greater than 100.4    2.  Prescriptions: Patient is enough prescription for Bactrim, acyclovir and Decadron.    3. Health maintenance: Unfortunately patient continues to smoke, patient was counseled about smoking cessation.  He had a colonoscopy last 5 years.  He remains full code.    4.  Constipation: Patient was encouraged to start using bowel regimen consisting of MiraLAX.  Prescription for MiraLAX has been sent to his pharmacy today.     Rafa Mendoza,  MD  3/6/2017  12:20 PM

## 2017-03-09 NOTE — PROGRESS NOTES
Subjective:     Mickey Lind is a 61 y.o. male who presents for follow up for Ig G lambda multiple myeloma, stage 3 followed by Dr. Mendoza (currently on chemo), chronic thrombocytopenia and splenomegaly due to hepatitis C, status posttreatment with Harvoni by Dr. David was diagnosed with a bone marrow biopsy by Dr. Villagran with a multiple myeloma in December 2016. Oon January 11, 2017 patient was referred to [facility] bone marrow transplant team to get evaluated if his candidate for bone marrow transplant and was considered to be a poor candidate. Patient started his treatment for multiple myeloma with Velcade and Decadron on January 23, 2017.  Complains of excessive fatigue. Denies any blood in the stool or urine. Denies any new skin lesions.     Back Pain: Patient presents for presents with low back problems.  Symptoms have been present for several months and include pain in lower back (aching in character; 8/10 in severity). Initial inciting event: none. Symptoms are worst: morning. Alleviating factors identifiable by patient are sitting. Exacerbating factors identifiable by patient are bending backwards, bending forwards, bending sideways, running, walking and walking uphill. Treatments so far initiated by patient: neurontin Previous lower back problems: yes, seondary to MM. Previous workup: on file. Previous treatments: see med list.      Preventative:  Over the past 2 weeks, have you felt down, depressed, or hopeless?No   Over the past 2 weeks, have you felt little interest or pleasure in doing things?No  Clinical depression screening refused by patient.Not Indicated     On osteoporosis therapy?Not Indicated     Past Medical Hx:  Past Medical History   Diagnosis Date   • Abdominal pain    • Alcoholic liver damage    • Alcoholic polyneuropathy    • Amblyopia      refractive os   • Ascites    • Ascites    • Astigmatism    • Cellulitis of left lower leg    • Chronic hepatitis C    • Chronic viral  hepatitis C    • Cirrhosis of liver    • Claudication    • Encounter for immunization    • Generalized edema    • History of colon polyps    • Hypermetropia    • Inguinal hernia      - Rih      • Low back pain    • Major depressive disorder      recurrent , moderate   • Pain in joint, lower leg    • Peripheral vascular disease    • Recurrent major depressive episodes    • Tinea unguium    • Tobacco use        Past Surgical Hx:  Past Surgical History   Procedure Laterality Date   • Cardiac catheterization       (Successful PCI to a totally occluded RCA with a 2.5 x 28 and a 2.5 x 8 mm Xience stent. Nonobstructive disease in the left coronary artery system.)   11/24/2012    • Endoscopy  10/13/2016   • Colonoscopy  10/13/2016   • Upper gastrointestinal endoscopy  10/13/2016       Health Maintenance:  Health Maintenance   Topic Date Due   • LIPID PANEL  10/26/2016   • COLONOSCOPY  10/13/2026   • TDAP/TD VACCINES (2 - Td) 11/08/2026   • PNEUMOCOCCAL VACCINE (19-64 MEDIUM RISK)  Completed   • HEPATITIS C SCREENING  Completed   • INFLUENZA VACCINE  Completed   • ZOSTER VACCINE  Completed       Current Meds:    Current Outpatient Prescriptions:   •  acyclovir (ZOVIRAX) 400 MG tablet, Take 400 mg by mouth., Disp: , Rfl:   •  atorvastatin (LIPITOR) 10 MG tablet, Take 1 tablet by mouth Daily., Disp: 30 tablet, Rfl: 3  •  dexamethasone (DECADRON) 4 MG tablet, Take 4 mg by mouth., Disp: , Rfl:   •  furosemide (LASIX) 40 MG tablet, Take 40 mg by mouth daily., Disp: , Rfl:   •  gabapentin (NEURONTIN) 400 MG capsule, Take 1 capsule by mouth 3 (Three) Times a Day., Disp: 90 capsule, Rfl: 2  •  polyethylene glycol (GoLYTELY) 236 G solution, Starting at noon on day prior to procedure, drink 8 ounces every 30 minutes until all gone or stools are clear. May add flavor packet., Disp: 4000 mL, Rfl: 0  •  polyethylene glycol (MIRALAX) powder, Take 17 g by mouth Daily., Disp: 527 g, Rfl: 1  •  prochlorperazine (COMPAZINE) 10 MG tablet, Take  1 tablet by mouth Every 6 (Six) Hours As Needed for nausea or vomiting., Disp: 60 tablet, Rfl: 1  •  sertraline (ZOLOFT) 100 MG tablet, Take 2 tablets by mouth Daily., Disp: 60 tablet, Rfl: 2  •  spironolactone (ALDACTONE) 50 MG tablet, Take 50 mg by mouth 2 (two) times a day., Disp: , Rfl:   •  sucralfate (CARAFATE) 1 GM/10ML suspension, Take 10 mL by mouth Daily., Disp: 30 g, Rfl: 3  •  sulfamethoxazole-trimethoprim (BACTRIM DS,SEPTRA DS) 800-160 MG per tablet, Take 400 mg by mouth., Disp: , Rfl:   •  diclofenac (VOLTAREN) 0.1 % ophthalmic solution, Administer 1 drop to the right eye 2 (Two) Times a Day., Disp: 2.5 mL, Rfl: 0  •  varenicline (CHANTIX) 0.5 MG tablet, Take 1 tablet by mouth 2 (Two) Times a Day., Disp: 30 tablet, Rfl: 0    Allergies:  Codeine; Other; and Penicillins    Family Hx:  Family History   Problem Relation Age of Onset   • Cancer Mother    • Cancer Father    • Other Father      ischemic heart disease   • Other Son      depressive disorder   • Diabetes Maternal Grandmother    • Cancer Paternal Grandfather         Social History:  Social History     Social History   • Marital status:      Spouse name: N/A   • Number of children: N/A   • Years of education: N/A     Occupational History   • Not on file.     Social History Main Topics   • Smoking status: Current Every Day Smoker   • Smokeless tobacco: Not on file      Comment: Smokes approx 1 ppd of Cigarettes.smoking since last 45 yr   • Alcohol use No      Comment: no alcohol since October 2015   • Drug use: Not on file   • Sexual activity: Not on file     Other Topics Concern   • Not on file     Social History Narrative       Review of Systems   Constitutional: Positive for fatigue. Negative for activity change, appetite change and fever.   HENT: Negative for ear pain and sore throat.    Eyes: Negative for pain and visual disturbance.   Respiratory: Negative for cough and shortness of breath.    Cardiovascular: Negative for chest pain  "and palpitations.   Gastrointestinal: Positive for abdominal distention and nausea. Negative for abdominal pain, blood in stool, constipation, rectal pain and vomiting.   Endocrine: Negative for cold intolerance and heat intolerance.   Genitourinary: Negative for difficulty urinating, dysuria and hematuria.   Musculoskeletal: Positive for back pain and myalgias. Negative for arthralgias and gait problem.   Skin: Negative for color change and rash.   Neurological: Negative for dizziness, weakness and headaches.   Hematological: Negative for adenopathy. Does not bruise/bleed easily.   Psychiatric/Behavioral: Positive for decreased concentration and dysphoric mood. Negative for agitation, confusion, sleep disturbance and suicidal ideas.         Objective:     Visit Vitals   • /60 (BP Location: Left arm, Patient Position: Sitting, Cuff Size: Adult)   • Pulse 80   • Ht 70\" (177.8 cm)   • Wt 177 lb 4.8 oz (80.4 kg)   • SpO2 93%   • BMI 25.44 kg/m2       Physical Exam   Constitutional: He is oriented to person, place, and time. He appears well-developed and well-nourished.   Patient limited to his walking. He is needing assistance with his walking. Gait disturbance noted today in office.   HENT:   Head: Normocephalic and atraumatic.   Eyes: EOM are normal. Pupils are equal, round, and reactive to light.       Neck: Normal range of motion. Neck supple.   Cardiovascular: Normal rate, regular rhythm and normal heart sounds.    Pulmonary/Chest: Effort normal and breath sounds normal.   Abdominal: Soft. Bowel sounds are normal. He exhibits no distension. There is no tenderness. There is no rebound.   Musculoskeletal:        Lumbar back: He exhibits decreased range of motion, tenderness, bony tenderness and pain. He exhibits no swelling, no edema, no deformity, no laceration, no spasm and normal pulse.   Neurological: He is alert and oriented to person, place, and time.   Skin: Skin is warm.   Psychiatric: Judgment and " thought content normal. His mood appears anxious. His speech is delayed. He is slowed. He exhibits a depressed mood.   Nursing note and vitals reviewed.       Assessment/Plan:   Mickey was seen today for multiple myeloma.    Diagnoses and all orders for this visit:    Multiple myeloma, stage 3    Dysthymia    End stage liver disease    Pure hypercholesterolemia    Tobacco abuse counseling    Other orders  -     varenicline (CHANTIX) 0.5 MG tablet; Take 1 tablet by mouth 2 (Two) Times a Day.  -     sertraline (ZOLOFT) 100 MG tablet; Take 2 tablets by mouth Daily.  -     diclofenac (VOLTAREN) 0.1 % ophthalmic solution; Administer 1 drop to the right eye 2 (Two) Times a Day.      Will hold off on PT until he is treated for his MM with chemo as they are making him more fatigue. Script for cane given today. Refills on meds given today.   Advised to follow Dr. Mendoza's recommendations. Patient was encourage to continue taking his acyclovir, Bactrim and Decadron as prescribed. Patient was instructed to come to the emergency room if he starts having fever greater than 100.4     Patient is willing to quit smoking, chantix given today.  He remains full code.    Follow-up:     Return in about 3 months (around 6/8/2017) for Recheck.      GOALS:  Control smoking    Preventative:  Male Preventative: Exercises regularly  Vaccines:   Tetanus vaccine: up to date  Annual influenza vaccine: up to date       Smoking cessation counseling was provided.  occasional/rare  eat more fruits and vegetables, decrease soda or juice intake, increase water intake, increase physical activity, reduce screen time, reduce portion size, cut out extra servings, reduce fast food intake, family to eat at dinner table more often, keep TV off during meals, plan meals, eat breakfast and have 3 meals a day    RISK SCORE: 4          This document has been electronically signed by Ara Bach MD on March 9, 2017 8:35 AM    Ara Bach MD, RODOLFO, PGY-2  [facility]  [place] [phone]

## 2017-03-27 NOTE — PROGRESS NOTES
Adult Outpatient Nutrition  Assessment    Patient Name:  Mickey Lind  YOB: 1955  MRN: [id number]        Assessment Date:  3/27/2017                        Comments:  No nutrition related problems verbalized today.   Wt 174 lb. Reinforced importance of nutrition.  Provided samples of Ensure/Boost.        Electronically signed by:  Rina Canchola RD  03/27/17 2:40 PM

## 2017-03-27 NOTE — PROGRESS NOTES
DATE OF VISIT: 3/27/2017    REASON FOR VISIT:  IgG lambda multiple myeloma on treatment.    HISTORY OF PRESENT ILLNESS:    61-year-old male with a past medical history significant for chronic thrombocytopenia and splenomegaly due to hepatitis C, status posttreatment with Harvoni  by Dr. David was diagnosed with Myeloma on  a bone marrow biopsy by Dr. Mitchell  in December 2016.  Patient started his treatment for multiple myeloma with Velcade and Decadron on January 23, 2017.  Patient is here to get his fourth cycle day 1 of Velcade today.  Denies any blood in the stool or urine.  Complains of constipation.  No new headache or dizziness.       PAST MEDICAL HISTORY:    Past Medical History:   Diagnosis Date   • Abdominal pain    • Alcoholic liver damage    • Alcoholic polyneuropathy    • Amblyopia     refractive os   • Ascites    • Ascites    • Astigmatism    • Cellulitis of left lower leg    • Chronic hepatitis C    • Chronic viral hepatitis C    • Cirrhosis of liver    • Claudication    • Encounter for immunization    • Generalized edema    • History of colon polyps    • Hypermetropia    • Inguinal hernia     - Rih      • Low back pain    • Major depressive disorder     recurrent , moderate   • Pain in joint, lower leg    • Peripheral vascular disease    • Recurrent major depressive episodes    • Tinea unguium    • Tobacco use        SOCIAL HISTORY:    Social History   Substance Use Topics   • Smoking status: Current Every Day Smoker     Packs/day: 0.50   • Smokeless tobacco: None      Comment: Smokes approx 1 ppd of Cigarettes.smoking since last 45 yr   • Alcohol use No      Comment: no alcohol since October 2015       Surgical History :  Past Surgical History:   Procedure Laterality Date   • CARDIAC CATHETERIZATION      (Successful PCI to a totally occluded RCA with a 2.5 x 28 and a 2.5 x 8 mm Xience stent. Nonobstructive disease in the left coronary artery system.)   11/24/2012    • COLONOSCOPY  10/13/2016   •  ENDOSCOPY  10/13/2016   • UPPER GASTROINTESTINAL ENDOSCOPY  10/13/2016       ALLERGIES:    Allergies   Allergen Reactions   • Codeine Other (See Comments)     Childhood, hives   • Other      MRSA COLONIZATION   • Penicillins Swelling       REVIEW OF SYSTEMS:      CONSTITUTIONAL: Positive for severe fatigue.  No fever, chills, or night sweats.     HEENT:  No epistaxis, mouth sores, or difficulty swallowing.    RESPIRATORY:  Denies any shortness of breath or cough at present.    CARDIOVASCULAR:  No chest pain or palpitations.    GASTROINTESTINAL:  Complains of chronic generalized abdominal pain. Had Constipation which finally resolved couple of days ago.  No blood in the stool.  Denies any nausea or vomiting.    GENITOURINARY:  No dysuria or hematuria.    MUSCULOSKELETAL:  Complains of chronic leg pain.  Denies any new back pain.    NEUROLOGICAL: Positive chronic tingling and numbness in the lower extremity, denies any recent worsening. No new headache or dizziness.     LYMPHATICS:  Denies any abnormal swollen and anywhere in the body.        PHYSICAL EXAMINATION:      VITAL SIGNS:    /66  Pulse 84  Temp 98.2 °F (36.8 °C)  Resp 20  Wt 174 lb (78.9 kg)  BMI 24.97 kg/m2    GENERAL:  Not in any distress.     EYES:  Mild pallor. No icterus.    NECK:  No adenopathy in cervical or supraclavicular area.    RESPIRATORY:  Fair air entry bilaterally. No rhonchi or wheezing.    CARDIOVASCULAR:  S1, S2. Regular rate and rhythm.    ABDOMEN:  Soft, mild tenderness on deep palpation which is unchanged. Bowel sounds present.    EXTREMITIES:  No edema.    NEUROLOGICAL:  Alert, awake, oriented x3. No motor or sensory deficits.        DIAGNOSTIC DATA:    Glucose   Date Value Ref Range Status   03/27/2017 129 (H) 60 - 100 mg/dL Final     Sodium   Date Value Ref Range Status   03/27/2017 131 (L) 137 - 145 mmol/L Final     Potassium   Date Value Ref Range Status   03/27/2017 4.1 3.5 - 5.1 mmol/L Final     CO2   Date Value Ref  Range Status   03/27/2017 23.0 22.0 - 31.0 mmol/L Final     Chloride   Date Value Ref Range Status   03/27/2017 98 95 - 110 mmol/L Final     Anion Gap   Date Value Ref Range Status   03/27/2017 10.0 5.0 - 15.0 mmol/L Final     Creatinine   Date Value Ref Range Status   03/27/2017 0.67 (L) 0.70 - 1.30 mg/dL Final     BUN   Date Value Ref Range Status   03/27/2017 15 7 - 21 mg/dL Final     BUN/Creatinine Ratio   Date Value Ref Range Status   03/27/2017 22.4 7.0 - 25.0 Final     Calcium   Date Value Ref Range Status   03/27/2017 8.3 (L) 8.4 - 10.2 mg/dL Final     eGFR Non  Amer   Date Value Ref Range Status   03/27/2017 120 >60 mL/min/1.73 Final     Alkaline Phosphatase   Date Value Ref Range Status   03/27/2017 102 38 - 126 U/L Final     Total Protein   Date Value Ref Range Status   03/27/2017 5.6 (L) 6.3 - 8.6 g/dL Final     ALT (SGPT)   Date Value Ref Range Status   03/27/2017 22 21 - 72 U/L Final     AST (SGOT)   Date Value Ref Range Status   03/27/2017 39 17 - 59 U/L Final     Total Bilirubin   Date Value Ref Range Status   03/27/2017 1.1 0.2 - 1.3 mg/dL Final     Albumin   Date Value Ref Range Status   03/27/2017 3.00 (L) 3.40 - 4.80 g/dL Final     Globulin   Date Value Ref Range Status   03/27/2017 2.6 2.3 - 3.5 gm/dL Final     A/G Ratio   Date Value Ref Range Status   03/27/2017 1.2 1.1 - 1.8 g/dL Final     Lab Results   Component Value Date    WBC 5.98 03/27/2017    HGB 9.8 (L) 03/27/2017    HCT 28.1 (L) 03/27/2017    MCV 94.6 03/27/2017     (L) 03/27/2017     Lab Results   Component Value Date    NEUTROABS 3.97 03/27/2017    IRON 95 07/19/2016    TIBC 331 07/19/2016    LABIRON 28.7 07/19/2016    FERRITIN 657.0 (H) 11/11/2016    [id number] 636 07/19/2016    FOLATE >20.00 07/19/2016     24-hour urine protein electrophoresis and immunofixation showed:  Component      Latest Ref Rng & Units 1/9/2017 3/7/2017   Total Protein, Urine      Not Estab. mg/dL 499.4 51.4   Protein, 24H Urine      30.0 -  150.0 mg/24 hr 6991.6 (H) 1182.2 (H)   Albumin, U      % 25.6 57.6   Alpha-1-Globulin, U      % 2.9 7.1   Alpha-2-Globulin, U      % 1.0 2.9   Beta Globulin, U      % 3.5 9.5   Gamma Globulin, Urine      % 66.9 22.8   M-Eduardo, % U      Not Observed %  16.7 (H)   M-Eduardo, mg/24 hr      Not Observed mg/24 hr 4544.5 (H) 197 (H)   Immunofixation Result, Urine        Comment   Note        Comment       Free light chain ratio done on the serum on March 9, 2017 showed:  Free Light Chain, Kappa 3.30 - 19.40 mg/L 32.00 (H)   Free Lambda Light Chains 5.71 - 26.30 mg/L 485.00 (H)   Kappa/Lambda Ratio 0.26 - 1.65 0.07 (L)   Resulting Agency  [facility]           Pathology :   Bone marrow biopsy (12/9/16) :  FINAL DIAGNOSIS:  PERIPHERAL SMEAR, REVIEW:       ANEMIA, MODERATE, WITH INADEQUATE RETICULOCYTE RESPONSE.        BONE MARROW ASPIRATION AND BIOPSY FROM LEFT POSTERIOR ILIAC CREST:       PLASMA CELL DYSCRASIA, CONSISTENT WITH MULTIPLE MYELOMA.          ASSESSMENT AND PLAN:        1.  IgG lambda multiple myeloma, ISS stage III.  Patient was diagnosed in December 2016 with a bone marrow biopsy.  Patient was recently started on Velcade and dexamethasone on January 23, 2017.  Patient is here to get cycle 4 day 1 of Velcade. Patient was also evaluated at [facility] bone marrow transplant team, and is not deemed candidate for bone marrow transplant.  Myeloma workup has been repeated today, we will follow-up on the result of blood work.  Patient was encourage to continue taking his acyclovir, Bactrim and Decadron as prescribed.  Patient was instructed to come to the emergency room if he starts having fever greater than 100.4    2.  Anemia and thrombocytopenia: Most likely secondary to multiple myeloma plus chemotherapy.  At this point we'll monitored with CBC.    3.  Prescriptions: Patient is enough prescription for Bactrim, acyclovir and Decadron.    4. Health maintenance: Unfortunately patient continues to smoke, patient was  counseled about smoking cessation.  He had a colonoscopy last 5 years.  He remains full code.    5.  Constipation: Patient was encouraged to start using bowel regimen consisting of MiraLAX.  Prescription for MiraLAX has been sent to his pharmacy today.           Rafa Mendoza MD  3/27/2017  5:24 PM

## 2017-03-29 NOTE — ANESTHESIA POSTPROCEDURE EVALUATION
Patient: Mickey Lind    Procedure Summary     Date Anesthesia Start Anesthesia Stop Room / Location    03/29/17 1118 1137 [facility] ENDOSCOPY 1 / [facility] ENDOSCOPY       Procedure Diagnosis Surgeon Provider    ESOPHAGOGASTRODUODENOSCOPY (U/S FIRST @0912)  (N/A Esophagus); COLONOSCOPY (N/A ) Anemia, unspecified type  (Anemia, unspecified type [D64.9]) MD Tiffanie Amaro CRNA          Anesthesia Type: MAC  Last vitals  /65 (03/29/17 1044)    Temp 98.4 °F (36.9 °C) (03/29/17 1044)    Pulse 71 (03/29/17 1044)   Resp 18 (03/29/17 1044)    SpO2 94 % (03/29/17 1044)      Post Anesthesia Care and Evaluation    Patient location during evaluation: bedside  Patient participation: complete - patient participated  Level of consciousness: awake and awake and alert  Pain score: 0  Pain management: adequate  Airway patency: patent  Anesthetic complications: No anesthetic complications  PONV Status: none  Cardiovascular status: acceptable  Respiratory status: acceptable  Hydration status: acceptable

## 2017-03-29 NOTE — ANESTHESIA PREPROCEDURE EVALUATION
Anesthesia Evaluation     Patient summary reviewed      Airway   Mallampati: II  Dental    (+) lower dentures and upper dentures    Pulmonary - normal exam   (+) asthma,   Cardiovascular - normal exam    (+) PVD,       Neuro/Psych  (+) numbness, psychiatric history Depression,    GI/Hepatic/Renal/Endo    (+)  hepatitis, liver disease,     Musculoskeletal     Abdominal    Substance History   (+) alcohol use,      OB/GYN          Other                                    Anesthesia Plan    ASA 3     MAC     intravenous induction   Anesthetic plan and risks discussed with patient.

## 2017-07-07 PROBLEM — R10.30 LOWER ABDOMINAL PAIN: Status: ACTIVE | Noted: 2017-01-01

## 2017-07-07 PROBLEM — J41.0 SIMPLE CHRONIC BRONCHITIS (HCC): Status: ACTIVE | Noted: 2017-01-01

## 2017-07-07 PROBLEM — K70.31 ASCITES DUE TO ALCOHOLIC CIRRHOSIS (HCC): Status: ACTIVE | Noted: 2017-01-01

## 2017-07-07 PROBLEM — F17.200 TOBACCO DEPENDENCE: Status: ACTIVE | Noted: 2017-01-01

## 2017-07-07 PROBLEM — J15.9 BACTERIAL PNEUMONIA: Status: ACTIVE | Noted: 2017-01-01

## 2017-07-07 NOTE — PRE-PROCEDURE NOTE
Patient's history and physical was reviewed, and not changes were noted.  The risks and benefits of the procedure were discussed with the patient, and the patient had ample opportunity to ask questions.  Informed consent was obtained.

## 2017-07-07 NOTE — ED PROVIDER NOTES
Subjective   HPI Comments: 62yo male pmh significant hcv/cirrhosis/cad/multiple myeloma presents ED c/o 2wk hx progressive lower abdominal pain/distension/radiating thru to back/assoc nausea, dysuria.  ROS neg fever/chills/chest pain/soa/hematuria/melena/hematochoezia/hematemesis/syncope.    Patient is a 61 y.o. male presenting with general illness.   Illness   Severity:  Moderate  Onset quality:  Gradual  Duration:  2 weeks  Timing:  Constant  Progression:  Worsening  Chronicity:  Recurrent  Associated symptoms: abdominal pain, fatigue and nausea    Associated symptoms: no vomiting        Review of Systems   Constitutional: Positive for fatigue.   HENT: Negative.    Eyes: Negative.    Respiratory: Negative.    Cardiovascular: Negative.    Gastrointestinal: Positive for abdominal distention, abdominal pain and nausea. Negative for blood in stool and vomiting.   Endocrine: Negative.    Genitourinary: Positive for dysuria.   Musculoskeletal: Negative.    Skin: Negative.    Allergic/Immunologic: Negative.    Psychiatric/Behavioral: Negative for confusion.       Past Medical History:   Diagnosis Date   • Abdominal pain    • Alcoholic liver damage    • Alcoholic polyneuropathy    • Amblyopia     refractive os   • Ascites    • Ascites    • Asthma    • Astigmatism    • Cellulitis of left lower leg    • Chronic hepatitis C    • Chronic viral hepatitis C    • Cirrhosis of liver    • Claudication    • Encounter for immunization    • Generalized edema    • History of colon polyps    • Hypermetropia    • Inguinal hernia     - Rih      • Low back pain    • Major depressive disorder     recurrent , moderate   • Pain in joint, lower leg    • Peripheral vascular disease    • Recurrent major depressive episodes    • Tinea unguium    • Tobacco use        Allergies   Allergen Reactions   • Codeine Other (See Comments)     Childhood, hives   • Other      MRSA COLONIZATION   • Penicillins Swelling       Past Surgical History:    Procedure Laterality Date   • CARDIAC CATHETERIZATION      (Successful PCI to a totally occluded RCA with a 2.5 x 28 and a 2.5 x 8 mm Xience stent. Nonobstructive disease in the left coronary artery system.)   11/24/2012    • CARDIAC SURGERY     • COLONOSCOPY  10/13/2016   • COLONOSCOPY N/A 3/29/2017    Procedure: COLONOSCOPY;  Surgeon: Jose Daniel David MD;  Location: [facility] ENDOSCOPY;  Service:    • ENDOSCOPY  10/13/2016   • ENDOSCOPY N/A 3/29/2017    Procedure: ESOPHAGOGASTRODUODENOSCOPY (U/S FIRST @0945) ;  Surgeon: Jose Daniel David MD;  Location: [facility] ENDOSCOPY;  Service:    • UPPER GASTROINTESTINAL ENDOSCOPY  10/13/2016       Family History   Problem Relation Age of Onset   • Cancer Mother    • Cancer Father    • Other Father      ischemic heart disease   • Other Son      depressive disorder   • Diabetes Maternal Grandmother    • Cancer Paternal Grandfather        Social History     Social History   • Marital status:      Spouse name: N/A   • Number of children: N/A   • Years of education: N/A     Social History Main Topics   • Smoking status: Current Every Day Smoker     Packs/day: 0.50   • Smokeless tobacco: None      Comment: Smokes approx 1 ppd of Cigarettes.smoking since last 45 yr   • Alcohol use No      Comment: no alcohol since October 2015   • Drug use: No   • Sexual activity: Defer     Other Topics Concern   • None     Social History Narrative           Objective   Physical Exam   Constitutional: He is oriented to person, place, and time. He appears well-developed and well-nourished.   HENT:   Head: Normocephalic and atraumatic.   Mouth/Throat: Oropharynx is clear and moist.   Eyes: Pupils are equal, round, and reactive to light.   Neck: Neck supple. No JVD present. No tracheal deviation present.   Cardiovascular: Normal rate, regular rhythm, normal heart sounds and intact distal pulses.  Exam reveals no gallop and no friction rub.    No murmur heard.  Pulmonary/Chest: He has decreased breath  sounds in the right middle field and the right lower field. He has no wheezes. He has no rhonchi. He has no rales.   Abdominal: Soft. Normal appearance and bowel sounds are normal. He exhibits distension, fluid wave and ascites. There is tenderness in the suprapubic area and left lower quadrant. There is rebound. There is no rigidity, no guarding and no CVA tenderness.   Genitourinary: Right testis shows swelling.   Musculoskeletal: Normal range of motion.   Lymphadenopathy:     He has no cervical adenopathy.   Neurological: He is alert and oriented to person, place, and time. GCS eye subscore is 4. GCS verbal subscore is 5. GCS motor subscore is 6.   Skin: Skin is warm and dry.   Nursing note and vitals reviewed.      ECG 12 Lead    Date/Time: 7/7/2017 2:22 PM  Performed by: FABIANO NIXON  Authorized by: FABIANO NIXON   Interpreted by physician  Rhythm: sinus rhythm  Rate: normal  BPM: 79  QRS axis: normal  Conduction: conduction normal  ST Segments: ST segments normal  T Waves: T waves normal  Other: no other findings  Clinical impression: normal ECG               ED Course  ED Course   Comment By Time   D/w Dr. Vazquez, admitting. Fbaiano Nixon MD 07/07 0123      Labs Reviewed   COMPREHENSIVE METABOLIC PANEL - Abnormal; Notable for the following:        Result Value    Glucose 101 (*)     Creatinine 0.47 (*)     Sodium 133 (*)     Calcium 8.0 (*)     Total Protein 5.8 (*)     Albumin 2.70 (*)     A/G Ratio 0.9 (*)     All other components within normal limits   APTT - Abnormal; Notable for the following:     PTT 56.6 (*)     All other components within normal limits    Narrative:     The recommended Heparin therapeutic range is 68-97 seconds.   CBC WITH AUTO DIFFERENTIAL - Abnormal; Notable for the following:     RBC 3.80 (*)     Hemoglobin 11.5 (*)     Hematocrit 33.7 (*)     RDW 16.4 (*)     RDW-SD 53.3 (*)     Platelets 117 (*)     All other components within normal limits   LIPASE - Normal   PROTIME-INR -  Normal    Narrative:     Therapeutic range for most indications is 2.0-3.0 INR,  or 2.5-3.5 for mechanical heart valves.   SCAN SLIDE   URINALYSIS W/ CULTURE IF INDICATED   CBC AND DIFFERENTIAL    Narrative:     The following orders were created for panel order CBC & Differential.  Procedure                               Abnormality         Status                     ---------                               -----------         ------                     Scan Slide[[id number]]                                       Final result               CBC Auto Differential[[id number]]        Abnormal            Final result                 Please view results for these tests on the individual orders.   EXTRA TUBES    Narrative:     The following orders were created for panel order Extra Tubes.  Procedure                               Abnormality         Status                     ---------                               -----------         ------                     Gold Top - SST[[id number]]                                   Final result                 Please view results for these tests on the individual orders.   GOLD TOP - SST     Ct Abdomen Pelvis With Contrast    Result Date: 7/7/2017  Narrative: DATE OF PROCEDURE:  7/7/2017 1:17 PM CDT PROCEDURE: CT ABDOMEN PELVIS WITH IV CONTRAST INDICATION FOR PROCEDURE:   61 years -old patient presents for evaluation of cirrhosis and hepatitis C. TECHNIQUE: Contiguous axial images were obtained from lung bases to the proximal thighs after intravenous administration of 93 mL of Isovue-300..   Oral contrast was also administered. Multiplanar reformations are submitted for interpretation. Dose length product is 364.  This exam was performed according to our departmental dose-optimization program, which includes automated exposure control, adjustment of the mA and/or kV according to patient size and/or use of iterative reconstruction technique. COMPARISON:  CT of the abdomen and pelvis dated  December 14, 2016. FINDINGS: What is seen of the chest wall has a normal appearance. What is seen of the heart has a normal appearance without obvious pericardial effusion. There is right lower lobe airspace consolidation and probable compressive atelectasis. A large right-sided pleural effusion is visible. There is reticulonodular interstitial thickening visible in the left lower lobe.  No left-sided pleural effusion is visible. The liver is small in size consistent with history of cirrhosis. No focal liver masses are visualized. There are no dilated intrahepatic biliary ducts. The gallbladder is present. The spleen is mildly enlarged measuring 13.4 cm in greatest dimension. There are calcified splenic granulomas. Both adrenal glands are within normal limits. The pancreas appears atrophic. Both kidneys have a normal appearance without obvious perinephric fluid or hydronephrosis. There is no evidence for hydroureter or radiopaque ureteral calculi. The urinary bladder has a normal appearance. Prostate has a normal appearance. The distal esophagus has a normal appearance. The stomach has a normal appearance. There is a large amount of ascites. There is no obvious pneumoperitoneum.  There appears to be delayed transit of enteric contrast through the small bowel. Maximum transverse dimension of the wall small bowel measures approximately 2.7 cm in greatest transverse dimension. This suggests possibility of partial small bowel obstruction. The small bowel has a normal appearance, otherwise. Stool is visualized throughout the colon.  There may be mild thickening of the walls of the colon.  Scattered colonic diverticula are visualized.  No abnormal appendix is visualized. Abdominal aorta is tortuous with extensive atherosclerotic calcification. There appears be high-grade stenosis of the origins of the common iliac arteries.. The inferior vena cava has a normal appearance. The abdominal wall has a normal appearance. There  is a large right inguinal hernia containing a large amount of ascites. There is mild compression of the superior endplate of T12. The bones appear osteopenic. There are mild degenerative changes of both hips. Imaged thoracic and lumbar vertebral bodies have normal alignment and appearance, otherwise. Intervertebral disc spaces are within normal limits.  Bony pelvis has a normal CT appearance.     Impression: 1.  Large right-sided pleural effusion and compressive atelectasis and airspace disease. 2.  Large amount of ascites probably related to hepatorenal syndrome with cirrhosis and mild splenomegaly. 3.  Possible sequela of small bowel ileus versus a partial small bowel obstruction. 4.  Large right-sided inguinal hernia containing ascites. This is larger than was apparent on the previous CT. Electronically signed by:  Mariajose Dutta MD  7/7/2017 2:18 PM CDT Workstation: [id number] Chest 1 View    Result Date: 7/7/2017  Narrative: Chest single view. CLINICAL INDICATION: Shortness of breath abdominal pain COMPARISON: Chest December 14, 2016. FINDINGS: Heart normal size. Dense consolidation right lower lobe and right-sided effusion. Significant unfavorable change since prior exam December 14, 2016. Left lung remains clear.     Impression: CONCLUSION: Dense consolidation involving portions the right lower lobe either pneumonitis or atelectasis. Moderate to large right-sided effusion. Unfavorable change since prior chest x-ray exam. CT chest with contrast may be useful in further assessment of right lower lobe consolidation and effusion. Electronically signed by:  Golden Field MD  7/7/2017 12:14 PM CDT Workstation: [id number]                MDM    Final diagnoses:   Lower abdominal pain   Other ascites   Recurrent right pleural effusion            Wilver Dougherty MD  07/07/17 1506       Wilver Dougherty MD  07/07/17 1509

## 2017-07-07 NOTE — H&P
HISTORY AND PHYSICAL  NAME: Mickey Lind  : 1955  MRN: [id number]    DATE OF ADMISSION: 17    DATE & TIME SEEN: 17 4:47 PM    PCP: Ara Bach MD    CODE STATUS: No Order    CHIEF COMPLAINT lower abdominal pain    HPI:  Mickey Lind is a 61 y.o. male who has a concurrent medical history of chronic viral hepatitis C, ascites, alcoholic liver disease who is presenting to the ER with a 2 week history of lower abdominal pain.  Patient mentions the pain is albertina-umbilical in location with radiation throughout his entire abdomen.  Patient states that the pain is described as a dull aching sensation that is rated on a scale of 1-10 as an 8 out of highest and 4 at its lowest.  Patient mentions no precipitating factors or any alleviating factors for this pain.  Patient does follow GI for his hepatitis C and does see heme oncology for multiple myeloma treatments.  Patient mentions that he quit drinking chronically when he was diagnosed with hepatitis C but does mention occasional drinks of beer and states his last beer that he had was on .  She denies any fevers, chest pain, shortness of breath does mention a history of cigarette smoking and is still continuing to smoke a pack per day.  Patient denies any diarrhea as mentioned a week long history of constipation but does state he had a bowel movement this morning  CONCURRENT MEDICAL HISTORY:  Past Medical History:   Diagnosis Date   • Abdominal pain    • Alcoholic liver damage    • Alcoholic polyneuropathy    • Amblyopia     refractive os   • Ascites    • Ascites    • Asthma    • Astigmatism    • Cellulitis of left lower leg    • Chronic hepatitis C    • Chronic viral hepatitis C    • Cirrhosis of liver    • Claudication    • Encounter for immunization    • Generalized edema    • History of colon polyps    • Hypermetropia    • Inguinal hernia     - Rih      • Low back pain    • Major depressive disorder     recurrent , moderate   • Pain  in joint, lower leg    • Peripheral vascular disease    • Recurrent major depressive episodes    • Tinea unguium    • Tobacco use        PAST SURGICAL HISTORY:  Past Surgical History:   Procedure Laterality Date   • CARDIAC CATHETERIZATION      (Successful PCI to a totally occluded RCA with a 2.5 x 28 and a 2.5 x 8 mm Xience stent. Nonobstructive disease in the left coronary artery system.)   11/24/2012    • CARDIAC SURGERY     • COLONOSCOPY  10/13/2016   • COLONOSCOPY N/A 3/29/2017    Procedure: COLONOSCOPY;  Surgeon: Jose Daniel David MD;  Location: [facility] ENDOSCOPY;  Service:    • ENDOSCOPY  10/13/2016   • ENDOSCOPY N/A 3/29/2017    Procedure: ESOPHAGOGASTRODUODENOSCOPY (U/S FIRST @0945) ;  Surgeon: Jose Daniel David MD;  Location: [facility] ENDOSCOPY;  Service:    • UPPER GASTROINTESTINAL ENDOSCOPY  10/13/2016       FAMILY HISTORY:  Family History   Problem Relation Age of Onset   • Cancer Mother    • Cancer Father    • Other Father      ischemic heart disease   • Other Son      depressive disorder   • Diabetes Maternal Grandmother    • Cancer Paternal Grandfather         SOCIAL HISTORY:  Social History     Social History   • Marital status:      Spouse name: N/A   • Number of children: N/A   • Years of education: N/A     Occupational History   • Not on file.     Social History Main Topics   • Smoking status: Current Every Day Smoker     Packs/day: 0.50   • Smokeless tobacco: Not on file      Comment: Smokes approx 1 ppd of Cigarettes.smoking since last 45 yr   • Alcohol use No      Comment: no alcohol since October 2015   • Drug use: No   • Sexual activity: Defer     Other Topics Concern   • Not on file     Social History Narrative       HOME MEDICATIONS:    Current Facility-Administered Medications:   •  Insert peripheral IV, , , Once **AND** sodium chloride 0.9 % flush 10 mL, 10 mL, Intravenous, PRN, Wilver Dougherty MD  •  sodium chloride 0.9 % infusion, 125 mL/hr, Intravenous, Continuous, Wilver Dougherty MD,  Last Rate: 125 mL/hr at 07/07/17 1307, 125 mL/hr at 07/07/17 1307    Current Outpatient Prescriptions:   •  acyclovir (ZOVIRAX) 400 MG tablet, Take 400 mg by mouth Daily., Disp: , Rfl:   •  atorvastatin (LIPITOR) 10 MG tablet, Take 1 tablet by mouth Daily., Disp: 30 tablet, Rfl: 3  •  dexamethasone (DECADRON) 4 MG tablet, Take 4 mg by mouth., Disp: , Rfl:   •  furosemide (LASIX) 40 MG tablet, Take 40 mg by mouth daily., Disp: , Rfl:   •  gabapentin (NEURONTIN) 400 MG capsule, Take 1 capsule by mouth 3 (Three) Times a Day., Disp: 90 capsule, Rfl: 2  •  GNP ACID REDUCER 75 MG tablet, , Disp: , Rfl:   •  polyethylene glycol (MIRALAX) powder, Take 17 g by mouth Daily., Disp: 527 g, Rfl: 1  •  prochlorperazine (COMPAZINE) 10 MG tablet, Take 1 tablet by mouth Every 6 (Six) Hours As Needed for nausea or vomiting., Disp: 60 tablet, Rfl: 1  •  sertraline (ZOLOFT) 100 MG tablet, Take 2 tablets by mouth Daily., Disp: 60 tablet, Rfl: 2  •  spironolactone (ALDACTONE) 50 MG tablet, Take 50 mg by mouth 2 (two) times a day., Disp: , Rfl:   •  sucralfate (CARAFATE) 1 GM/10ML suspension, Take 10 mL by mouth Daily., Disp: 30 g, Rfl: 3    ALLERGIES:  Codeine; Other; and Penicillins    REVIEW OF SYSTEMS  Review of Systems  Review of systems     Constitutional: no weight loss, fever, chills, does mention generalized weakness  HEENT: no visual loss, blurred vision, double vision, yellow scalarea  Skin: no rash, no discoloration   CVS: no chest pain, palpitations  Chest: no shortness of air, cough, dyspnea  GI: abdominal pain, constipation  : no burning in urination, change in odor, no change in frequency  Neuro: no headache, dizziness, syncope, paralysis, ataxia, numbness  Musculoskeletal: no muscle, back pain, joint pain, stiffness  Lymphatics: no enlarged nodes  Endo: no reports of sweating, cold or heat intolerance, no polyuria      PHYSICAL EXAM:  Temp:  [97.8 °F (36.6 °C)] 97.8 °F (36.6 °C)  Heart Rate:  [78-88] 84  Resp:  [20]  20  BP: (136-180)/(83-95) 162/95  Body mass index is 25.54 kg/(m^2).  Physical Exam    GENERAL APPEARANCE: was in mild distress from the distended abdomen  SKIN: Inspection of the skin reveals no rashes, ulcerations or petechiae.  NECK: Supple and symmetric. There was no thyroid enlargement, and no tenderness, or masses were felt.  CHEST: Normal AP diameter and normal contour without any kyphoscoliosis.  LUNGS: decrease breath sounds bilaterally, wheezes present bilaterally on inspiration  CARDIOVASCULAR: There was a regular rate and rhythm without any murmurs, gallops, rubs. The carotid pulses were normal and 2+ bilaterally without bruits. Peripheral pulses were 2+ and symmetric.  ABDOMEN: Bowel sounds positive in all 4 quadrants, abdomen is tight, patient does have tenderness to palpation in all quadrants of the abdomen, right inguinal hernia was palpated, scrotal edema was present   LYMPH NODES: No lymphadenopathy was appreciated in the neck, axillae or groin.  MUSCULOSKELETAL: Gait was normal. There was no tenderness or effusions noted. Muscle strength and tone were normal.      DIAGNOSTIC DATA:   Lab Results (last 24 hours)     Procedure Component Value Units Date/Time    CBC Auto Differential [[id number]]  (Abnormal) Collected:  07/07/17 1158    Specimen:  Blood Updated:  07/07/17 1216     WBC 4.99 10*3/mm3      RBC 3.80 (L) 10*6/mm3      Hemoglobin 11.5 (L) g/dL      Hematocrit 33.7 (L) %      MCV 88.7 fL      MCH 30.3 pg      MCHC 34.1 g/dL      RDW 16.4 (H) %      RDW-SD 53.3 (H) fl      MPV -- fL       Instrument unable to calculate results        Platelets 117 (L) 10*3/mm3      Neutrophil % 68.6 %      Lymphocyte % 16.2 %      Monocyte % 9.2 %      Eosinophil % 5.2 %      Basophil % 0.6 %      Immature Grans % 0.2 %      Neutrophils, Absolute 3.42 10*3/mm3      Lymphocytes, Absolute 0.81 10*3/mm3      Monocytes, Absolute 0.46 10*3/mm3      Eosinophils, Absolute 0.26 10*3/mm3      Basophils, Absolute 0.03  10*3/mm3      Immature Grans, Absolute 0.01 10*3/mm3     Comprehensive Metabolic Panel [[id number]]  (Abnormal) Collected:  07/07/17 1158    Specimen:  Blood Updated:  07/07/17 1224     Glucose 101 (H) mg/dL      BUN 10 mg/dL      Creatinine 0.47 (L) mg/dL      Sodium 133 (L) mmol/L      Potassium 3.6 mmol/L      Chloride 102 mmol/L      CO2 25.0 mmol/L      Calcium 8.0 (L) mg/dL      Total Protein 5.8 (L) g/dL      Albumin 2.70 (L) g/dL      ALT (SGPT) 27 U/L      AST (SGOT) 32 U/L      Alkaline Phosphatase 114 U/L      Total Bilirubin 0.8 mg/dL      eGFR Non African Amer >150 mL/min/1.73      Globulin 3.1 gm/dL      A/G Ratio 0.9 (L) g/dL      BUN/Creatinine Ratio 21.3     Anion Gap 6.0 mmol/L     Lipase [[id number]]  (Normal) Collected:  07/07/17 1158    Specimen:  Blood Updated:  07/07/17 1224     Lipase 97 U/L     Protime-INR [[id number]]  (Normal) Collected:  07/07/17 1158    Specimen:  Blood Updated:  07/07/17 1239     Protime 14.6 Seconds      INR 1.15    Narrative:       Therapeutic range for most indications is 2.0-3.0 INR,  or 2.5-3.5 for mechanical heart valves.    aPTT [[id number]]  (Abnormal) Collected:  07/07/17 1158    Specimen:  Blood Updated:  07/07/17 1239     PTT 56.6 (H) seconds     Narrative:       The recommended Heparin therapeutic range is 68-97 seconds.    Extra Tubes [[id number]] Collected:  07/07/17 1158    Specimen:  Blood from Blood, Venous Line Updated:  07/07/17 1301    Narrative:       The following orders were created for panel order Extra Tubes.  Procedure                               Abnormality         Status                     ---------                               -----------         ------                     Gold Top - SST[[id number]]                                   Final result                 Please view results for these tests on the individual orders.    Gold Top - SST [[id number]] Collected:  07/07/17 1158    Specimen:  Blood Updated:  07/07/17 1301     Extra Tube Hold for  add-ons.      Auto resulted.       CBC & Differential [[id number]] Collected:  07/07/17 1158    Specimen:  Blood Updated:  07/07/17 1315    Narrative:       The following orders were created for panel order CBC & Differential.  Procedure                               Abnormality         Status                     ---------                               -----------         ------                     Scan Slide[[id number]]                                       Final result               CBC Auto Differential[[id number]]        Abnormal            Final result                 Please view results for these tests on the individual orders.    Scan Slide [[id number]] Collected:  07/07/17 1158    Specimen:  Blood Updated:  07/07/17 1315     Anisocytosis Slight/1+      Few Ovalocytes, eliptocytes, and Dacrocytes- tear drop cells observed  Rare hypochromic cell observed        WBC Morphology Normal     Platelet Estimate Decreased    Urinalysis With / Culture If Indicated [[id number]]  (Abnormal) Collected:  07/07/17 1514    Specimen:  Urine from Urine, Clean Catch Updated:  07/07/17 1535     Color, UA Yellow     Appearance, UA Clear     pH, UA 7.0     Specific Gravity, UA 1.015     Glucose, UA Negative     Ketones, UA Negative     Bilirubin, UA Negative     Blood, UA Negative     Protein, UA 30 mg/dL (1+) (A)     Leuk Esterase, UA Negative     Nitrite, UA Negative     Urobilinogen, UA 4.0 E.U./dL (A)    Urinalysis, Microscopic Only [[id number]]  (Abnormal) Collected:  07/07/17 1514    Specimen:  Urine from Urine, Clean Catch Updated:  07/07/17 1538     RBC, UA 0-2 (A) /HPF      WBC, UA 0-2 /HPF      Bacteria, UA None Seen /HPF      Squamous Epithelial Cells, UA None Seen /HPF      Hyaline Casts, UA None Seen /LPF      Methodology Automated Microscopy    Body Fluid Culture [[id number]] Collected:  07/07/17 1536    Specimen:  Body Fluid from Abdominal Cavity Updated:  07/07/17 1609           Imaging Results (last 24 hours)      Procedure Component Value Units Date/Time    XR Chest 1 View [[id number]] Collected:  07/07/17 1153     Updated:  07/07/17 1215    Narrative:       Chest single view.       CLINICAL INDICATION: Shortness of breath abdominal pain    COMPARISON: Chest December 14, 2016.    FINDINGS: Heart normal size. Dense consolidation right lower lobe  and right-sided effusion. Significant unfavorable change since  prior exam December 14, 2016. Left lung remains clear.      Impression:       CONCLUSION: Dense consolidation involving portions the right  lower lobe either pneumonitis or atelectasis. Moderate to large  right-sided effusion. Unfavorable change since prior chest x-ray  exam.     CT chest with contrast may be useful in further assessment of  right lower lobe consolidation and effusion.        Electronically signed by:  Golden Field MD  7/7/2017 12:14 PM CDT  Workstation: [id number]    CT Abdomen Pelvis With Contrast [[id number]] Collected:  07/07/17 1317     Updated:  07/07/17 1420    Narrative:       DATE OF PROCEDURE:  7/7/2017 1:17 PM CDT    PROCEDURE: CT ABDOMEN PELVIS WITH IV CONTRAST    INDICATION FOR PROCEDURE:   61 years -old patient presents for  evaluation of cirrhosis and hepatitis C.    TECHNIQUE: Contiguous axial images were obtained from lung bases  to the proximal thighs after intravenous administration of 93 mL  of Isovue-300..   Oral contrast was also administered.  Multiplanar reformations are submitted for interpretation. Dose  length product is 364.      This exam was performed according to our departmental  dose-optimization program, which includes automated exposure  control, adjustment of the mA and/or kV according to patient size  and/or use of iterative reconstruction technique.    COMPARISON:  CT of the abdomen and pelvis dated December 14, 2016.    FINDINGS: What is seen of the chest wall has a normal appearance.  What is seen of the heart has a normal appearance without obvious  pericardial  effusion.     There is right lower lobe airspace consolidation and probable  compressive atelectasis. A large right-sided pleural effusion is  visible. There is reticulonodular interstitial thickening visible  in the left lower lobe.  No left-sided pleural effusion is  visible.    The liver is small in size consistent with history of cirrhosis.  No focal liver masses are visualized. There are no dilated  intrahepatic biliary ducts. The gallbladder is present. The  spleen is mildly enlarged measuring 13.4 cm in greatest  dimension. There are calcified splenic granulomas.     Both adrenal glands are within normal limits. The pancreas  appears atrophic.    Both kidneys have a normal appearance without obvious perinephric  fluid or hydronephrosis. There is no evidence for hydroureter or  radiopaque ureteral calculi.     The urinary bladder has a normal appearance. Prostate has a  normal appearance.     The distal esophagus has a normal appearance. The stomach has a  normal appearance. There is a large amount of ascites. There is  no obvious pneumoperitoneum.      There appears to be delayed transit of enteric contrast through  the small bowel. Maximum transverse dimension of the wall small  bowel measures approximately 2.7 cm in greatest transverse  dimension. This suggests possibility of partial small bowel  obstruction. The small bowel has a normal appearance, otherwise.   Stool is visualized throughout the colon.  There may be mild  thickening of the walls of the colon.  Scattered colonic  diverticula are visualized.  No abnormal appendix is visualized.    Abdominal aorta is tortuous with extensive atherosclerotic  calcification. There appears be high-grade stenosis of the  origins of the common iliac arteries.. The inferior vena cava has  a normal appearance.    The abdominal wall has a normal appearance. There is a large  right inguinal hernia containing a large amount of ascites.    There is mild compression of  the superior endplate of T12. The  bones appear osteopenic. There are mild degenerative changes of  both hips. Imaged thoracic and lumbar vertebral bodies have  normal alignment and appearance, otherwise. Intervertebral disc  spaces are within normal limits.  Bony pelvis has a normal CT  appearance.      Impression:         1.  Large right-sided pleural effusion and compressive  atelectasis and airspace disease.  2.  Large amount of ascites probably related to hepatorenal  syndrome with cirrhosis and mild splenomegaly.  3.  Possible sequela of small bowel ileus versus a partial small  bowel obstruction.  4.  Large right-sided inguinal hernia containing ascites. This is  larger than was apparent on the previous CT.    Electronically signed by:  Mariajose Dutta MD  7/7/2017 2:18 PM CDT  Workstation: [id number]     Paracentesis [[id number]] Collected:  07/07/17 1535    Specimen:  Body Fluid Updated:  07/07/17 1628    Narrative:       Ultrasound paracentesis.    HISTORY: Ascites.    Using ultrasound guidance and sterile technique a path was  selected for paracentesis. The right lateral abdominal wall was  punctured under ultrasound guidance a 5 Georgian multipurpose  drainage catheter. 3.9 L of clear yellowish fluid was drained  without difficulty.    The patient tolerated the procedure well with no evidence of any  immediate complications.      Impression:       CONCLUSION: Technically successful and uneventful ultrasound  guided paracentesis.    Electronically signed by:  Golden Field MD  7/7/2017 4:26 PM CDT  Workstation: [id number]    Paracentesis performed by Dr. Shin Vasquez. Dr. Vasquez however is   unavailable to dictate the examination (on vacation).   Examination is therefore dictated by Dr. Golden Field.          I reviewed the patient's new clinical results.    ASSESSMENT AND PLAN: This is a 61 y.o. male with:    Active Hospital Problems (** Indicates Principal Problem)    Diagnosis Date Noted   • **Ascites due  to alcoholic cirrhosis [K70.31] 07/07/2017     -patient had a CT scan of the abdomen and pelvis performed which showed a large fluid collection in his abdomen as well as right sided pleural effusion, patient underwent a ultrasound guided paracentesis which they took out 3.9 liters of fluid     • Lower abdominal pain [R10.30] 07/07/2017     -secondary to ascites from hep. C as well as alcoholic cirrhosis, patient is afebrile currently and has been for the last few weeks, no need for antibiotics at this time     • Tobacco dependence [F17.200] 07/07/2017     -admits to smoking a PPD, will give 21mg nicotine patch     • Simple chronic bronchitis [J41.0] 07/07/2017     -will provide with supplemental oxygen as needed as well as breathing treatments PRN     • Chronic hepatitis C without hepatic coma [B18.2] 02/16/2017     -has been followed by Dr. Montalvo, will continue his home medication of zovirax     • Multiple myeloma, stage 3 [C90.00] 01/20/2017     -has a right sided port in, has been followed by Dr. Mendoza of heme-oncology, will monitor      • Unilateral inguinal hernia without obstruction or gangrene [K40.90] 09/10/2016     -large right sided inguinal hernia palpated and seen on abdominal CT for now, will observe for now, possible surgery outpatient follow up     • End stage liver disease [K72.90] 08/31/2016     -secondary to chronic hepatitis C, is being followed by Dr. Montalvo as an outpatient, will continue to monitor      • Depression [F32.9] 08/31/2016     -continue home medication of zoloft        Resolved Hospital Problems    Diagnosis Date Noted Date Resolved   No resolved problems to display.         DVT prophylaxis: SCDs TEDs  Code status is No Order         I discussed the patients findings and my recommendations with patient, family and primary care team.     Dr. Leo is the attending on record at time of admission, she is aware of the patient's status and agrees with the above history and  physical.          This document has been electronically signed by Carlos Hahn MD on July 7, 2017 4:47 PM

## 2017-07-07 NOTE — CONSULTS
Consult Note  Patient Care Team:  Ara Bach MD as PCP - General  Rafa Mendoza MD as Consulting Physician (Hematology and Oncology)  Serena Modi LCSW as  (Oncology)  Wilver Dougherty MD  Chief complaint abd pain    Subjective     Patient is a 61 y.o. male presents with several day h/o lower abd pain.  Has not had pain like this before.  Has liver disease with ascities and Hep C and alcoholic cirrhosis.  Never noticed a groin bulge before.  No obstructive symptoms.    Review of Systems   Review of Systems   Constitutional: Positive for appetite change.   HENT: Negative.    Eyes: Negative.    Respiratory: Negative.    Cardiovascular: Negative.    Gastrointestinal: Positive for abdominal distention and abdominal pain.   Endocrine: Negative.    Genitourinary: Negative.    Musculoskeletal: Negative.    Skin: Negative.    Allergic/Immunologic: Negative.    Neurological: Negative.    Hematological: Negative.    Psychiatric/Behavioral: Negative.      History  Past Medical History:   Diagnosis Date   • Abdominal pain    • Alcoholic liver damage    • Alcoholic polyneuropathy    • Amblyopia     refractive os   • Ascites    • Ascites    • Asthma    • Astigmatism    • Cellulitis of left lower leg    • Chronic hepatitis C    • Chronic viral hepatitis C    • Cirrhosis of liver    • Claudication    • Encounter for immunization    • Generalized edema    • History of colon polyps    • Hypermetropia    • Inguinal hernia     - Rih      • Low back pain    • Major depressive disorder     recurrent , moderate   • Pain in joint, lower leg    • Peripheral vascular disease    • Recurrent major depressive episodes    • Tinea unguium    • Tobacco use      Past Surgical History:   Procedure Laterality Date   • CARDIAC CATHETERIZATION      (Successful PCI to a totally occluded RCA with a 2.5 x 28 and a 2.5 x 8 mm Xience stent. Nonobstructive disease in the left coronary artery system.)   11/24/2012    • CARDIAC SURGERY     •  COLONOSCOPY  10/13/2016   • COLONOSCOPY N/A 3/29/2017    Procedure: COLONOSCOPY;  Surgeon: Jose Daniel David MD;  Location: [facility] ENDOSCOPY;  Service:    • ENDOSCOPY  10/13/2016   • ENDOSCOPY N/A 3/29/2017    Procedure: ESOPHAGOGASTRODUODENOSCOPY (U/S FIRST @0945) ;  Surgeon: Jose Daniel David MD;  Location: [facility] ENDOSCOPY;  Service:    • UPPER GASTROINTESTINAL ENDOSCOPY  10/13/2016     Family History   Problem Relation Age of Onset   • Cancer Mother    • Cancer Father    • Other Father      ischemic heart disease   • Other Son      depressive disorder   • Diabetes Maternal Grandmother    • Cancer Paternal Grandfather      Social History   Substance Use Topics   • Smoking status: Current Every Day Smoker     Packs/day: 0.50   • Smokeless tobacco: None      Comment: Smokes approx 1 ppd of Cigarettes.smoking since last 45 yr   • Alcohol use No      Comment: no alcohol since October 2015       (Not in a hospital admission)  Allergies:  Codeine; Other; and Penicillins    Objective     Vital Signs  Temp:  [97.8 °F (36.6 °C)] 97.8 °F (36.6 °C)  Heart Rate:  [76-88] 76  Resp:  [18-20] 18  BP: (136-180)/(83-95) 153/85    Physical Exam:    Physical Exam   Constitutional: He is oriented to person, place, and time. He appears well-developed.   HENT:   Head: Normocephalic and atraumatic.   Eyes: Pupils are equal, round, and reactive to light.   Neck: Normal range of motion. Neck supple.   Cardiovascular: Normal rate, regular rhythm, normal heart sounds and intact distal pulses.    Pulmonary/Chest: Effort normal and breath sounds normal.   Abdominal: Soft. Bowel sounds are normal. A hernia is present.   Musculoskeletal: Normal range of motion.   Neurological: He is alert and oriented to person, place, and time.   Skin: Skin is warm and dry.   Psychiatric: He has a normal mood and affect. His behavior is normal.   reducible right inguinal hernia, no left inguinal hernia, no umbilical hernia-does have diastasis recti    Results  Review:      Lab Results (last 24 hours)     Procedure Component Value Units Date/Time    CBC Auto Differential [[id number]]  (Abnormal) Collected:  07/07/17 1158    Specimen:  Blood Updated:  07/07/17 1216     WBC 4.99 10*3/mm3      RBC 3.80 (L) 10*6/mm3      Hemoglobin 11.5 (L) g/dL      Hematocrit 33.7 (L) %      MCV 88.7 fL      MCH 30.3 pg      MCHC 34.1 g/dL      RDW 16.4 (H) %      RDW-SD 53.3 (H) fl      MPV -- fL       Instrument unable to calculate results        Platelets 117 (L) 10*3/mm3      Neutrophil % 68.6 %      Lymphocyte % 16.2 %      Monocyte % 9.2 %      Eosinophil % 5.2 %      Basophil % 0.6 %      Immature Grans % 0.2 %      Neutrophils, Absolute 3.42 10*3/mm3      Lymphocytes, Absolute 0.81 10*3/mm3      Monocytes, Absolute 0.46 10*3/mm3      Eosinophils, Absolute 0.26 10*3/mm3      Basophils, Absolute 0.03 10*3/mm3      Immature Grans, Absolute 0.01 10*3/mm3     Comprehensive Metabolic Panel [[id number]]  (Abnormal) Collected:  07/07/17 1158    Specimen:  Blood Updated:  07/07/17 1224     Glucose 101 (H) mg/dL      BUN 10 mg/dL      Creatinine 0.47 (L) mg/dL      Sodium 133 (L) mmol/L      Potassium 3.6 mmol/L      Chloride 102 mmol/L      CO2 25.0 mmol/L      Calcium 8.0 (L) mg/dL      Total Protein 5.8 (L) g/dL      Albumin 2.70 (L) g/dL      ALT (SGPT) 27 U/L      AST (SGOT) 32 U/L      Alkaline Phosphatase 114 U/L      Total Bilirubin 0.8 mg/dL      eGFR Non African Amer >150 mL/min/1.73      Globulin 3.1 gm/dL      A/G Ratio 0.9 (L) g/dL      BUN/Creatinine Ratio 21.3     Anion Gap 6.0 mmol/L     Lipase [[id number]]  (Normal) Collected:  07/07/17 1158    Specimen:  Blood Updated:  07/07/17 1224     Lipase 97 U/L     Protime-INR [[id number]]  (Normal) Collected:  07/07/17 1158    Specimen:  Blood Updated:  07/07/17 1239     Protime 14.6 Seconds      INR 1.15    Narrative:       Therapeutic range for most indications is 2.0-3.0 INR,  or 2.5-3.5 for mechanical heart valves.    aPTT [[id number]]   (Abnormal) Collected:  07/07/17 1158    Specimen:  Blood Updated:  07/07/17 1239     PTT 56.6 (H) seconds     Narrative:       The recommended Heparin therapeutic range is 68-97 seconds.    Extra Tubes [[id number]] Collected:  07/07/17 1158    Specimen:  Blood from Blood, Venous Line Updated:  07/07/17 1301    Narrative:       The following orders were created for panel order Extra Tubes.  Procedure                               Abnormality         Status                     ---------                               -----------         ------                     Gold Top - SST[[id number]]                                   Final result                 Please view results for these tests on the individual orders.    Gold Top - SST [[id number]] Collected:  07/07/17 1158    Specimen:  Blood Updated:  07/07/17 1301     Extra Tube Hold for add-ons.      Auto resulted.       CBC & Differential [[id number]] Collected:  07/07/17 1158    Specimen:  Blood Updated:  07/07/17 1315    Narrative:       The following orders were created for panel order CBC & Differential.  Procedure                               Abnormality         Status                     ---------                               -----------         ------                     Scan Slide[[id number]]                                       Final result               CBC Auto Differential[[id number]]        Abnormal            Final result                 Please view results for these tests on the individual orders.    Scan Slide [[id number]] Collected:  07/07/17 1158    Specimen:  Blood Updated:  07/07/17 1315     Anisocytosis Slight/1+      Few Ovalocytes, eliptocytes, and Dacrocytes- tear drop cells observed  Rare hypochromic cell observed        WBC Morphology Normal     Platelet Estimate Decreased    Urinalysis With / Culture If Indicated [[id number]]  (Abnormal) Collected:  07/07/17 1514    Specimen:  Urine from Urine, Clean Catch Updated:  07/07/17 1535     Color, UA  Yellow     Appearance, UA Clear     pH, UA 7.0     Specific Gravity, UA 1.015     Glucose, UA Negative     Ketones, UA Negative     Bilirubin, UA Negative     Blood, UA Negative     Protein, UA 30 mg/dL (1+) (A)     Leuk Esterase, UA Negative     Nitrite, UA Negative     Urobilinogen, UA 4.0 E.U./dL (A)    Urinalysis, Microscopic Only [[id number]]  (Abnormal) Collected:  07/07/17 1514    Specimen:  Urine from Urine, Clean Catch Updated:  07/07/17 1538     RBC, UA 0-2 (A) /HPF      WBC, UA 0-2 /HPF      Bacteria, UA None Seen /HPF      Squamous Epithelial Cells, UA None Seen /HPF      Hyaline Casts, UA None Seen /LPF      Methodology Automated Microscopy    Body Fluid Culture [[id number]] Collected:  07/07/17 1536    Specimen:  Body Fluid from Abdominal Cavity Updated:  07/07/17 1655     Gram Stain Result Few (2+) WBCs seen      No organisms seen      Ct shows large volume ascites with fluid in right pleural space and right inguinal hernia space    Assessment/Plan     Principal Problem:    Ascites due to alcoholic cirrhosis  Active Problems:    End stage liver disease    Depression    Unilateral inguinal hernia without obstruction or gangrene    Multiple myeloma, stage 3    Chronic hepatitis C without hepatic coma    Lower abdominal pain    Tobacco dependence    Simple chronic bronchitis    Mr Lind is a 62 yo man with cirrhosis and liver disease who presents with a reducible right inguinal hernia.  Not an operative candidate.  Would not recommend a hernia repair.  Needs treatment for his cirrhosis.  He was taught how to reduce his hernia and to look for signs of incarceration.  Thank you for the consult.    I discussed the patients findings and my recommendations with patient.     Mauro James MD  07/07/17  5:29 PM

## 2017-07-08 NOTE — NURSING NOTE
Spoke with Dr. Bell about pt heart rhythm (10 beats of wide QRS just now and earlier this AM). She is going to order Magnesium IV.

## 2017-07-08 NOTE — CONSULTS
"Adult Nutrition  Assessment    Patient Name:  Mickey Lind  YOB: 1955  MRN: [id number]  Admit Date:  7/7/2017    Assessment Date:  7/8/2017          Reason for Assessment       07/08/17 1054    Reason for Assessment    Reason For Assessment/Visit admission assessment    Identified At Risk By Screening Criteria MST SCORE 2+;unintentional loss of 10 lbs or more in the past 2 mos    Diagnosis Diagnosis    Hepatic Cirrhosis;Hepatitis    Substance Use ETOH    Factors Affecting Nutrition Factors    Appetite Poor;Early Satiety    Reported GI Symptoms Abdominal Distention                Nutrition/Diet History       07/08/17 1055    Nutrition/Diet History    Typical Food/Fluid Intake Pt is a poor historian but reports that he cannot eat much at one time and feels full after a few bites due to fluid buildup.  Pt reports wt loss but cannot tell an amount.            Anthropometrics       07/08/17 1056    Anthropometrics    Height 177.8 cm (70\")    Weight 67.6 kg (149 lb)    RD Documented Weight on Admission 80.7 kg (178 lb)    Anthropometrics (Special Considerations)    Height Used for Calculations 1.778 m (5' 10\")    Weight Used for Calculations 67.6 kg (149 lb)    RD Calculated     RD Calculated % IBW 90    Ideal Body Weight (IBW)    Ideal Body Weight (IBW), Male (kg) 76.48    % Ideal Body Weight 88.56    Usual Body Weight (UBW)    Weight Loss --   amount unknown    Body Mass Index (BMI)    BMI (kg/m2) 21.42    BMI Grade 19.1 - 24.9 - normal            Labs/Tests/Procedures/Meds       07/08/17 1058    Labs/Tests/Procedures/Meds    Labs/Tests Review Reviewed    Medication Review Reviewed, pertinent;Multivitamin;Diuretic;Antibiotic            Physical Findings       07/08/17 1058    Physical Findings/Assessment    Additional Documentation Physical Appearance (Group)    Physical Appearance    Gastrointestinal ascites;abdominal distention    Oral/Mouth Cavity poor dentition          " "  Estimated/Assessed Needs       07/08/17 1058    Calculation Measurements    Weight Used For Calculations 67.6 kg (149 lb)    Height Used for Calculations 1.778 m (5' 10\")    Estimated/Assessed Energy Needs    Energy Need Method [facility]    Age 61    RMR ([facility] Equation) 1482.11    [facility]  Out of bed, ambulatory  1.3    Total estimated needs ([facility]) 1927    Estimated/Assessed Protein Needs    Weight Used for Protein Calculation 67.6 kg (149 lb)    Protein (gm/kg) 0.8    0.8 Gm Protein (gm) 54.07    Estimated Protein Range 54-68    Estimated/Assessed Fluid Needs    Fluid Need Method RDA method    RDA Method (mL)  1928            Nutrition Prescription Ordered       07/08/17 1100    Nutrition Prescription PO    Current PO Diet Regular            Evaluation of Received Nutrient/Fluid Intake       07/08/17 1100    PO Evaluation    Number of Meals 1    % PO Intake 25            Comments:      Pt is a 61 year old  Man with a hx of ETOH abuse and cirrhosis/hepatitis admitted with ascites.  Pt also has hx of multiple myeloma.  Despite being a poor historian, pt reports that he cannot eat much but he tries to eat throughout the day.  Pt states that he feels full after eating very little due to fluid in abdomen.  Pt had paracentesis after admission resulting in ~30lb wt loss.  Pt had reported wt loss at admit but unable to give RD details of how much.  Will add Ensure with meals and encouraged small frequent meals at home. Also added no salt added to diet order. MVI and Lasix prescribed.  RD will monitor.        Electronically signed by:  Jaz Paulson RD  07/08/17 11:00 AM  "

## 2017-07-08 NOTE — PROGRESS NOTES
FAMILY MEDICINE DAILY PROGRESS NOTE  NAME: Mickey Lind  : 1955  MRN: [id number]      LOS: 1 day     PROVIDER OF SERVICE: Carlos Hahn MD    Chief Complaint: Ascites due to alcoholic cirrhosis    Subjective:     Interval History:  History taken from: patient chart RN  Patient Complaints: states that his breathing has gotten better as well as the abdominal pain    -patient presented with ascites secondary to alcohol and hepatitis C and had 3.9 liters of fluid drained via paracentesis, patient currently on levaquin and flagyl, has been afebrile all night      Review of Systems:   Review of Systems  Constitutional: no weight loss, fever, chills, does mention generalized weakness  HEENT: no visual loss, blurred vision, double vision, yellow scalarea  Skin: no rash, no discoloration   CVS: no chest pain, palpitations  Chest: no shortness of air, cough, dyspnea  GI: abdominal fullness and tightness   : no burning in urination, change in odor, no change in frequency  Neuro: no headache, dizziness, syncope, paralysis, ataxia, numbness  Musculoskeletal: no muscle, back pain, joint pain, stiffness  Lymphatics: no enlarged nodes  Endo: no reports of sweating, cold or heat intolerance, no polyuria  Objective:     Vital Signs  Temp:  [96.9 °F (36.1 °C)-98.3 °F (36.8 °C)] 96.9 °F (36.1 °C)  Heart Rate:  [74-88] 83  Resp:  [18-20] 18  BP: (116-180)/(71-95) 116/78  Body mass index is 21.44 kg/(m^2).    Physical Exam  Physical Exam  Constitutional: He is oriented to person, place, and time. He appears well-developed.   HENT:   Head: Normocephalic and atraumatic.   Eyes: Pupils are equal, round, and reactive to light.   Neck: Normal range of motion. Neck supple.   Cardiovascular: Normal rate, regular rhythm, normal heart sounds and intact distal pulses.   Pulmonary/Chest: Effort normal and breath sounds normal.   Abdominal: Soft. Bowel sounds are normal. A hernia is present, abdomen  distended  Musculoskeletal: Normal range of motion.   Neurological: He is alert and oriented to person, place, and time.   Skin: Skin is warm and dry.   Psychiatric: He has a normal mood and affect. His behavior is normal.   reducible right inguinal hernia, no left inguinal hernia, no umbilical hernia-does have diastasis recti  Medication Review    Current Facility-Administered Medications:   •  acyclovir (ZOVIRAX) tablet 400 mg, 400 mg, Oral, Daily, Carlos Hahn MD, 400 mg at 07/07/17 2137  •  atorvastatin (LIPITOR) tablet 10 mg, 10 mg, Oral, Daily, Carlos Hahn MD, 10 mg at 07/07/17 2136  •  dexamethasone (DECADRON) tablet 4 mg, 4 mg, Oral, Daily With Breakfast, Carlos Hahn MD  •  famotidine (PEPCID) tablet 40 mg, 40 mg, Oral, Daily, Carlos Hahn MD, 40 mg at 07/07/17 2136  •  furosemide (LASIX) tablet 40 mg, 40 mg, Oral, Daily, Carlos Hahn MD, 40 mg at 07/07/17 2136  •  gabapentin (NEURONTIN) capsule 400 mg, 400 mg, Oral, TID, Carlos Hahn MD, 400 mg at 07/07/17 2136  •  hydrALAZINE (APRESOLINE) injection 10 mg, 10 mg, Intravenous, Q6H PRN, Carlos Hahn MD  •  levoFLOXacin (LEVAQUIN) 500 mg/100 mL D5W (premix) (LEVAQUIN) 500 mg, 500 mg, Intravenous, Q24H, Carolee Leo MD, Last Rate: 100 mL/hr at 07/07/17 2140, 500 mg at 07/07/17 2140  •  LORazepam (ATIVAN) tablet 1 mg, 1 mg, Oral, Q2H PRN **OR** LORazepam (ATIVAN) injection 1 mg, 1 mg, Intravenous, Q2H PRN **OR** LORazepam (ATIVAN) tablet 2 mg, 2 mg, Oral, Q1H PRN, 2 mg at 07/08/17 0001 **OR** LORazepam (ATIVAN) injection 2 mg, 2 mg, Intravenous, Q1H PRN **OR** LORazepam (ATIVAN) injection 2 mg, 2 mg, Intravenous, Q15 Min PRN **OR** LORazepam (ATIVAN) injection 2 mg, 2 mg, Intramuscular, Q15 Min PRN, Joel Antonio MD  •  metroNIDAZOLE (FLAGYL) IVPB 500 mg, 500 mg, Intravenous, Q8H, Carolee Leo MD, Last Rate: 100 mL/hr at 07/08/17 0458, 500 mg at 07/08/17 0458  •  Morphine sulfate (PF) injection 2 mg, 2 mg,  Intravenous, Q4H PRN, 2 mg at 07/08/17 0113 **AND** naloxone (NARCAN) injection 0.4 mg, 0.4 mg, Intravenous, Q5 Min PRN, Carolee Leo MD  •  multiple vitamin (M.V.I. Adult) 10 mL, thiamine (B-1) 100 mg, folic acid 1 mg, magnesium sulfate 2 g in sodium chloride 0.9 % 1,000 mL infusion, 100 mL/hr, Intravenous, Daily, Joel Antonio MD, Last Rate: 100 mL/hr at 07/07/17 2353, 100 mL/hr at 07/07/17 2353  •  nicotine (NICODERM CQ) 21 MG/24HR patch 1 patch, 1 patch, Transdermal, Q24H, Carlos Hahn MD, 1 patch at 07/07/17 2136  •  ondansetron (ZOFRAN) injection 4 mg, 4 mg, Intravenous, Q6H PRN, Carlos Hahn MD  •  polyethylene glycol (MIRALAX) powder 17 g, 17 g, Oral, Daily, Carlos Hahn MD, 17 g at 07/07/17 2146  •  prochlorperazine (COMPAZINE) tablet 10 mg, 10 mg, Oral, Q6H PRN, Carlos Hahn MD  •  sertraline (ZOLOFT) tablet 200 mg, 200 mg, Oral, Daily, Carlos Hahn MD, 200 mg at 07/07/17 2136  •  sodium chloride 0.9 % flush 1-10 mL, 1-10 mL, Intravenous, PRN, Carlos Hahn MD  •  Insert peripheral IV, , , Once **AND** sodium chloride 0.9 % flush 10 mL, 10 mL, Intravenous, PRN, Wilver Dougherty MD  •  spironolactone (ALDACTONE) tablet 50 mg, 50 mg, Oral, BID, Carlos Hahn MD, 50 mg at 07/07/17 2135  •  sucralfate (CARAFATE) 1 GM/10ML suspension 1 g, 1 g, Oral, Daily, Carlos Hahn MD, 1 g at 07/07/17 2135     Diagnostic Data    Lab Results (last 24 hours)     Procedure Component Value Units Date/Time    CBC Auto Differential [[id number]]  (Abnormal) Collected:  07/07/17 1158    Specimen:  Blood Updated:  07/07/17 1216     WBC 4.99 10*3/mm3      RBC 3.80 (L) 10*6/mm3      Hemoglobin 11.5 (L) g/dL      Hematocrit 33.7 (L) %      MCV 88.7 fL      MCH 30.3 pg      MCHC 34.1 g/dL      RDW 16.4 (H) %      RDW-SD 53.3 (H) fl      MPV -- fL       Instrument unable to calculate results        Platelets 117 (L) 10*3/mm3      Neutrophil % 68.6 %      Lymphocyte % 16.2 %       Monocyte % 9.2 %      Eosinophil % 5.2 %      Basophil % 0.6 %      Immature Grans % 0.2 %      Neutrophils, Absolute 3.42 10*3/mm3      Lymphocytes, Absolute 0.81 10*3/mm3      Monocytes, Absolute 0.46 10*3/mm3      Eosinophils, Absolute 0.26 10*3/mm3      Basophils, Absolute 0.03 10*3/mm3      Immature Grans, Absolute 0.01 10*3/mm3     Comprehensive Metabolic Panel [[id number]]  (Abnormal) Collected:  07/07/17 1158    Specimen:  Blood Updated:  07/07/17 1224     Glucose 101 (H) mg/dL      BUN 10 mg/dL      Creatinine 0.47 (L) mg/dL      Sodium 133 (L) mmol/L      Potassium 3.6 mmol/L      Chloride 102 mmol/L      CO2 25.0 mmol/L      Calcium 8.0 (L) mg/dL      Total Protein 5.8 (L) g/dL      Albumin 2.70 (L) g/dL      ALT (SGPT) 27 U/L      AST (SGOT) 32 U/L      Alkaline Phosphatase 114 U/L      Total Bilirubin 0.8 mg/dL      eGFR Non African Amer >150 mL/min/1.73      Globulin 3.1 gm/dL      A/G Ratio 0.9 (L) g/dL      BUN/Creatinine Ratio 21.3     Anion Gap 6.0 mmol/L     Lipase [[id number]]  (Normal) Collected:  07/07/17 1158    Specimen:  Blood Updated:  07/07/17 1224     Lipase 97 U/L     Protime-INR [[id number]]  (Normal) Collected:  07/07/17 1158    Specimen:  Blood Updated:  07/07/17 1239     Protime 14.6 Seconds      INR 1.15    Narrative:       Therapeutic range for most indications is 2.0-3.0 INR,  or 2.5-3.5 for mechanical heart valves.    aPTT [[id number]]  (Abnormal) Collected:  07/07/17 1158    Specimen:  Blood Updated:  07/07/17 1239     PTT 56.6 (H) seconds     Narrative:       The recommended Heparin therapeutic range is 68-97 seconds.    Extra Tubes [[id number]] Collected:  07/07/17 1158    Specimen:  Blood from Blood, Venous Line Updated:  07/07/17 1301    Narrative:       The following orders were created for panel order Extra Tubes.  Procedure                               Abnormality         Status                     ---------                               -----------         ------                      Gold Top - SST[[id number]]                                   Final result                 Please view results for these tests on the individual orders.    Gold Top - SST [[id number]] Collected:  07/07/17 1158    Specimen:  Blood Updated:  07/07/17 1301     Extra Tube Hold for add-ons.      Auto resulted.       CBC & Differential [[id number]] Collected:  07/07/17 1158    Specimen:  Blood Updated:  07/07/17 1315    Narrative:       The following orders were created for panel order CBC & Differential.  Procedure                               Abnormality         Status                     ---------                               -----------         ------                     Scan Slide[[id number]]                                       Final result               CBC Auto Differential[[id number]]        Abnormal            Final result                 Please view results for these tests on the individual orders.    Scan Slide [[id number]] Collected:  07/07/17 1158    Specimen:  Blood Updated:  07/07/17 1315     Anisocytosis Slight/1+      Few Ovalocytes, eliptocytes, and Dacrocytes- tear drop cells observed  Rare hypochromic cell observed        WBC Morphology Normal     Platelet Estimate Decreased    Urinalysis With / Culture If Indicated [[id number]]  (Abnormal) Collected:  07/07/17 1514    Specimen:  Urine from Urine, Clean Catch Updated:  07/07/17 1535     Color, UA Yellow     Appearance, UA Clear     pH, UA 7.0     Specific Gravity, UA 1.015     Glucose, UA Negative     Ketones, UA Negative     Bilirubin, UA Negative     Blood, UA Negative     Protein, UA 30 mg/dL (1+) (A)     Leuk Esterase, UA Negative     Nitrite, UA Negative     Urobilinogen, UA 4.0 E.U./dL (A)    Urinalysis, Microscopic Only [[id number]]  (Abnormal) Collected:  07/07/17 1514    Specimen:  Urine from Urine, Clean Catch Updated:  07/07/17 1538     RBC, UA 0-2 (A) /HPF      WBC, UA 0-2 /HPF      Bacteria, UA None Seen /HPF      Squamous Epithelial  Cells, UA None Seen /HPF      Hyaline Casts, UA None Seen /LPF      Methodology Automated Microscopy    Body Fluid Culture [[id number]] Collected:  07/07/17 1536    Specimen:  Body Fluid from Abdominal Cavity Updated:  07/07/17 1655     Gram Stain Result Few (2+) WBCs seen      No organisms seen    Legionella Antigen, Urine [[id number]]  (Normal) Collected:  07/07/17 1514    Specimen:  Urine from Urine, Clean Catch Updated:  07/07/17 2216     LEGIONELLA ANTIGEN, URINE Negative    Mycoplasma Pneumoniae PCR [[id number]] Collected:  07/08/17 0009    Specimen:  Sputum from Nasopharynx Updated:  07/08/17 0031    S. Pneumo Ag Urine or CSF [[id number]]  (Normal) Collected:  07/08/17 0009    Specimen:  Urine from Urine, Clean Catch Updated:  07/08/17 0231     Strep Pneumo Ag Negative    Urine Culture [[id number]]  (Normal) Collected:  07/08/17 0009    Specimen:  Urine from Urine, Clean Catch Updated:  07/08/17 0642     Urine Culture Culture in progress    Blood Culture [[id number]]  (Normal) Collected:  07/07/17 1843    Specimen:  Blood from Hand, Left Updated:  07/08/17 0801     Blood Culture No growth at less than 24 hours            I reviewed the patient's new clinical results.    Assessment/Plan:     Active Hospital Problems (** Indicates Principal Problem)    Diagnosis Date Noted   • **Ascites due to alcoholic cirrhosis [K70.31] 07/07/2017     -patient had a CT scan of the abdomen and pelvis performed which showed a large fluid collection in his abdomen as well as right sided pleural effusion, patient underwent a ultrasound guided paracentesis which they took out 3.9 liters of fluid  -currently on levaquin and flagyl IV, blood culture and urine culture no growth     • Lower abdominal pain [R10.30] 07/07/2017     -secondary to ascites from hep. C as well as alcoholic cirrhosis, patient is afebrile currently and has been for the last few weeks, no need for antibiotics at this time     • Tobacco dependence [F17.200] 07/07/2017      -admits to smoking a PPD, will give 21mg nicotine patch     • Simple chronic bronchitis [J41.0] 07/07/2017     -will provide with supplemental oxygen as needed as well as breathing treatments PRN     • Bacterial pneumonia [J15.9] 07/07/2017   • Chronic hepatitis C without hepatic coma [B18.2] 02/16/2017     -has been followed by Dr. Montalvo, will continue his home medication of zovirax     • Multiple myeloma, stage 3 [C90.00] 01/20/2017     -has a right sided port in, has been followed by Dr. Mendoza of heme-oncology, will monitor      • Unilateral inguinal hernia without obstruction or gangrene [K40.90] 09/10/2016     -large right sided inguinal hernia palpated and seen on abdominal CT for now, surgery was consulted from the ER  -surgery was consulted and stated that it was reducible and that he was not an operative candidate      • End stage liver disease [K72.90] 08/31/2016     -secondary to chronic hepatitis C, is being followed by Dr. Montalvo as an outpatient, will continue to monitor      • Depression [F32.9] 08/31/2016     -continue home medication of zoloft        Resolved Hospital Problems    Diagnosis Date Noted Date Resolved   No resolved problems to display.           DVT prophylaxis: SCDs TEDs  Code status is Full Code          This document has been electronically signed by Carlos Hahn MD on July 8, 2017 8:12 AM

## 2017-07-08 NOTE — PLAN OF CARE
Problem: Patient Care Overview (Adult)  Goal: Plan of Care Review  Outcome: Ongoing (interventions implemented as appropriate)    07/08/17 1215   Coping/Psychosocial Response Interventions   Plan Of Care Reviewed With patient   Patient Care Overview   Progress no change   Outcome Evaluation   Outcome Summary/Follow up Plan resting well. vss. decreased appetite. no pain at this time         Problem: Fall Risk (Adult)  Goal: Identify Related Risk Factors and Signs and Symptoms  Outcome: Outcome(s) achieved Date Met:  07/08/17  Goal: Absence of Falls  Outcome: Ongoing (interventions implemented as appropriate)    Problem: Anxiety (Adult)  Goal: Identify Related Risk Factors and Signs and Symptoms  Outcome: Ongoing (interventions implemented as appropriate)  Goal: Reduction/Resolution  Outcome: Ongoing (interventions implemented as appropriate)    Problem: Pain, Acute (Adult)  Goal: Identify Related Risk Factors and Signs and Symptoms  Outcome: Outcome(s) achieved Date Met:  07/08/17  Goal: Acceptable Pain Control/Comfort Level  Outcome: Ongoing (interventions implemented as appropriate)

## 2017-07-08 NOTE — H&P
Ascites due to alcoholic cirrhosis  Subjective:     Mickey Lind is a 61 y.o. male who presents for abdominal pain that started 3 weeks ago.  It got worse so he came into the ER.  He is also having higher than normal back pain.  The pain aches and throbs.  He states his hernia has gotten worse as well.  He is having bad pain in his lower abdomen.  He is also coughing up green to brown phlegm.  He has a lot of nausea and cannot eat.  He has a headache as well and a rash in his groin.  He sees Dr. David for his cirrhosis and Dr. Mendoza for multiple myeloma.  His last treatment for myeloma was in March.  He has also had blood transfusions at the [facility].  He is weak and doesn't feel well.  No hematochezia, hematemesis, hematuria, or hemoptysis.      The following portions of the patient's history were reviewed and updated as appropriate: allergies, current medications, past family history, past medical history, past social history, past surgical history and problem list.    Concurrent Medical Hx:  Past Medical History:   Diagnosis Date   • Abdominal pain    • Alcoholic liver damage    • Alcoholic polyneuropathy    • Amblyopia     refractive os   • Anemia    • Ascites    • Ascites    • Asthma    • Astigmatism    • Cellulitis of left lower leg    • Chronic hepatitis C    • Chronic viral hepatitis C    • Cirrhosis of liver    • Claudication    • Encounter for immunization    • Generalized edema    • History of blood transfusion    • History of colon polyps    • Hypermetropia    • Inguinal hernia     - Rih      • Low back pain    • Major depressive disorder     recurrent , moderate   • Myocardial infarction    • Pain in joint, lower leg    • Peripheral vascular disease    • Recurrent major depressive episodes    • Tinea unguium    • Tobacco use        Past Surgical Hx:  Past Surgical History:   Procedure Laterality Date   • CARDIAC CATHETERIZATION      (Successful PCI to a totally occluded RCA with a 2.5 x 28 and a 2.5 x  8 mm Xience stent. Nonobstructive disease in the left coronary artery system.)   2012    • CARDIAC SURGERY     • COLONOSCOPY  10/13/2016   • COLONOSCOPY N/A 3/29/2017    Procedure: COLONOSCOPY;  Surgeon: Jose Daniel David MD;  Location: [facility] ENDOSCOPY;  Service:    • ENDOSCOPY  10/13/2016   • ENDOSCOPY N/A 3/29/2017    Procedure: ESOPHAGOGASTRODUODENOSCOPY (U/S FIRST @0945) ;  Surgeon: Jose Daniel David MD;  Location: [facility] ENDOSCOPY;  Service:    • UPPER GASTROINTESTINAL ENDOSCOPY  10/13/2016     Family Hx:  Family History   Problem Relation Age of Onset   • Cancer Mother    • Heart disease Mother    • Cancer Father    • Other Father      ischemic heart disease   • Heart disease Father    • Other Son      depressive disorder   • Diabetes Maternal Grandmother    • Cancer Paternal Grandfather    • No Known Problems Daughter    • No Known Problems Son       Social History:  Social History     Social History   • Marital status:      Spouse name: N/A   • Number of children: N/A   • Years of education: N/A     Occupational History   • Not on file.     Social History Main Topics   • Smoking status: Current Every Day Smoker     Packs/day: 0.50   • Smokeless tobacco: Never Used      Comment: Smokes approx 1 ppd of Cigarettes.smoking since last 45 yr   • Alcohol use No      Comment: no alcohol since 2017   • Drug use: No   • Sexual activity: Defer     Other Topics Concern   • Not on file     Social History Narrative    , wife  2017. Lives with son and his son's girlfriend.  He has worked as , mine equipment, coal mines.  He served in the [employer] for 1.5 years.  Used to smoke 2 ppd down to 1/2 ppd for 35 years or more.  Drank a 12 pack of beer a day for 20 years, last drink was 1/2 a beer on .  He last drank in 10/2015.  He  drugs 20-30 years ago including cocaine, meth, heroin, pot, and other drugs.  He smoked, snorted, and shot up drugs.       Home Medications:  No current  facility-administered medications on file prior to encounter.      Current Outpatient Prescriptions on File Prior to Encounter   Medication Sig Dispense Refill   • acyclovir (ZOVIRAX) 400 MG tablet Take 400 mg by mouth Daily.     • atorvastatin (LIPITOR) 10 MG tablet Take 1 tablet by mouth Daily. 30 tablet 3   • dexamethasone (DECADRON) 4 MG tablet Take 4 mg by mouth.     • furosemide (LASIX) 40 MG tablet Take 40 mg by mouth daily.     • gabapentin (NEURONTIN) 400 MG capsule Take 1 capsule by mouth 3 (Three) Times a Day. 90 capsule 2   • GNP ACID REDUCER 75 MG tablet      • polyethylene glycol (MIRALAX) powder Take 17 g by mouth Daily. 527 g 1   • prochlorperazine (COMPAZINE) 10 MG tablet Take 1 tablet by mouth Every 6 (Six) Hours As Needed for nausea or vomiting. 60 tablet 1   • sertraline (ZOLOFT) 100 MG tablet Take 2 tablets by mouth Daily. 60 tablet 2   • spironolactone (ALDACTONE) 50 MG tablet Take 50 mg by mouth 2 (two) times a day.     • sucralfate (CARAFATE) 1 GM/10ML suspension Take 10 mL by mouth Daily. 30 g 3       Allergies:  Codeine; Other; and Penicillins  I reviewed the patient's new clinical results.  Review of Systems  Review of Systems   Constitutional: Positive for activity change, appetite change, chills, fatigue and unexpected weight change. Negative for diaphoresis and fever.   HENT: Positive for congestion. Negative for ear pain, hearing loss, mouth sores, nosebleeds, sore throat and trouble swallowing.    Eyes: Negative for photophobia and visual disturbance.   Respiratory: Positive for cough, shortness of breath and wheezing.    Cardiovascular: Positive for leg swelling. Negative for chest pain and palpitations.   Gastrointestinal: Positive for abdominal distention, abdominal pain and nausea. Negative for blood in stool, constipation, diarrhea, rectal pain and vomiting.   Genitourinary: Positive for scrotal swelling. Negative for decreased urine volume, difficulty urinating, dysuria and  "hematuria.   Musculoskeletal: Positive for arthralgias, back pain, gait problem, joint swelling, myalgias, neck pain and neck stiffness.   Skin: Positive for rash.   Allergic/Immunologic: Positive for environmental allergies and immunocompromised state.   Neurological: Positive for weakness and headaches. Negative for seizures and speech difficulty.   Hematological: Bruises/bleeds easily.   Psychiatric/Behavioral: Positive for sleep disturbance. Negative for agitation, behavioral problems, confusion, decreased concentration, dysphoric mood, hallucinations, self-injury and suicidal ideas. The patient is not nervous/anxious and is not hyperactive.        Objective:     /84 (BP Location: Left arm, Patient Position: Lying)  Pulse 89  Temp 97 °F (36.1 °C) (Oral)   Resp 20  Ht 70\" (177.8 cm)  Wt 149 lb (67.6 kg)  SpO2 92%  BMI 21.38 kg/m2  Physical Exam   Constitutional: He is oriented to person, place, and time. He appears well-developed. No distress.   Appears older than stated age   HENT:   Head: Normocephalic and atraumatic.   Right Ear: External ear normal.   Left Ear: External ear normal.   Nose: Nose normal.   Mouth/Throat: Oropharynx is clear and moist. No oropharyngeal exudate.   Eyes: Conjunctivae and EOM are normal. Pupils are equal, round, and reactive to light. Right eye exhibits no discharge. Left eye exhibits no discharge. No scleral icterus.   Neck: No JVD present. No tracheal deviation present.   Cardiovascular: Normal rate, regular rhythm and intact distal pulses.  Exam reveals no gallop and no friction rub.    Murmur heard.  Pulmonary/Chest: Effort normal. No respiratory distress. He has wheezes. He has no rales. He exhibits no tenderness.   Scattered rhonchi, diminished breath sounds on the right side   Abdominal: Bowel sounds are normal. He exhibits distension. There is tenderness. There is no rebound and no guarding. A hernia is present.   Diffusely tender no guarding or rebound, large " right side inguinal hernia, small umbilical hernia   Genitourinary:   Genitourinary Comments: Swelling in the scrotum   Musculoskeletal: He exhibits tenderness. He exhibits no edema.   Lymphadenopathy:     He has no cervical adenopathy.   Neurological: He is alert and oriented to person, place, and time. He displays normal reflexes. No cranial nerve deficit. He exhibits normal muscle tone.   Plantar reflex down going bilaterally, no dysmetria noted   Skin: Skin is dry. Rash noted. He is not diaphoretic.   Excoriations and erythema in the groin and on the abdomen   Psychiatric: He has a normal mood and affect. His behavior is normal. Judgment and thought content normal.   Nursing note and vitals reviewed.    I reviewed the patient's new clinical results.  Assessment/Plan:     Active Hospital Problems (** Indicates Principal Problem)    Diagnosis Date Noted   • **Ascites due to alcoholic cirrhosis [K70.31] 07/07/2017     -patient had a CT scan of the abdomen and pelvis performed which showed a large fluid collection in his abdomen as well as right sided pleural effusion, patient underwent a ultrasound guided paracentesis which they took out 3.9 liters of fluid  -currently on levaquin and flagyl IV, blood culture and urine culture no growth     • Lower abdominal pain [R10.30] 07/07/2017     -secondary to ascites from hep. C as well as alcoholic cirrhosis, patient is afebrile currently and has been for the last few weeks, no need for antibiotics at this time     • Tobacco dependence [F17.200] 07/07/2017     -admits to smoking a PPD, will give 21mg nicotine patch     • Simple chronic bronchitis [J41.0] 07/07/2017     -will provide with supplemental oxygen as needed as well as breathing treatments PRN     • Bacterial pneumonia [J15.9] 07/07/2017   • Chronic hepatitis C without hepatic coma [B18.2] 02/16/2017     -has been followed by Dr. Montalvo, will continue his home medication of zovirax     • Multiple myeloma, stage 3  [C90.00] 01/20/2017     -has a right sided port in, has been followed by Dr. Mendoza of heme-oncology, will monitor      • Unilateral inguinal hernia without obstruction or gangrene [K40.90] 09/10/2016     -large right sided inguinal hernia palpated and seen on abdominal CT for now, surgery was consulted from the ER  -surgery was consulted and stated that it was reducible and that he was not an operative candidate      • End stage liver disease [K72.90] 08/31/2016     -secondary to chronic hepatitis C, is being followed by Dr. Montalvo as an outpatient, will continue to monitor      • Depression [F32.9] 08/31/2016     -continue home medication of zoloft        Resolved Hospital Problems    Diagnosis Date Noted Date Resolved   No resolved problems to display.     Patient appears to have pneumonia as well as possible spontaneous bacterial peritonitis.  Treat with IV antibiotics.    I have seen and examined the patient.  I have reviewed the notes, assessments, and/or procedures performed by Dr. Hahn, I concur with his documentation and assessment and plan for Mickey Lind.        This document has been electronically signed by Carolee Leo MD on July 8, 2017 1:41 PM          This document has been electronically signed by Carolee Leo MD on July 8, 2017 1:41 PM

## 2017-07-08 NOTE — PLAN OF CARE
Problem: Patient Care Overview (Adult)  Goal: Plan of Care Review  Outcome: Ongoing (interventions implemented as appropriate)    07/08/17 0630   Coping/Psychosocial Response Interventions   Plan Of Care Reviewed With patient   Patient Care Overview   Progress no change   Outcome Evaluation   Outcome Summary/Follow up Plan Pt rested well, VS stable, pain controlled with meds

## 2017-07-09 NOTE — PLAN OF CARE
Problem: Patient Care Overview (Adult)  Goal: Plan of Care Review  Outcome: Ongoing (interventions implemented as appropriate)    07/09/17 1341   Coping/Psychosocial Response Interventions   Plan Of Care Reviewed With patient   Outcome Evaluation   Outcome Summary/Follow up Plan PT evaluation completed. Pt ambulated 5 ft to bathroom with CGA. O2 sats dropped to 87% on1L/min. Without O2, pt O2 sats dropped to 84% with ambulation 20ft. RN put pt back on O2.Function limited primarily by decreased strength, balance and tolerance for functional mobility and activities. Pt will benefit from skilled PT to regain lost function. Pt arranging for son and/or dtr-in-law to come for family education as pt at present would require 24/7 skilled caregiver with continued therapy services.         Problem: Inpatient Physical Therapy  Goal: Transfer Training Goal 1 LTG- PT  Outcome: Ongoing (interventions implemented as appropriate)    07/09/17 1341   Transfer Training PT LTG   Transfer Training PT LTG, Date Established 07/09/17   Transfer Training PT LTG, Time to Achieve by discharge   Transfer Training PT LTG, Activity Type bed to chair /chair to bed;sit to stand/stand to sit;toilet   Transfer Training PT LTG, [place] Level supervision required;conditional independence   Transfer Training PT LTG, Assist Device (AAD)   Transfer Training PT LTG, Outcome goal ongoing       Goal: Gait Training Goal LTG- PT  Outcome: Ongoing (interventions implemented as appropriate)    07/09/17 1341   Gait Training PT LTG   Gait Training Goal PT LTG, Date Established 07/09/17   Gait Training Goal PT LTG, Time to Achieve by discharge   Gait Training Goal PT LTG, [place] Level supervision required;conditional independence   Gait Training Goal PT LTG, Distance to Achieve 50 ft;O2 sats will not drop below 90%   Gait Training Goal PT LTG, Additional Goal 150ft;O2 sats will not drop below 92%   Gait Training Goal PT LTG, Outcome goal ongoing        Goal: Stair Training Goal LTG- PT  Outcome: Ongoing (interventions implemented as appropriate)    07/09/17 1341   Stair Training PT LTG   Stair Training Goal PT LTG, Date Established 07/09/17   Stair Training Goal PT LTG, Time to Achieve by discharge   Stair Training Goal PT LTG, Number of Steps 5   Stair Training Goal PT LTG, [place] Level supervision required;contact guard assist   Stair Training Goal PT LTG, Assist Device cane, straight   Stair Training Goal PT LTG, Outcome goal ongoing       Goal: Strength Goal LTG- PT  Outcome: Ongoing (interventions implemented as appropriate)    07/09/17 1341   Strength Goal PT LTG   Strength Goal PT LTG, Date Established 07/09/17   Strength Goal PT LTG, Time to Achieve by discharge   Strength Goal PT LTG, Measure to Achieve Pt will perform 15 reps of AROM exercises   Strength Goal PT LTG, Functional Goal Pt will progress to standing exercises   Strength Goal PT LTG, Outcome goal ongoing       Goal: Patient Education Goal LTG- PT  Outcome: Ongoing (interventions implemented as appropriate)    07/09/17 1341   Patient Education PT LTG   Patient Education PT LTG, Date Established 07/09/17   Patient Education PT LTG, Time to Achieve by discharge   Patient Education PT LTG, Education Type HEP;written program;posture/body mechanics;gait;transfers;home safety   Patient Education PT LTG Outcome goal ongoing       Goal: Caregiver Training Goal LTG- PT  Outcome: Ongoing (interventions implemented as appropriate)    07/09/17 1341   Caregiver Training PT LTG   Caregiver Training PT LTG, Date Established 07/09/17   Caregiver Training PT LTG, Time to Achieve by discharge   Caregiver Training PT LTG, Activity Type Homecaregiver will demonstrate understanding assisting pt with transfers/ambulation with device as needed. Homecaregiver will acknowledge understanding of home care instructions   Caregiver Training PT LTG, Outcome goal ongoing

## 2017-07-09 NOTE — PLAN OF CARE
Problem: Patient Care Overview (Adult)  Goal: Plan of Care Review  Outcome: Ongoing (interventions implemented as appropriate)    07/09/17 0336   Coping/Psychosocial Response Interventions   Plan Of Care Reviewed With patient   Patient Care Overview   Progress no change   Outcome Evaluation   Outcome Summary/Follow up Plan Pt rested intermittently, VS stable, pain controlled with meds

## 2017-07-09 NOTE — THERAPY EVALUATION
Acute Care - Physical Therapy Initial Evaluation  [facility]     Patient Name: Mickey Lind  : 1955  MRN: [id number]  Today's Date: 2017   Onset of Illness/Injury or Date of Surgery Date: (P) 17  Date of Referral to PT: 17  Referring Physician: (P) Dr. Carolee Leo      Admit Date: 2017     Visit Dx:    ICD-10-CM ICD-9-CM   1. Lower abdominal pain R10.30 789.09   2. Other ascites R18.8 789.59   3. Recurrent right pleural effusion J90 511.9   4. Impaired physical mobility Z74.09 781.99     Patient Active Problem List   Diagnosis   • End stage liver disease   • Depression   • Low back pain with sciatica   • Hyperlipidemia   • Unilateral inguinal hernia without obstruction or gangrene   • Diarrhea   • Hepatic cirrhosis   • Epigastric pain   • Peripheral vascular disease   • Encounter for venous access device care   • Multiple myeloma, stage 3   • Anemia   • Chronic hepatitis C without hepatic coma   • Lower abdominal pain   • Ascites due to alcoholic cirrhosis   • Tobacco dependence   • Simple chronic bronchitis   • Bacterial pneumonia     Past Medical History:   Diagnosis Date   • Abdominal pain    • Alcoholic liver damage    • Alcoholic polyneuropathy    • Amblyopia     refractive os   • Anemia    • Ascites    • Ascites    • Asthma    • Astigmatism    • Cellulitis of left lower leg    • Chronic hepatitis C    • Chronic viral hepatitis C    • Cirrhosis of liver    • Claudication    • Encounter for immunization    • Generalized edema    • History of blood transfusion    • History of colon polyps    • Hypermetropia    • Inguinal hernia     - Rih      • Low back pain    • Major depressive disorder     recurrent , moderate   • Myocardial infarction    • Pain in joint, lower leg    • Peripheral vascular disease    • Recurrent major depressive episodes    • Tinea unguium    • Tobacco use      Past Surgical History:   Procedure Laterality Date   • CARDIAC CATHETERIZATION      (Successful  PCI to a totally occluded RCA with a 2.5 x 28 and a 2.5 x 8 mm Xience stent. Nonobstructive disease in the left coronary artery system.)   11/24/2012    • CARDIAC SURGERY     • COLONOSCOPY  10/13/2016   • COLONOSCOPY N/A 3/29/2017    Procedure: COLONOSCOPY;  Surgeon: Jose Daniel David MD;  Location: [facility] ENDOSCOPY;  Service:    • ENDOSCOPY  10/13/2016   • ENDOSCOPY N/A 3/29/2017    Procedure: ESOPHAGOGASTRODUODENOSCOPY (U/S FIRST @0945) ;  Surgeon: Jose Daniel David MD;  Location: [facility] ENDOSCOPY;  Service:    • UPPER GASTROINTESTINAL ENDOSCOPY  10/13/2016          PT ASSESSMENT (last 72 hours)      PT Evaluation       07/09/17 1338 07/09/17 0920    Rehab Evaluation    Document Type (P)  evaluation  - evaluation  -    Subjective Information (P)  agree to therapy;complains of;pain  - agree to therapy;complains of;pain  -    Patient Effort, Rehab Treatment  good  -    Symptoms Noted During/After Treatment  shortness of breath;significant change in vital signs  -    Symptoms Noted Comment  O2 sats dropped to 87% on 1L/min;RN notified  -    General Information    Patient Profile Review (P)  yes  - yes  -    Onset of Illness/Injury or Date of Surgery Date (P)  07/07/17  - 07/07/17  -    Referring Physician (P)  Dr. Carolee Leo  - Dr. Carolee Leo  -    General Observations (P)  Pt supine HOB elevated, tele, IV, O2 1L  - Pt supine with HOB elevated;noted O2 per nasal cannula on 1L/min, telemetry, IV  -    Pertinent History Of Current Problem (P)  Pt admitted lower abdominal pain, possible pneumonia, with ascites due to alcholic cirrhosis, Pt underwent parcentesis 7/7/17. Pt also has history multiple myeloma  - Pt admitted lower abdominal pain, possible pneumonia, with ascites due to alcoholic cirrhosis. Pt underwent paracentesis 7/7/2017. Pt also has h/o multiple myeloma  -    Precautions/Limitations (P)  fall precautions;oxygen therapy device and L/min   vital signs  - fall  precautions;oxygen therapy device and L/min;other (see comments)   vital signs  -    Prior Level of Function (P)  independent:;all household mobility;ADL's;home management;driving;using stairs  - independent:;all household mobility;ADL's;driving  -    Equipment Currently Used at Home (P)  cane, straight   pt has RW  - cane, straight   Pt has RW  -    Plans/Goals Discussed With  patient;agreed upon  -    Risks Reviewed  patient:;dizziness;change in vital signs;increased discomfort  -    Benefits Reviewed  patient:;improve function;increase independence;increase strength;increase balance  -    Barriers to Rehab  medically complex;previous functional deficit  -    Living Environment    Lives With (P)  child(edwardo), adult   son and dtr in law  - child(edwardo), adult  -    Living Arrangements (P)  house  - house  -    Home Accessibility (P)  stairs to enter home;tub/shower is not walk in;bed and bath on same level  - stairs to enter home  -    Number of Stairs to Enter Home (P)  5  - 5  -    Stair Railings at Home (P)  none  - none  -    Transportation Available (P)  car;family or friend will provide  - family or friend will provide;car  -    Living Environment Comment (P)  Pt reports sometimes he is alone but sometimes some one is there.   - Pt's son and dtr-in-law have been staying with pt a lot since spouse recently passed.Pt reports family shares housekeeping tasks.  -    Clinical Impression    Date of Referral to PT  07/08/17  -    PT Diagnosis  abdominal pain, ascites due to liver cirrhosis, pneumonia  -    Prognosis  per MD  -ASTER    Patient/Family Goals Statement  Be stronger;able to assist with care  -    Criteria for Skilled Therapeutic Interventions Met  yes  -    Rehab Potential  good, to achieve stated therapy goals  -    Predicted Duration of Therapy Intervention (days/wks)  until discharge  -    Vital Signs    Pre Systolic BP Rehab (P)  126  - 139  -     Pre Treatment Diastolic BP (P)  62  -BH 73  -ASTER    Pretreatment Heart Rate (beats/min) (P)  83  -BH 88  -ASTER    Intratreatment Heart Rate (beats/min)  92  -ASTER    Posttreatment Heart Rate (beats/min)  83  -ASTER    Pre SpO2 (%) (P)  91  -BH 93  -ASTER    O2 Delivery Pre Treatment (P)  supplemental O2   1L  -BH supplemental O2   1L/min  -    Intra SpO2 (%)  84  -ASTER    O2 Delivery Intra Treatment  room air   Rn present to see if pt could be weaned off O2  -    Post SpO2 (%)  93  -ASTER    O2 Delivery Post Treatment  supplemental O2  -ASTER    Pre Patient Position (P)  Supine  - Supine  -    Intra Patient Position  Standing  -    Post Patient Position  Sitting  -    Pain Assessment    Pain Assessment  0-10  -    Pain Score (P)  7  - 6  -    Post Pain Score  7  -    Pain Location (P)  Abdomen   legs, hernia, back   - Abdomen   back, legs  -    Pain Intervention(s)  Medication (See MAR);Repositioned;Rest  -    Vision Assessment/Intervention    Visual Impairment (P)  WFL with corrective lenses  - WFL with corrective lenses  -    Cognitive Assessment/Intervention    Current Cognitive/Communication Assessment (P)  functional  - functional   a little slow processing  -    Orientation Status (P)  oriented x 4;required verbal cueing (specifiy in comments)   cues for current month  - oriented x 4  -    Follows Commands/Answers Questions  75% of the time;needs cueing;needs increased time;needs repetition  -    Personal Safety  decreased awareness, need for safety;decreased awareness, need for assist  -    Personal Safety Interventions  gait belt;nonskid shoes/slippers when out of bed;supervised activity  -    ROM (Range of Motion)    General ROM (P)  no range of motion deficits identified  - no range of motion deficits identified  -    General ROM Detail (P)  BUE WFL, some decreased in digits noted on left hand; fair digit to thumb mod difficulty fair digit to nose   -     MMT (Manual  Muscle Testing)    General MMT Assessment (P)  upper extremity strength deficits identified;lower extremity strength deficits identified  - upper extremity strength deficits identified;lower extremity strength deficits identified  -    General MMT Assessment Detail (P)  BUE  grossly 4/5; BUE grossly 4/5  -     Upper Extremity    Upper Ext Manual Muscle Testing Detail  BUE: 4/5 , wrists, elbows, shoulders  -    Lower Extremity    Lower Ext Manual Muscle Testing Detail  BLE: 3+/5 ankles/feet, knees, 3/5 hips  -    Bed Mobility, Assessment/Treatment    Bed Mobility, Assistive Device  head of bed elevated;bed rails  -    Bed Mob, Supine to Sit, [place]  contact guard assist  -    Bed Mob, Sit to Supine, [place]  not tested  -    Transfer Assessment/Treatment    Transfers, Bed-Chair [place]  minimum assist (75% patient effort)  -    Transfers, Chair-Bed [place]  not tested  -    Transfers, Bed-Chair-Bed, Assist Device  rolling walker  -    Transfers, Sit-Stand [place]  contact guard assist  -ASTER    Transfers, Stand-Sit [place]  contact guard assist  -    Transfers, Sit-Stand-Sit, Assist Device  rolling walker  -    Toilet Transfer, [place]  contact guard assist  -ASTER    Toilet Transfer, Assistive Device  rolling walker  -ASTER    Transfer, Safety Issues  step length decreased;balance decreased during turns   verbal cues to stay closer to walker  -ASTER    Gait Assessment/Treatment    Gait, [place] Level  minimum assist (75% patient effort)  -    Gait, Assistive Device  rolling walker  -    Gait, Distance (Feet)  35   5ft+10+20ft  -    Gait, Gait Deviations  ataxia;step length decreased;right:;left:  -    Gait, Safety Issues  balance decreased during turns;supplemental O2  -    Gait, Comment  Required verbal and tactile cues for safe use of RW  -ASTER    Sensory Assessment/Intervention    Light Touch (P)  LUE;RUE  - LUE;RUE;LLE;RLE  -    LUE  Light Touch (P)  WNL  - WNL  -ASTER    RUE Light Touch (P)  WNL  - WNL  -ASTER    LLE Light Touch  severe impairment  -ASTER    RLE Light Touch  severe impairment  -    Proprioception (P)  LUE;RUE  - LLE;RLE  -ASTER    LLE Proprioception  moderate impairment  -ASTER    RLE Proprioception  moderate impairment  -ASTER    Edema Management    Edema Amount  right:;left:;trace  -ASTER    Positioning and Restraints    Pre-Treatment Position  in bed  -ASTER    Post Treatment Position  chair  -ASTER    In Chair  call light within reach;encouraged to call for assist;sitting  -      07/07/17 2012 07/07/17 2005    General Information    Equipment Currently Used at Home  cane, straight  -BD    Living Environment    Lives With child(edwardo), adult  -     Living Arrangements house  -     Home Accessibility no concerns  -     Stair Railings at Home other (see comments)   no rails on outside steps  -     Type of Financial/Environmental Concern unable to afford medication(s)  -     Transportation Available car;family or friend will provide  -       User Key  (r) = Recorded By, (t) = Taken By, (c) = Cosigned By    Initials Name Provider Type     Dannielle Sanders, OTR/L Occupational Therapist    ASTER Chapa, PT Physical Therapist    HALEY Elliott, RN Registered Nurse          Physical Therapy Education     Title: PT OT SLP Therapies (Active)     Topic: Physical Therapy (Active)     Point: Mobility training (Active)    Learning Progress Summary    Learner Readiness Method Response Comment Documented by Status   Patient Acceptance E,D NR  ASTER 07/09/17 1339 Active               Point: Body mechanics (Active)    Learning Progress Summary    Learner Readiness Method Response Comment Documented by Status   Patient Acceptance E,D NR  ASTER 07/09/17 1339 Active               Point: Precautions (Active)    Learning Progress Summary    Learner Readiness Method Response Comment Documented by Status   Patient Acceptance E,D NR   07/09/17 1339 Active                       User Key     Initials Effective Dates Name Provider Type Discipline     10/17/16 -  Erika Chapa, PT Physical Therapist PT                PT Recommendation and Plan  Anticipated Equipment Needs At Discharge:  (pt has RW)  Anticipated Discharge Disposition: placement for care (vs 24/7 home caregiver with  therapy to follow)  Planned Therapy Interventions: balance training, bed mobility training, gait training, home exercise program, patient/family education, postural re-education, neuromuscular re-education, stair training, strengthening, transfer training  PT Frequency: other (see comments) (5-14 times per weej)  Plan of Care Review  Plan Of Care Reviewed With: patient  Outcome Summary/Follow up Plan: PT evaluation completed. Pt ambulated 5 ft to bathroom with CGA. O2 sats dropped to 87% on1L/min. Without O2, pt O2 sats dropped to 84% with ambulation 20ft. RN put pt back on O2.Function limited primarily by decreased strength, balance and tolerance for functional mobility and activities. Pt will benefit from skilled PT to regain lost function. Pt arranging for son and/or dtr-in-law to come for family education as pt at present  would require 24/7 skilled caregiver with continued therapy services.          IP PT Goals       07/09/17 1341          Transfer Training PT LTG    Transfer Training PT LTG, Date Established 07/09/17  -      Transfer Training PT LTG, Time to Achieve by discharge  -      Transfer Training PT LTG, Activity Type bed to chair /chair to bed;sit to stand/stand to sit;toilet  -      Transfer Training PT LTG, [place] Level supervision required;conditional independence  -      Transfer Training PT LTG, Assist Device --   AAD  -      Transfer Training PT LTG, Outcome goal ongoing  -      Gait Training PT LTG    Gait Training Goal PT LTG, Date Established 07/09/17  -      Gait Training Goal PT LTG, Time to Achieve by discharge  -      Gait Training Goal PT  LTG, [place] Level supervision required;conditional independence  -      Gait Training Goal PT LTG, Distance to Achieve 50 ft;O2 sats will not drop below 90%  -      Gait Training Goal PT LTG, Additional Goal 150ft;O2 sats will not drop below 92%  -      Gait Training Goal PT LTG, Outcome goal ongoing  [facility]      Stair Training PT LTG    Stair Training Goal PT LTG, Date Established 07/09/17  -      Stair Training Goal PT LTG, Time to Achieve by discharge  -      Stair Training Goal PT LTG, Number of Steps 5  -      Stair Training Goal PT LTG, [place] Level supervision required;contact guard assist  -      Stair Training Goal PT LTG, Assist Device cane, straight  -      Stair Training Goal PT LTG, Outcome goal ongoing  [facility]      Strength Goal PT LTG    Strength Goal PT LTG, Date Established 07/09/17  -      Strength Goal PT LTG, Time to Achieve by discharge  -      Strength Goal PT LTG, Measure to Achieve Pt will perform 15 reps of AROM exercises  -      Strength Goal PT LTG, Functional Goal Pt will progress to standing exercises  -      Strength Goal PT LTG, Outcome goal [facility]      Patient Education PT LTG    Patient Education PT LTG, Date Established 07/09/17  -      Patient Education PT LTG, Time to Achieve by discharge  -      Patient Education PT LTG, Education Type HEP;written program;posture/body mechanics;gait;transfers;home safety  -      Patient Education PT LTG Outcome goal [facility]      Caregiver Training PT LTG    Caregiver Training PT LTG, Date Established 07/09/17  -      Caregiver Training PT LTG, Time to Achieve by discharge  -      Caregiver Training PT LTG, Activity Type Homecaregiver will demonstrate understanding assisting pt with transfers/ambulation with device as needed. Homecaregiver will acknowledge understanding of home care instructions  -      Caregiver Training PT LTG, Outcome goal [facility]        User Key  (r) = Recorded By, (t)  = Taken By, (c) = Cosigned By    Initials Name Provider Type    ASTER Chapa PT Physical Therapist                Outcome Measures       07/09/17 0920          How much help from another person do you currently need...    Turning from your back to your side while in flat bed without using bedrails? 3  -ASTER      Moving from lying on back to sitting on the side of a flat bed without bedrails? 3  -ASTER      Moving to and from a bed to a chair (including a wheelchair)? 3  -ASTER      Standing up from a chair using your arms (e.g., wheelchair, bedside chair)? 3  -ASTER      Climbing 3-5 steps with a railing? 2  -ASTER      To walk in hospital room? 3  -ASTER      AM-PAC 6 Clicks Score 17  -      Functional Assessment    Outcome Measure Options AM-PAC 6 Clicks Basic Mobility (PT)  -        User Key  (r) = Recorded By, (t) = Taken By, (c) = Cosigned By    Initials Name Provider Type    ASTER Chapa PT Physical Therapist           Time Calculation:         PT Charges       07/09/17 1353          Time Calculation    Start Time 0920  -      Stop Time 1029  -      Time Calculation (min) 69 min  -      PT Received On 07/09/17  -      PT Goal Re-Cert Due Date 07/22/17  -      Time Calculation- PT    Total Timed Code Minutes- PT 54 minute(s)  -        User Key  (r) = Recorded By, (t) = Taken By, (c) = Cosigned By    Initials Name Provider Type    ASTER Chapa PT Physical Therapist          Therapy Charges for Today     Code Description Service Date Service Provider Modifiers Qty    97068755863 HC PT MOBILITY CURRENT 7/9/2017 Erika Chapa, PT GP, CK 1    66224823624 HC PT MOBILITY PROJECTED 7/9/2017 Erika Chapa, PT GP, CI 1    50323882170 HC PT EVAL HIGH COMPLEXITY 1 7/9/2017 Erika Chapa, PT GP 1    24600813155 HC GAIT TRAINING EA 15 MIN 7/9/2017 Erika Chapa, PT GP 1    20656659950 HC PT THERAPEUTIC ACT EA 15 MIN 7/9/2017 Erika Chapa, PT GP 2    20549148274 HC PT SELF CARE/MGMT/TRAIN EA 15 MIN 7/9/2017 Erika  DEENA Chapa, PT GP 1          PT G-Codes  PT Professional Judgement Used?: Yes  Outcome Measure Options: AM-PAC 6 Clicks Basic Mobility (PT)  Score: 17  Functional Limitation: Mobility: Walking and moving around  Mobility: Walking and Moving Around Current Status (): At least 40 percent but less than 60 percent impaired, limited or restricted  Mobility: Walking and Moving Around Goal Status (): At least 1 percent but less than 20 percent impaired, limited or restricted      Erika Chapa, PT  7/9/2017

## 2017-07-09 NOTE — PROGRESS NOTES
FAMILY MEDICINE PROGRESS NOTE  NAME: Mickey Lind  : 1955  MRN: [id number]     LOS: 2 days   Full Code  PROVIDER OF SERVICE:     Chief Complaint:  Ascites due to alcoholic cirrhosis    Subjective      Interval History:  History taken from: patient  Subjective: Patient is a 62 yo  male who was admitted with abdominal pain and pneumonia.  He is coughing up a lot of phlegm.  He doesn't feel good.  He is eating better.    Review of Systems:   Review of Systems   Constitutional: Positive for appetite change, chills and fatigue.   HENT: Positive for congestion.    Respiratory: Positive for cough and shortness of breath.    Gastrointestinal: Positive for abdominal distention, abdominal pain and nausea. Negative for vomiting.   Genitourinary: Negative for dysuria and hematuria.   Musculoskeletal: Positive for arthralgias and back pain.   Neurological: Positive for weakness.       Objective     Vital Signs  Temp:  [96.2 °F (35.7 °C)-97.3 °F (36.3 °C)] 97.3 °F (36.3 °C)  Heart Rate:  [68-94] 94  Resp:  [18-20] 18  BP: (130-142)/(68-93) 132/68    Physical Exam  Physical Exam   Constitutional: He is oriented to person, place, and time. He appears well-developed and well-nourished. He appears distressed.   Cardiovascular: Normal rate, regular rhythm and intact distal pulses.  Exam reveals no gallop and no friction rub.    Murmur heard.  Pulmonary/Chest: Effort normal. No respiratory distress. He has wheezes. He has no rales.   Scattered rhonchi, diminished breath sounds on the right side   Abdominal: Soft. Bowel sounds are normal. There is tenderness. There is no rebound and no guarding.   Neurological: He is alert and oriented to person, place, and time.   Skin: He is not diaphoretic.   Psychiatric: He has a normal mood and affect. His behavior is normal. Judgment and thought content normal.   Nursing note and vitals reviewed.      Medication Review    Current Facility-Administered Medications:   •   acyclovir (ZOVIRAX) tablet 400 mg, 400 mg, Oral, Daily, Carlos Hahn MD, 400 mg at 07/09/17 0802  •  albumin human 25 % IV SOLN 25 g, 25 g, Intravenous, Once, Carolee Leo MD  •  atorvastatin (LIPITOR) tablet 10 mg, 10 mg, Oral, Daily, Carlos Hahn MD, 10 mg at 07/09/17 0802  •  dexamethasone (DECADRON) tablet 4 mg, 4 mg, Oral, Daily With Breakfast, Carlos Hahn MD, 4 mg at 07/09/17 0802  •  famotidine (PEPCID) tablet 40 mg, 40 mg, Oral, Daily, Carlos Hahn MD, 40 mg at 07/09/17 0802  •  furosemide (LASIX) injection 20 mg, 20 mg, Intravenous, Once, Carolee Leo MD  •  furosemide (LASIX) tablet 40 mg, 40 mg, Oral, Daily, Carlos Hahn MD, 40 mg at 07/09/17 0802  •  gabapentin (NEURONTIN) capsule 400 mg, 400 mg, Oral, TID, Carlos Hahn MD, 400 mg at 07/09/17 0802  •  hydrALAZINE (APRESOLINE) injection 10 mg, 10 mg, Intravenous, Q6H PRN, Carlos Hahn MD  •  levoFLOXacin (LEVAQUIN) tablet 750 mg, 750 mg, Oral, Q24H, Carlos Hahn MD  •  LORazepam (ATIVAN) tablet 1 mg, 1 mg, Oral, Q2H PRN **OR** LORazepam (ATIVAN) injection 1 mg, 1 mg, Intravenous, Q2H PRN **OR** LORazepam (ATIVAN) tablet 2 mg, 2 mg, Oral, Q1H PRN, 2 mg at 07/08/17 0001 **OR** LORazepam (ATIVAN) injection 2 mg, 2 mg, Intravenous, Q1H PRN **OR** LORazepam (ATIVAN) injection 2 mg, 2 mg, Intravenous, Q15 Min PRN **OR** LORazepam (ATIVAN) injection 2 mg, 2 mg, Intramuscular, Q15 Min PRN, Joel Antonio MD  •  metroNIDAZOLE (FLAGYL) tablet 500 mg, 500 mg, Oral, Q8H, Carlos Hahn MD, 500 mg at 07/09/17 1309  •  Morphine sulfate (PF) injection 2 mg, 2 mg, Intravenous, Q4H PRN, 2 mg at 07/09/17 5114 **AND** naloxone (NARCAN) injection 0.4 mg, 0.4 mg, Intravenous, Q5 Min PRN, Carolee Leo MD  •  multiple vitamin (M.V.I. Adult) 10 mL, thiamine (B-1) 100 mg, folic acid 1 mg, magnesium sulfate 2 g in sodium chloride 0.9 % 1,000 mL infusion, 100 mL/hr, Intravenous, Daily, Joel Antonio MD, Last  Rate: 100 mL/hr at 07/09/17 0841, 100 mL/hr at 07/09/17 0841  •  nicotine (NICODERM CQ) 21 MG/24HR patch 1 patch, 1 patch, Transdermal, Q24H, Carlos Hahn MD, Stopped at 07/08/17 1929  •  ondansetron (ZOFRAN) injection 4 mg, 4 mg, Intravenous, Q6H PRN, Carlos Hahn MD  •  polyethylene glycol (MIRALAX) powder 17 g, 17 g, Oral, Daily, Carlos Hahn MD, 17 g at 07/07/17 2146  •  potassium chloride (MICRO-K) CR capsule 20 mEq, 20 mEq, Oral, Once, Carolee Leo MD  •  prochlorperazine (COMPAZINE) tablet 10 mg, 10 mg, Oral, Q6H PRN, Carlos Hahn MD  •  sertraline (ZOLOFT) tablet 200 mg, 200 mg, Oral, Daily, Carlos Hahn MD, 200 mg at 07/09/17 0802  •  sodium chloride 0.9 % flush 1-10 mL, 1-10 mL, Intravenous, PRN, Carlos Hahn MD  •  Insert peripheral IV, , , Once **AND** sodium chloride 0.9 % flush 10 mL, 10 mL, Intravenous, PRN, Wilver Dougherty MD  •  spironolactone (ALDACTONE) tablet 50 mg, 50 mg, Oral, BID, Carlos Hahn MD, 50 mg at 07/09/17 0802  •  sucralfate (CARAFATE) 1 GM/10ML suspension 1 g, 1 g, Oral, Daily, Carlos Hahn MD, 1 g at 07/09/17 0802     Diagnostic Data      I reviewed the patient's new clinical results and imaging.      Assessment/Plan     Active Hospital Problems (** Indicates Principal Problem)    Diagnosis Date Noted   • **Ascites due to alcoholic cirrhosis [K70.31] 07/07/2017     -patient had a CT scan of the abdomen and pelvis performed which showed a large fluid collection in his abdomen as well as right sided pleural effusion, patient underwent a ultrasound guided paracentesis which they took out 3.9 liters of fluid  -currently on levaquin and flagyl IV, blood culture and urine culture no growth     • Lower abdominal pain [R10.30] 07/07/2017     -secondary to ascites from hep. C as well as alcoholic cirrhosis, patient is afebrile currently and has been for the last few weeks, no need for antibiotics at this time     • Tobacco dependence  [F17.200] 07/07/2017     -admits to smoking a PPD, will give 21mg nicotine patch     • Simple chronic bronchitis [J41.0] 07/07/2017     -will provide with supplemental oxygen as needed as well as breathing treatments PRN     • Bacterial pneumonia [J15.9] 07/07/2017   • Chronic hepatitis C without hepatic coma [B18.2] 02/16/2017     -has been followed by Dr. Montalvo, will continue his home medication of zovirax     • Multiple myeloma, stage 3 [C90.00] 01/20/2017     -has a right sided port in, has been followed by Dr. Mendoza of heme-oncology, will monitor      • Unilateral inguinal hernia without obstruction or gangrene [K40.90] 09/10/2016     -large right sided inguinal hernia palpated and seen on abdominal CT for now, surgery was consulted from the ER  -surgery was consulted and stated that it was reducible and that he was not an operative candidate      • End stage liver disease [K72.90] 08/31/2016     -secondary to chronic hepatitis C, is being followed by Dr. Montalvo as an outpatient, will continue to monitor      • Depression [F32.9] 08/31/2016     -continue home medication of zoloft        Resolved Hospital Problems    Diagnosis Date Noted Date Resolved   No resolved problems to display.     Patient may benefit from thoracentesis.  Lot of social dynamics at home.  Cannot afford all of his meds.    I have reviewed the notes, assessments, and/or procedures performed by Dr. Hahn, I concur with his documentation of Mickey Lind.      DVT prophylaxis: SCDs/TEDs      Disposition:Home with home health          This document has been electronically signed by Carolee Leo MD on July 9, 2017 2:24 PM          This document has been electronically signed by Carolee Leo MD on July 9, 2017 2:24 PM

## 2017-07-09 NOTE — PROGRESS NOTES
Discharge Planning Assessment  [facility]     Patient Name: Mickey Lind  MRN: [id number]  Today's Date: 7/9/2017    Admit Date: 7/7/2017          Discharge Needs Assessment     None            Discharge Plan       07/09/17 0919    Case Management/Social Work Plan    Plan Undetermined    Patient/Family In Agreement With Plan yes    Additional Comments SW received consult re: pt can not afford all medication and not taking them as prescribed. Per pt, he is not quite sure the specific medications he can not afford but after he pays his co-pays for his doctor visits, he can not afford them. SW gave pt [employer]'s $4 list as well as [facility] resources. Pt stated he has no insurance b/c he had gotten his Medicaid taken away in March. Pt stated he has spoke with [facility] re: getting coverage for hospitalization but has not heard back just yet. Pt is a pt of [facility]. Pt stated he could possibly have an amputation. CM please f/u re: med assist and pt's progress throughout hospital stay...LONDON Gamboa.        Discharge Placement     No information found                Demographic Summary     None            Functional Status     None            Psychosocial     None            Abuse/Neglect     None            Legal     None            Substance Abuse     None            Patient Forms     None          Yojana Jerome

## 2017-07-09 NOTE — PROGRESS NOTES
FAMILY MEDICINE DAILY PROGRESS NOTE  NAME: Mickey Lind  : 1955  MRN: [id number]      LOS: 2 days     PROVIDER OF SERVICE: Carlos Hahn MD    Chief Complaint: Ascites due to alcoholic cirrhosis    Subjective:     Interval History:  History taken from: patient chart RN  Patient Complaints: states that his abdominal pain is getting better  -patient has a history of cirrhosis secondary to alcohol and hepatitis C, on levaquin and flagyl for possible pneumonia    Review of Systems:   Review of Systems  Constitutional: no weight loss, fever, chills, does mention generalized weakness  HEENT: no visual loss, blurred vision, double vision, yellow scalarea  Skin: no rash, no discoloration   CVS: no chest pain, palpitations  Chest: no shortness of air, cough, dyspnea  GI: abdominal fullness and tightness   : no burning in urination, change in odor, no change in frequency  Neuro: no headache, dizziness, syncope, paralysis, ataxia, numbness  Musculoskeletal: no muscle, back pain, joint pain, stiffness  Lymphatics: no enlarged nodes  Endo: no reports of sweating, cold or heat intolerance, no polyuria  Objective:     Vital Signs  Temp:  [96.2 °F (35.7 °C)-97 °F (36.1 °C)] 96.8 °F (36 °C)  Heart Rate:  [68-90] 78  Resp:  [18-20] 18  BP: (119-142)/(72-93) 139/73  Body mass index is 21.38 kg/(m^2).    Physical Exam  Physical Exam  Constitutional: He is oriented to person, place, and time. He appears well-developed.   HENT:   Head: Normocephalic and atraumatic.   Eyes: Pupils are equal, round, and reactive to light.   Neck: Normal range of motion. Neck supple.   Cardiovascular: Normal rate, regular rhythm, normal heart sounds and intact distal pulses.   Pulmonary/Chest: Effort normal and breath sounds normal.   Abdominal: Soft. Bowel sounds are normal. A hernia is present, abdomen distended  Musculoskeletal: Normal range of motion.   Neurological: He is alert and oriented to person, place, and time.   Skin:  Skin is warm and dry.   Psychiatric: He has a normal mood and affect. His behavior is normal.   reducible right inguinal hernia, no left inguinal hernia, no umbilical hernia-does have diastasis recti  Medication Review    Current Facility-Administered Medications:   •  acyclovir (ZOVIRAX) tablet 400 mg, 400 mg, Oral, Daily, Carlos Hahn MD, 400 mg at 07/08/17 0823  •  atorvastatin (LIPITOR) tablet 10 mg, 10 mg, Oral, Daily, Carlos Hahn MD, 10 mg at 07/08/17 0823  •  dexamethasone (DECADRON) tablet 4 mg, 4 mg, Oral, Daily With Breakfast, Carlos Hahn MD, 4 mg at 07/08/17 0823  •  famotidine (PEPCID) tablet 40 mg, 40 mg, Oral, Daily, Carlos Hahn MD, 40 mg at 07/08/17 0824  •  furosemide (LASIX) tablet 40 mg, 40 mg, Oral, Daily, Carlos Hahn MD, 40 mg at 07/08/17 0824  •  gabapentin (NEURONTIN) capsule 400 mg, 400 mg, Oral, TID, Carlos Hahn MD, 400 mg at 07/08/17 2043  •  hydrALAZINE (APRESOLINE) injection 10 mg, 10 mg, Intravenous, Q6H PRN, Carlos Hahn MD  •  levoFLOXacin (LEVAQUIN) tablet 750 mg, 750 mg, Oral, Q24H, Carlos Hahn MD  •  LORazepam (ATIVAN) tablet 1 mg, 1 mg, Oral, Q2H PRN **OR** LORazepam (ATIVAN) injection 1 mg, 1 mg, Intravenous, Q2H PRN **OR** LORazepam (ATIVAN) tablet 2 mg, 2 mg, Oral, Q1H PRN, 2 mg at 07/08/17 0001 **OR** LORazepam (ATIVAN) injection 2 mg, 2 mg, Intravenous, Q1H PRN **OR** LORazepam (ATIVAN) injection 2 mg, 2 mg, Intravenous, Q15 Min PRN **OR** LORazepam (ATIVAN) injection 2 mg, 2 mg, Intramuscular, Q15 Min PRN, Joel Antonio MD  •  metroNIDAZOLE (FLAGYL) tablet 500 mg, 500 mg, Oral, Q8H, Carlos Hahn MD  •  Morphine sulfate (PF) injection 2 mg, 2 mg, Intravenous, Q4H PRN, 2 mg at 07/09/17 0414 **AND** naloxone (NARCAN) injection 0.4 mg, 0.4 mg, Intravenous, Q5 Min PRN, Carolee Leo MD  •  multiple vitamin (M.V.I. Adult) 10 mL, thiamine (B-1) 100 mg, folic acid 1 mg, magnesium sulfate 2 g in sodium chloride 0.9  % 1,000 mL infusion, 100 mL/hr, Intravenous, Daily, Joel Antonio MD, Last Rate: 100 mL/hr at 07/08/17 0906, 100 mL/hr at 07/08/17 0906  •  nicotine (NICODERM CQ) 21 MG/24HR patch 1 patch, 1 patch, Transdermal, Q24H, Carlos Hahn MD, Stopped at 07/08/17 1929  •  ondansetron (ZOFRAN) injection 4 mg, 4 mg, Intravenous, Q6H PRN, Carlos Hahn MD  •  polyethylene glycol (MIRALAX) powder 17 g, 17 g, Oral, Daily, Carlos Hahn MD, 17 g at 07/07/17 2146  •  prochlorperazine (COMPAZINE) tablet 10 mg, 10 mg, Oral, Q6H PRN, Carlos Hahn MD  •  sertraline (ZOLOFT) tablet 200 mg, 200 mg, Oral, Daily, Carlos Hahn MD, 200 mg at 07/08/17 0824  •  sodium chloride 0.9 % flush 1-10 mL, 1-10 mL, Intravenous, PRN, Carlos Hahn MD  •  Insert peripheral IV, , , Once **AND** sodium chloride 0.9 % flush 10 mL, 10 mL, Intravenous, PRN, Wilver Dougherty MD  •  spironolactone (ALDACTONE) tablet 50 mg, 50 mg, Oral, BID, Carlos Hahn MD, 50 mg at 07/08/17 1744  •  sucralfate (CARAFATE) 1 GM/10ML suspension 1 g, 1 g, Oral, Daily, Carlos Hahn MD, 1 g at 07/08/17 0823     Diagnostic Data    Lab Results (last 24 hours)     Procedure Component Value Units Date/Time    CBC & Differential [[id number]] Collected:  07/08/17 0740    Specimen:  Blood Updated:  07/08/17 0904    Narrative:       The following orders were created for panel order CBC & Differential.  Procedure                               Abnormality         Status                     ---------                               -----------         ------                     CBC Auto Differential[[id number]]        Abnormal            Final result                 Please view results for these tests on the individual orders.    CBC Auto Differential [[id number]]  (Abnormal) Collected:  07/08/17 0740    Specimen:  Blood Updated:  07/08/17 0904     WBC 7.18 10*3/mm3      RBC 3.73 (L) 10*6/mm3      Hemoglobin 11.3 (L) g/dL      Hematocrit 33.9 (L)  %      MCV 90.9 fL      MCH 30.3 pg      MCHC 33.3 g/dL      RDW 15.9 (H) %      RDW-SD 52.8 (H) fl      MPV 11.9 fL      Platelets 136 (L) 10*3/mm3      Neutrophil % 72.7 %      Lymphocyte % 13.6 %      Monocyte % 9.2 %      Eosinophil % 4.0 %      Basophil % 0.4 %      Immature Grans % 0.1 %      Neutrophils, Absolute 5.21 10*3/mm3      Lymphocytes, Absolute 0.98 10*3/mm3      Monocytes, Absolute 0.66 10*3/mm3      Eosinophils, Absolute 0.29 10*3/mm3      Basophils, Absolute 0.03 10*3/mm3      Immature Grans, Absolute 0.01 10*3/mm3     Body Fluid Culture [[id number]]  (Normal) Collected:  07/07/17 1536    Specimen:  Body Fluid from Abdominal Cavity Updated:  07/08/17 0908     BF Culture Culture in progress     Gram Stain Result Few (2+) WBCs seen      No organisms seen    Comprehensive Metabolic Panel [[id number]]  (Abnormal) Collected:  07/08/17 0740    Specimen:  Blood Updated:  07/08/17 0913     Glucose 86 mg/dL      BUN 9 mg/dL      Creatinine 0.51 (L) mg/dL      Sodium 133 (L) mmol/L      Potassium 3.6 mmol/L      Chloride 102 mmol/L      CO2 25.0 mmol/L      Calcium 7.8 (L) mg/dL      Total Protein 5.5 (L) g/dL      Albumin 2.50 (L) g/dL      ALT (SGPT) 27 U/L      AST (SGOT) 29 U/L      Alkaline Phosphatase 105 U/L      Total Bilirubin 0.9 mg/dL      eGFR Non African Amer >150 mL/min/1.73      Globulin 3.0 gm/dL      A/G Ratio 0.8 (L) g/dL      BUN/Creatinine Ratio 17.6     Anion Gap 6.0 mmol/L     Mycoplasma Pneumoniae PCR [[id number]] Collected:  07/08/17 0009    Specimen:  Sputum from Nasopharynx Updated:  07/08/17 1334     MYCOPLASMAE PNEUMONIAE BY PCR Negative    Narrative:       This test is performed by utilizing real time polymerace chain recation (PCR).   This test is performed by utilizing real time polymerace chain recation (PCR).     Blood Culture [[id number]]  (Normal) Collected:  07/07/17 1843    Specimen:  Blood from Hand, Left Updated:  07/08/17 2001     Blood Culture No growth at 24 hours     Blood Culture [[id number]]  (Normal) Collected:  07/08/17 0740    Specimen:  Blood from Wrist, Left Updated:  07/08/17 2101     Blood Culture No growth at less than 24 hours    Urine Culture [[id number]]  (Normal) Collected:  07/08/17 0009    Specimen:  Urine from Urine, Clean Catch Updated:  07/09/17 0631     Urine Culture No growth at 24 hours    CBC & Differential [[id number]] Collected:  07/09/17 0656    Specimen:  Blood Updated:  07/09/17 0717    Narrative:       The following orders were created for panel order CBC & Differential.  Procedure                               Abnormality         Status                     ---------                               -----------         ------                     CBC Auto Differential[[id number]]        Abnormal            Final result                 Please view results for these tests on the individual orders.    CBC Auto Differential [[id number]]  (Abnormal) Collected:  07/09/17 0656    Specimen:  Blood Updated:  07/09/17 0717     WBC 9.60 10*3/mm3      RBC 3.57 (L) 10*6/mm3      Hemoglobin 10.9 (L) g/dL      Hematocrit 31.9 (L) %      MCV 89.4 fL      MCH 30.5 pg      MCHC 34.2 g/dL      RDW 16.1 (H) %      RDW-SD 52.6 (H) fl      MPV 12.1 (H) fL      Platelets 161 10*3/mm3      Neutrophil % 74.4 %      Lymphocyte % 15.0 %      Monocyte % 8.6 %      Eosinophil % 1.5 %      Basophil % 0.2 %      Immature Grans % 0.3 %      Neutrophils, Absolute 7.14 10*3/mm3      Lymphocytes, Absolute 1.44 10*3/mm3      Monocytes, Absolute 0.83 10*3/mm3      Eosinophils, Absolute 0.14 10*3/mm3      Basophils, Absolute 0.02 10*3/mm3      Immature Grans, Absolute 0.03 (H) 10*3/mm3     Comprehensive Metabolic Panel [[id number]]  (Abnormal) Collected:  07/09/17 0656    Specimen:  Blood Updated:  07/09/17 0729     Glucose 102 (H) mg/dL      BUN 13 mg/dL      Creatinine 0.49 (L) mg/dL      Sodium 131 (L) mmol/L      Potassium 3.7 mmol/L      Chloride 100 mmol/L      CO2 24.0 mmol/L      Calcium  7.7 (L) mg/dL      Total Protein 5.4 (L) g/dL      Albumin 2.50 (L) g/dL      ALT (SGPT) 31 U/L      AST (SGOT) 26 U/L      Alkaline Phosphatase 105 U/L      Total Bilirubin 0.6 mg/dL      eGFR Non African Amer >150 mL/min/1.73      Globulin 2.9 gm/dL      A/G Ratio 0.9 (L) g/dL      BUN/Creatinine Ratio 26.5 (H)     Anion Gap 7.0 mmol/L             I reviewed the patient's new clinical results.    Assessment/Plan:     Active Hospital Problems (** Indicates Principal Problem)    Diagnosis Date Noted   • **Ascites due to alcoholic cirrhosis [K70.31] 07/07/2017     -patient had a CT scan of the abdomen and pelvis performed which showed a large fluid collection in his abdomen as well as right sided pleural effusion, patient underwent a ultrasound guided paracentesis which they took out 3.9 liters of fluid  -currently on levaquin and flagyl IV, blood culture and urine culture no growth     • Lower abdominal pain [R10.30] 07/07/2017     -secondary to ascites from hep. C as well as alcoholic cirrhosis, patient is afebrile currently and has been for the last few weeks, no need for antibiotics at this time     • Tobacco dependence [F17.200] 07/07/2017     -admits to smoking a PPD, will give 21mg nicotine patch     • Simple chronic bronchitis [J41.0] 07/07/2017     -will provide with supplemental oxygen as needed as well as breathing treatments PRN     • Bacterial pneumonia [J15.9] 07/07/2017   • Chronic hepatitis C without hepatic coma [B18.2] 02/16/2017     -has been followed by Dr. Montalvo, will continue his home medication of zovirax     • Multiple myeloma, stage 3 [C90.00] 01/20/2017     -has a right sided port in, has been followed by Dr. Mendoza of heme-oncology, will monitor      • Unilateral inguinal hernia without obstruction or gangrene [K40.90] 09/10/2016     -large right sided inguinal hernia palpated and seen on abdominal CT for now, surgery was consulted from the ER  -surgery was consulted and stated that it was  reducible and that he was not an operative candidate      • End stage liver disease [K72.90] 08/31/2016     -secondary to chronic hepatitis C, is being followed by Dr. Mnotalvo as an outpatient, will continue to monitor      • Depression [F32.9] 08/31/2016     -continue home medication of zoloft        Resolved Hospital Problems    Diagnosis Date Noted Date Resolved   No resolved problems to display.       DVT prophylaxis: SCDs TEDs  Code status is Full Code          This document has been electronically signed by Carlos Hahn MD on July 9, 2017 7:53 AM

## 2017-07-09 NOTE — PLAN OF CARE
Problem: Patient Care Overview (Adult)  Goal: Plan of Care Review  Outcome: Ongoing (interventions implemented as appropriate)    07/09/17 1221   Coping/Psychosocial Response Interventions   Plan Of Care Reviewed With patient   Patient Care Overview   Progress improving   Outcome Evaluation   Outcome Summary/Follow up Plan iv abx changed to po. chest xr showed pleural effusion. o2 unable to be d/c'd.         Problem: Fall Risk (Adult)  Goal: Absence of Falls  Outcome: Ongoing (interventions implemented as appropriate)    Problem: Anxiety (Adult)  Goal: Identify Related Risk Factors and Signs and Symptoms  Outcome: Outcome(s) achieved Date Met:  07/09/17  Goal: Reduction/Resolution  Outcome: Ongoing (interventions implemented as appropriate)    Problem: Pain, Acute (Adult)  Goal: Acceptable Pain Control/Comfort Level  Outcome: Ongoing (interventions implemented as appropriate)

## 2017-07-09 NOTE — PLAN OF CARE
Problem: Patient Care Overview (Adult)  Goal: Plan of Care Review  Outcome: Ongoing (interventions implemented as appropriate)    07/09/17 1338   Coping/Psychosocial Response Interventions   Plan Of Care Reviewed With patient;family   Outcome Evaluation   Outcome Summary/Follow up Plan JD ra completed this date. Pt fatigued and became SOB easy with activity with many rest breaks and cues for deep breathing required. pt on 1L of O2 dropped down to 88 frequently and 85 a few times with t/f, mobility, and ADL. RN notified. Pt requried min A with sit to stand t/f and CGA to min A with mobility to toilet, sink, and bed. Pt requried total assist with IV line and O2 line. Pt displayed with decreased endurance, strength, safety, and independence with ADL and decreased pulmonary. Pt two dtrs present and eucated on concerns with t/f and O2 line and need for further assist. Pt may benefit from futher rehab in a facility before able to d/c home. If pt d/c home pt will need 24/7 skilled caregiver that can assist with lifiting and HH therapy.          Problem: Inpatient Occupational Therapy  Goal: Transfer Training Goal 1 STG- OT  Outcome: Ongoing (interventions implemented as appropriate)    07/09/17 1338   Transfer Training OT STG   Transfer Training OT STG, Date Established 07/09/17   Transfer Training OT STG, Time to Achieve by discharge   Transfer Training OT STG, Activity Type all transfers   Transfer Training OT STG, [place] Level supervision required       Goal: Patient Education Goal LTG- OT  Outcome: Ongoing (interventions implemented as appropriate)    07/09/17 1338   Patient Education OT LTG   Patient Education OT LTG, Date Established 07/09/17   Patient Education OT LTG, Time to Achieve by discharge   Patient Education OT LTG, Education Type HEP;energy conservation;home safety;positioning;adaptive equipment mgmt;adaptive breathing   Patient Education OT LTG, Education Understanding verbalizes  understanding;demonstrates adequately;independent  (with caregiver assist as needed)       Goal: ADL Goal LTG- OT  Outcome: Ongoing (interventions implemented as appropriate)    07/09/17 1338   ADL OT LTG   ADL OT LTG, Date Established 07/09/17   ADL OT LTG, Time to Achieve by discharge   ADL OT LTG, Activity Type ADL skills   ADL OT LTG, Additional Goal set up feeding/grooming; SBA toileting/bathing/dressing - AE/AD as needed       Goal: Activity Tolerance Goal STG- OT  Outcome: Ongoing (interventions implemented as appropriate)    07/09/17 1338   Activity Tolerance OT STG   Activity Tolerance Goal OT STG, Date Established 07/09/17   Activity Tolerance Goal OT STG, Time to Achieve by discharge   Activity Tolerance Goal OT STG, Activity Level 15 min activity;O2 sat >/equal to 88%;with 2 rest breaks       Goal: Endurance Goal STG- OT  Outcome: Ongoing (interventions implemented as appropriate)    07/09/17 1338   Endurance OT STG   Endurance Goal OT STG, Date Established 07/09/17   Endurance Goal OT STG, Time to Achieve by discharge   Endurance Goal OT STG, Activity Level endurance 2 fair

## 2017-07-09 NOTE — THERAPY EVALUATION
Acute Care - Occupational Therapy Initial Evaluation  [facility]     Patient Name: Mickey Lind  : 1955  MRN: [id number]  Today's Date: 2017  Onset of Illness/Injury or Date of Surgery Date: 17  Date of Referral to OT: 17  Referring Physician: Dr. Carolee Leo    Admit Date: 2017       ICD-10-CM ICD-9-CM   1. Lower abdominal pain R10.30 789.09   2. Other ascites R18.8 789.59   3. Recurrent right pleural effusion J90 511.9   4. Impaired physical mobility Z74.09 781.99   5. Impaired mobility and ADLs Z74.09 799.89     Patient Active Problem List   Diagnosis   • End stage liver disease   • Depression   • Low back pain with sciatica   • Hyperlipidemia   • Unilateral inguinal hernia without obstruction or gangrene   • Diarrhea   • Hepatic cirrhosis   • Epigastric pain   • Peripheral vascular disease   • Encounter for venous access device care   • Multiple myeloma, stage 3   • Anemia   • Chronic hepatitis C without hepatic coma   • Lower abdominal pain   • Ascites due to alcoholic cirrhosis   • Tobacco dependence   • Simple chronic bronchitis   • Bacterial pneumonia     Past Medical History:   Diagnosis Date   • Abdominal pain    • Alcoholic liver damage    • Alcoholic polyneuropathy    • Amblyopia     refractive os   • Anemia    • Ascites    • Ascites    • Asthma    • Astigmatism    • Cellulitis of left lower leg    • Chronic hepatitis C    • Chronic viral hepatitis C    • Cirrhosis of liver    • Claudication    • Encounter for immunization    • Generalized edema    • History of blood transfusion    • History of colon polyps    • Hypermetropia    • Inguinal hernia     - Rih      • Low back pain    • Major depressive disorder     recurrent , moderate   • Myocardial infarction    • Pain in joint, lower leg    • Peripheral vascular disease    • Recurrent major depressive episodes    • Tinea unguium    • Tobacco use      Past Surgical History:   Procedure Laterality Date   • CARDIAC  CATHETERIZATION      (Successful PCI to a totally occluded RCA with a 2.5 x 28 and a 2.5 x 8 mm Xience stent. Nonobstructive disease in the left coronary artery system.)   11/24/2012    • CARDIAC SURGERY     • COLONOSCOPY  10/13/2016   • COLONOSCOPY N/A 3/29/2017    Procedure: COLONOSCOPY;  Surgeon: Jose Daniel David MD;  Location: [facility] ENDOSCOPY;  Service:    • ENDOSCOPY  10/13/2016   • ENDOSCOPY N/A 3/29/2017    Procedure: ESOPHAGOGASTRODUODENOSCOPY (U/S FIRST @0945) ;  Surgeon: Jose Daniel David MD;  Location: [facility] ENDOSCOPY;  Service:    • UPPER GASTROINTESTINAL ENDOSCOPY  10/13/2016          OT ASSESSMENT FLOWSHEET (last 72 hours)      OT Evaluation       07/09/17 1338 07/09/17 0920 07/07/17 2012 07/07/17 2005       Rehab Evaluation    Document Type evaluation  - evaluation  -       Subjective Information agree to therapy;complains of;pain  - agree to therapy;complains of;pain  -       Patient Effort, Rehab Treatment good  - good  -       Symptoms Noted During/After Treatment shortness of breath;fatigue;significant change in vital signs  [facility] shortness of breath;significant change in vital signs  -       Symptoms Noted Comment O2 dropped to 85 on 1L RN notified and charge RN notifed took 1-3 minutes to rise to low 90s  - O2 sats dropped to 87% on 1L/min;RN notified  -       General Information    Patient Profile Review yes  - yes  -ASTER       Onset of Illness/Injury or Date of Surgery Date 07/07/17  - 07/07/17  -       Referring Physician Dr. Carolee Leo  - Dr. Carolee Leo  -ASTER       General Observations Pt supine HOB elevated, tele, IV, O2 1L  - Pt supine with HOB elevated;noted O2 per nasal cannula on 1L/min, telemetry, IV  -ASTER       Pertinent History Of Current Problem Pt admitted lower abdominal pain, possible pneumonia, with ascites due to alcholic cirrhosis, Pt underwent parcentesis 7/7/17. Pt also has history multiple myeloma  - Pt admitted lower abdominal pain, possible  pneumonia, with ascites due to alcoholic cirrhosis. Pt underwent paracentesis 7/7/2017. Pt also has h/o multiple myeloma  -       Precautions/Limitations fall precautions;oxygen therapy device and L/min   vital signs  - fall precautions;oxygen therapy device and L/min;other (see comments)   vital signs  -       Prior Level of Function independent:;all household mobility;ADL's;home management;driving;using stairs  - independent:;all household mobility;ADL's;driving  -       Equipment Currently Used at Home cane, straight   pt has RW  - cane, straight   Pt has RW  -  cane, straight  -     Plans/Goals Discussed With patient  - patient;agreed upon  -       Risks Reviewed patient:  - patient:;dizziness;change in vital signs;increased discomfort  -       Benefits Reviewed patient:  - patient:;improve function;increase independence;increase strength;increase balance  -       Barriers to Rehab medically complex;previous functional deficit  - medically complex;previous functional deficit  -       Living Environment    Lives With child(edwardo), adult   son and dtr in law  - child(edwardo), adult  - child(edwardo), adult  -      Living Arrangements [place]  - house  - house  -BD      Home Accessibility stairs to enter home;tub/shower is not walk in;bed and bath on same level  - stairs to enter home  - no concerns  -      Number of Stairs to Enter Home 5  - 5  -ASTER       Stair Railings at Home none  - none  - other (see comments)   no rails on outside steps  -      Type of Financial/Environmental Concern   unable to afford medication(s)  -      Transportation Available car;family or friend will provide  - family or friend will provide;car  - car;family or friend will provide  -      Living Environment Comment Pt reports sometimes he is alone but sometimes some one is there.   - Pt's son and dtr-in-law have been staying with pt a lot since spouse recently passed.Pt reports  family shares housekeeping tasks.  -       Clinical Impression    Date of Referral to OT 07/08/17  -        OT Diagnosis impaired mobility and ADL  -        Prognosis fair  -        Functional Level At Time Of Evaluation Pt fatigued with tasks becoming SOB easy with activity with O2 dropping down to 88 frequently and 85 at times with education on deep breathing. Pt required extended time and effort for t/f and ADL. pt min A with sit to stand t/f.   -        Impairments Found (describe specific impairments) aerobic capacity/endurance;gait, locomotion, and balance;joint integrity and mobility;motor function;muscle performance;ventilation and respiration/gas exchange   ADL, functional t/f and mobility. ax tolerance  -        Patient/Family Goals Statement to go home  -        Criteria for Skilled Therapeutic Interventions Met yes;treatment indicated  -        Rehab Potential fair, will monitor progress closely  -        Therapy Frequency other (see comments)   3-14x a week  -        Predicted Duration of Therapy Intervention (days/wks) until d/c or all goals met  -        Anticipated Equipment Needs At Discharge shower chair;bedside commode  -        Anticipated Discharge Disposition skilled nursing facility;home with 24/7 care;home with home health   depends on progress  -        Functional Level Prior    Ambulation 1-->assistive equipment  -   1-->assistive equipment  -BD     Transferring 1-->assistive equipment  -   1-->assistive equipment  -BD     Toileting 0-->independent  -   1-->assistive equipment  -BD     Bathing 0-->independent  -   0-->independent  -BD     Dressing 0-->independent  -   0-->independent  -BD     Eating 0-->independent  -   0-->independent  -BD     Communication 0-->understands/communicates without difficulty  -   0-->understands/communicates without difficulty  -BD     Swallowing 0-->swallows foods/liquids without difficulty  -   0-->swallows  foods/liquids without difficulty  -     Vital Signs    Pre Systolic BP Rehab 126  -  -ASTER       Pre Treatment Diastolic BP 62  - 73  -ASTER       Post Systolic BP Rehab 134  -BH        Post Treatment Diastolic BP 77  -BH        Pretreatment Heart Rate (beats/min) 83  - 88  -ASTER       Intratreatment Heart Rate (beats/min) 115  -BH 92  -ASTER       Posttreatment Heart Rate (beats/min) 98  - 83  -ASTER       Pre SpO2 (%) 91  -BH 93  -ASTER       O2 Delivery Pre Treatment supplemental O2   1L  - supplemental O2   1L/min  -       Intra SpO2 (%) 85   varried dropped down to 88 frequently   - 84  -ASTER       O2 Delivery Intra Treatment supplemental O2  - room air   Rn present to see if pt could be weaned off O2  -       Post SpO2 (%) 92  - 93  -ASTER       O2 Delivery Post Treatment supplemental O2  - supplemental O2  -ASTER       Pre Patient Position Supine  - Supine  -ASTER       Intra Patient Position Sitting  - Standing  -       Post Patient Position Sitting  - Sitting  -       Recovery Time --   1-3 minutes  -        Pain Assessment    Pain Assessment 0-10  - 0-10  -       Pain Score 7  - 6  -ASTER       Post Pain Score 7  - 7  -       Pain Location Abdomen   legs, hernia, back   - Abdomen   back, legs  -       Pain Intervention(s) Repositioned;Rest;Ambulation/increased activity;Medication (See MAR)   RN gave meds  - Medication (See MAR);Repositioned;Rest  -       Vision Assessment/Intervention    Visual Impairment WFL with corrective lenses  - WFL with corrective lenses  -       Cognitive Assessment/Intervention    Current Cognitive/Communication Assessment functional  - functional   a little slow processing  -       Orientation Status oriented x 4;required verbal cueing (specifiy in comments)   cues for current month  - oriented x 4  -       Follows Commands/Answers Questions 75% of the time;able to follow single-step instructions;needs cueing;needs increased time;needs  repetition  - 75% of the time;needs cueing;needs increased time;needs repetition  -       Personal Safety mild impairment;decreased awareness, need for assist;decreased insight to deficits;decreased awareness, need for safety  - decreased awareness, need for safety;decreased awareness, need for assist  -       Personal Safety Interventions fall prevention program maintained;gait belt;muscle strengthening facilitated;nonskid shoes/slippers when out of bed;supervised activity  - gait belt;nonskid shoes/slippers when out of bed;supervised activity  -       ROM (Range of Motion)    General ROM no range of motion deficits identified  - no range of motion deficits identified  -       General ROM Detail BUE WFL, some decreased in digits noted on left hand; fair digit to thumb mod difficulty fair digit to nose   -        MMT (Manual Muscle Testing)    General MMT Assessment upper extremity strength deficits identified;lower extremity strength deficits identified  - upper extremity strength deficits identified;lower extremity strength deficits identified  -       General MMT Assessment Detail BUE  grossly 4/5; BUE grossly 4/5  -        Upper Extremity    Upper Ext Manual Muscle Testing Detail  BUE: 4/5 , wrists, elbows, shoulders  -       Bed Mobility, Assessment/Treatment    Bed Mobility, Assistive Device head of bed elevated;bed rails  - head of bed elevated;bed rails  -       Bed Mob, Supine to Sit, Jay contact guard assist;supervision required;verbal cues required  - contact guard assist  -       Bed Mob, Sit to Supine, Jay not tested  - not tested  -       Bed Mobility, Comment pt had max difficulty with scooting sitting EOB, decreased safety and ability  -        Transfer Assessment/Treatment    Transfers, Bed-Chair Jay  minimum assist (75% patient effort)  -       Transfers, Chair-Bed Jay  not tested  -       Transfers,  Bed-Chair-Bed, Assist Device  rolling walker  -ASTER       Transfers, Sit-Stand [place] minimum assist (75% patient effort)  - contact guard assist  -ASTER       Transfers, Stand-Sit [place] minimum assist (75% patient effort);contact guard assist  - contact guard assist  -ASTER       Transfers, Sit-Stand-Sit, Assist Device rolling walker  - rolling walker  -ASTER       Toilet Transfer, [place] minimum assist (75% patient effort)  - contact guard assist  -ASTER       Toilet Transfer, Assistive Device rolling walker   grab bar  - rolling walker  -ASTER       Transfer, Safety Issues step length decreased;weight-shifting ability decreased;balance decreased during turns  - step length decreased;balance decreased during turns   verbal cues to stay closer to walker  -ASTER       Transfer, Comment pt decreased safety with t/f and pt got agitated with OT position in front to assist with safe t/f. Pt required vc for hand placement  and pt fatigued with task.   -        Functional Mobility    Functional Mobility- Ind. Level minimum assist (75% patient effort);contact guard assist  -        Functional Mobility- Device rolling walker  -        Functional Mobility-Distance (Feet) --   approx total 10 feet in room  -        Functional Mobility- Safety Issues weight-shifting ability decreased;step length decreased;sequencing ability decreased;balance decreased during turns;supplemental O2;loses balance backward  -        Functional Mobility- Comment Pt unsafe and required assist with O2 line and IV pole. Pt mostly CGA but at times was unsteady with balancing assist required.   -        Lower Body Dressing Assessment/Training    LB Dressing Assess/Train, Comment pt had max difficulty supine reaching feet to straighten out socks with getting SOB  -        Toileting Assessment/Training    Toileting Assess/Train, Assistive Device grab bars  -        Toileting Assess/Train, Position standing;sitting  -         Toileting Assess/Train, Indepen Level minimum assist (75% patient effort);verbal cues required;contact guard assist  -        Toileting Assess/Train, Impairments strength decreased;impaired balance;coordination impaired;motor control impaired;pain  -        Toileting Assess/Train, Comment pt decreased safety with clothing management and balance to stand. Pt required many rest breaks and cues for deep breathing.   -        Grooming Assessment/Training    Grooming Assess/Train, Assistive Device --   washing hands at sink  -        Grooming Assess/Train, Position standing  -        Grooming Assess/Train, Indepen Level contact guard assist;verbal cues required;set up required  -        Grooming Assess/Train, Impairments strength decreased;impaired balance;coordination impaired;pain  -        Grooming Assess/Train, Comment Pt required rest breaks before and after and got SOB easily  -        Motor Skills/Interventions    Additional Documentation Balance Skills Training (Group);Fine Motor Coordination Training (Group)  -        Balance Skills Training    Sitting-Level of Assistance Distant supervision;Close supervision;Independent   depends on activity level  -        Standing-Level of Assistance Minimum assistance;Contact guard  -        Fine Motor Coordination Training    Opposition Right:;Left:;impaired   mod difficulty completing  -        Sensory Assessment/Intervention    Light Touch LUE;RUE  - LUE;RUE;LLE;RLE  -ASTER       LUE Light Touch WNL  - WNL  -ASTER       RUE Light Touch WNL  -BH WNL  -ASTER       LLE Light Touch  severe impairment  -ASTER       RLE Light Touch  severe impairment  -ASTER       Proprioception LUE;RUE  - LLE;RLE  -ASTER       LUE Proprioception other (see comments)   grossly WFL  -        RUE Proprioception other (see comments)   grossly WFL  -        LLE Proprioception  moderate impairment  -ASTER       RLE Proprioception  moderate impairment  -ASTER       Edema Management    Edema  Amount  right:;left:;trace  -ASTER       General Therapy Interventions    Planned Therapy Interventions activity intolerance;adaptive equipment training;ADL retraining;balance training;bed mobility training;energy conservation;fine motor coordination training;home exercise program;motor coordination training;strengthening;transfer training  -        Positioning and Restraints    Pre-Treatment Position in bed  - in bed  -       Post Treatment Position bed  - chair  -       In Bed notified nsg;call light within reach;encouraged to call for assist;exit alarm on;with family/caregiver  -        In Chair  call light within reach;encouraged to call for assist;sitting  -       Lower Extremity    Lower Ext Manual Muscle Testing Detail  BLE: 3+/5 ankles/feet, knees, 3/5 hips  -ASTER         User Key  (r) = Recorded By, (t) = Taken By, (c) = Cosigned By    Initials Name Effective Dates     Dannielle Sanders, OTR/L 10/17/16 -     ASTER Erika Chapa, PT 10/17/16 -     BD Dannielle Elliott, RN 10/17/16 -            Occupational Therapy Education     Title: PT OT SLP Therapies (Active)     Topic: Occupational Therapy (Active)     Point: ADL training (Done)    Description: Instruct learner(s) on proper safety adaptation and remediation techniques during self care or transfers.   Instruct in proper use of assistive devices.    Learning Progress Summary    Learner Readiness Method Response Comment Documented by Status   Patient Acceptance E VU Educated on OT and POC. Educated to call for assist and not get up on his own. Educated on importance of further rehab and concerns with safety with O2 dropping and with ADL. Educated on safety w/ t/f, mobility, and ADL.  07/09/17 1500 Done   Family Acceptance E VU Educated on OT and POC. Educated to call for assist and not get up on his own. Educated on importance of further rehab and concerns with safety with O2 dropping and with ADL. Educated on safety w/ t/f, mobility, and ADL.   07/09/17 1500 Done               Point: Precautions (Done)    Description: Instruct learner(s) on prescribed precautions during self-care and functional transfers.    Learning Progress Summary    Learner Readiness Method Response Comment Documented by Status   Patient Acceptance E VU Educated on OT and POC. Educated to call for assist and not get up on his own. Educated on importance of further rehab and concerns with safety with O2 dropping and with ADL. Educated on safety w/ t/f, mobility, and ADL.  07/09/17 1500 Done   Family Acceptance E VU Educated on OT and POC. Educated to call for assist and not get up on his own. Educated on importance of further rehab and concerns with safety with O2 dropping and with ADL. Educated on safety w/ t/f, mobility, and ADL.  07/09/17 1500 Done               Point: Body mechanics (Done)    Description: Instruct learner(s) on proper positioning and spine alignment during self-care, functional mobility activities and/or exercises.    Learning Progress Summary    Learner Readiness Method Response Comment Documented by Status   Patient Acceptance E VU Educated on OT and POC. Educated to call for assist and not get up on his own. Educated on importance of further rehab and concerns with safety with O2 dropping and with ADL. Educated on safety w/ t/f, mobility, and ADL.  07/09/17 1500 Done   Family Acceptance E VU Educated on OT and POC. Educated to call for assist and not get up on his own. Educated on importance of further rehab and concerns with safety with O2 dropping and with ADL. Educated on safety w/ t/f, mobility, and ADL.  07/09/17 1500 Done                      User Key     Initials Effective Dates Name Provider Type Discipline     10/17/16 -  Dannielle Sanders OTR/L Occupational Therapist OT                  OT Recommendation and Plan  Anticipated Equipment Needs At Discharge: shower chair, bedside commode  Anticipated Discharge Disposition: skilled nursing facility,  home with 24/7 care, home with home health (depends on progress)  Planned Therapy Interventions: activity intolerance, adaptive equipment training, ADL retraining, balance training, bed mobility training, energy conservation, fine motor coordination training, home exercise program, motor coordination training, strengthening, transfer training  Therapy Frequency: other (see comments) (3-14x a week)  Plan of Care Review  Plan Of Care Reviewed With: patient, family  Outcome Summary/Follow up Plan: OT ra completed this date. Pt fatigued and became SOB easy with activity with many rest breaks and  cues for deep breathing required. pt on 1L of O2 dropped down to 88 frequently and 85 a few times with t/f, mobility, and ADL. RN notified. Pt requried min A with sit to stand t/f and CGA to min A with mobility to toilet, sink, and bed. Pt requried total assist with IV line and O2 line. Pt displayed with decreased endurance, strength, safety, and independence with ADL and decreased pulmonary. Pt two dtrs present and eucated on concerns with t/f and O2 line and need for further assist. Pt may benefit from futher rehab in a facility before able to d/c home. If pt d/c home pt will need 24/7 skilled caregiver that can assist with lifiting and HH therapy.           OT Goals       07/09/17 1338          Transfer Training OT STG    Transfer Training OT STG, Date Established 07/09/17  [facility]      Transfer Training OT STG, Time to Achieve by discharge  -      Transfer Training OT STG, Activity Type all transfers  -      Transfer Training OT STG, [place] Level supervision required  -      Patient Education OT LTG    Patient Education OT LTG, Date Established 07/09/17  -      Patient Education OT LTG, Time to Achieve by discharge  -      Patient Education OT LTG, Education Type HEP;energy conservation;home safety;positioning;adaptive equipment mgmt;adaptive breathing  -      Patient Education OT LTG, Education  Understanding verbalizes understanding;demonstrates adequately;independent   with caregiver assist as needed  -      ADL OT LTG    ADL OT LTG, Date Established 07/09/17  -      ADL OT LTG, Time to Achieve by discharge  -      ADL OT LTG, Activity Type ADL skills  -      ADL OT LTG, Additional Goal set up feeding/grooming; SBA toileting/bathing/dressing - AE/AD as needed  -      Activity Tolerance OT STG    Activity Tolerance Goal OT STG, Date Established 07/09/17  -      Activity Tolerance Goal OT STG, Time to Achieve by discharge  -      Activity Tolerance Goal OT STG, Activity Level 15 min activity;O2 sat >/equal to 88%;with 2 rest breaks  -      Endurance OT STG    Endurance Goal OT STG, Date Established 07/09/17  -      Endurance Goal OT STG, Time to Achieve by discharge  -      Endurance Goal OT STG, Activity Level endurance 2 fair  -        User Key  (r) = Recorded By, (t) = Taken By, (c) = Cosigned By    Initials Name Provider Type     Dannielle Sanders, OTR/L Occupational Therapist                Outcome Measures       07/09/17 1339 07/09/17 0920       How much help from another person do you currently need...    Turning from your back to your side while in flat bed without using bedrails?  3  -ASTER     Moving from lying on back to sitting on the side of a flat bed without bedrails?  3  -ASTER     Moving to and from a bed to a chair (including a wheelchair)?  3  -ASTER     Standing up from a chair using your arms (e.g., wheelchair, bedside chair)?  3  -ASTER     Climbing 3-5 steps with a railing?  2  -ASTER     To walk in hospital room?  3  -ASTER     AM-PAC 6 Clicks Score  17  -ASTER     How much help from another is currently needed...    Putting on and taking off regular lower body clothing? 2  -BH      Bathing (including washing, rinsing, and drying) 2  -      Toileting (which includes using toilet bed pan or urinal) 3  -BH      Putting on and taking off regular upper body clothing 3  -BH      Taking  care of personal grooming (such as brushing teeth) 3  -      Eating meals 4  -      Score 17  -      Functional Assessment    Outcome Measure Options AM-PAC 6 Clicks Daily Activity (OT)  - AM-PAC 6 Clicks Basic Mobility (PT)  -       User Key  (r) = Recorded By, (t) = Taken By, (c) = Cosigned By    Initials Name Provider Type     Dannielle Sanders, OTR/L Occupational Therapist    ASTER Chapa, PT Physical Therapist          Time Calculation:   OT Start Time: 1338  OT Stop Time: 1435  OT Time Calculation (min): 57 min    Therapy Charges for Today     Code Description Service Date Service Provider Modifiers Qty    78559197678 HC OT SELFCARE CURRENT 7/9/2017 Dannielle Sanders OTR/L GO, CK 1    10318159129 HC OT SELFCARE PROJECTED 7/9/2017 Dannielle Sanders OTR/L GO, CJ 1    [id number] HC OT SELF CARE/MGMT/TRAIN EA 15 MIN 7/9/2017 Dannielle Sanders OTR/L GO 2    [id number] HC OT THERAPEUTIC ACT EA 15 MIN 7/9/2017 Dannielle Sanders OTR/L GO 1    [id number]  OT EVAL MOD COMPLEXITY 1 7/9/2017 Dannielle Sanders OTR/L GO 1          OT G-codes  OT Professional Judgement Used?: Yes  OT Functional Scales Options: AM-PAC 6 Clicks Daily Activity (OT)  Score: 17  Functional Limitation: Self care  Self Care Current Status (): At least 40 percent but less than 60 percent impaired, limited or restricted  Self Care Goal Status (): At least 20 percent but less than 40 percent impaired, limited or restricted    Dannielle Sanders OTR/L  7/9/2017

## 2017-07-10 PROBLEM — J90 PLEURAL EFFUSION: Status: ACTIVE | Noted: 2017-01-01

## 2017-07-10 PROBLEM — L98.429: Status: ACTIVE | Noted: 2017-01-01

## 2017-07-10 NOTE — DISCHARGE PLACEMENT REQUEST
"Mickey Lind (61 y.o. Male)     Date of Birth Social Security Number Address Home Phone MRN    1955  [id number] [facility] [phone] [id number]    Sabianism Marital Status          Advent        Admission Date Admission Type Admitting Provider Attending Provider Department, Room/Bed    17 Emergency Carolee Leo MD Sound, Sara, MD [address], 372/1    Discharge Date Discharge Disposition Discharge Destination                      Attending Provider: Ara Bach MD     Allergies:  Codeine, Other, Penicillins    Isolation:  None   Infection:  None   Code Status:  FULL    Ht:  70\" (177.8 cm)   Wt:  149 lb (67.6 kg)    Admission Cmt:  None   Principal Problem:  Ascites due to alcoholic cirrhosis [K70.31] More...                 Active Insurance as of 2017     Patient has no active insurance coverage on file for 2017.          Emergency Contacts      (Rel.) Home Phone Work Phone Mobile Phone    Pratik Lind (Son) -- -- [phone]    TimoteoAra (Daughter) -- -- [phone]               History & Physical      Ali Pete Hahn MD at 2017  4:22 PM     Attestation signed by Carolee Leo MD at 2017  1:24 PM        I have reviewed the notes, assessments, and/or procedures performed by Dr. Hahn, I concur with his documentation of Mickey Lind.        This document has been electronically signed by Carolee Leo MD on 2017 1:23 PM                                       HISTORY AND PHYSICAL  NAME: Mickey Lind  : 1955  MRN: [id number]    DATE OF ADMISSION: 17    DATE & TIME SEEN: 17 4:47 PM    PCP: Ara Bach MD    CODE STATUS: No Order    CHIEF COMPLAINT lower abdominal pain    HPI:  Mickey Lind is a 61 y.o. male who has a concurrent medical history of chronic viral hepatitis C, ascites, alcoholic liver disease who is presenting to the ER with a 2 week history of lower " abdominal pain.  Patient mentions the pain is albertina-umbilical in location with radiation throughout his entire abdomen.  Patient states that the pain is described as a dull aching sensation that is rated on a scale of 1-10 as an 8 out of highest and 4 at its lowest.  Patient mentions no precipitating factors or any alleviating factors for this pain.  Patient does follow GI for his hepatitis C and does see heme oncology for multiple myeloma treatments.  Patient mentions that he quit drinking chronically when he was diagnosed with hepatitis C but does mention occasional drinks of beer and states his last beer that he had was on 4 July.  She denies any fevers, chest pain, shortness of breath does mention a history of cigarette smoking and is still continuing to smoke a pack per day.  Patient denies any diarrhea as mentioned a week long history of constipation but does state he had a bowel movement this morning  CONCURRENT MEDICAL HISTORY:  Past Medical History:   Diagnosis Date   • Abdominal pain    • Alcoholic liver damage    • Alcoholic polyneuropathy    • Amblyopia     refractive os   • Ascites    • Ascites    • Asthma    • Astigmatism    • Cellulitis of left lower leg    • Chronic hepatitis C    • Chronic viral hepatitis C    • Cirrhosis of liver    • Claudication    • Encounter for immunization    • Generalized edema    • History of colon polyps    • Hypermetropia    • Inguinal hernia     - Rih      • Low back pain    • Major depressive disorder     recurrent , moderate   • Pain in joint, lower leg    • Peripheral vascular disease    • Recurrent major depressive episodes    • Tinea unguium    • Tobacco use        PAST SURGICAL HISTORY:  Past Surgical History:   Procedure Laterality Date   • CARDIAC CATHETERIZATION      (Successful PCI to a totally occluded RCA with a 2.5 x 28 and a 2.5 x 8 mm Xience stent. Nonobstructive disease in the left coronary artery system.)   11/24/2012    • CARDIAC SURGERY     •  COLONOSCOPY  10/13/2016   • COLONOSCOPY N/A 3/29/2017    Procedure: COLONOSCOPY;  Surgeon: Jose Daniel David MD;  Location: [facility] ENDOSCOPY;  Service:    • ENDOSCOPY  10/13/2016   • ENDOSCOPY N/A 3/29/2017    Procedure: ESOPHAGOGASTRODUODENOSCOPY (U/S FIRST @0945) ;  Surgeon: Jose Daniel David MD;  Location: [facility] ENDOSCOPY;  Service:    • UPPER GASTROINTESTINAL ENDOSCOPY  10/13/2016       FAMILY HISTORY:  Family History   Problem Relation Age of Onset   • Cancer Mother    • Cancer Father    • Other Father      ischemic heart disease   • Other Son      depressive disorder   • Diabetes Maternal Grandmother    • Cancer Paternal Grandfather         SOCIAL HISTORY:  Social History     Social History   • Marital status:      Spouse name: N/A   • Number of children: N/A   • Years of education: N/A     Occupational History   • Not on file.     Social History Main Topics   • Smoking status: Current Every Day Smoker     Packs/day: 0.50   • Smokeless tobacco: Not on file      Comment: Smokes approx 1 ppd of Cigarettes.smoking since last 45 yr   • Alcohol use No      Comment: no alcohol since October 2015   • Drug use: No   • Sexual activity: Defer     Other Topics Concern   • Not on file     Social History Narrative       HOME MEDICATIONS:    Current Facility-Administered Medications:   •  Insert peripheral IV, , , Once **AND** sodium chloride 0.9 % flush 10 mL, 10 mL, Intravenous, PRN, Wilver Dougherty MD  •  sodium chloride 0.9 % infusion, 125 mL/hr, Intravenous, Continuous, Wilver Dougherty MD, Last Rate: 125 mL/hr at 07/07/17 1307, 125 mL/hr at 07/07/17 1307    Current Outpatient Prescriptions:   •  acyclovir (ZOVIRAX) 400 MG tablet, Take 400 mg by mouth Daily., Disp: , Rfl:   •  atorvastatin (LIPITOR) 10 MG tablet, Take 1 tablet by mouth Daily., Disp: 30 tablet, Rfl: 3  •  dexamethasone (DECADRON) 4 MG tablet, Take 4 mg by mouth., Disp: , Rfl:   •  furosemide (LASIX) 40 MG tablet, Take 40 mg by mouth daily., Disp: ,  Rfl:   •  gabapentin (NEURONTIN) 400 MG capsule, Take 1 capsule by mouth 3 (Three) Times a Day., Disp: 90 capsule, Rfl: 2  •  GNP ACID REDUCER 75 MG tablet, , Disp: , Rfl:   •  polyethylene glycol (MIRALAX) powder, Take 17 g by mouth Daily., Disp: 527 g, Rfl: 1  •  prochlorperazine (COMPAZINE) 10 MG tablet, Take 1 tablet by mouth Every 6 (Six) Hours As Needed for nausea or vomiting., Disp: 60 tablet, Rfl: 1  •  sertraline (ZOLOFT) 100 MG tablet, Take 2 tablets by mouth Daily., Disp: 60 tablet, Rfl: 2  •  spironolactone (ALDACTONE) 50 MG tablet, Take 50 mg by mouth 2 (two) times a day., Disp: , Rfl:   •  sucralfate (CARAFATE) 1 GM/10ML suspension, Take 10 mL by mouth Daily., Disp: 30 g, Rfl: 3    ALLERGIES:  Codeine; Other; and Penicillins    REVIEW OF SYSTEMS  Review of Systems  Review of systems     Constitutional: no weight loss, fever, chills, does mention generalized weakness  HEENT: no visual loss, blurred vision, double vision, yellow scalarea  Skin: no rash, no discoloration   CVS: no chest pain, palpitations  Chest: no shortness of air, cough, dyspnea  GI: abdominal pain, constipation  : no burning in urination, change in odor, no change in frequency  Neuro: no headache, dizziness, syncope, paralysis, ataxia, numbness  Musculoskeletal: no muscle, back pain, joint pain, stiffness  Lymphatics: no enlarged nodes  Endo: no reports of sweating, cold or heat intolerance, no polyuria      PHYSICAL EXAM:  Temp:  [97.8 °F (36.6 °C)] 97.8 °F (36.6 °C)  Heart Rate:  [78-88] 84  Resp:  [20] 20  BP: (136-180)/(83-95) 162/95  Body mass index is 25.54 kg/(m^2).  Physical Exam    GENERAL APPEARANCE: was in mild distress from the distended abdomen  SKIN: Inspection of the skin reveals no rashes, ulcerations or petechiae.  NECK: Supple and symmetric. There was no thyroid enlargement, and no tenderness, or masses were felt.  CHEST: Normal AP diameter and normal contour without any kyphoscoliosis.  LUNGS: decrease breath  sounds bilaterally, wheezes present bilaterally on inspiration  CARDIOVASCULAR: There was a regular rate and rhythm without any murmurs, gallops, rubs. The carotid pulses were normal and 2+ bilaterally without bruits. Peripheral pulses were 2+ and symmetric.  ABDOMEN: Bowel sounds positive in all 4 quadrants, abdomen is tight, patient does have tenderness to palpation in all quadrants of the abdomen, right inguinal hernia was palpated, scrotal edema was present   LYMPH NODES: No lymphadenopathy was appreciated in the neck, axillae or groin.  MUSCULOSKELETAL: Gait was normal. There was no tenderness or effusions noted. Muscle strength and tone were normal.      DIAGNOSTIC DATA:   Lab Results (last 24 hours)     Procedure Component Value Units Date/Time    CBC Auto Differential [[id number]]  (Abnormal) Collected:  07/07/17 1158    Specimen:  Blood Updated:  07/07/17 1216     WBC 4.99 10*3/mm3      RBC 3.80 (L) 10*6/mm3      Hemoglobin 11.5 (L) g/dL      Hematocrit 33.7 (L) %      MCV 88.7 fL      MCH 30.3 pg      MCHC 34.1 g/dL      RDW 16.4 (H) %      RDW-SD 53.3 (H) fl      MPV -- fL       Instrument unable to calculate results        Platelets 117 (L) 10*3/mm3      Neutrophil % 68.6 %      Lymphocyte % 16.2 %      Monocyte % 9.2 %      Eosinophil % 5.2 %      Basophil % 0.6 %      Immature Grans % 0.2 %      Neutrophils, Absolute 3.42 10*3/mm3      Lymphocytes, Absolute 0.81 10*3/mm3      Monocytes, Absolute 0.46 10*3/mm3      Eosinophils, Absolute 0.26 10*3/mm3      Basophils, Absolute 0.03 10*3/mm3      Immature Grans, Absolute 0.01 10*3/mm3     Comprehensive Metabolic Panel [[id number]]  (Abnormal) Collected:  07/07/17 1158    Specimen:  Blood Updated:  07/07/17 1224     Glucose 101 (H) mg/dL      BUN 10 mg/dL      Creatinine 0.47 (L) mg/dL      Sodium 133 (L) mmol/L      Potassium 3.6 mmol/L      Chloride 102 mmol/L      CO2 25.0 mmol/L      Calcium 8.0 (L) mg/dL      Total Protein 5.8 (L) g/dL      Albumin 2.70  (L) g/dL      ALT (SGPT) 27 U/L      AST (SGOT) 32 U/L      Alkaline Phosphatase 114 U/L      Total Bilirubin 0.8 mg/dL      eGFR Non African Amer >150 mL/min/1.73      Globulin 3.1 gm/dL      A/G Ratio 0.9 (L) g/dL      BUN/Creatinine Ratio 21.3     Anion Gap 6.0 mmol/L     Lipase [[id number]]  (Normal) Collected:  07/07/17 1158    Specimen:  Blood Updated:  07/07/17 1224     Lipase 97 U/L     Protime-INR [[id number]]  (Normal) Collected:  07/07/17 1158    Specimen:  Blood Updated:  07/07/17 1239     Protime 14.6 Seconds      INR 1.15    Narrative:       Therapeutic range for most indications is 2.0-3.0 INR,  or 2.5-3.5 for mechanical heart valves.    aPTT [[id number]]  (Abnormal) Collected:  07/07/17 1158    Specimen:  Blood Updated:  07/07/17 1239     PTT 56.6 (H) seconds     Narrative:       The recommended Heparin therapeutic range is 68-97 seconds.    Extra Tubes [[id number]] Collected:  07/07/17 1158    Specimen:  Blood from Blood, Venous Line Updated:  07/07/17 1301    Narrative:       The following orders were created for panel order Extra Tubes.  Procedure                               Abnormality         Status                     ---------                               -----------         ------                     Gold Top - SST[[id number]]                                   Final result                 Please view results for these tests on the individual orders.    Gold Top - SST [[id number]] Collected:  07/07/17 1158    Specimen:  Blood Updated:  07/07/17 1301     Extra Tube Hold for add-ons.      Auto resulted.       CBC & Differential [[id number]] Collected:  07/07/17 1158    Specimen:  Blood Updated:  07/07/17 1315    Narrative:       The following orders were created for panel order CBC & Differential.  Procedure                               Abnormality         Status                     ---------                               -----------         ------                     Scan Slide[[id number]]                                        Final result               CBC Auto Differential[[id number]]        Abnormal            Final result                 Please view results for these tests on the individual orders.    Scan Slide [[id number]] Collected:  07/07/17 1158    Specimen:  Blood Updated:  07/07/17 1315     Anisocytosis Slight/1+      Few Ovalocytes, eliptocytes, and Dacrocytes- tear drop cells observed  Rare hypochromic cell observed        WBC Morphology Normal     Platelet Estimate Decreased    Urinalysis With / Culture If Indicated [[id number]]  (Abnormal) Collected:  07/07/17 1514    Specimen:  Urine from Urine, Clean Catch Updated:  07/07/17 1535     Color, UA Yellow     Appearance, UA Clear     pH, UA 7.0     Specific Gravity, UA 1.015     Glucose, UA Negative     Ketones, UA Negative     Bilirubin, UA Negative     Blood, UA Negative     Protein, UA 30 mg/dL (1+) (A)     Leuk Esterase, UA Negative     Nitrite, UA Negative     Urobilinogen, UA 4.0 E.U./dL (A)    Urinalysis, Microscopic Only [[id number]]  (Abnormal) Collected:  07/07/17 1514    Specimen:  Urine from Urine, Clean Catch Updated:  07/07/17 1538     RBC, UA 0-2 (A) /HPF      WBC, UA 0-2 /HPF      Bacteria, UA None Seen /HPF      Squamous Epithelial Cells, UA None Seen /HPF      Hyaline Casts, UA None Seen /LPF      Methodology Automated Microscopy    Body Fluid Culture [[id number]] Collected:  07/07/17 1536    Specimen:  Body Fluid from Abdominal Cavity Updated:  07/07/17 1609           Imaging Results (last 24 hours)     Procedure Component Value Units Date/Time    XR Chest 1 View [[id number]] Collected:  07/07/17 1153     Updated:  07/07/17 1215    Narrative:       Chest single view.       CLINICAL INDICATION: Shortness of breath abdominal pain    COMPARISON: Chest December 14, 2016.    FINDINGS: Heart normal size. Dense consolidation right lower lobe  and right-sided effusion. Significant unfavorable change since  prior exam December 14, 2016. Left  lung remains clear.      Impression:       CONCLUSION: Dense consolidation involving portions the right  lower lobe either pneumonitis or atelectasis. Moderate to large  right-sided effusion. Unfavorable change since prior chest x-ray  exam.     CT chest with contrast may be useful in further assessment of  right lower lobe consolidation and effusion.        Electronically signed by:  Golden Field MD  7/7/2017 12:14 PM CDT  Workstation: [id number]    CT Abdomen Pelvis With Contrast [[id number]] Collected:  07/07/17 1317     Updated:  07/07/17 1420    Narrative:       DATE OF PROCEDURE:  7/7/2017 1:17 PM CDT    PROCEDURE: CT ABDOMEN PELVIS WITH IV CONTRAST    INDICATION FOR PROCEDURE:   61 years -old patient presents for  evaluation of cirrhosis and hepatitis C.    TECHNIQUE: Contiguous axial images were obtained from lung bases  to the proximal thighs after intravenous administration of 93 mL  of Isovue-300..   Oral contrast was also administered.  Multiplanar reformations are submitted for interpretation. Dose  length product is 364.      This exam was performed according to our departmental  dose-optimization program, which includes automated exposure  control, adjustment of the mA and/or kV according to patient size  and/or use of iterative reconstruction technique.    COMPARISON:  CT of the abdomen and pelvis dated December 14, 2016.    FINDINGS: What is seen of the chest wall has a normal appearance.  What is seen of the heart has a normal appearance without obvious  pericardial effusion.     There is right lower lobe airspace consolidation and probable  compressive atelectasis. A large right-sided pleural effusion is  visible. There is reticulonodular interstitial thickening visible  in the left lower lobe.  No left-sided pleural effusion is  visible.    The liver is small in size consistent with history of cirrhosis.  No focal liver masses are visualized. There are no dilated  intrahepatic biliary ducts. The  gallbladder is present. The  spleen is mildly enlarged measuring 13.4 cm in greatest  dimension. There are calcified splenic granulomas.     Both adrenal glands are within normal limits. The pancreas  appears atrophic.    Both kidneys have a normal appearance without obvious perinephric  fluid or hydronephrosis. There is no evidence for hydroureter or  radiopaque ureteral calculi.     The urinary bladder has a normal appearance. Prostate has a  normal appearance.     The distal esophagus has a normal appearance. The stomach has a  normal appearance. There is a large amount of ascites. There is  no obvious pneumoperitoneum.      There appears to be delayed transit of enteric contrast through  the small bowel. Maximum transverse dimension of the wall small  bowel measures approximately 2.7 cm in greatest transverse  dimension. This suggests possibility of partial small bowel  obstruction. The small bowel has a normal appearance, otherwise.   Stool is visualized throughout the colon.  There may be mild  thickening of the walls of the colon.  Scattered colonic  diverticula are visualized.  No abnormal appendix is visualized.    Abdominal aorta is tortuous with extensive atherosclerotic  calcification. There appears be high-grade stenosis of the  origins of the common iliac arteries.. The inferior vena cava has  a normal appearance.    The abdominal wall has a normal appearance. There is a large  right inguinal hernia containing a large amount of ascites.    There is mild compression of the superior endplate of T12. The  bones appear osteopenic. There are mild degenerative changes of  both hips. Imaged thoracic and lumbar vertebral bodies have  normal alignment and appearance, otherwise. Intervertebral disc  spaces are within normal limits.  Bony pelvis has a normal CT  appearance.      Impression:         1.  Large right-sided pleural effusion and compressive  atelectasis and airspace disease.  2.  Large amount of  ascites probably related to hepatorenal  syndrome with cirrhosis and mild splenomegaly.  3.  Possible sequela of small bowel ileus versus a partial small  bowel obstruction.  4.  Large right-sided inguinal hernia containing ascites. This is  larger than was apparent on the previous CT.    Electronically signed by:  Mariajose Dutta MD  7/7/2017 2:18 PM CDT  Workstation: [id number]     Paracentesis [[id number]] Collected:  07/07/17 1535    Specimen:  Body Fluid Updated:  07/07/17 1628    Narrative:       Ultrasound paracentesis.    HISTORY: Ascites.    Using ultrasound guidance and sterile technique a path was  selected for paracentesis. The right lateral abdominal wall was  punctured under ultrasound guidance a 5 Mont[place]grin multipurpose  drainage catheter. 3.9 L of clear yellowish fluid was drained  without difficulty.    The patient tolerated the procedure well with no evidence of any  immediate complications.      Impression:       CONCLUSION: Technically successful and uneventful ultrasound  guided paracentesis.    Electronically signed by:  Golden Field MD  7/7/2017 4:26 PM CDT  Workstation: [id number]    Paracentesis performed by Dr. Shin Vasquez. Dr. Vasquez however is   unavailable to dictate the examination (on vacation).   Examination is therefore dictated by Dr. Golden Field.          I reviewed the patient's new clinical results.    ASSESSMENT AND PLAN: This is a 61 y.o. male with:    Active Hospital Problems (** Indicates Principal Problem)    Diagnosis Date Noted   • **Ascites due to alcoholic cirrhosis [K70.31] 07/07/2017     -patient had a CT scan of the abdomen and pelvis performed which showed a large fluid collection in his abdomen as well as right sided pleural effusion, patient underwent a ultrasound guided paracentesis which they took out 3.9 liters of fluid     • Lower abdominal pain [R10.30] 07/07/2017     -secondary to ascites from hep. C as well as alcoholic cirrhosis, patient is afebrile currently  and has been for the last few weeks, no need for antibiotics at this time     • Tobacco dependence [F17.200] 07/07/2017     -admits to smoking a PPD, will give 21mg nicotine patch     • Simple chronic bronchitis [J41.0] 07/07/2017     -will provide with supplemental oxygen as needed as well as breathing treatments PRN     • Chronic hepatitis C without hepatic coma [B18.2] 02/16/2017     -has been followed by Dr. Montalvo, will continue his home medication of zovirax     • Multiple myeloma, stage 3 [C90.00] 01/20/2017     -has a right sided port in, has been followed by Dr. Mendoza of heme-oncology, will monitor      • Unilateral inguinal hernia without obstruction or gangrene [K40.90] 09/10/2016     -large right sided inguinal hernia palpated and seen on abdominal CT for now, will observe for now, possible surgery outpatient follow up     • End stage liver disease [K72.90] 08/31/2016     -secondary to chronic hepatitis C, is being followed by Dr. Montalvo as an outpatient, will continue to monitor      • Depression [F32.9] 08/31/2016     -continue home medication of zoloft        Resolved Hospital Problems    Diagnosis Date Noted Date Resolved   No resolved problems to display.         DVT prophylaxis: SCDs TEDs  Code status is No Order         I discussed the patients findings and my recommendations with patient, family and primary care team.     Dr. Leo is the attending on record at time of admission, she is aware of the patient's status and agrees with the above history and physical.          This document has been electronically signed by Carlos Hahn MD on July 7, 2017 4:47 PM         Electronically signed by Carolee Leo MD at 7/8/2017  1:24 PM      Carolee Leo MD at 7/7/2017  5:44 PM          Ascites due to alcoholic cirrhosis  Subjective:     Mickey Lind is a 61 y.o. male who presents for abdominal pain that started 3 weeks ago.  It got worse so he came into the ER.  He is also having higher  than normal back pain.  The pain aches and throbs.  He states his hernia has gotten worse as well.  He is having bad pain in his lower abdomen.  He is also coughing up green to brown phlegm.  He has a lot of nausea and cannot eat.  He has a headache as well and a rash in his groin.  He sees Dr. David for his cirrhosis and Dr. Mendoza for multiple myeloma.  His last treatment for myeloma was in March.  He has also had blood transfusions at the [facility].  He is weak and doesn't feel well.  No hematochezia, hematemesis, hematuria, or hemoptysis.      The following portions of the patient's history were reviewed and updated as appropriate: allergies, current medications, past family history, past medical history, past social history, past surgical history and problem list.    Concurrent Medical Hx:  Past Medical History:   Diagnosis Date   • Abdominal pain    • Alcoholic liver damage    • Alcoholic polyneuropathy    • Amblyopia     refractive os   • Anemia    • Ascites    • Ascites    • Asthma    • Astigmatism    • Cellulitis of left lower leg    • Chronic hepatitis C    • Chronic viral hepatitis C    • Cirrhosis of liver    • Claudication    • Encounter for immunization    • Generalized edema    • History of blood transfusion    • History of colon polyps    • Hypermetropia    • Inguinal hernia     - Rih      • Low back pain    • Major depressive disorder     recurrent , moderate   • Myocardial infarction    • Pain in joint, lower leg    • Peripheral vascular disease    • Recurrent major depressive episodes    • Tinea unguium    • Tobacco use        Past Surgical Hx:  Past Surgical History:   Procedure Laterality Date   • CARDIAC CATHETERIZATION      (Successful PCI to a totally occluded RCA with a 2.5 x 28 and a 2.5 x 8 mm Xience stent. Nonobstructive disease in the left coronary artery system.)   11/24/2012    • CARDIAC SURGERY     • COLONOSCOPY  10/13/2016   • COLONOSCOPY N/A 3/29/2017    Procedure: COLONOSCOPY;  Surgeon:  Jose Daniel David MD;  Location: [facility] ENDOSCOPY;  Service:    • ENDOSCOPY  10/13/2016   • ENDOSCOPY N/A 3/29/2017    Procedure: ESOPHAGOGASTRODUODENOSCOPY (U/S FIRST @0945) ;  Surgeon: Jose Daniel David MD;  Location: [facility] ENDOSCOPY;  Service:    • UPPER GASTROINTESTINAL ENDOSCOPY  10/13/2016     Family Hx:  Family History   Problem Relation Age of Onset   • Cancer Mother    • Heart disease Mother    • Cancer Father    • Other Father      ischemic heart disease   • Heart disease Father    • Other Son      depressive disorder   • Diabetes Maternal Grandmother    • Cancer Paternal Grandfather    • No Known Problems Daughter    • No Known Problems Son       Social History:  Social History     Social History   • Marital status:      Spouse name: N/A   • Number of children: N/A   • Years of education: N/A     Occupational History   • Not on file.     Social History Main Topics   • Smoking status: Current Every Day Smoker     Packs/day: 0.50   • Smokeless tobacco: Never Used      Comment: Smokes approx 1 ppd of Cigarettes.smoking since last 45 yr   • Alcohol use No      Comment: no alcohol since 2017   • Drug use: No   • Sexual activity: Defer     Other Topics Concern   • Not on file     Social History Narrative    , wife  2017. Lives with son and his son's girlfriend.  He has worked as , mine equipment, coal mines.  He served in the [employer] for 1.5 years.  Used to smoke 2 ppd down to 1/2 ppd for 35 years or more.  Drank a 12 pack of beer a day for 20 years, last drink was 1/2 a beer on .  He last drank in 10/2015.  He  drugs 20-30 years ago including cocaine, meth, heroin, pot, and other drugs.  He smoked, snorted, and shot up drugs.       Home Medications:  No current facility-administered medications on file prior to encounter.      Current Outpatient Prescriptions on File Prior to Encounter   Medication Sig Dispense Refill   • acyclovir (ZOVIRAX) 400 MG tablet Take 400 mg by  mouth Daily.     • atorvastatin (LIPITOR) 10 MG tablet Take 1 tablet by mouth Daily. 30 tablet 3   • dexamethasone (DECADRON) 4 MG tablet Take 4 mg by mouth.     • furosemide (LASIX) 40 MG tablet Take 40 mg by mouth daily.     • gabapentin (NEURONTIN) 400 MG capsule Take 1 capsule by mouth 3 (Three) Times a Day. 90 capsule 2   • GNP ACID REDUCER 75 MG tablet      • polyethylene glycol (MIRALAX) powder Take 17 g by mouth Daily. 527 g 1   • prochlorperazine (COMPAZINE) 10 MG tablet Take 1 tablet by mouth Every 6 (Six) Hours As Needed for nausea or vomiting. 60 tablet 1   • sertraline (ZOLOFT) 100 MG tablet Take 2 tablets by mouth Daily. 60 tablet 2   • spironolactone (ALDACTONE) 50 MG tablet Take 50 mg by mouth 2 (two) times a day.     • sucralfate (CARAFATE) 1 GM/10ML suspension Take 10 mL by mouth Daily. 30 g 3       Allergies:  Codeine; Other; and Penicillins  I reviewed the patient's new clinical results.  Review of Systems  Review of Systems   Constitutional: Positive for activity change, appetite change, chills, fatigue and unexpected weight change. Negative for diaphoresis and fever.   HENT: Positive for congestion. Negative for ear pain, hearing loss, mouth sores, nosebleeds, sore throat and trouble swallowing.    Eyes: Negative for photophobia and visual disturbance.   Respiratory: Positive for cough, shortness of breath and wheezing.    Cardiovascular: Positive for leg swelling. Negative for chest pain and palpitations.   Gastrointestinal: Positive for abdominal distention, abdominal pain and nausea. Negative for blood in stool, constipation, diarrhea, rectal pain and vomiting.   Genitourinary: Positive for scrotal swelling. Negative for decreased urine volume, difficulty urinating, dysuria and hematuria.   Musculoskeletal: Positive for arthralgias, back pain, gait problem, joint swelling, myalgias, neck pain and neck stiffness.   Skin: Positive for rash.   Allergic/Immunologic: Positive for environmental  "allergies and immunocompromised state.   Neurological: Positive for weakness and headaches. Negative for seizures and speech difficulty.   Hematological: Bruises/bleeds easily.   Psychiatric/Behavioral: Positive for sleep disturbance. Negative for agitation, behavioral problems, confusion, decreased concentration, dysphoric mood, hallucinations, self-injury and suicidal ideas. The patient is not nervous/anxious and is not hyperactive.        Objective:     /84 (BP Location: Left arm, Patient Position: Lying)  Pulse 89  Temp 97 °F (36.1 °C) (Oral)   Resp 20  Ht 70\" (177.8 cm)  Wt 149 lb (67.6 kg)  SpO2 92%  BMI 21.38 kg/m2  Physical Exam   Constitutional: He is oriented to person, place, and time. He appears well-developed. No distress.   Appears older than stated age   HENT:   Head: Normocephalic and atraumatic.   Right Ear: External ear normal.   Left Ear: External ear normal.   Nose: Nose normal.   Mouth/Throat: Oropharynx is clear and moist. No oropharyngeal exudate.   Eyes: Conjunctivae and EOM are normal. Pupils are equal, round, and reactive to light. Right eye exhibits no discharge. Left eye exhibits no discharge. No scleral icterus.   Neck: No JVD present. No tracheal deviation present.   Cardiovascular: Normal rate, regular rhythm and intact distal pulses.  Exam reveals no gallop and no friction rub.    Murmur heard.  Pulmonary/Chest: Effort normal. No respiratory distress. He has wheezes. He has no rales. He exhibits no tenderness.   Scattered rhonchi, diminished breath sounds on the right side   Abdominal: Bowel sounds are normal. He exhibits distension. There is tenderness. There is no rebound and no guarding. A hernia is present.   Diffusely tender no guarding or rebound, large right side inguinal hernia, small umbilical hernia   Genitourinary:   Genitourinary Comments: Swelling in the scrotum   Musculoskeletal: He exhibits tenderness. He exhibits no edema.   Lymphadenopathy:     He has no " cervical adenopathy.   Neurological: He is alert and oriented to person, place, and time. He displays normal reflexes. No cranial nerve deficit. He exhibits normal muscle tone.   Plantar reflex down going bilaterally, no dysmetria noted   Skin: Skin is dry. Rash noted. He is not diaphoretic.   Excoriations and erythema in the groin and on the abdomen   Psychiatric: He has a normal mood and affect. His behavior is normal. Judgment and thought content normal.   Nursing note and vitals reviewed.    I reviewed the patient's new clinical results.  Assessment/Plan:     Active Hospital Problems (** Indicates Principal Problem)    Diagnosis Date Noted   • **Ascites due to alcoholic cirrhosis [K70.31] 07/07/2017     -patient had a CT scan of the abdomen and pelvis performed which showed a large fluid collection in his abdomen as well as right sided pleural effusion, patient underwent a ultrasound guided paracentesis which they took out 3.9 liters of fluid  -currently on levaquin and flagyl IV, blood culture and urine culture no growth     • Lower abdominal pain [R10.30] 07/07/2017     -secondary to ascites from hep. C as well as alcoholic cirrhosis, patient is afebrile currently and has been for the last few weeks, no need for antibiotics at this time     • Tobacco dependence [F17.200] 07/07/2017     -admits to smoking a PPD, will give 21mg nicotine patch     • Simple chronic bronchitis [J41.0] 07/07/2017     -will provide with supplemental oxygen as needed as well as breathing treatments PRN     • Bacterial pneumonia [J15.9] 07/07/2017   • Chronic hepatitis C without hepatic coma [B18.2] 02/16/2017     -has been followed by Dr. Montalvo, will continue his home medication of zovirax     • Multiple myeloma, stage 3 [C90.00] 01/20/2017     -has a right sided port in, has been followed by Dr. Mendoza of heme-oncology, will monitor      • Unilateral inguinal hernia without obstruction or gangrene [K40.90] 09/10/2016     -large right  sided inguinal hernia palpated and seen on abdominal CT for now, surgery was consulted from the ER  -surgery was consulted and stated that it was reducible and that he was not an operative candidate      • End stage liver disease [K72.90] 08/31/2016     -secondary to chronic hepatitis C, is being followed by Dr. Montalvo as an outpatient, will continue to monitor      • Depression [F32.9] 08/31/2016     -continue home medication of zoloft        Resolved Hospital Problems    Diagnosis Date Noted Date Resolved   No resolved problems to display.     Patient appears to have pneumonia as well as possible spontaneous bacterial peritonitis.  Treat with IV antibiotics.    I have seen and examined the patient.  I have reviewed the notes, assessments, and/or procedures performed by Dr. Hahn, I concur with his documentation and assessment and plan for Mickey Lind.        This document has been electronically signed by Carolee Leo MD on July 8, 2017 1:41 PM          This document has been electronically signed by Carolee Leo MD on July 8, 2017 1:41 PM          Electronically signed by Carolee Leo MD at 7/8/2017  1:45 PM        Hospital Medications (active)       Dose Frequency Start End    acyclovir (ZOVIRAX) tablet 400 mg 400 mg Daily 7/7/2017     Sig - Route: Take 1 tablet by mouth Daily. - Oral    atorvastatin (LIPITOR) tablet 10 mg 10 mg Daily 7/7/2017     Sig - Route: Take 1 tablet by mouth Daily. - Oral    dexamethasone (DECADRON) tablet 4 mg 4 mg Daily With Breakfast 7/8/2017     Sig - Route: Take 1 tablet by mouth Daily With Breakfast. - Oral    famotidine (PEPCID) tablet 40 mg 40 mg Daily 7/7/2017     Sig - Route: Take 1 tablet by mouth Daily. - Oral    fentaNYL citrate (PF) (SUBLIMAZE) injection  Code / Trauma / Sedation Medication 7/10/2017 7/10/2017    Sig - Route: Infuse  into a venous catheter Emergency Use. - Intravenous    furosemide (LASIX) tablet 40 mg 40 mg Daily 7/7/2017     Sig - Route:  "Take 1 tablet by mouth Daily. - Oral    gabapentin (NEURONTIN) capsule 400 mg 400 mg 3 Times Daily 7/7/2017     Sig - Route: Take 1 capsule by mouth 3 (Three) Times a Day. - Oral    hydrALAZINE (APRESOLINE) injection 10 mg 10 mg Every 6 Hours PRN 7/7/2017     Sig - Route: Infuse 0.5 mL into a venous catheter Every 6 (Six) Hours As Needed for High Blood Pressure. - Intravenous    levoFLOXacin (LEVAQUIN) tablet 750 mg 750 mg Every 24 Hours 7/9/2017     Sig - Route: Take 1 tablet by mouth Daily. - Oral    LORazepam (ATIVAN) injection 1 mg 1 mg Every 2 Hours PRN 7/7/2017 7/17/2017    Sig - Route: Infuse 0.5 mL into a venous catheter Every 2 (Two) Hours As Needed for Withdrawal (For CIWA score 8-10). - Intravenous    Linked Group 1:  \"Or\" Linked Group Details        LORazepam (ATIVAN) injection 2 mg 2 mg Every 1 Hour PRN 7/7/2017 7/17/2017    Sig - Route: Infuse 1 mL into a venous catheter Every 1 (One) Hour As Needed for Withdrawal (For CIWA score 11-15). - Intravenous    Linked Group 1:  \"Or\" Linked Group Details        LORazepam (ATIVAN) injection 2 mg 2 mg Every 15 Minutes PRN 7/7/2017 7/17/2017    Sig - Route: Infuse 1 mL into a venous catheter Every 15 (Fifteen) Minutes As Needed for Anxiety (Use IV route first.  If unable to give IV, use IM.  For CIWA-Ar greater than 15.  Repeat dose in 15 minutes if CIWA-Ar is not decreasing.). - Intravenous    Linked Group 1:  \"Or\" Linked Group Details        LORazepam (ATIVAN) injection 2 mg 2 mg Every 15 Minutes PRN 7/7/2017 7/17/2017    Sig - Route: Inject 1 mL into the shoulder, thigh, or buttocks Every 15 (Fifteen) Minutes As Needed for Withdrawal (Use IV route first.  If unable to give IV, use IM.  For CIWA-Ar greater than 15.  Repeat dose in 15 minutes if CIWA-Ar is not decreasing.). - Intramuscular    Linked Group 1:  \"Or\" Linked Group Details        LORazepam (ATIVAN) tablet 1 mg 1 mg Every 2 Hours PRN 7/7/2017 7/17/2017    Sig - Route: Take 1 tablet by mouth Every 2 " "(Two) Hours As Needed for Withdrawal (For CIWA score 8-10). - Oral    Linked Group 1:  \"Or\" Linked Group Details        LORazepam (ATIVAN) tablet 2 mg 2 mg Every 1 Hour PRN 7/7/2017 7/17/2017    Sig - Route: Take 1 tablet by mouth Every 1 (One) Hour As Needed for Withdrawal (For CIWA score 11-15). - Oral    Linked Group 1:  \"Or\" Linked Group Details        magic butt ointment  3 Times Daily 7/10/2017     Sig - Route: Apply  topically 3 (Three) Times a Day. - Topical    metroNIDAZOLE (FLAGYL) tablet 500 mg 500 mg Every 8 Hours Scheduled 7/9/2017     Sig - Route: Take 1 tablet by mouth Every 8 (Eight) Hours. - Oral    midazolam (VERSED) injection  Code / Trauma / Sedation Medication 7/10/2017 7/10/2017    Sig - Route: Infuse  into a venous catheter Emergency Use. - Intravenous    Morphine sulfate (PF) injection 2 mg 2 mg Every 4 Hours PRN 7/7/2017 7/17/2017    Sig - Route: Infuse 1 mL into a venous catheter Every 4 (Four) Hours As Needed for Moderate Pain (4-6). - Intravenous    Linked Group 2:  \"And\" Linked Group Details        multiple vitamin (M.V.I. Adult) 10 mL, thiamine (B-1) 100 mg, folic acid 1 mg, magnesium sulfate 2 g in sodium chloride 0.9 % 1,000 mL infusion 100 mL/hr Daily 7/7/2017 7/10/2017    Sig - Route: Infuse 100 mL/hr into a venous catheter Daily. - Intravenous    naloxone (NARCAN) injection 0.4 mg 0.4 mg Every 5 Minutes PRN 7/7/2017     Sig - Route: Infuse 1 mL into a venous catheter Every 5 (Five) Minutes As Needed for Respiratory Depression. - Intravenous    Linked Group 2:  \"And\" Linked Group Details        nicotine (NICODERM CQ) 21 MG/24HR patch 1 patch 1 patch Every 24 Hours 7/7/2017     Sig - Route: Place 1 patch on the skin Daily. - Transdermal    ondansetron (ZOFRAN) injection 4 mg 4 mg Every 6 Hours PRN 7/7/2017     Sig - Route: Infuse 2 mL into a venous catheter Every 6 (Six) Hours As Needed for Nausea or Vomiting. - Intravenous    polyethylene glycol (MIRALAX) powder 17 g 17 g Daily " "7/7/2017     Sig - Route: Take 17 g by mouth Daily. - Oral    prochlorperazine (COMPAZINE) tablet 10 mg 10 mg Every 6 Hours PRN 7/7/2017     Sig - Route: Take 1 tablet by mouth Every 6 (Six) Hours As Needed for Nausea or Vomiting. - Oral    sertraline (ZOLOFT) tablet 200 mg 200 mg Daily 7/7/2017     Sig - Route: Take 4 tablets by mouth Daily. - Oral    sodium chloride 0.9 % flush 1-10 mL 1-10 mL As Needed 7/7/2017     Sig - Route: Infuse 1-10 mL into a venous catheter As Needed for Line Care. - Intravenous    sodium chloride 0.9 % flush 10 mL 10 mL As Needed 7/7/2017     Sig - Route: Infuse 10 mL into a venous catheter As Needed for Line Care. - Intravenous    Linked Group 3:  \"And\" Linked Group Details        spironolactone (ALDACTONE) tablet 50 mg 50 mg 2 Times Daily 7/7/2017     Sig - Route: Take 1 tablet by mouth 2 (Two) Times a Day. - Oral    sucralfate (CARAFATE) 1 GM/10ML suspension 1 g 1 g Daily 7/7/2017     Sig - Route: Take 10 mL by mouth Daily. - Oral            Discharge Summary     No notes of this type exist for this encounter.        "

## 2017-07-10 NOTE — NURSING NOTE
[facility] - [phone]    Asks that the hospitalist call her regarding her father's care.

## 2017-07-10 NOTE — PROGRESS NOTES
FAMILY MEDICINE DAILY PROGRESS NOTE  NAME: Mickey Lind  : 1955  MRN: [id number]      LOS: 3 days     PROVIDER OF SERVICE: Vasiliy Xiao MD    Chief Complaint: Ascites due to alcoholic cirrhosis    Subjective:     Interval History:  History taken from: patient chart RN  Patient Complaints: states that his abdominal pain is the same  -patient has a history of cirrhosis secondary to alcohol and hepatitis C, on levaquin and flagyl for possible pneumonia    WBC up from 9 to 13.  O2 sat dropped to mid 80's overnight with a coughing spell    Review of Systems:   Review of Systems  Constitutional: no weight loss, fever, chills, does mention generalized weakness  HEENT: no visual loss, blurred vision, double vision, yellow scalarea  Skin: no rash, no discoloration   CVS: no chest pain, palpitations  Chest: no shortness of air, cough, dyspnea  GI: abdominal fullness and tightness   : no burning in urination, change in odor, no change in frequency  Neuro: no headache, dizziness, syncope, paralysis, ataxia, numbness  Musculoskeletal: no muscle, back pain, joint pain, stiffness  Lymphatics: no enlarged nodes  Endo: no reports of sweating, cold or heat intolerance, no polyuria  Objective:     Vital Signs  Temp:  [96.5 °F (35.8 °C)-98.7 °F (37.1 °C)] 97.2 °F (36.2 °C)  Heart Rate:  [82-98] 82  Resp:  [18] 18  BP: (105-150)/(58-86) 129/58  Body mass index is 21.38 kg/(m^2).    Physical Exam  Physical Exam  Constitutional: He is oriented to person, place, and time. He appears well-developed.   HENT:   Head: Normocephalic and atraumatic.   Eyes: Pupils are equal, round, and reactive to light.   Neck: Normal range of motion. Neck supple.   Cardiovascular: Normal rate, regular rhythm, normal heart sounds and intact distal pulses.   Pulmonary/Chest: Effort normal, decreased breath sounds R base  Abdominal: Bowel sounds are normal. A hernia is present, abdomen distended  Musculoskeletal: Normal range of  motion.   Neurological: He is alert and oriented to person, place, and time.   Skin: Skin is warm and dry.   Psychiatric: He has a normal mood and affect. His behavior is normal.   reducible right inguinal hernia, no left inguinal hernia, no umbilical hernia-does have diastasis recti  Medication Review    Current Facility-Administered Medications:   •  acyclovir (ZOVIRAX) tablet 400 mg, 400 mg, Oral, Daily, Carlos Hahn MD, 400 mg at 07/09/17 0802  •  atorvastatin (LIPITOR) tablet 10 mg, 10 mg, Oral, Daily, Carlos Hahn MD, 10 mg at 07/09/17 0802  •  dexamethasone (DECADRON) tablet 4 mg, 4 mg, Oral, Daily With Breakfast, Carlos Hahn MD, 4 mg at 07/09/17 0802  •  famotidine (PEPCID) tablet 40 mg, 40 mg, Oral, Daily, Carlos Hahn MD, 40 mg at 07/09/17 0802  •  furosemide (LASIX) tablet 40 mg, 40 mg, Oral, Daily, Carlos Hahn MD, 40 mg at 07/09/17 0802  •  gabapentin (NEURONTIN) capsule 400 mg, 400 mg, Oral, TID, Carlos Hahn MD, 400 mg at 07/09/17 2128  •  hydrALAZINE (APRESOLINE) injection 10 mg, 10 mg, Intravenous, Q6H PRN, Carlos Hahn MD  •  levoFLOXacin (LEVAQUIN) tablet 750 mg, 750 mg, Oral, Q24H, Carlos Hahn MD, 750 mg at 07/09/17 2128  •  LORazepam (ATIVAN) tablet 1 mg, 1 mg, Oral, Q2H PRN **OR** LORazepam (ATIVAN) injection 1 mg, 1 mg, Intravenous, Q2H PRN **OR** LORazepam (ATIVAN) tablet 2 mg, 2 mg, Oral, Q1H PRN, 2 mg at 07/08/17 0001 **OR** LORazepam (ATIVAN) injection 2 mg, 2 mg, Intravenous, Q1H PRN **OR** LORazepam (ATIVAN) injection 2 mg, 2 mg, Intravenous, Q15 Min PRN **OR** LORazepam (ATIVAN) injection 2 mg, 2 mg, Intramuscular, Q15 Min PRN, Joel Antonio MD  •  magic butt ointment, , Topical, TID, Carlos Hahn MD  •  metroNIDAZOLE (FLAGYL) tablet 500 mg, 500 mg, Oral, Q8H, Carlos Hahn MD, 500 mg at 07/10/17 0556  •  Morphine sulfate (PF) injection 2 mg, 2 mg, Intravenous, Q4H PRN, 2 mg at 07/10/17 0555 **AND** naloxone  (NARCAN) injection 0.4 mg, 0.4 mg, Intravenous, Q5 Min PRN, Carolee Leo MD  •  multiple vitamin (M.V.I. Adult) 10 mL, thiamine (B-1) 100 mg, folic acid 1 mg, magnesium sulfate 2 g in sodium chloride 0.9 % 1,000 mL infusion, 100 mL/hr, Intravenous, Daily, Joel Antonio MD, Stopped at 07/09/17 1926  •  nicotine (NICODERM CQ) 21 MG/24HR patch 1 patch, 1 patch, Transdermal, Q24H, Carlos Hahn MD, 1 patch at 07/09/17 1806  •  ondansetron (ZOFRAN) injection 4 mg, 4 mg, Intravenous, Q6H PRN, Carlos Hahn MD  •  polyethylene glycol (MIRALAX) powder 17 g, 17 g, Oral, Daily, Carlos Hahn MD, 17 g at 07/07/17 2146  •  prochlorperazine (COMPAZINE) tablet 10 mg, 10 mg, Oral, Q6H PRN, Carlos Hahn MD  •  sertraline (ZOLOFT) tablet 200 mg, 200 mg, Oral, Daily, Carlos Hahn MD, 200 mg at 07/09/17 0802  •  sodium chloride 0.9 % flush 1-10 mL, 1-10 mL, Intravenous, PRN, Carlos Hahn MD  •  Insert peripheral IV, , , Once **AND** sodium chloride 0.9 % flush 10 mL, 10 mL, Intravenous, PRN, Wilver Dougherty MD  •  spironolactone (ALDACTONE) tablet 50 mg, 50 mg, Oral, BID, Carlos Hahn MD, 50 mg at 07/09/17 1805  •  sucralfate (CARAFATE) 1 GM/10ML suspension 1 g, 1 g, Oral, Daily, Carlos Hahn MD, 1 g at 07/09/17 0802     Diagnostic Data    Lab Results (last 24 hours)     Procedure Component Value Units Date/Time    Blood Culture [[id number]]  (Normal) Collected:  07/08/17 0740    Specimen:  Blood from Wrist, Left Updated:  07/09/17 0901     Blood Culture No growth at 24 hours    Blood Culture [[id number]]  (Normal) Collected:  07/07/17 1843    Specimen:  Blood from Hand, Left Updated:  07/09/17 2001     Blood Culture No growth at 2 days    CBC & Differential [[id number]] Collected:  07/10/17 0527    Specimen:  Blood Updated:  07/10/17 0530    Narrative:       The following orders were created for panel order CBC & Differential.  Procedure                               Abnormality          Status                     ---------                               -----------         ------                     CBC Auto Differential[[id number]]        Abnormal            Final result                 Please view results for these tests on the individual orders.    CBC Auto Differential [[id number]]  (Abnormal) Collected:  07/10/17 0527    Specimen:  Blood Updated:  07/10/17 0530     WBC 13.67 (H) 10*3/mm3      RBC 3.35 (L) 10*6/mm3      Hemoglobin 10.2 (L) g/dL      Hematocrit 30.3 (L) %      MCV 90.4 fL      MCH 30.4 pg      MCHC 33.7 g/dL      RDW 16.1 (H) %      RDW-SD 53.2 (H) fl      MPV 10.9 fL      Platelets 114 (L) 10*3/mm3      Neutrophil % 83.1 (H) %      Lymphocyte % 10.5 %      Monocyte % 5.6 %      Eosinophil % 0.4 %      Basophil % 0.1 %      Immature Grans % 0.3 %      Neutrophils, Absolute 11.35 (H) 10*3/mm3      Lymphocytes, Absolute 1.43 10*3/mm3      Monocytes, Absolute 0.77 10*3/mm3      Eosinophils, Absolute 0.06 10*3/mm3      Basophils, Absolute 0.02 10*3/mm3      Immature Grans, Absolute 0.04 (H) 10*3/mm3     Ammonia [[id number]]  (Normal) Collected:  07/10/17 0525    Specimen:  Blood Updated:  07/10/17 0539     Ammonia 19 umol/L     Comprehensive Metabolic Panel [[id number]]  (Abnormal) Collected:  07/10/17 0527    Specimen:  Blood Updated:  07/10/17 0539     Glucose 124 (H) mg/dL      BUN 14 mg/dL      Creatinine 0.54 (L) mg/dL      Sodium 132 (L) mmol/L      Potassium 3.6 mmol/L      Chloride 98 mmol/L      CO2 27.0 mmol/L      Calcium 7.7 (L) mg/dL      Total Protein 5.5 (L) g/dL      Albumin 2.70 (L) g/dL      ALT (SGPT) 31 U/L      AST (SGOT) 34 U/L      Alkaline Phosphatase 97 U/L      Total Bilirubin 0.8 mg/dL      eGFR Non African Amer >150 mL/min/1.73      Globulin 2.8 gm/dL      A/G Ratio 1.0 (L) g/dL      BUN/Creatinine Ratio 25.9 (H)     Anion Gap 7.0 mmol/L     Body Fluid Culture [[id number]]  (Normal) Collected:  07/07/17 1536    Specimen:  Body Fluid from Abdominal Cavity  Updated:  07/10/17 0634     BF Culture No growth at 2 days     Gram Stain Result Few (2+) WBCs seen      No organisms seen            I reviewed the patient's new clinical results.    Assessment/Plan:     Active Hospital Problems (** Indicates Principal Problem)    Diagnosis Date Noted   • **Ascites due to alcoholic cirrhosis [K70.31] 07/07/2017     -patient had a CT scan of the abdomen and pelvis performed which showed a large fluid collection in his abdomen as well as right sided pleural effusion, patient underwent a ultrasound guided paracentesis which they took out 3.9 liters of fluid  -currently on levaquin and flagyl, blood culture and urine culture no growth     • Pleural effusion [J90] 07/10/2017     Continue lasix, may need IR       • Ulcer of sacral region, stage 1 [L89.151] 07/10/2017     Wound care consulted     • Lower abdominal pain [R10.30] 07/07/2017     -secondary to ascites from hep. C as well as alcoholic cirrhosis, patient is afebrile currently and has been for the last few weeks, no need for antibiotics at this time     • Tobacco dependence [F17.200] 07/07/2017     -admits to smoking a PPD, will give 21mg nicotine patch     • Simple chronic bronchitis [J41.0] 07/07/2017     -will provide with supplemental oxygen as needed as well as breathing treatments PRN     • Bacterial pneumonia [J15.9] 07/07/2017   • Chronic hepatitis C without hepatic coma [B18.2] 02/16/2017     -has been followed by Dr. Montalvo, will continue his home medication of zovirax     • Multiple myeloma, stage 3 [C90.00] 01/20/2017     -has a right sided port in, has been followed by Dr. Mendoza of heme-oncology, will monitor      • Unilateral inguinal hernia without obstruction or gangrene [K40.90] 09/10/2016     -large right sided inguinal hernia palpated and seen on abdominal CT for now, surgery was consulted from the ER  -surgery was consulted and stated that it was reducible and that he was not an operative candidate      • End  stage liver disease [K72.90] 08/31/2016     -secondary to chronic hepatitis C, is being followed by Dr. Montalvo as an outpatient, will continue to monitor      • Depression [F32.9] 08/31/2016     -continue home medication of zoloft        Resolved Hospital Problems    Diagnosis Date Noted Date Resolved   No resolved problems to display.       DVT prophylaxis: SCDs TEDs  Code status is Full Code          This document has been electronically signed by Vasiliy Xiao MD on July 10, 2017 8:11 AM

## 2017-07-10 NOTE — TELEPHONE ENCOUNTER
I spoke with Vivian she states she has talked to family member and has called case management since pt is in hospital

## 2017-07-10 NOTE — THERAPY TREATMENT NOTE
Acute Care - Occupational Therapy Treatment Note  [facility]     Patient Name: Mickey Lind  : 1955  MRN: [id number]  Today's Date: 7/10/2017  Onset of Illness/Injury or Date of Surgery Date: 17  Date of Referral to OT: 17  Referring Physician: Dr. Carolee Leo      Admit Date: 2017    Visit Dx:     ICD-10-CM ICD-9-CM   1. Lower abdominal pain R10.30 789.09   2. Other ascites R18.8 789.59   3. Recurrent right pleural effusion J90 511.9   4. Impaired physical mobility Z74.09 781.99   5. Impaired mobility and ADLs Z74.09 799.89     Patient Active Problem List   Diagnosis   • End stage liver disease   • Depression   • Low back pain with sciatica   • Hyperlipidemia   • Unilateral inguinal hernia without obstruction or gangrene   • Diarrhea   • Hepatic cirrhosis   • Epigastric pain   • Peripheral vascular disease   • Encounter for venous access device care   • Multiple myeloma, stage 3   • Anemia   • Chronic hepatitis C without hepatic coma   • Lower abdominal pain   • Ascites due to alcoholic cirrhosis   • Tobacco dependence   • Simple chronic bronchitis   • Bacterial pneumonia   • Pleural effusion   • Ulcer of sacral region, stage 1             Adult Rehabilitation Note       07/10/17 1558 07/10/17 1400       Rehab Assessment/Intervention    Discipline occupational therapy assistant  -CS physical therapy assistant  -AM     Document Type therapy note (daily note)  -CS therapy note (daily note)  -AM     Subjective Information agree to therapy  -CS agree to therapy;no complaints  -AM     Patient Effort, Rehab Treatment  good  -AM     Symptoms Noted During/After Treatment  shortness of breath  -AM     Precautions/Limitations  fall precautions;oxygen therapy device and L/min  -AM     Equipment Issued to Patient  gait belt  -AM     Recorded by [CS] UZAIR Jhaveri/RAFAELA [AM] Oliver Moses PTA     Vital Signs    Pre Systolic BP Rehab  135  -AM     Pre Treatment Diastolic BP  78  -AM      Post Systolic BP Rehab  136  -AM     Post Treatment Diastolic BP  82  -AM     Pretreatment Heart Rate (beats/min) 92  -CS 98  -AM     Posttreatment Heart Rate (beats/min) 92  -  -AM     Pre SpO2 (%) 94  -CS 88  -AM     O2 Delivery Pre Treatment supplemental O2  -CS supplemental O2  -AM     Post SpO2 (%) 94  -CS 93  -AM     O2 Delivery Post Treatment supplemental O2  -CS supplemental O2  -AM     Pre Patient Position Sitting  -CS Sitting  -AM     Intra Patient Position Sitting  -CS Standing  -AM     Post Patient Position Sitting  -CS Sitting  -AM     Recorded by [CS] UZAIR Jhaveri/RAFAELA [AM] Oliver Moses PTA     Pain Assessment    Pain Assessment 0-10  -CS 0-10  -AM     Pain Score 6  -CS 7  -AM     Post Pain Score 6  -CS 7  -AM     Pain Type  Acute pain  -AM     Pain Location Abdomen   legs, back, hernia  -CS Generalized  -AM     Pain Descriptors  Sore  -AM     Pain Frequency  Constant/continuous  -AM     Date Pain First Started  07/07/17  -AM     Clinical Progression  Not changed  -AM     Patient's Stated Pain Goal  No pain  -AM     Pain Intervention(s)  Medication (See MAR);Ambulation/increased activity  -AM     Result of Injury  No  -AM     Work-Related Injury  No  -AM     Multiple Pain Sites  No  -AM     Recorded by [CS] UZAIR Jhaveri/RAFAELA [AM] Oliver Moses PTA     Cognitive Assessment/Intervention    Current Cognitive/Communication Assessment functional  -CS functional  -AM     Orientation Status oriented x 4  -CS oriented x 4  -AM     Follows Commands/Answers Questions 100% of the time  -% of the time  -AM     Personal Safety Interventions  gait belt;nonskid shoes/slippers when out of bed;supervised activity  -AM     Recorded by [CS] UZAIR Jhaveri/RAFAELA [AM] Oliver Moses PTA     ROM (Range of Motion)    General ROM  no range of motion deficits identified  -AM     Recorded by  [AM] Oliver Moses PTA     Bed Mobility, Assessment/Treatment    Bed Mobility, Assistive Device   head of bed elevated;bed rails  -AM     Bed Mobility, Roll Left, [place]  not tested  -AM     Bed Mobility, Roll Right, [place]  not tested  -AM     Bed Mobility, Scoot/Bridge, [place]  not tested  -AM     Bed Mob, Supine to Sit, [place]  conditional independence  -AM     Bed Mob, Sit to Supine, [place]  conditional independence  -AM     Bed Mob, Sidelying to Sit, [place]  not tested  -AM     Bed Mob, Sit to Sidelying, [place]  not tested  -AM     Bed Mobility, Safety Issues  decreased use of arms for pushing/pulling;decreased use of legs for bridging/pushing  -AM     Bed Mobility, Impairments  strength decreased  -AM     Recorded by  [AM] Oliver Moses PTA     Transfer Assessment/Treatment    Transfers, Bed-Chair [place]  contact guard assist  -AM     Transfers, Chair-Bed [place]  contact guard assist  -AM     Transfers, Bed-Chair-Bed, Assist Device  rolling walker  -AM     Transfers, Sit-Stand [place]  contact guard assist  -AM     Transfers, Stand-Sit [place]  contact guard assist  -AM     Transfers, Sit-Stand-Sit, Assist Device  rolling walker  -AM     Toilet Transfer, [place]  not tested  -AM     Walk-In Shower Transfer, [place]  not tested  -AM     Bathtub Transfer, [place]  not tested  -AM     Transfer, Maintain Weight Bearing Status  able to maintain weight bearing status  -AM     Transfer, Safety Issues  step length decreased   verbal cues to stay closer to walker  -AM     Transfer, Impairments  strength decreased  -AM     Recorded by  [AM] Oliver Moses PTA     Gait Assessment/Treatment    Gait, [place] Level  contact guard assist  -AM     Gait, Assistive Device  rolling walker  -AM     Gait, Distance (Feet)  100  -AM     Gait, Gait Pattern Analysis  3-point gait  -AM     Gait, Gait Deviations  bilateral:;sergio decreased  -AM     Gait, Maintain Weight Bearing Status  able to maintain weight bearing status  -AM     Gait,  Safety Issues  step length decreased  -AM     Gait, Impairments  strength decreased  -AM     Recorded by  [AM] Oliver Moses PTA     Stairs Assessment/Treatment    Stairs, [place] Level  not tested  -AM     Recorded by  [AM] Oliver Moses PTA     Therapy Exercises    Bilateral Lower Extremities  AROM:;20 reps;sitting;ankle pumps/circles;glut sets;LAQ;other reps   pillow squeeze  -AM     Bilateral Upper Extremity AROM:;20 reps;sitting;elbow flexion/extension;hand pumps;pronation/supination;shoulder extension/flexion;shoulder ER/IR  -CS      BUE Resistance manual resistance- minimal  -CS      Recorded by [CS] UZAIR Jhaveri/RAFAELA [AM] Oliver Moses PTA     Positioning and Restraints    Pre-Treatment Position in bed  -CS in bed  -AM     Post Treatment Position bed  -CS chair  -AM     In Bed sitting EOB;call light within reach  -CS      In Chair  reclined;call light within reach;encouraged to call for assist;legs elevated  -AM     Recorded by [CS] UZAIR Jhaveri/RAFAELA [AM] Olivre Moses PTA       User Key  (r) = Recorded By, (t) = Taken By, (c) = Cosigned By    Initials Name Effective Dates    AM Oliver Moses PTA 10/17/16 -      RAUL hJaveri 10/17/16 -                 OT Goals       07/10/17 1625 07/09/17 1338       Transfer Training OT STG    Transfer Training OT STG, Date Established  07/09/17  -     Transfer Training OT STG, Time to Achieve  by discharge  -     Transfer Training OT STG, Activity Type  all transfers  -     Transfer Training OT STG, [place] Level  supervision required  -     Transfer Training OT STG, Date Goal Reviewed 07/10/17  -      Transfer Training OT STG, Outcome goal ongoing  -      Patient Education OT LTG    Patient Education OT LTG, Date Established  07/09/17  -     Patient Education OT LTG, Time to Achieve  by discharge  -     Patient Education OT LTG, Education Type  HEP;energy conservation;home safety;positioning;adaptive  equipment mgmt;adaptive breathing  -     Patient Education OT LTG, Education Understanding  verbalizes understanding;demonstrates adequately;independent   with caregiver assist as needed  -     Patient Education OT LTG, Date Goal Reviewed 07/10/17  -      Patient Education OT LTG Outcome goal ongoing  -      ADL OT LTG    ADL OT LTG, Date Established  07/09/17  -     ADL OT LTG, Time to Achieve  by discharge  -     ADL OT LTG, Activity Type  ADL skills  -     ADL OT LTG, Additional Goal  set up feeding/grooming; SBA toileting/bathing/dressing - AE/AD as needed  -     ADL OT LTG, Date Goal Reviewed 07/10/17  -      ADL OT LTG, Outcome goal ongoing  [facility]      Activity Tolerance OT STG    Activity Tolerance Goal OT STG, Date Established  07/09/17  -     Activity Tolerance Goal OT STG, Time to Achieve  by discharge  -     Activity Tolerance Goal OT STG, Activity Level  15 min activity;O2 sat >/equal to 88%;with 2 rest breaks  -     Activity Tolerance Goal OT STG, Date Goal Reviewed 07/10/17  -      Activity Tolerance Goal OT STG, Outcome goal [facility]      Endurance OT STG    Endurance Goal OT STG, Date Established  07/09/17  -     Endurance Goal OT STG, Time to Achieve  by discharge  -     Endurance Goal OT STG, Activity Level  endurance 2 fair  -     Endurance Goal OT STG, Date Goal Reviewed 07/10/17  -      Endurance Goal OT STG, Outcome goal ongoing  [facility]        User Key  (r) = Recorded By, (t) = Taken By, (c) = Cosigned By    Initials Name Provider Type     JUAN Villalobos/L Occupational Therapist    UZAIR Sparrow/RAFAELA Occupational Therapy Assistant          Occupational Therapy Education     Title: PT OT SLP Therapies (Active)     Topic: Occupational Therapy (Active)     Point: ADL training (Done)    Description: Instruct learner(s) on proper safety adaptation and remediation techniques during self care or transfers.   Instruct in proper use of assistive devices.     Learning Progress Summary    Learner Readiness Method Response Comment Documented by Status   Patient Acceptance E VU Educated on OT and POC. Educated to call for assist and not get up on his own. Educated on importance of further rehab and concerns with safety with O2 dropping and with ADL. Educated on safety w/ t/f, mobility, and ADL.  07/09/17 1500 Done   Family Acceptance E VU Educated on OT and POC. Educated to call for assist and not get up on his own. Educated on importance of further rehab and concerns with safety with O2 dropping and with ADL. Educated on safety w/ t/f, mobility, and ADL.  07/09/17 1500 Done               Point: Home exercise program (Active)    Description: Instruct learner(s) on appropriate technique for monitoring, assisting and/or progressing therapeutic exercises/activities.    Learning Progress Summary    Learner Readiness Method Response Comment Documented by Status   Patient Acceptance E,TB,D NR   07/10/17 1624 Active               Point: Precautions (Done)    Description: Instruct learner(s) on prescribed precautions during self-care and functional transfers.    Learning Progress Summary    Learner Readiness Method Response Comment Documented by Status   Patient Acceptance E VU Educated on OT and POC. Educated to call for assist and not get up on his own. Educated on importance of further rehab and concerns with safety with O2 dropping and with ADL. Educated on safety w/ t/f, mobility, and ADL.  07/09/17 1500 Done   Family Acceptance E VU Educated on OT and POC. Educated to call for assist and not get up on his own. Educated on importance of further rehab and concerns with safety with O2 dropping and with ADL. Educated on safety w/ t/f, mobility, and ADL.  07/09/17 1500 Done               Point: Body mechanics (Done)    Description: Instruct learner(s) on proper positioning and spine alignment during self-care, functional mobility activities and/or exercises.     Learning Progress Summary    Learner Readiness Method Response Comment Documented by Status   Patient Acceptance E VU Educated on OT and POC. Educated to call for assist and not get up on his own. Educated on importance of further rehab and concerns with safety with O2 dropping and with ADL. Educated on safety w/ t/f, mobility, and ADL.  07/09/17 1500 Done   Family Acceptance E VU Educated on OT and POC. Educated to call for assist and not get up on his own. Educated on importance of further rehab and concerns with safety with O2 dropping and with ADL. Educated on safety w/ t/f, mobility, and ADL.  07/09/17 1500 Done                      User Key     Initials Effective Dates Name Provider Type Discipline     10/17/16 -  JUAN Villalobos/L Occupational Therapist OT     10/17/16 -  UZAIR Jhaveri/RAFAELA Occupational Therapy Assistant OT                  OT Recommendation and Plan  Anticipated Equipment Needs At Discharge: shower chair, bedside commode  Anticipated Discharge Disposition: skilled nursing facility, home with 24/7 care, home with home health (depends on progress)  Planned Therapy Interventions: activity intolerance, adaptive equipment training, ADL retraining, balance training, bed mobility training, energy conservation, fine motor coordination training, home exercise program, motor coordination training, strengthening, transfer training  Therapy Frequency: other (see comments) (3-14x a week)  Plan of Care Review  Plan Of Care Reviewed With: patient  Progress: improving  Outcome Summary/Follow up Plan: Pt tolerated tx well this date. No goals met this tx. Continue POC.        Outcome Measures       07/10/17 1600 07/10/17 1400 07/09/17 1339    How much help from another person do you currently need...    Turning from your back to your side while in flat bed without using bedrails?  3  -AM     Moving from lying on back to sitting on the side of a flat bed without bedrails?  4  -AM     Moving  to and from a bed to a chair (including a wheelchair)?  3  -AM     Standing up from a chair using your arms (e.g., wheelchair, bedside chair)?  3  -AM     Climbing 3-5 steps with a railing?  1  -AM     To walk in hospital room?  3  -AM     AM-PAC 6 Clicks Score  17  -AM     How much help from another is currently needed...    Putting on and taking off regular lower body clothing? 2  -CS  2  -BH    Bathing (including washing, rinsing, and drying) 2  -CS  2  -BH    Toileting (which includes using toilet bed pan or urinal) 3  -CS  3  -BH    Putting on and taking off regular upper body clothing 3  -CS  3  -BH    Taking care of personal grooming (such as brushing teeth) 3  -CS  3  -BH    Eating meals 4  -CS  4  -BH    Score 17  -CS  17  -BH    Functional Assessment    Outcome Measure Options AM-PAC 6 Clicks Daily Activity (OT)  - AM-PAC 6 Clicks Basic Mobility (PT)  -AM AM-PAC 6 Clicks Daily Activity (OT)  -      07/09/17 0920          How much help from another person do you currently need...    Turning from your back to your side while in flat bed without using bedrails? 3  -ASTER      Moving from lying on back to sitting on the side of a flat bed without bedrails? 3  -ASTER      Moving to and from a bed to a chair (including a wheelchair)? 3  -ASTER      Standing up from a chair using your arms (e.g., wheelchair, bedside chair)? 3  -ASTER      Climbing 3-5 steps with a railing? 2  -ASTER      To walk in hospital room? 3  -ASTER      AM-PAC 6 Clicks Score 17  -ASTER      Functional Assessment    Outcome Measure Options AM-PAC 6 Clicks Basic Mobility (PT)  -ASTER        User Key  (r) = Recorded By, (t) = Taken By, (c) = Cosigned By    Initials Name Provider Type     Dannielle Sanders, OTR/L Occupational Therapist    ASTER Chapa, PT Physical Therapist    MARIAM Moses, BENJAMIN Physical Therapy Assistant    UZAIR Sparrow/RAFAELA Occupational Therapy Assistant           Time Calculation:         Time Calculation- OT       07/10/17  1626          Time Calculation- OT    OT Start Time 1558  -CS      OT Stop Time 1615  -CS      OT Time Calculation (min) 17 min  -CS      Total Timed Code Minutes- OT 17 minute(s)  -CS      OT Received On 07/10/17  -CS        User Key  (r) = Recorded By, (t) = Taken By, (c) = Cosigned By    Initials Name Provider Type    CS RAUL Jhaveri Occupational Therapy Assistant           Therapy Charges for Today     Code Description Service Date Service Provider Modifiers Qty    [id number]  OT THER PROC EA 15 MIN 7/10/2017 RAUL Jhaveri GO 1          OT G-codes  OT Professional Judgement Used?: Yes  OT Functional Scales Options: AM-PAC 6 Clicks Daily Activity (OT)  Score: 17  Functional Limitation: Self care  Self Care Current Status (): At least 40 percent but less than 60 percent impaired, limited or restricted  Self Care Goal Status (): At least 20 percent but less than 40 percent impaired, limited or restricted    RAUL Jhaveri  7/10/2017

## 2017-07-10 NOTE — PLAN OF CARE
Problem: Patient Care Overview (Adult)  Goal: Plan of Care Review  Outcome: Ongoing (interventions implemented as appropriate)  Goal: Adult Individualization and Mutuality  Outcome: Ongoing (interventions implemented as appropriate)  Goal: Discharge Needs Assessment  Outcome: Ongoing (interventions implemented as appropriate)    Problem: Fall Risk (Adult)  Goal: Absence of Falls  Outcome: Ongoing (interventions implemented as appropriate)    Problem: Anxiety (Adult)  Goal: Reduction/Resolution  Outcome: Ongoing (interventions implemented as appropriate)    Problem: Pain, Acute (Adult)  Goal: Acceptable Pain Control/Comfort Level  Outcome: Ongoing (interventions implemented as appropriate)

## 2017-07-10 NOTE — PLAN OF CARE
Problem: Patient Care Overview (Adult)  Goal: Plan of Care Review  Outcome: Ongoing (interventions implemented as appropriate)    07/10/17 1400   Coping/Psychosocial Response Interventions   Plan Of Care Reviewed With patient   Patient Care Overview   Progress progress toward functional goals as expected   Outcome Evaluation   Outcome Summary/Follow up Plan Pt did not meet any PT goals this tx. Pt able to amb 100 ft w/RW CGA. Pt would benefit from HH PT oce discharged from this faciltiy.        Goal: Discharge Needs Assessment  Outcome: Ongoing (interventions implemented as appropriate)    07/10/17 1400   Discharge Needs Assessment   Concerns To Be Addressed discharge planning concerns   Readmission Within The Last 30 Days no previous admission in last 30 days   Equipment Needed After Discharge walker, rolling   Discharge Facility/Level Of Care Needs home with home health   Current Discharge Risk chronically ill   Current Health   Anticipated Changes Related to Illness inability to care for self   Self-Care   Equipment Currently Used at Home cane, straight   Living Environment   Transportation Available family or friend will provide         Problem: Inpatient Physical Therapy  Goal: Transfer Training Goal 1 LTG- PT  Outcome: Ongoing (interventions implemented as appropriate)    07/09/17 1341 07/10/17 1400   Transfer Training PT LTG   Transfer Training PT LTG, Date Established 07/09/17 --    Transfer Training PT LTG, Time to Achieve by discharge --    Transfer Training PT LTG, Activity Type bed to chair /chair to bed;sit to stand/stand to sit;toilet --    Transfer Training PT LTG, [place] Level supervision required;conditional independence --    Transfer Training PT LTG, Assist Device (AAD) --    Transfer Training PT LTG, Date Goal Reviewed --  07/10/17   Transfer Training PT LTG, Outcome --  goal not met       Goal: Gait Training Goal LTG- PT  Outcome: Ongoing (interventions implemented as appropriate)     07/09/17 1341 07/10/17 1400   Gait Training PT LTG   Gait Training Goal PT LTG, Date Established 07/09/17 --    Gait Training Goal PT LTG, Time to Achieve by discharge --    Gait Training Goal PT LTG, [place] Level supervision required;conditional independence --    Gait Training Goal PT LTG, Distance to Achieve 50 ft;O2 sats will not drop below 90% --    Gait Training Goal PT LTG, Additional Goal 150ft;O2 sats will not drop below 92% --    Gait Training Goal PT LTG, Date Goal Reviewed --  07/10/17   Gait Training Goal PT LTG, Outcome --  goal not met       Goal: Stair Training Goal LTG- PT  Outcome: Ongoing (interventions implemented as appropriate)    07/09/17 1341 07/10/17 1400   Stair Training PT LTG   Stair Training Goal PT LTG, Date Established 07/09/17 --    Stair Training Goal PT LTG, Time to Achieve by discharge --    Stair Training Goal PT LTG, Number of Steps 5 --    Stair Training Goal PT LTG, [place] Level supervision required;contact guard assist --    Stair Training Goal PT LTG, Assist Device cane, straight --    Stair Training Goal PT LTG, Date Goal Reviewed --  07/10/17   Stair Training Goal PT LTG, Outcome --  goal not met       Goal: Strength Goal LTG- PT  Outcome: Ongoing (interventions implemented as appropriate)    07/09/17 1341 07/10/17 1400   Strength Goal PT LTG   Strength Goal PT LTG, Date Established 07/09/17 --    Strength Goal PT LTG, Time to Achieve by discharge --    Strength Goal PT LTG, Measure to Achieve Pt will perform 15 reps of AROM exercises --    Strength Goal PT LTG, Functional Goal Pt will progress to standing exercises --    Strength Goal PT LTG, Date Goal Reviewed --  07/10/17   Strength Goal PT LTG, Outcome --  goal partially met       Goal: Patient Education Goal LTG- PT  Outcome: Ongoing (interventions implemented as appropriate)    07/09/17 1341 07/10/17 1400   Patient Education PT LTG   Patient Education PT LTG, Date Established 07/09/17 --    Patient  Education PT LTG, Time to Achieve by discharge --    Patient Education PT LTG, Education Type HEP;written program;posture/body mechanics;gait;transfers;home safety --    Patient Education PT LTG, Date Goal Reviewed --  07/10/17   Patient Education PT LTG Outcome --  goal not met       Goal: Caregiver Training Goal LTG- PT  Outcome: Ongoing (interventions implemented as appropriate)    07/09/17 1341 07/10/17 1400   Caregiver Training PT LTG   Caregiver Training PT LTG, Date Established 07/09/17 --    Caregiver Training PT LTG, Time to Achieve by discharge --    Caregiver Training PT LTG, Activity Type Homecaregiver will demonstrate understanding assisting pt with transfers/ambulation with device as needed. Homecaregiver will acknowledge understanding of home care instructions --    Caregiver Training PT LTG, Date Goal Reviewed --  07/10/17   Caregiver Training PT LTG, Outcome --  goal not met

## 2017-07-10 NOTE — PLAN OF CARE
Problem: Patient Care Overview (Adult)  Goal: Plan of Care Review  Outcome: Ongoing (interventions implemented as appropriate)    07/10/17 1625   Coping/Psychosocial Response Interventions   Plan Of Care Reviewed With patient   Patient Care Overview   Progress improving   Outcome Evaluation   Outcome Summary/Follow up Plan Pt tolerated tx well this date. No goals met this tx. Continue POC.         Problem: Inpatient Occupational Therapy  Goal: Transfer Training Goal 1 STG- OT  Outcome: Ongoing (interventions implemented as appropriate)    07/09/17 1338 07/10/17 1625   Transfer Training OT STG   Transfer Training OT STG, Date Established 07/09/17 --    Transfer Training OT STG, Time to Achieve by discharge --    Transfer Training OT STG, Activity Type all transfers --    Transfer Training OT STG, [place] Level supervision required --    Transfer Training OT STG, Date Goal Reviewed --  07/10/17   Transfer Training OT STG, Outcome --  goal ongoing       Goal: Patient Education Goal LTG- OT  Outcome: Ongoing (interventions implemented as appropriate)    07/09/17 1338 07/10/17 1625   Patient Education OT LTG   Patient Education OT LTG, Date Established 07/09/17 --    Patient Education OT LTG, Time to Achieve by discharge --    Patient Education OT LTG, Education Type HEP;energy conservation;home safety;positioning;adaptive equipment mgmt;adaptive breathing --    Patient Education OT LTG, Education Understanding verbalizes understanding;demonstrates adequately;independent  (with caregiver assist as needed) --    Patient Education OT LTG, Date Goal Reviewed --  07/10/17   Patient Education OT LTG Outcome --  goal ongoing       Goal: ADL Goal LTG- OT  Outcome: Ongoing (interventions implemented as appropriate)    07/09/17 1338 07/10/17 1625   ADL OT LTG   ADL OT LTG, Date Established 07/09/17 --    ADL OT LTG, Time to Achieve by discharge --    ADL OT LTG, Activity Type ADL skills --    ADL OT LTG, Additional Goal set  up feeding/grooming; SBA toileting/bathing/dressing - AE/AD as needed --    ADL OT LTG, Date Goal Reviewed --  07/10/17   ADL OT LTG, Outcome --  goal ongoing       Goal: Activity Tolerance Goal STG- OT  Outcome: Ongoing (interventions implemented as appropriate)    07/09/17 1338 07/10/17 1625   Activity Tolerance OT STG   Activity Tolerance Goal OT STG, Date Established 07/09/17 --    Activity Tolerance Goal OT STG, Time to Achieve by discharge --    Activity Tolerance Goal OT STG, Activity Level 15 min activity;O2 sat >/equal to 88%;with 2 rest breaks --    Activity Tolerance Goal OT STG, Date Goal Reviewed --  07/10/17   Activity Tolerance Goal OT STG, Outcome --  goal ongoing       Goal: Endurance Goal STG- OT  Outcome: Ongoing (interventions implemented as appropriate)    07/09/17 1338 07/10/17 1625   Endurance OT STG   Endurance Goal OT STG, Date Established 07/09/17 --    Endurance Goal OT STG, Time to Achieve by discharge --    Endurance Goal OT STG, Activity Level endurance 2 fair --    Endurance Goal OT STG, Date Goal Reviewed --  07/10/17   Endurance Goal OT STG, Outcome --  goal ongoing

## 2017-07-10 NOTE — THERAPY TREATMENT NOTE
Acute Care - Physical Therapy Treatment Note  [facility]     Patient Name: Mickey Lind  : 1955  MRN: [id number]  Today's Date: 7/10/2017  Onset of Illness/Injury or Date of Surgery Date: 17  Date of Referral to PT: 17  Referring Physician: Dr. Carolee Leo    Admit Date: 2017    Visit Dx:    ICD-10-CM ICD-9-CM   1. Lower abdominal pain R10.30 789.09   2. Other ascites R18.8 789.59   3. Recurrent right pleural effusion J90 511.9   4. Impaired physical mobility Z74.09 781.99   5. Impaired mobility and ADLs Z74.09 799.89     Patient Active Problem List   Diagnosis   • End stage liver disease   • Depression   • Low back pain with sciatica   • Hyperlipidemia   • Unilateral inguinal hernia without obstruction or gangrene   • Diarrhea   • Hepatic cirrhosis   • Epigastric pain   • Peripheral vascular disease   • Encounter for venous access device care   • Multiple myeloma, stage 3   • Anemia   • Chronic hepatitis C without hepatic coma   • Lower abdominal pain   • Ascites due to alcoholic cirrhosis   • Tobacco dependence   • Simple chronic bronchitis   • Bacterial pneumonia   • Pleural effusion   • Ulcer of sacral region, stage 1               Adult Rehabilitation Note       07/10/17 1558 07/10/17 1400       Rehab Assessment/Intervention    Discipline occupational therapy assistant  -CS physical therapy assistant  -AM     Document Type therapy note (daily note)  -CS therapy note (daily note)  -AM     Subjective Information agree to therapy  -CS agree to therapy;no complaints  -AM     Patient Effort, Rehab Treatment  good  -AM     Symptoms Noted During/After Treatment  shortness of breath  -AM     Precautions/Limitations  fall precautions;oxygen therapy device and L/min  -AM     Equipment Issued to Patient  gait belt  -AM     Recorded by [CS] UZAIR hJaveri/RAFAELA [AM] Oliver Moses PTA     Vital Signs    Pre Systolic BP Rehab  135  -AM     Pre Treatment Diastolic BP  78  -AM     Post  Systolic BP Rehab  136  -AM     Post Treatment Diastolic BP  82  -AM     Pretreatment Heart Rate (beats/min) 92  -CS 98  -AM     Posttreatment Heart Rate (beats/min)  104  -AM     Pre SpO2 (%) 94  -CS 88  -AM     O2 Delivery Pre Treatment supplemental O2  -CS supplemental O2  -AM     Post SpO2 (%)  93  -AM     O2 Delivery Post Treatment  supplemental O2  -AM     Pre Patient Position Sitting  -CS Sitting  -AM     Intra Patient Position  Standing  -AM     Post Patient Position  Sitting  -AM     Recorded by [CS] UZAIR Jhaveri/RAFAELA [AM] Oliver Moses PTA     Pain Assessment    Pain Assessment 0-10  -CS 0-10  -AM     Pain Score 6  -CS 7  -AM     Post Pain Score  7  -AM     Pain Type  Acute pain  -AM     Pain Location Abdomen   legs, back, hernia  -CS Generalized  -AM     Pain Descriptors  Sore  -AM     Pain Frequency  Constant/continuous  -AM     Date Pain First Started  07/07/17  -AM     Clinical Progression  Not changed  -AM     Patient's Stated Pain Goal  No pain  -AM     Pain Intervention(s)  Medication (See MAR);Ambulation/increased activity  -AM     Result of Injury  No  -AM     Work-Related Injury  No  -AM     Multiple Pain Sites  No  -AM     Recorded by [CS] UZAIR Jhaveri/RAFAELA [AM] Oliver Moses PTA     Cognitive Assessment/Intervention    Current Cognitive/Communication Assessment functional  -CS functional  -AM     Orientation Status oriented x 4  -CS oriented x 4  -AM     Follows Commands/Answers Questions  100% of the time  -AM     Personal Safety Interventions  gait belt;nonskid shoes/slippers when out of bed;supervised activity  -AM     Recorded by [CS] UZAIR Jhaveri/RAFAELA [AM] Oliver Moses PTA     ROM (Range of Motion)    General ROM  no range of motion deficits identified  -AM     Recorded by  [AM] Oliver Moses PTA     Bed Mobility, Assessment/Treatment    Bed Mobility, Assistive Device  head of bed elevated;bed rails  -AM     Bed Mobility, Roll Left, [place]  not  tested  -AM     Bed Mobility, Roll Right, [place]  not tested  -AM     Bed Mobility, Scoot/Bridge, [place]  not tested  -AM     Bed Mob, Supine to Sit, [place]  conditional independence  -AM     Bed Mob, Sit to Supine, [place]  conditional independence  -AM     Bed Mob, Sidelying to Sit, [place]  not tested  -AM     Bed Mob, Sit to Sidelying, [place]  not tested  -AM     Bed Mobility, Safety Issues  decreased use of arms for pushing/pulling;decreased use of legs for bridging/pushing  -AM     Bed Mobility, Impairments  strength decreased  -AM     Recorded by  [AM] Oliver Moses PTA     Transfer Assessment/Treatment    Transfers, Bed-Chair [place]  contact guard assist  -AM     Transfers, Chair-Bed [place]  contact guard assist  -AM     Transfers, Bed-Chair-Bed, Assist Device  rolling walker  -AM     Transfers, Sit-Stand [place]  contact guard assist  -AM     Transfers, Stand-Sit [place]  contact guard assist  -AM     Transfers, Sit-Stand-Sit, Assist Device  rolling walker  -AM     Toilet Transfer, [place]  not tested  -AM     Walk-In Shower Transfer, [place]  not tested  -AM     Bathtub Transfer, [place]  not tested  -AM     Transfer, Maintain Weight Bearing Status  able to maintain weight bearing status  -AM     Transfer, Safety Issues  step length decreased   verbal cues to stay closer to walker  -AM     Transfer, Impairments  strength decreased  -AM     Recorded by  [AM] Oliver Moses PTA     Gait Assessment/Treatment    Gait, [place] Level  contact guard assist  -AM     Gait, Assistive Device  rolling walker  -AM     Gait, Distance (Feet)  100  -AM     Gait, Gait Pattern Analysis  3-point gait  -AM     Gait, Gait Deviations  bilateral:;sergio decreased  -AM     Gait, Maintain Weight Bearing Status  able to maintain weight bearing status  -AM     Gait, Safety Issues  step length decreased  -AM     Gait, Impairments  strength decreased   -AM     Recorded by  [AM] Oliver Moses PTA     Stairs Assessment/Treatment    Stairs, [place] Level  not tested  -AM     Recorded by  [AM] Oliver Moses PTA     Therapy Exercises    Bilateral Lower Extremities  AROM:;20 reps;sitting;ankle pumps/circles;glut sets;LAQ;other reps   pillow squeeze  -AM     Bilateral Upper Extremity AROM:;20 reps;sitting;elbow flexion/extension;hand pumps;pronation/supination;shoulder extension/flexion;shoulder ER/IR  -CS      BUE Resistance manual resistance- minimal  -CS      Recorded by [CS] UZAIR Jhaveri/RAFAELA [AM] Oliver Moses PTA     Positioning and Restraints    Pre-Treatment Position in bed  -CS in bed  -AM     Post Treatment Position bed  -CS chair  -AM     In Chair  reclined;call light within reach;encouraged to call for assist;legs elevated  -AM     Recorded by [CS] UZAIR Jhaveri/RAFAELA [AM] Oliver Moses PTA       User Key  (r) = Recorded By, (t) = Taken By, (c) = Cosigned By    Initials Name Effective Dates    AM Oliver Moses PTA 10/17/16 -     CS RAUL Jhaveri 10/17/16 -                 IP PT Goals       07/10/17 1400 07/09/17 1341       Transfer Training PT LTG    Transfer Training PT LTG, Date Established  07/09/17  -ASTER     Transfer Training PT LTG, Time to Achieve  by discharge  -ASTER     Transfer Training PT LTG, Activity Type  bed to chair /chair to bed;sit to stand/stand to sit;toilet  -ASTER     Transfer Training PT LTG, [place] Level  supervision required;conditional independence  -ASTER     Transfer Training PT LTG, Assist Device  --   AAD  -ASTER     Transfer Training PT  LTG, Date Goal Reviewed 07/10/17  -AM      Transfer Training PT LTG, Outcome goal not met  -AM goal ongoing  -ASTER     Gait Training PT LTG    Gait Training Goal PT LTG, Date Established  07/09/17  -ASTER     Gait Training Goal PT LTG, Time to Achieve  by discharge  -ASTER     Gait Training Goal PT LTG, [place] Level  supervision required;conditional independence   -     Gait Training Goal PT LTG, Distance to Achieve  50 ft;O2 sats will not drop below 90%  -     Gait Training Goal PT LTG, Additional Goal  150ft;O2 sats will not drop below 92%  -     Gait Training Goal PT LTG, Date Goal Reviewed 07/10/17  -AM      Gait Training Goal PT LTG, Outcome goal not met  -AM goal ongoing  -     Stair Training PT LTG    Stair Training Goal PT LTG, Date Established  07/09/17  -     Stair Training Goal PT LTG, Time to Achieve  by discharge  -     Stair Training Goal PT LTG, Number of Steps  5  -     Stair Training Goal PT LTG, [place] Level  supervision required;contact guard assist  -     Stair Training Goal PT LTG, Assist Device  cane, straight  -     Stair Training Goal PT LTG, Date Goal Reviewed 07/10/17  -AM      Stair Training Goal PT LTG, Outcome goal not met  -AM goal ongoing  -     Strength Goal PT LTG    Strength Goal PT LTG, Date Established  07/09/17  -     Strength Goal PT LTG, Time to Achieve  by discharge  -     Strength Goal PT LTG, Measure to Achieve  Pt will perform 15 reps of AROM exercises  -     Strength Goal PT LTG, Functional Goal  Pt will progress to standing exercises  -     Strength Goal PT LTG, Date Goal Reviewed 07/10/17  -AM      Strength Goal PT LTG, Outcome goal partially met  -AM goal ongoing  [facility]     Patient Education PT LTG    Patient Education PT LTG, Date Established  07/09/17  -     Patient Education PT LTG, Time to Achieve  by discharge  -     Patient Education PT LTG, Education Type  HEP;written program;posture/body mechanics;gait;transfers;home safety  -     Patient Education PT LTG, Date Goal Reviewed 07/10/17  -AM      Patient Education PT LTG Outcome goal not met  -AM goal ongoing  [facility]     Caregiver Training PT LTG    Caregiver Training PT LTG, Date Established  07/09/17  -     Caregiver Training PT LTG, Time to Achieve  by discharge  -     Caregiver Training PT LTG, Activity Type  Homecaregiver will  demonstrate understanding assisting pt with transfers/ambulation with device as needed. Homecaregiver will acknowledge understanding of home care instructions  -ASTER     Caregiver Training PT LTG, Date Goal Reviewed 07/10/17  -AM      Caregiver Training PT LTG, Outcome goal not met  -AM goal ongoing  -       User Key  (r) = Recorded By, (t) = Taken By, (c) = Cosigned By    Initials Name Provider Type    ASTER Chapa, PT Physical Therapist    AM Oliver Moses PTA Physical Therapy Assistant          Physical Therapy Education     Title: PT OT SLP Therapies (Active)     Topic: Physical Therapy (Active)     Point: Mobility training (Active)    Learning Progress Summary    Learner Readiness Method Response Comment Documented by Status   Patient Acceptance E,D NR   07/09/17 1339 Active               Point: Body mechanics (Active)    Learning Progress Summary    Learner Readiness Method Response Comment Documented by Status   Patient Acceptance E,D NR   07/09/17 1339 Active               Point: Precautions (Active)    Learning Progress Summary    Learner Readiness Method Response Comment Documented by Status   Patient Acceptance E,D NR   07/09/17 1339 Active                      User Key     Initials Effective Dates Name Provider Type Discipline     10/17/16 -  Erika Chapa, PT Physical Therapist PT                    PT Recommendation and Plan  Anticipated Equipment Needs At Discharge:  (pt has RW)  Anticipated Discharge Disposition: home with home health  Planned Therapy Interventions: balance training, bed mobility training, gait training, home exercise program, patient/family education, postural re-education, neuromuscular re-education, stair training, strengthening, transfer training  PT Frequency: other (see comments) (5-14 times per weej)  Plan of Care Review  Plan Of Care Reviewed With: patient  Progress: progress toward functional goals as expected  Outcome Summary/Follow up Plan: Pt did not meet  any PT goals this tx. Pt able to amb 100 ft w/RW CGA. Pt would benefit from  PT oce discharged from this faciltiy.           Outcome Measures       07/10/17 1400 07/09/17 1339 07/09/17 0920    How much help from another person do you currently need...    Turning from your back to your side while in flat bed without using bedrails? 3  -AM  3  -ASTER    Moving from lying on back to sitting on the side of a flat bed without bedrails? 4  -AM  3  -ASTER    Moving to and from a bed to a chair (including a wheelchair)? 3  -AM  3  -ASTER    Standing up from a chair using your arms (e.g., wheelchair, bedside chair)? 3  -AM  3  -ASTER    Climbing 3-5 steps with a railing? 1  -AM  2  -ASTER    To walk in hospital room? 3  -AM  3  -ASTER    AM-PAC 6 Clicks Score 17  -AM  17  -    How much help from another is currently needed...    Putting on and taking off regular lower body clothing?  2  -BH     Bathing (including washing, rinsing, and drying)  2  -BH     Toileting (which includes using toilet bed pan or urinal)  3  -BH     Putting on and taking off regular upper body clothing  3  -BH     Taking care of personal grooming (such as brushing teeth)  3  -BH     Eating meals  4  -     Score  17  -     Functional Assessment    Outcome Measure Options AM-PAC 6 Clicks Basic Mobility (PT)  -AM AM-PAC 6 Clicks Daily Activity (OT)  - AM-PAC 6 Clicks Basic Mobility (PT)  -      User Key  (r) = Recorded By, (t) = Taken By, (c) = Cosigned By    Initials Name Provider Type     Dannielle Sanders, OTR/L Occupational Therapist    ASTER Chapa, PT Physical Therapist    MARIAM Moses, PTA Physical Therapy Assistant           Time Calculation:         PT Charges       07/10/17 1400          Time Calculation    Start Time 1400  -AM      Stop Time 1430  -AM      Time Calculation (min) 30 min  -AM      PT Received On 07/10/17  -AM      PT - Next Appointment 07/11/17  -AM      Time Calculation- PT    Total Timed Code Minutes- PT 30 minute(s)  -AM         User Key  (r) = Recorded By, (t) = Taken By, (c) = Cosigned By    Initials Name Provider Type    AM Oliver Moses PTA Physical Therapy Assistant          Therapy Charges for Today     Code Description Service Date Service Provider Modifiers Qty    36186167944 HC GAIT TRAINING EA 15 MIN 7/10/2017 Oliver Moses PTA GP 1    25827403912 HC PT THER PROC EA 15 MIN 7/10/2017 Oliver Moses PTA GP 1          PT G-Codes  PT Professional Judgement Used?: Yes  Outcome Measure Options: AM-PAC 6 Clicks Basic Mobility (PT)  Score: 17  Functional Limitation: Mobility: Walking and moving around  Mobility: Walking and Moving Around Current Status (): At least 40 percent but less than 60 percent impaired, limited or restricted  Mobility: Walking and Moving Around Goal Status (): At least 1 percent but less than 20 percent impaired, limited or restricted    Oliver Moses PTA  7/10/2017

## 2017-07-10 NOTE — TELEPHONE ENCOUNTER
Received call from Cheri stating she was concerned that pt as no ins, and has the inability to pay for medication, and was afraid he would be discharged and not go   Home with all the need items. Due to no insurance assured ms that case management would not send him home until all his needs were met. Contact case management  Deann Noland and shared this information.

## 2017-07-10 NOTE — PLAN OF CARE
Problem: Patient Care Overview (Adult)  Goal: Plan of Care Review  Outcome: Ongoing (interventions implemented as appropriate)    07/10/17 1451   Coping/Psychosocial Response Interventions   Plan Of Care Reviewed With patient   Patient Care Overview   Progress no change   Outcome Evaluation   Outcome Summary/Follow up Plan pt. had paracentesis and withdrew 1300ml, had to increase 02 to 4LNC       Goal: Adult Individualization and Mutuality  Outcome: Ongoing (interventions implemented as appropriate)  Goal: Discharge Needs Assessment  Outcome: Ongoing (interventions implemented as appropriate)    Problem: Fall Risk (Adult)  Goal: Absence of Falls  Outcome: Ongoing (interventions implemented as appropriate)    Problem: Anxiety (Adult)  Goal: Reduction/Resolution  Outcome: Ongoing (interventions implemented as appropriate)    Problem: Pain, Acute (Adult)  Goal: Acceptable Pain Control/Comfort Level  Outcome: Ongoing (interventions implemented as appropriate)

## 2017-07-11 PROBLEM — J15.9 BACTERIAL PNEUMONIA: Status: RESOLVED | Noted: 2017-01-01 | Resolved: 2017-01-01

## 2017-07-11 NOTE — PROGRESS NOTES
FAMILY MEDICINE PROGRESS NOTE  NAME: Mickey Lind  : 1955  MRN: [id number]     LOS: 4 days   Full Code  PROVIDER OF SERVICE:     Chief Complaint:  Ascites due to alcoholic cirrhosis    Subjective      Interval History:  History taken from: patient  Subjective: Patient is a 60 yo  male who was admitted with abdominal pain and pneumonia.  He is coughing up a lot of phlegm.  He doesn't feel good.  He is eating better.  He had some bowel movements.    Review of Systems:   Review of Systems   Constitutional: Positive for appetite change, chills and fatigue.   HENT: Positive for congestion.    Respiratory: Positive for cough and shortness of breath.    Gastrointestinal: Positive for abdominal distention, abdominal pain and nausea. Negative for vomiting.   Genitourinary: Negative for dysuria and hematuria.   Musculoskeletal: Positive for arthralgias and back pain.   Neurological: Positive for weakness.       Objective     Vital Signs  Temp:  [97.1 °F (36.2 °C)-98.7 °F (37.1 °C)] 97.7 °F (36.5 °C)  Heart Rate:  [78-89] 82  Resp:  [20] 20  BP: (112-137)/(63-87) 135/70    Physical Exam  Physical Exam   Constitutional: He is oriented to person, place, and time. He appears well-developed and well-nourished. He appears distressed.   Cardiovascular: Normal rate, regular rhythm and intact distal pulses.  Exam reveals no gallop and no friction rub.    Murmur heard.  Pulmonary/Chest: Effort normal. No respiratory distress. He has wheezes. He has no rales.   Scattered rhonchi, diminished breath sounds on the right side   Abdominal: Soft. Bowel sounds are normal. There is tenderness. There is no rebound and no guarding.   Neurological: He is alert and oriented to person, place, and time.   Skin: He is not diaphoretic.   Psychiatric: He has a normal mood and affect. His behavior is normal. Judgment and thought content normal.   Nursing note and vitals reviewed.      Medication Review    Current  Facility-Administered Medications:   •  acyclovir (ZOVIRAX) tablet 400 mg, 400 mg, Oral, Daily, Carlos Hahn MD, 400 mg at 07/11/17 0938  •  atorvastatin (LIPITOR) tablet 10 mg, 10 mg, Oral, Daily, Carlos Hahn MD, 10 mg at 07/11/17 0935  •  dexamethasone (DECADRON) tablet 4 mg, 4 mg, Oral, Daily With Breakfast, Carlos Hahn MD, 4 mg at 07/11/17 0937  •  famotidine (PEPCID) tablet 40 mg, 40 mg, Oral, Daily, Carlos Hahn MD, 40 mg at 07/11/17 0937  •  furosemide (LASIX) tablet 40 mg, 40 mg, Oral, Daily, Carlos Hahn MD, 40 mg at 07/11/17 0937  •  gabapentin (NEURONTIN) capsule 400 mg, 400 mg, Oral, TID, Carlos Hahn MD, 400 mg at 07/11/17 1708  •  hydrALAZINE (APRESOLINE) injection 10 mg, 10 mg, Intravenous, Q6H PRN, Carlos Hahn MD  •  levoFLOXacin (LEVAQUIN) tablet 750 mg, 750 mg, Oral, Q24H, Ara Bach MD, 750 mg at 07/11/17 1309  •  LORazepam (ATIVAN) tablet 2 mg, 2 mg, Oral, Q6H PRN, Ara Bach MD  •  magic butt ointment, , Topical, TID, Carlos Hahn MD  •  naproxen (NAPROSYN) tablet 500 mg, 500 mg, Oral, BID With Meals, Ara Bach MD, 500 mg at 07/11/17 1709  •  nicotine (NICODERM CQ) 21 MG/24HR patch 1 patch, 1 patch, Transdermal, Q24H, Carlos Hahn MD, 1 patch at 07/11/17 1710  •  ondansetron (ZOFRAN) injection 4 mg, 4 mg, Intravenous, Q6H PRN, Carlos Hahn MD, 4 mg at 07/11/17 1309  •  polyethylene glycol (MIRALAX) powder 17 g, 17 g, Oral, Daily, Carlos Hahn MD, 17 g at 07/07/17 2146  •  prochlorperazine (COMPAZINE) tablet 10 mg, 10 mg, Oral, Q6H PRN, Carlos Hahn MD  •  sertraline (ZOLOFT) tablet 200 mg, 200 mg, Oral, Daily, Carlos Hahn MD, 200 mg at 07/11/17 0904  •  sodium chloride 0.9 % flush 1-10 mL, 1-10 mL, Intravenous, PRN, Carlos Hahn MD, 10 mL at 07/11/17 1311  •  Insert peripheral IV, , , Once **AND** sodium chloride 0.9 % flush 10 mL, 10 mL, Intravenous, PRN, Wilver Dougherty MD  •   spironolactone (ALDACTONE) tablet 50 mg, 50 mg, Oral, BID, Carlos Hahn MD, 50 mg at 07/11/17 1709  •  sucralfate (CARAFATE) 1 GM/10ML suspension 1 g, 1 g, Oral, Daily, Carlos Hahn MD, 1 g at 07/10/17 0837     Diagnostic Data      I reviewed the patient's new clinical results and imaging.      Assessment/Plan     Active Hospital Problems (** Indicates Principal Problem)    Diagnosis Date Noted   • **Ascites due to alcoholic cirrhosis [K70.31] 07/07/2017     -patient had a CT scan of the abdomen and pelvis performed which showed a large fluid collection in his abdomen as well as right sided pleural effusion, patient underwent a ultrasound guided paracentesis which they took out 3.9 liters of fluid  -currently on levaquin and flagyl, blood culture and urine culture no growth  -will d/c antibiotics     • Pleural effusion [J90] 07/10/2017     Continue lasix  Will get CXR       • Ulcer of sacral region, stage 1 [L89.151] 07/10/2017     Wound care consulted     • Lower abdominal pain [R10.30] 07/07/2017     -secondary to ascites from hep. C as well as alcoholic cirrhosis, patient is afebrile currently and has been for the last few weeks, no need for antibiotics at this time     • Tobacco dependence [F17.200] 07/07/2017     -admits to smoking a PPD, will give 21mg nicotine patch     • Simple chronic bronchitis [J41.0] 07/07/2017     -will provide with supplemental oxygen as needed as well as breathing treatments PRN  -patient had oxygen all night. Will wean him off tonight to see if he tolerates breathing without oxygen  - CXR to revisit the pleural effusion     • Chronic hepatitis C without hepatic coma [B18.2] 02/16/2017     -has been followed by Dr. Montalvo, will continue his home medication of zovirax     • Multiple myeloma, stage 3 [C90.00] 01/20/2017     -has a right sided port in, has been followed by Dr. Mendoza of heme-oncology. However, treatment on hold as patient does not have any insurance, will  monitor      • Unilateral inguinal hernia without obstruction or gangrene [K40.90] 09/10/2016     -large right sided inguinal hernia palpated and seen on abdominal CT for now, surgery mentioned him as not an operative candidate, will manage medically     • End stage liver disease [K72.90] 08/31/2016     -secondary to chronic hepatitis C, is being followed by Dr. Montalvo as an outpatient, will continue to monitor      • Depression [F32.9] 08/31/2016     -continue home medication of zoloft        Resolved Hospital Problems    Diagnosis Date Noted Date Resolved   • Bacterial pneumonia [J15.9] 07/07/2017 07/11/2017     Patient may benefit from thoracentesis.  Lot of social dynamics at home.  Cannot afford all of his meds.    I have reviewed the notes, assessments, and/or procedures performed by Dr. Xiao, I concur with his documentation of Mickey Lind.      DVT prophylaxis: SCDs/TEDs      Disposition:Home with home health          This document has been electronically signed by Carolee Leo MD on July 11, 2017 5:13 PM          This document has been electronically signed by Carolee Leo MD on July 11, 2017 5:13 PM

## 2017-07-11 NOTE — PLAN OF CARE
Problem: Patient Care Overview (Adult)  Goal: Plan of Care Review  Outcome: Ongoing (interventions implemented as appropriate)    07/11/17 1344   Coping/Psychosocial Response Interventions   Plan Of Care Reviewed With patient   Patient Care Overview   Progress improving   Outcome Evaluation   Outcome Summary/Follow up Plan VSS waiting on info about home oxygen       Goal: Adult Individualization and Mutuality  Outcome: Ongoing (interventions implemented as appropriate)    07/11/17 1344   Individualization   Patient Specific Goals wants to go home       Goal: Discharge Needs Assessment  Outcome: Ongoing (interventions implemented as appropriate)    07/11/17 1344   Discharge Needs Assessment   Concerns To Be Addressed basic needs concerns         Problem: Fall Risk (Adult)  Goal: Absence of Falls  Outcome: Ongoing (interventions implemented as appropriate)    07/11/17 1344   Fall Risk (Adult)   Absence of Falls making progress toward outcome         Problem: Anxiety (Adult)  Goal: Reduction/Resolution  Outcome: Ongoing (interventions implemented as appropriate)    07/11/17 1344   Anxiety (Adult)   Reduction/Resolution making progress toward outcome         Problem: Pain, Acute (Adult)  Goal: Acceptable Pain Control/Comfort Level  Outcome: Ongoing (interventions implemented as appropriate)    07/11/17 1344   Pain, Acute (Adult)   Acceptable Pain Control/Comfort Level making progress toward outcome

## 2017-07-11 NOTE — PLAN OF CARE
Problem: Patient Care Overview (Adult)  Goal: Plan of Care Review  Outcome: Ongoing (interventions implemented as appropriate)    07/11/17 1333   Coping/Psychosocial Response Interventions   Plan Of Care Reviewed With patient   Patient Care Overview   Progress progress toward functional goals as expected   Outcome Evaluation   Outcome Summary/Follow up Plan pt responded well to therapy w/ increased gait to 332 ft CGA w/ RW. pt completed 2 sets of seated ther ex. pt was edu on HEP and home safety. pt appears to have good understanding. no new goals met at this time. pt would continue to benefit from PT services.        Goal: Discharge Needs Assessment  Outcome: Ongoing (interventions implemented as appropriate)    07/10/17 1400 07/11/17 1344   Discharge Needs Assessment   Concerns To Be Addressed --  basic needs concerns   Readmission Within The Last 30 Days no previous admission in last 30 days --    Equipment Needed After Discharge walker, rolling --    Discharge Facility/Level Of Care Needs home with home health --    Current Discharge Risk chronically ill --    Current Health   Anticipated Changes Related to Illness inability to care for self --    Self-Care   Equipment Currently Used at Home cane, straight --    Living Environment   Transportation Available family or friend will provide --          Problem: Inpatient Physical Therapy  Goal: Transfer Training Goal 1 LTG- PT  Outcome: Ongoing (interventions implemented as appropriate)    07/09/17 1341 07/11/17 1333   Transfer Training PT LTG   Transfer Training PT LTG, Date Established 07/09/17 --    Transfer Training PT LTG, Time to Achieve by discharge --    Transfer Training PT LTG, Activity Type bed to chair /chair to bed;sit to stand/stand to sit;toilet --    Transfer Training PT LTG, [place] Level supervision required;conditional independence --    Transfer Training PT LTG, Assist Device (AAD) --    Transfer Training PT LTG, Date Goal Reviewed --   07/11/17   Transfer Training PT LTG, Outcome --  goal ongoing       Goal: Gait Training Goal LTG- PT  Outcome: Ongoing (interventions implemented as appropriate)    07/09/17 1341 07/11/17 1333   Gait Training PT LTG   Gait Training Goal PT LTG, Date Established 07/09/17 --    Gait Training Goal PT LTG, Time to Achieve by discharge --    Gait Training Goal PT LTG, [place] Level supervision required;conditional independence --    Gait Training Goal PT LTG, Distance to Achieve 50 ft;O2 sats will not drop below 90% --    Gait Training Goal PT LTG, Additional Goal 150ft;O2 sats will not drop below 92% --    Gait Training Goal PT LTG, Date Goal Reviewed --  07/11/17   Gait Training Goal PT LTG, Outcome --  goal ongoing       Goal: Stair Training Goal LTG- PT  Outcome: Ongoing (interventions implemented as appropriate)    07/09/17 1341 07/11/17 1333   Stair Training PT LTG   Stair Training Goal PT LTG, Date Established 07/09/17 --    Stair Training Goal PT LTG, Time to Achieve by discharge --    Stair Training Goal PT LTG, Number of Steps 5 --    Stair Training Goal PT LTG, [place] Level supervision required;contact guard assist --    Stair Training Goal PT LTG, Assist Device cane, straight --    Stair Training Goal PT LTG, Date Goal Reviewed --  07/11/17   Stair Training Goal PT LTG, Outcome --  goal ongoing       Goal: Strength Goal LTG- PT  Outcome: Ongoing (interventions implemented as appropriate)    07/09/17 1341 07/11/17 1333   Strength Goal PT LTG   Strength Goal PT LTG, Date Established 07/09/17 --    Strength Goal PT LTG, Time to Achieve by discharge --    Strength Goal PT LTG, Measure to Achieve Pt will perform 15 reps of AROM exercises --    Strength Goal PT LTG, Functional Goal Pt will progress to standing exercises --    Strength Goal PT LTG, Date Goal Reviewed --  07/11/17   Strength Goal PT LTG, Outcome --  goal ongoing       Goal: Patient Education Goal LTG- PT  Outcome: Ongoing (interventions  implemented as appropriate)    07/09/17 1341 07/11/17 1333   Patient Education PT LTG   Patient Education PT LTG, Date Established 07/09/17 --    Patient Education PT LTG, Time to Achieve by discharge --    Patient Education PT LTG, Education Type HEP;written program;posture/body mechanics;gait;transfers;home safety --    Patient Education PT LTG, Date Goal Reviewed --  07/11/17   Patient Education PT LTG Outcome --  goal ongoing

## 2017-07-11 NOTE — PROGRESS NOTES
FAMILY MEDICINE PROGRESS NOTE  NAME: Miceky Lind  : 1955  MRN: [id number]     LOS: 4 days   Full Code  PROVIDER OF SERVICE:     Chief Complaint:  Ascites due to alcoholic cirrhosis    Subjective      Interval History:  History taken from: patient  Subjective: Patient is a 60 yo  male who was admitted with abdominal pain and pneumonia.  He is coughing up a lot of phlegm.  He thinks his breathing is better.  He wants to go home.    Review of Systems:   Review of Systems   Constitutional: Positive for appetite change, chills and fatigue.   HENT: Positive for congestion.    Respiratory: Positive for cough and shortness of breath.    Gastrointestinal: Positive for abdominal distention, abdominal pain and nausea. Negative for vomiting.   Genitourinary: Negative for dysuria and hematuria.   Musculoskeletal: Positive for arthralgias and back pain.   Neurological: Positive for weakness.       Objective     Vital Signs  Temp:  [97.1 °F (36.2 °C)-98.7 °F (37.1 °C)] 97.7 °F (36.5 °C)  Heart Rate:  [78-89] 82  Resp:  [20] 20  BP: (112-137)/(63-87) 135/70    Physical Exam  Physical Exam   Constitutional: He is oriented to person, place, and time. He appears well-developed and well-nourished. He appears distressed.   Cardiovascular: Normal rate, regular rhythm and intact distal pulses.  Exam reveals no gallop and no friction rub.    Murmur heard.  Pulmonary/Chest: Effort normal. No respiratory distress. He has wheezes. He has no rales.   Scattered rhonchi, diminished breath sounds on the right side   Abdominal: Soft. Bowel sounds are normal. There is tenderness. There is no rebound and no guarding.   Neurological: He is alert and oriented to person, place, and time.   Skin: He is not diaphoretic.   Psychiatric: He has a normal mood and affect. His behavior is normal. Judgment and thought content normal.   Nursing note and vitals reviewed.      Medication Review    Current Facility-Administered Medications:      •  acyclovir (ZOVIRAX) tablet 400 mg, 400 mg, Oral, Daily, Carlos Hahn MD, 400 mg at 07/11/17 0938  •  atorvastatin (LIPITOR) tablet 10 mg, 10 mg, Oral, Daily, Carlos Hahn MD, 10 mg at 07/11/17 0935  •  dexamethasone (DECADRON) tablet 4 mg, 4 mg, Oral, Daily With Breakfast, Carlos Hahn MD, 4 mg at 07/11/17 0937  •  famotidine (PEPCID) tablet 40 mg, 40 mg, Oral, Daily, Carlos Hahn MD, 40 mg at 07/11/17 0937  •  furosemide (LASIX) tablet 40 mg, 40 mg, Oral, Daily, Carlos Hahn MD, 40 mg at 07/11/17 0937  •  gabapentin (NEURONTIN) capsule 400 mg, 400 mg, Oral, TID, Carlos Hahn MD, 400 mg at 07/11/17 1708  •  hydrALAZINE (APRESOLINE) injection 10 mg, 10 mg, Intravenous, Q6H PRN, Carlos Hahn MD  •  levoFLOXacin (LEVAQUIN) tablet 750 mg, 750 mg, Oral, Q24H, Ara Bach MD, 750 mg at 07/11/17 1309  •  LORazepam (ATIVAN) tablet 2 mg, 2 mg, Oral, Q6H PRN, Ara Bach MD  •  magic butt ointment, , Topical, TID, Carlos Hahn MD  •  naproxen (NAPROSYN) tablet 500 mg, 500 mg, Oral, BID With Meals, Ara Bach MD, 500 mg at 07/11/17 1709  •  nicotine (NICODERM CQ) 21 MG/24HR patch 1 patch, 1 patch, Transdermal, Q24H, Carlos Hahn MD, 1 patch at 07/11/17 1710  •  ondansetron (ZOFRAN) injection 4 mg, 4 mg, Intravenous, Q6H PRN, Carlos Hahn MD, 4 mg at 07/11/17 1309  •  polyethylene glycol (MIRALAX) powder 17 g, 17 g, Oral, Daily, Carlos Hahn MD, 17 g at 07/07/17 2146  •  prochlorperazine (COMPAZINE) tablet 10 mg, 10 mg, Oral, Q6H PRN, Carlos Hahn MD  •  sertraline (ZOLOFT) tablet 200 mg, 200 mg, Oral, Daily, Carlos Hahn MD, 200 mg at 07/11/17 0935  •  sodium chloride 0.9 % flush 1-10 mL, 1-10 mL, Intravenous, PRN, Carlos Hahn MD, 10 mL at 07/11/17 1311  •  Insert peripheral IV, , , Once **AND** sodium chloride 0.9 % flush 10 mL, 10 mL, Intravenous, PRN, Wilver Dougherty MD  •  spironolactone (ALDACTONE) tablet 50 mg, 50 mg,  Oral, BID, Carlos Hahn MD, 50 mg at 07/11/17 1709  •  sucralfate (CARAFATE) 1 GM/10ML suspension 1 g, 1 g, Oral, Daily, Carlos Hahn MD, 1 g at 07/10/17 0837     Diagnostic Data      I reviewed the patient's new clinical results and imaging.      Assessment/Plan     Active Hospital Problems (** Indicates Principal Problem)    Diagnosis Date Noted   • **Ascites due to alcoholic cirrhosis [K70.31] 07/07/2017     -patient had a CT scan of the abdomen and pelvis performed which showed a large fluid collection in his abdomen as well as right sided pleural effusion, patient underwent a ultrasound guided paracentesis which they took out 3.9 liters of fluid  -currently on levaquin and flagyl, blood culture and urine culture no growth  -will d/c antibiotics     • Pleural effusion [J90] 07/10/2017     Continue lasix  Will get CXR       • Ulcer of sacral region, stage 1 [L89.151] 07/10/2017     Wound care consulted     • Lower abdominal pain [R10.30] 07/07/2017     -secondary to ascites from hep. C as well as alcoholic cirrhosis, patient is afebrile currently and has been for the last few weeks, no need for antibiotics at this time     • Tobacco dependence [F17.200] 07/07/2017     -admits to smoking a PPD, will give 21mg nicotine patch     • Simple chronic bronchitis [J41.0] 07/07/2017     -will provide with supplemental oxygen as needed as well as breathing treatments PRN  -patient had oxygen all night. Will wean him off tonight to see if he tolerates breathing without oxygen  - CXR to revisit the pleural effusion     • Chronic hepatitis C without hepatic coma [B18.2] 02/16/2017     -has been followed by Dr. Montalvo, will continue his home medication of zovirax     • Multiple myeloma, stage 3 [C90.00] 01/20/2017     -has a right sided port in, has been followed by Dr. Mendoza of heme-oncology. However, treatment on hold as patient does not have any insurance, will monitor      • Unilateral inguinal hernia without  obstruction or gangrene [K40.90] 09/10/2016     -large right sided inguinal hernia palpated and seen on abdominal CT for now, surgery mentioned him as not an operative candidate, will manage medically     • End stage liver disease [K72.90] 08/31/2016     -secondary to chronic hepatitis C, is being followed by Dr. Montalvo as an outpatient, will continue to monitor      • Depression [F32.9] 08/31/2016     -continue home medication of zoloft        Resolved Hospital Problems    Diagnosis Date Noted Date Resolved   • Bacterial pneumonia [J15.9] 07/07/2017 07/11/2017         I have reviewed the notes, assessments, and/or procedures performed by Dr. Bach, I concur with her documentation of Mickey Lind.      DVT prophylaxis: SCDs/TEDs      Disposition:Home with home health          This document has been electronically signed by Carolee Leo MD on July 11, 2017 5:15 PM          This document has been electronically signed by Carolee Leo MD on July 11, 2017 5:15 PM

## 2017-07-11 NOTE — PLAN OF CARE
Problem: Patient Care Overview (Adult)  Goal: Plan of Care Review  Outcome: Ongoing (interventions implemented as appropriate)    07/11/17 1045 07/11/17 1344   Coping/Psychosocial Response Interventions   Plan Of Care Reviewed With --  patient   Patient Care Overview   Progress --  improving   Outcome Evaluation   Outcome Summary/Follow up Plan non new goals met this date --        Goal: Discharge Needs Assessment  Outcome: Ongoing (interventions implemented as appropriate)    07/10/17 1400 07/11/17 1344   Discharge Needs Assessment   Concerns To Be Addressed --  basic needs concerns   Readmission Within The Last 30 Days no previous admission in last 30 days --    Equipment Needed After Discharge walker, rolling --    Discharge Facility/Level Of Care Needs home with home health --    Current Discharge Risk chronically ill --    Current Health   Anticipated Changes Related to Illness inability to care for self --    Self-Care   Equipment Currently Used at Home cane, straight --    Living Environment   Transportation Available family or friend will provide --          Problem: Inpatient Occupational Therapy  Goal: Transfer Training Goal 1 STG- OT  Outcome: Ongoing (interventions implemented as appropriate)    07/09/17 1338 07/10/17 1625 07/11/17 1045   Transfer Training OT STG   Transfer Training OT STG, Date Established 07/09/17 --  --    Transfer Training OT STG, Time to Achieve by discharge --  --    Transfer Training OT STG, Activity Type all transfers --  --    Transfer Training OT STG, [place] Level supervision required --  --    Transfer Training OT STG, Date Goal Reviewed --  --  07/11/17   Transfer Training OT STG, Outcome --  goal ongoing --        Goal: Patient Education Goal LTG- OT  Outcome: Ongoing (interventions implemented as appropriate)    07/09/17 1338 07/11/17 1045   Patient Education OT LTG   Patient Education OT LTG, Date Established 07/09/17 --    Patient Education OT LTG, Time to Achieve  by discharge --    Patient Education OT LTG, Education Type HEP;energy conservation;home safety;positioning;adaptive equipment mgmt;adaptive breathing --    Patient Education OT LTG, Education Understanding verbalizes understanding;demonstrates adequately;independent  (with caregiver assist as needed) --    Patient Education OT LTG, Date Goal Reviewed --  07/11/17   Patient Education OT LTG Outcome --  goal not met       Goal: ADL Goal LTG- OT  Outcome: Ongoing (interventions implemented as appropriate)    07/09/17 1338 07/10/17 1625 07/11/17 1045   ADL OT LTG   ADL OT LTG, Date Established 07/09/17 --  --    ADL OT LTG, Time to Achieve by discharge --  --    ADL OT LTG, Activity Type ADL skills --  --    ADL OT LTG, Additional Goal set up feeding/grooming; SBA toileting/bathing/dressing - AE/AD as needed --  --    ADL OT LTG, Date Goal Reviewed --  --  07/11/17   ADL OT LTG, Outcome --  goal ongoing --        Goal: Activity Tolerance Goal STG- OT  Outcome: Ongoing (interventions implemented as appropriate)    07/09/17 1338 07/11/17 1045   Activity Tolerance OT STG   Activity Tolerance Goal OT STG, Date Established 07/09/17 --    Activity Tolerance Goal OT STG, Time to Achieve by discharge --    Activity Tolerance Goal OT STG, Activity Level 15 min activity;O2 sat >/equal to 88%;with 2 rest breaks --    Activity Tolerance Goal OT STG, Date Goal Reviewed --  07/11/17   Activity Tolerance Goal OT STG, Outcome --  goal not met       Goal: Endurance Goal STG- OT  Outcome: Ongoing (interventions implemented as appropriate)    07/09/17 1338 07/10/17 1625 07/11/17 1045   Endurance OT STG   Endurance Goal OT STG, Date Established 07/09/17 --  --    Endurance Goal OT STG, Time to Achieve by discharge --  --    Endurance Goal OT STG, Activity Level endurance 2 fair --  --    Endurance Goal OT STG, Date Goal Reviewed --  --  07/11/17   Endurance Goal OT STG, Outcome --  goal ongoing --

## 2017-07-11 NOTE — PROGRESS NOTES
FAMILY MEDICINE DAILY PROGRESS NOTE  NAME: Mickey Lind  : 1955  MRN: [id number]      LOS: 4 days     PROVIDER OF SERVICE: Ara Bach MD    Chief Complaint: Ascites due to alcoholic cirrhosis    Subjective:     Interval History:  History taken from: patient and chart.  Overnight, patient had his oxygen on all night. Not sure if he is requiring it overnight still. Patient is satting at oxygen of 96% at . He is been of off oxygen only for few hours.   Patient Complaints: of some diffuse abdominal pain still.     WBC better at 12.     Review of Systems   Constitutional: Positive for activity change, appetite change and fatigue. Negative for fever.   HENT: Positive for congestion. Negative for ear pain and sore throat.    Eyes: Negative for pain and visual disturbance.   Respiratory: Positive for cough. Negative for shortness of breath and wheezing.    Cardiovascular: Negative for chest pain and palpitations.   Gastrointestinal: Positive for abdominal pain. Negative for nausea.   Endocrine: Negative for cold intolerance and heat intolerance.   Genitourinary: Negative for difficulty urinating and dysuria.   Musculoskeletal: Negative for arthralgias and gait problem.   Skin: Positive for wound (decubitus ulcer). Negative for color change and rash.   Neurological: Negative for dizziness, weakness and headaches.   Hematological: Negative for adenopathy. Does not bruise/bleed easily.   Psychiatric/Behavioral: Negative for agitation, confusion and sleep disturbance.       Objective:     Vital Signs  Temp:  [97.4 °F (36.3 °C)-98.9 °F (37.2 °C)] 97.4 °F (36.3 °C)  Heart Rate:  [] 87  Resp:  [18-23] 20  BP: (112-155)/(69-87) 114/78  Body mass index is 21.38 kg/(m^2).    Physical Exam  Physical Exam   Constitutional: He is oriented to person, place, and time. He appears well-developed and well-nourished.   HENT:   Head: Normocephalic and atraumatic.   Eyes:   Wears glasses   Neck: Normal range of motion.  Neck supple.   Cardiovascular: Normal rate, regular rhythm and normal heart sounds.    Pulmonary/Chest: Effort normal. He has wheezes in the right upper field.   Abdominal: Soft. Bowel sounds are normal. He exhibits no distension. There is no tenderness. There is no rebound. A hernia (reducible and no pain elicited) is present. Hernia confirmed positive in the right inguinal area.   Musculoskeletal: Normal range of motion.   Port on right side under clavicle.    Neurological: He is alert and oriented to person, place, and time.   Skin: Skin is warm.   Psychiatric: He has a normal mood and affect. His behavior is normal. Judgment and thought content normal.   Nursing note and vitals reviewed.    Medication Review    Current Facility-Administered Medications:   •  acyclovir (ZOVIRAX) tablet 400 mg, 400 mg, Oral, Daily, Carlos Hahn MD, 400 mg at 07/10/17 0836  •  atorvastatin (LIPITOR) tablet 10 mg, 10 mg, Oral, Daily, Carlos Hahn MD, 10 mg at 07/10/17 0836  •  dexamethasone (DECADRON) tablet 4 mg, 4 mg, Oral, Daily With Breakfast, Carlos Hahn MD, 4 mg at 07/10/17 0837  •  famotidine (PEPCID) tablet 40 mg, 40 mg, Oral, Daily, Carlos Hahn MD, 40 mg at 07/10/17 0837  •  furosemide (LASIX) tablet 40 mg, 40 mg, Oral, Daily, Carlos Hahn MD, 40 mg at 07/10/17 0836  •  gabapentin (NEURONTIN) capsule 400 mg, 400 mg, Oral, TID, Carlos Hahn MD, 400 mg at 07/10/17 2143  •  hydrALAZINE (APRESOLINE) injection 10 mg, 10 mg, Intravenous, Q6H PRN, Carlos Hahn MD  •  levoFLOXacin (LEVAQUIN) tablet 750 mg, 750 mg, Oral, Q24H, Carlos Hahn MD, 750 mg at 07/10/17 2143  •  LORazepam (ATIVAN) tablet 1 mg, 1 mg, Oral, Q2H PRN **OR** LORazepam (ATIVAN) injection 1 mg, 1 mg, Intravenous, Q2H PRN **OR** LORazepam (ATIVAN) tablet 2 mg, 2 mg, Oral, Q1H PRN, 2 mg at 07/08/17 0001 **OR** LORazepam (ATIVAN) injection 2 mg, 2 mg, Intravenous, Q1H PRN **OR** LORazepam (ATIVAN) injection 2  mg, 2 mg, Intravenous, Q15 Min PRN **OR** LORazepam (ATIVAN) injection 2 mg, 2 mg, Intramuscular, Q15 Min PRN, Joel Antonio MD  •  magic butt ointment, , Topical, TID, Carlos Hahn MD  •  metroNIDAZOLE (FLAGYL) tablet 500 mg, 500 mg, Oral, Q8H, Carlos Hahn MD, 500 mg at 07/11/17 0524  •  Morphine sulfate (PF) injection 2 mg, 2 mg, Intravenous, Q4H PRN, 2 mg at 07/11/17 0230 **AND** naloxone (NARCAN) injection 0.4 mg, 0.4 mg, Intravenous, Q5 Min PRN, Carolee Leo MD  •  nicotine (NICODERM CQ) 21 MG/24HR patch 1 patch, 1 patch, Transdermal, Q24H, Carlos Hahn MD, 1 patch at 07/10/17 1704  •  ondansetron (ZOFRAN) injection 4 mg, 4 mg, Intravenous, Q6H PRN, Carlos Hahn MD  •  polyethylene glycol (MIRALAX) powder 17 g, 17 g, Oral, Daily, Carlos Hahn MD, 17 g at 07/07/17 2146  •  prochlorperazine (COMPAZINE) tablet 10 mg, 10 mg, Oral, Q6H PRN, Carlos Hahn MD  •  sertraline (ZOLOFT) tablet 200 mg, 200 mg, Oral, Daily, Carlos Hahn MD, 200 mg at 07/10/17 0836  •  sodium chloride 0.9 % flush 1-10 mL, 1-10 mL, Intravenous, PRN, Carlos Hahn MD  •  Insert peripheral IV, , , Once **AND** sodium chloride 0.9 % flush 10 mL, 10 mL, Intravenous, PRN, Wilver Dougherty MD  •  spironolactone (ALDACTONE) tablet 50 mg, 50 mg, Oral, BID, Carlos Hahn MD, 50 mg at 07/10/17 1703  •  sucralfate (CARAFATE) 1 GM/10ML suspension 1 g, 1 g, Oral, Daily, Carlos Hahn MD, 1 g at 07/10/17 0837     Diagnostic Data    Lab Results (last 24 hours)     Procedure Component Value Units Date/Time    Blood Culture [[id number]]  (Normal) Collected:  07/08/17 0740    Specimen:  Blood from Wrist, Left Updated:  07/10/17 0901     Blood Culture No growth at 2 days    Blood Culture [[id number]]  (Normal) Collected:  07/07/17 1843    Specimen:  Blood from Hand, Left Updated:  07/10/17 2001     Blood Culture No growth at 3 days    Body Fluid Culture [[id number]]  (Normal) Collected:  07/07/17  1536    Specimen:  Body Fluid from Abdominal Cavity Updated:  07/11/17 0638     BF Culture No growth at 3 days     Gram Stain Result Few (2+) WBCs seen      No organisms seen    CBC & Differential [[id number]] Collected:  07/11/17 0635    Specimen:  Blood Updated:  07/11/17 0642    Narrative:       The following orders were created for panel order CBC & Differential.  Procedure                               Abnormality         Status                     ---------                               -----------         ------                     CBC Auto Differential[[id number]]        Abnormal            Final result                 Please view results for these tests on the individual orders.    CBC Auto Differential [[id number]]  (Abnormal) Collected:  07/11/17 0635    Specimen:  Blood Updated:  07/11/17 0642     WBC 12.08 (H) 10*3/mm3      RBC 3.46 (L) 10*6/mm3      Hemoglobin 10.4 (L) g/dL      Hematocrit 31.5 (L) %      MCV 91.0 fL      MCH 30.1 pg      MCHC 33.0 g/dL      RDW 16.2 (H) %      RDW-SD 53.9 (H) fl      MPV 11.6 fL      Platelets 115 (L) 10*3/mm3      Neutrophil % 82.5 (H) %      Lymphocyte % 10.3 %      Monocyte % 6.7 %      Eosinophil % 0.1 %      Basophil % 0.1 %      Immature Grans % 0.3 %      Neutrophils, Absolute 9.97 (H) 10*3/mm3      Lymphocytes, Absolute 1.24 10*3/mm3      Monocytes, Absolute 0.81 10*3/mm3      Eosinophils, Absolute 0.01 10*3/mm3      Basophils, Absolute 0.01 10*3/mm3      Immature Grans, Absolute 0.04 (H) 10*3/mm3     Comprehensive Metabolic Panel [[id number]]  (Abnormal) Collected:  07/11/17 0635    Specimen:  Blood Updated:  07/11/17 0653     Glucose 154 (H) mg/dL      BUN 15 mg/dL      Creatinine 0.52 (L) mg/dL      Sodium 132 (L) mmol/L      Potassium 3.7 mmol/L      Chloride 99 mmol/L      CO2 26.0 mmol/L      Calcium 7.8 (L) mg/dL      Total Protein 5.5 (L) g/dL      Albumin 2.60 (L) g/dL      ALT (SGPT) 29 U/L      AST (SGOT) 36 U/L      Alkaline Phosphatase 94 U/L       Total Bilirubin 0.4 mg/dL      eGFR Non African Amer 161 (H) mL/min/1.73      Globulin 2.9 gm/dL      A/G Ratio 0.9 (L) g/dL      BUN/Creatinine Ratio 28.8 (H)     Anion Gap 7.0 mmol/L     Body Fluid Culture [[id number]]  (Normal) Collected:  07/10/17 1302    Specimen:  Body Fluid from Pleural Cavity Updated:  07/11/17 0714     BF Culture No growth at 24 hours     Gram Stain Result Many (4+) WBCs seen      No No organisms seen            I reviewed the patient's new clinical results.    Assessment/Plan:     Active Hospital Problems (** Indicates Principal Problem)    Diagnosis Date Noted   • **Ascites due to alcoholic cirrhosis [K70.31] 07/07/2017     -patient had a CT scan of the abdomen and pelvis performed which showed a large fluid collection in his abdomen as well as right sided pleural effusion, patient underwent a ultrasound guided paracentesis which they took out 3.9 liters of fluid  -currently on levaquin and flagyl, blood culture and urine culture no growth     • Pleural effusion [J90] 07/10/2017     Continue lasix, may need IR       • Ulcer of sacral region, stage 1 [L89.151] 07/10/2017     Wound care consulted     • Lower abdominal pain [R10.30] 07/07/2017     -secondary to ascites from hep. C as well as alcoholic cirrhosis, patient is afebrile currently and has been for the last few weeks, no need for antibiotics at this time     • Tobacco dependence [F17.200] 07/07/2017     -admits to smoking a PPD, will give 21mg nicotine patch     • Simple chronic bronchitis [J41.0] 07/07/2017     -will provide with supplemental oxygen as needed as well as breathing treatments PRN     • Bacterial pneumonia [J15.9] 07/07/2017   • Chronic hepatitis C without hepatic coma [B18.2] 02/16/2017     -has been followed by Dr. Montalvo, will continue his home medication of zovirax     • Multiple myeloma, stage 3 [C90.00] 01/20/2017     -has a right sided port in, has been followed by Dr. Mendoza of heme-oncology, will monitor         • Unilateral inguinal hernia without obstruction or gangrene [K40.90] 09/10/2016     -large right sided inguinal hernia palpated and seen on abdominal CT for now, surgery was consulted from the ER  -surgery was consulted and stated that it was reducible and that he was not an operative candidate      • End stage liver disease [K72.90] 08/31/2016     -secondary to chronic hepatitis C, is being followed by Dr. Montalvo as an outpatient, will continue to monitor      • Depression [F32.9] 08/31/2016     -continue home medication of zoloft        Resolved Hospital Problems    Diagnosis Date Noted Date Resolved   No resolved problems to display.     Plan: will keep him off oxygen for at least 24 hours to make sure he doesn't require them.   CXR to revisit the pleural effusion s/p  Continue PT/OT  Possible dc tomorrow    DVT prophylaxis: SCDs TEDs  Code status is Full Code          This document has been electronically signed by Ara Bach MD on July 11, 2017 8:02 AM    Ara Bach MD, [facility]  [address] [address] [phone]

## 2017-07-11 NOTE — THERAPY TREATMENT NOTE
"Acute Care - Physical Therapy Treatment Note  [facility]     Patient Name: Mickey Lind  : 1955  MRN: [id number]  Today's Date: 2017  Onset of Illness/Injury or Date of Surgery Date: 17  Date of Referral to PT: 17  Referring Physician: Dr. Carolee Leo    Admit Date: 2017    Visit Dx:    ICD-10-CM ICD-9-CM   1. Lower abdominal pain R10.30 789.09   2. Other ascites R18.8 789.59   3. Recurrent right pleural effusion J90 511.9   4. Impaired physical mobility Z74.09 781.99   5. Impaired mobility and ADLs Z74.09 799.89   6. Acute respiratory failure with hypoxia J96.01 518.81   7. Severe muscle deconditioning R29.898 781.99     Patient Active Problem List   Diagnosis   • End stage liver disease   • Depression   • Low back pain with sciatica   • Hyperlipidemia   • Unilateral inguinal hernia without obstruction or gangrene   • Diarrhea   • Hepatic cirrhosis   • Epigastric pain   • Peripheral vascular disease   • Encounter for venous access device care   • Multiple myeloma, stage 3   • Anemia   • Chronic hepatitis C without hepatic coma   • Lower abdominal pain   • Ascites due to alcoholic cirrhosis   • Tobacco dependence   • Simple chronic bronchitis   • Pleural effusion   • Ulcer of sacral region, stage 1               Adult Rehabilitation Note       17 1333 17 1045 07/10/17 1558    Rehab Assessment/Intervention    Discipline physical therapy assistant  -ASTER occupational therapy assistant  -KD occupational therapy assistant  -CS    Document Type therapy note (daily note)  -ASTER therapy note (daily note)  -KD therapy note (daily note)  -CS    Subjective Information agree to therapy  -ASTER agree to therapy  -KD agree to therapy  -CS    Patient Effort, Rehab Treatment good  -ASTER      Patient Effort, Rehab Treatment Comment pt reports he has poor blood flow in his legs causing them to \"give out\" at time.   -ASTER      Symptoms Noted During/After Treatment shortness of breath  -ASTER      " Precautions/Limitations fall precautions;oxygen therapy device and L/min  -ASTER      Recorded by [ASTER] Jorge Vargas PTA [KD] Sylvie Mcadams, DUNNE/L [CS] Liza Graham, DUNNE/L    Vital Signs    Pre Systolic BP Rehab 122  -ASTER 115  -KD     Pre Treatment Diastolic BP 63  -ASTER 70  -KD     Intra Treatment Diastolic   -ASTER      Post Systolic BP Rehab 64  -ASTER      Pretreatment Heart Rate (beats/min) 86  -ASTER 87  -KD 92  -CS    Intratreatment Heart Rate (beats/min) 86  -ASTER      Posttreatment Heart Rate (beats/min) 82  -ASTER 93  -KD 92  -CS    Pre SpO2 (%) 94  -ASTER 92  -KD 94  -CS    O2 Delivery Pre Treatment supplemental O2  -ASTER supplemental O2  -KD supplemental O2  -CS    Intra SpO2 (%) 88   after gait. improved to 93%  -ASTER      O2 Delivery Intra Treatment supplemental O2  -ASTER      Post SpO2 (%) 94  -ASTER 94  -KD 94  -CS    O2 Delivery Post Treatment supplemental O2  -ASTER supplemental O2  -KD supplemental O2  -CS    Pre Patient Position Supine  -ASTER Sitting  -KD Sitting  -CS    Intra Patient Position  Sitting  -KD Sitting  -CS    Post Patient Position Sitting  -ASTER Sitting  -KD Sitting  -CS    Recorded by [ASTER] Jorge Vargas PTA [KD] Sylvie Mcadams, DUNNE/L [CS] Liza Graham, DUNNE/L    Pain Assessment    Pain Assessment 0-10  -ASTER 0-10  -KD 0-10  -CS    Pain Score 6  -ASTER 5  -KD 6  -CS    Post Pain Score 6  -ASTER 5  -KD 6  -CS    Pain Type  Acute pain  -KD     Pain Location Generalized  -ASTER Abdomen  -KD Abdomen   legs, back, hernia  -CS    Pain Intervention(s) Repositioned;Ambulation/increased activity   pt defers pain meds.   -ASTER Medication (See MAR)  -KD     Recorded by [ASTER] Jorge Vargas PTA [KD] Sylvie Mcadams, DUNNE/L [CS] Liza Graham, DUNNE/L    Vision Assessment/Intervention    Visual Impairment WFL with corrective lenses  -ASTER      Recorded by [ASTER] Jorge Vargas PTA      Cognitive Assessment/Intervention    Current Cognitive/Communication Assessment functional  -ASTER functional  -KD functional  -CS     Orientation Status oriented x 4  -ASTER oriented x 4  -KD oriented x 4  -CS    Follows Commands/Answers Questions 100% of the time  -ASTER 100% of the time  -% of the time  -CS    Personal Safety Interventions gait belt;muscle strengthening facilitated;nonskid shoes/slippers when out of bed;supervised activity  -ASTER gait belt  -KD     Recorded by [ASTER] Jorge Vargas PTA [KD] CHANELL EsquivelA/L [CS] Liza Graham, DUNNE/L    Bed Mobility, Assessment/Treatment    Bed Mobility, Assistive Device bed rails;head of bed elevated  -ASTER      Bed Mob, Supine to Sit, [place] conditional independence  -ASTER conditional independence  -KD     Bed Mob, Sit to Supine, [place] conditional independence  -ASTER conditional independence  -KD     Recorded by [ASTER] Jorge Vargas PTA [KD] Sylvie Mcadams, DUNNE/L     Transfer Assessment/Treatment    Transfers, Bed-Chair [place] contact guard assist  -ASTER      Transfers, Chair-Bed [place] contact guard assist  -ASTER      Transfers, Bed-Chair-Bed, Assist Device rolling walker  -ASTER      Transfers, Sit-Stand [place] contact guard assist  -ASTER      Transfers, Stand-Sit [place] contact guard assist  -ASTER      Transfers, Sit-Stand-Sit, Assist Device rolling walker  -ASTER      Toilet Transfer, [place] contact guard assist  -ASTER      Toilet Transfer, Assistive Device rolling walker  -ASTER      Recorded by [ASTER] Jorge Vargas PTA      Gait Assessment/Treatment    Gait, [place] Level contact guard assist  -ASTER      Gait, Assistive Device rolling walker  -ASTER      Gait, Distance (Feet) 332   15 ft  -ASTER      Gait, Gait Deviations sergio decreased  -ASTER      Gait, Comment required  standing rest breaks  -ASTER      Recorded by [ASTER] Jorge Vargas PTA      Therapy Exercises    Bilateral Lower Extremities AROM:;LAQ;15 reps;sitting;ankle pumps/circles;hip flexion;glut sets;hip abduction/adduction   2 sets of each. Edu on HEP and home safety  -ASTER      Bilateral Upper Extremity   AROM:;20 reps;sitting;elbow flexion/extension;hand pumps;shoulder abduction/adduction;shoulder circles;shoulder extension/flexion;shoulder ER/IR  -KD AROM:;20 reps;sitting;elbow flexion/extension;hand pumps;pronation/supination;shoulder extension/flexion;shoulder ER/IR  -CS    BUE Resistance  manual resistance- moderate   2lb HW; 2 sets   -KD manual resistance- minimal  -CS    Recorded by [ASTER] Jorge Vargas PTA [KD] UZAIR Esquivel/RAFAELA [CS] UZAIR Jhaveri/L    Positioning and Restraints    Pre-Treatment Position in bed  -ASTER in bed  -KD in bed  -CS    Post Treatment Position bed  -ASTER bed  -KD bed  -CS    In Bed sitting;call light within reach;encouraged to call for assist;with family/caregiver  -ASTER sitting EOB;call light within reach;encouraged to call for assist;exit alarm on  -KD sitting EOB;call light within reach  -CS    Recorded by [ASTER] Jorge Vargas PTA [KD] UZAIR Esquivel/RAFAELA [CS] RAUL Jhaveri      07/10/17 1400          Rehab Assessment/Intervention    Discipline physical therapy assistant  -AM      Document Type therapy note (daily note)  -AM      Subjective Information agree to therapy;no complaints  -AM      Patient Effort, Rehab Treatment good  -AM      Symptoms Noted During/After Treatment shortness of breath  -AM      Precautions/Limitations fall precautions;oxygen therapy device and L/min  -AM      Equipment Issued to Patient gait belt  -AM      Recorded by [AM] Oliver Moses PTA      Vital Signs    Pre Systolic BP Rehab 135  -AM      Pre Treatment Diastolic BP 78  -AM      Post Systolic BP Rehab 136  -AM      Post Treatment Diastolic BP 82  -AM      Pretreatment Heart Rate (beats/min) 98  -AM      Posttreatment Heart Rate (beats/min) 104  -AM      Pre SpO2 (%) 88  -AM      O2 Delivery Pre Treatment supplemental O2  -AM      Post SpO2 (%) 93  -AM      O2 Delivery Post Treatment supplemental O2  -AM      Pre Patient Position Sitting  -AM      Intra Patient Position  Standing  -AM      Post Patient Position Sitting  -AM      Recorded by [AM] Oliver Moses PTA      Pain Assessment    Pain Assessment 0-10  -AM      Pain Score 7  -AM      Post Pain Score 7  -AM      Pain Type Acute pain  -AM      Pain Location Generalized  -AM      Pain Descriptors Sore  -AM      Pain Frequency Constant/continuous  -AM      Date Pain First Started 07/07/17  -AM      Clinical Progression Not changed  -AM      Patient's Stated Pain Goal No pain  -AM      Pain Intervention(s) Medication (See MAR);Ambulation/increased activity  -AM      Result of Injury No  -AM      Work-Related Injury No  -AM      Multiple Pain Sites No  -AM      Recorded by [AM] Oliver Moses PTA      Cognitive Assessment/Intervention    Current Cognitive/Communication Assessment functional  -AM      Orientation Status oriented x 4  -AM      Follows Commands/Answers Questions 100% of the time  -AM      Personal Safety Interventions gait belt;nonskid shoes/slippers when out of bed;supervised activity  -AM      Recorded by [AM] Oliver Moses PTA      ROM (Range of Motion)    General ROM no range of motion deficits identified  -AM      Recorded by [AM] Oliver Moses PTA      Bed Mobility, Assessment/Treatment    Bed Mobility, Assistive Device head of bed elevated;bed rails  -AM      Bed Mobility, Roll Left, [place] not tested  -AM      Bed Mobility, Roll Right, [place] not tested  -AM      Bed Mobility, Scoot/Bridge, [place] not tested  -AM      Bed Mob, Supine to Sit, [place] conditional independence  -AM      Bed Mob, Sit to Supine, [place] conditional independence  -AM      Bed Mob, Sidelying to Sit, [place] not tested  -AM      Bed Mob, Sit to Sidelying, [place] not tested  -AM      Bed Mobility, Safety Issues decreased use of arms for pushing/pulling;decreased use of legs for bridging/pushing  -AM      Bed Mobility, Impairments strength decreased  -AM      Recorded by [AM] Oliver MORALEZ  BENJAMIN Moses      Transfer Assessment/Treatment    Transfers, Bed-Chair [place] contact guard assist  -AM      Transfers, Chair-Bed [place] contact guard assist  -AM      Transfers, Bed-Chair-Bed, Assist Device rolling walker  -AM      Transfers, Sit-Stand [place] contact guard assist  -AM      Transfers, Stand-Sit [place] contact guard assist  -AM      Transfers, Sit-Stand-Sit, Assist Device rolling walker  -AM      Toilet Transfer, [place] not tested  -AM      Walk-In Shower Transfer, [place] not tested  -AM      Bathtub Transfer, [place] not tested  -AM      Transfer, Maintain Weight Bearing Status able to maintain weight bearing status  -AM      Transfer, Safety Issues step length decreased   verbal cues to stay closer to walker  -AM      Transfer, Impairments strength decreased  -AM      Recorded by [AM] Oliver Moses PTA      Gait Assessment/Treatment    Gait, [place] Level contact guard assist  -AM      Gait, Assistive Device rolling walker  -AM      Gait, Distance (Feet) 100  -AM      Gait, Gait Pattern Analysis 3-point gait  -AM      Gait, Gait Deviations bilateral:;sergio decreased  -AM      Gait, Maintain Weight Bearing Status able to maintain weight bearing status  -AM      Gait, Safety Issues step length decreased  -AM      Gait, Impairments strength decreased  -AM      Recorded by [AM] Oliver Moses PTA      Stairs Assessment/Treatment    Stairs, [place] Level not tested  -AM      Recorded by [AM] Oliver Moses PTA      Therapy Exercises    Bilateral Lower Extremities AROM:;20 reps;sitting;ankle pumps/circles;glut sets;LAQ;other reps   pillow squeeze  -AM      Recorded by [AM] Oliver Moses PTA      Positioning and Restraints    Pre-Treatment Position in bed  -AM      Post Treatment Position chair  -AM      In Chair reclined;call light within reach;encouraged to call for assist;legs elevated  -AM      Recorded by [AM] Oliver Moses PTA         User Key  (r) = Recorded By, (t) = Taken By, (c) = Cosigned By    Initials Name Effective Dates    ASTER Jorge Vargas, PTA 10/17/16 -     AM Oliver Moses, PTA 10/17/16 -     KD Sylvie Mcadams, DUNNE/L 10/17/16 -     CS Liza RAFAELA Graham, DUNNE/L 10/17/16 -                 IP PT Goals       07/11/17 1333 07/10/17 1400 07/09/17 1341    Transfer Training PT LTG    Transfer Training PT LTG, Date Established   07/09/17  -    Transfer Training PT LTG, Time to Achieve   by discharge  -    Transfer Training PT LTG, Activity Type   bed to chair /chair to bed;sit to stand/stand to sit;toilet  -    Transfer Training PT LTG, [place] Level   supervision required;conditional independence  -    Transfer Training PT LTG, Assist Device   --   AAD  -    Transfer Training PT  LTG, Date Goal Reviewed 07/11/17  -JCA 07/10/17  -AM     Transfer Training PT LTG, Outcome goal ongoing  -A goal not met  -AM goal ongoing  -    Gait Training PT LTG    Gait Training Goal PT LTG, Date Established   07/09/17  -    Gait Training Goal PT LTG, Time to Achieve   by discharge  -    Gait Training Goal PT LTG, [place] Level   supervision required;conditional independence  -    Gait Training Goal PT LTG, Distance to Achieve   50 ft;O2 sats will not drop below 90%  -    Gait Training Goal PT LTG, Additional Goal   150ft;O2 sats will not drop below 92%  -    Gait Training Goal PT LTG, Date Goal Reviewed 07/11/17  -JCA 07/10/17  -AM     Gait Training Goal PT LTG, Outcome goal ongoing  -A goal not met  -AM goal ongoing  -    Stair Training PT LTG    Stair Training Goal PT LTG, Date Established   07/09/17  -    Stair Training Goal PT LTG, Time to Achieve   by discharge  -    Stair Training Goal PT LTG, Number of Steps   5  -    Stair Training Goal PT LTG, [place] Level   supervision required;contact guard assist  -    Stair Training Goal PT LTG, Assist Device   cane, straight  -    Stair Training Goal PT  LTG, Date Goal Reviewed 07/11/17  -A 07/10/17  -AM     Stair Training Goal PT LTG, Outcome goal ongoing  -A goal not met  -AM goal ongoing  -    Strength Goal PT LTG    Strength Goal PT LTG, Date Established   07/09/17  -    Strength Goal PT LTG, Time to Achieve   by discharge  -    Strength Goal PT LTG, Measure to Achieve   Pt will perform 15 reps of AROM exercises  -    Strength Goal PT LTG, Functional Goal   Pt will progress to standing exercises  -    Strength Goal PT LTG, Date Goal Reviewed 07/11/17  -A 07/10/17  -AM     Strength Goal PT LTG, Outcome goal ongoing  -A goal partially met  -AM goal ongoing  -    Patient Education PT LTG    Patient Education PT LTG, Date Established   07/09/17  -    Patient Education PT LTG, Time to Achieve   by discharge  -    Patient Education PT LTG, Education Type   HEP;written program;posture/body mechanics;gait;transfers;home safety  -    Patient Education PT LTG, Date Goal Reviewed 07/11/17  -A 07/10/17  -AM     Patient Education PT LTG Outcome goal ongoing  [facility]  -[facility] goal ongoing  -    Caregiver Training PT LTG    Caregiver Training PT LTG, Date Established   07/09/17  -    Caregiver Training PT LTG, Time to Achieve   by discharge  -    Caregiver Training PT LTG, Activity Type   Homecaregiver will demonstrate understanding assisting pt with transfers/ambulation with device as needed. Homecaregiver will acknowledge understanding of home care instructions  -    Caregiver Training PT LTG, Date Goal Reviewed  07/10/17  -AM     Caregiver Training PT LTG, Outcome  goal not met  -AM goal ongoing  [facility]      User Key  (r) = Recorded By, (t) = Taken By, (c) = Cosigned By    Initials Name Provider Type    ASTER Chapa, PT Physical Therapist    LIBERTY Vargas, PTA Physical Therapy Assistant    MARIAM Moses PTA Physical Therapy Assistant          Physical Therapy Education     Title: PT OT SLP Therapies (Active)     Topic:  Physical Therapy (Active)     Point: Mobility training (Active)    Learning Progress Summary    Learner Readiness Method Response Comment Documented by Status   Patient Acceptance E NR   07/11/17 1419 Active    Acceptance E,D NR  [facility] 07/09/17 1339 Active               Point: Home exercise program (Done)    Learning Progress Summary    Learner Readiness Method Response Comment Documented by Status   Patient Acceptance E RICKIVASU edu on HEP and home safety.  07/11/17 1439 Done               Point: Body mechanics (Active)    Learning Progress Summary    Learner Readiness Method Response Comment Documented by Status   Patient Acceptance E,D NR  [facility] 07/09/17 1339 Active               Point: Precautions (Active)    Learning Progress Summary    Learner Readiness Method Response Comment Documented by Status   Patient Acceptance E,D NR  [facility] 07/09/17 1339 Active                      User Key     Initials Effective Dates Name Provider Type Discipline    [facility] 10/17/16 -  Erika Chapa, PT Physical Therapist PT     10/17/16 -  Jorge Vargas, PTA Physical Therapy Assistant PT                    PT Recommendation and Plan  Anticipated Equipment Needs At Discharge:  (pt has RW)  Anticipated Discharge Disposition: home with home health  Planned Therapy Interventions: balance training, bed mobility training, gait training, home exercise program, patient/family education, postural re-education, neuromuscular re-education, stair training, strengthening, transfer training  PT Frequency: other (see comments) (5-14 times per weej)  Plan of Care Review  Plan Of Care Reviewed With: patient  Progress: progress toward functional goals as expected  Outcome Summary/Follow up Plan: pt responded well to therapy w/ increased gait to 332 ft CGA w/ RW. pt completed 2 sets of seated ther ex. pt was edu on HEP and home safety. pt appears to have good understanding. no new goals met at this time. pt would continue to benefit from PT services.            Outcome Measures       07/11/17 1333 07/11/17 1045 07/10/17 1600    How much help from another person do you currently need...    Turning from your back to your side while in flat bed without using bedrails? 4  -ASTER      Moving from lying on back to sitting on the side of a flat bed without bedrails? 4  -ASTER      Moving to and from a bed to a chair (including a wheelchair)? 3  -ASTER      Standing up from a chair using your arms (e.g., wheelchair, bedside chair)? 3  -ASTER      Climbing 3-5 steps with a railing? 2  -ASTER      To walk in hospital room? 3  -ASTER      AM-PAC 6 Clicks Score 19  -ASTER      How much help from another is currently needed...    Putting on and taking off regular lower body clothing? 2  -KD 2  -KD 2  -CS    Bathing (including washing, rinsing, and drying) 2  -KD 2  -KD 2  -CS    Toileting (which includes using toilet bed pan or urinal) 3  -KD 3  -KD 3  -CS    Putting on and taking off regular upper body clothing 3  -KD 3  -KD 3  -CS    Taking care of personal grooming (such as brushing teeth) 3  -KD 3  -KD 3  -CS    Eating meals 4  -KD 4  -KD 4  -CS    Score 17  -KD 17  -KD 17  -CS    Functional Assessment    Outcome Measure Options AM-PAC 6 Clicks Basic Mobility (PT)  -ASTER  AM-PAC 6 Clicks Daily Activity (OT)  -CS      07/10/17 1400 07/09/17 1339 07/09/17 0920    How much help from another person do you currently need...    Turning from your back to your side while in flat bed without using bedrails? 3  -AM  3  -JCA    Moving from lying on back to sitting on the side of a flat bed without bedrails? 4  -AM  3  -JCA    Moving to and from a bed to a chair (including a wheelchair)? 3  -AM  3  -JCA    Standing up from a chair using your arms (e.g., wheelchair, bedside chair)? 3  -AM  3  -JCA    Climbing 3-5 steps with a railing? 1  -AM  2  -JCA    To walk in hospital room? 3  -AM  3  -JCA    AM-PAC 6 Clicks Score 17  -AM  17  -JCA    How much help from another is currently needed...    Putting on and  taking off regular lower body clothing?  2  -BH     Bathing (including washing, rinsing, and drying)  2  -BH     Toileting (which includes using toilet bed pan or urinal)  3  -BH     Putting on and taking off regular upper body clothing  3  -BH     Taking care of personal grooming (such as brushing teeth)  3  -BH     Eating meals  4  -     Score  17  -     Functional Assessment    Outcome Measure Options AM-PAC 6 Clicks Basic Mobility (PT)  -AM AM-PAC 6 Clicks Daily Activity (OT)  - AM-PAC 6 Clicks Basic Mobility (PT)  -JCA      User Key  (r) = Recorded By, (t) = Taken By, (c) = Cosigned By    Initials Name Provider Type     Dannielle Sanders, OTR/L Occupational Therapist    LIBERTY Chapa, PT Physical Therapist    ASTER Vargas, PTA Physical Therapy Assistant    MARIAM Moses, PTA Physical Therapy Assistant    ROSANGELA Mcadams, DUNNE/L Occupational Therapy Assistant     Liza Graham, DUNNE/L Occupational Therapy Assistant           Time Calculation:         PT Charges       07/11/17 1510          Time Calculation    Start Time 1333  -ASTER      Stop Time 1431  -ASTER      Time Calculation (min) 58 min  -ASTER      Time Calculation- PT    Total Timed Code Minutes- PT 58 minute(s)  -ASTER        User Key  (r) = Recorded By, (t) = Taken By, (c) = Cosigned By    Initials Name Provider Type     Jorge Vargas PTA Physical Therapy Assistant          Therapy Charges for Today     Code Description Service Date Service Provider Modifiers Qty    55857266633 HC GAIT TRAINING EA 15 MIN 7/11/2017 Jorge Vargas PTA GP 1    51336211952 HC PT SELF CARE/MGMT/TRAIN EA 15 MIN 7/11/2017 Jorge Vargas PTA GP 1    13807848603 HC PT THER PROC EA 15 MIN 7/11/2017 Jorge Vargas PTA GP 2          PT G-Codes  PT Professional Judgement Used?: Yes  Outcome Measure Options: AM-PAC 6 Clicks Basic Mobility (PT)  Score: 17  Functional Limitation: Mobility: Walking and moving around  Mobility: Walking and Moving Around  Current Status (): At least 40 percent but less than 60 percent impaired, limited or restricted  Mobility: Walking and Moving Around Goal Status (): At least 1 percent but less than 20 percent impaired, limited or restricted    Jorge Vargas, PTA  7/11/2017

## 2017-07-11 NOTE — THERAPY TREATMENT NOTE
"Acute Care - Occupational Therapy Treatment Note  [facility]     Patient Name: Mickey Lind  : 1955  MRN: [id number]  Today's Date: 2017  Onset of Illness/Injury or Date of Surgery Date: 17  Date of Referral to OT: 17  Referring Physician: Dr. Carolee Leo      Admit Date: 2017    Visit Dx:     ICD-10-CM ICD-9-CM   1. Lower abdominal pain R10.30 789.09   2. Other ascites R18.8 789.59   3. Recurrent right pleural effusion J90 511.9   4. Impaired physical mobility Z74.09 781.99   5. Impaired mobility and ADLs Z74.09 799.89   6. Acute respiratory failure with hypoxia J96.01 518.81   7. Severe muscle deconditioning R29.898 781.99     Patient Active Problem List   Diagnosis   • End stage liver disease   • Depression   • Low back pain with sciatica   • Hyperlipidemia   • Unilateral inguinal hernia without obstruction or gangrene   • Diarrhea   • Hepatic cirrhosis   • Epigastric pain   • Peripheral vascular disease   • Encounter for venous access device care   • Multiple myeloma, stage 3   • Anemia   • Chronic hepatitis C without hepatic coma   • Lower abdominal pain   • Ascites due to alcoholic cirrhosis   • Tobacco dependence   • Simple chronic bronchitis   • Pleural effusion   • Ulcer of sacral region, stage 1             Adult Rehabilitation Note       17 1333 17 1045 07/10/17 1558    Rehab Assessment/Intervention    Discipline physical therapy assistant  -ASTER occupational therapy assistant  -KD occupational therapy assistant  -CS    Document Type therapy note (daily note)  -ASTER therapy note (daily note)  -KD therapy note (daily note)  -CS    Subjective Information agree to therapy  -ASTER agree to therapy  -KD agree to therapy  -CS    Patient Effort, Rehab Treatment good  -ASTER      Patient Effort, Rehab Treatment Comment pt reports he has poor blood flow in his legs causing them to \"give out\" at time.   -ASTER      Symptoms Noted During/After Treatment shortness of breath  -ASTER  "     Precautions/Limitations fall precautions;oxygen therapy device and L/min  -ASTER      Recorded by [ASTER] Jorge Vargas PTA [KD] Sylvie Mcadams, DUNNE/L [CS] Liza Graham, DUNNE/L    Vital Signs    Pre Systolic BP Rehab 122  -ASTER 115  -KD     Pre Treatment Diastolic BP 63  -ASTER 70  -KD     Intra Treatment Diastolic   -ASTER      Post Systolic BP Rehab 64  -ASTER      Pretreatment Heart Rate (beats/min) 86  -ASTER 87  -KD 92  -CS    Intratreatment Heart Rate (beats/min) 86  -ASTER      Posttreatment Heart Rate (beats/min) 82  -ASTER 93  -KD 92  -CS    Pre SpO2 (%) 94  -ASTER 92  -KD 94  -CS    O2 Delivery Pre Treatment supplemental O2  -ASTER supplemental O2  -KD supplemental O2  -CS    Intra SpO2 (%) 88   after gait. improved to 93%  -ASTER      O2 Delivery Intra Treatment supplemental O2  -ASTER      Post SpO2 (%) 94  -ASTER 94  -KD 94  -CS    O2 Delivery Post Treatment supplemental O2  -ASTER supplemental O2  -KD supplemental O2  -CS    Pre Patient Position Supine  -ASTER Sitting  -KD Sitting  -CS    Intra Patient Position  Sitting  -KD Sitting  -CS    Post Patient Position Sitting  -ASTER Sitting  -KD Sitting  -CS    Recorded by [ASTER] Jorge Vargas PTA [KD] Sylvie Mcadams, DUNNE/L [CS] Liza Graham, DUNNE/L    Pain Assessment    Pain Assessment 0-10  -ASTER 0-10  -KD 0-10  -CS    Pain Score 6  -ASTER 5  -KD 6  -CS    Post Pain Score 6  -ASTER 5  -KD 6  -CS    Pain Type  Acute pain  -KD     Pain Location Generalized  -ASTER Abdomen  -KD Abdomen   legs, back, hernia  -CS    Pain Intervention(s) Repositioned;Ambulation/increased activity   pt defers pain meds.   -ASTER Medication (See MAR)  -KD     Recorded by [ASTER] Jorge Vargas PTA [KD] Sylvie Mcadams, DUNNE/L [CS] Liza Graham, DUNNE/L    Vision Assessment/Intervention    Visual Impairment WFL with corrective lenses  -ASTER      Recorded by [ASTER] Jorge Vargas PTA      Cognitive Assessment/Intervention    Current Cognitive/Communication Assessment functional  -ASTER functional  -KD functional  -CS     Orientation Status oriented x 4  -ASTER oriented x 4  -KD oriented x 4  -CS    Follows Commands/Answers Questions 100% of the time  -ASTER 100% of the time  -% of the time  -CS    Personal Safety Interventions gait belt;muscle strengthening facilitated;nonskid shoes/slippers when out of bed;supervised activity  -ASTER gait belt  -KD     Recorded by [ASTER] Jorge Vargas PTA [KD] CHANELL EsquivelA/L [CS] Liza Graham, DUNNE/L    Bed Mobility, Assessment/Treatment    Bed Mobility, Assistive Device bed rails;head of bed elevated  -ASTER      Bed Mob, Supine to Sit, [place] conditional independence  -ASTER conditional independence  -KD     Bed Mob, Sit to Supine, [place] conditional independence  -ASTER conditional independence  -KD     Recorded by [ASTER] Jorge Vargas PTA [KD] Sylvie Mcadams, DUNNE/L     Transfer Assessment/Treatment    Transfers, Bed-Chair [place] contact guard assist  -ASTER      Transfers, Chair-Bed [place] contact guard assist  -ASTER      Transfers, Bed-Chair-Bed, Assist Device rolling walker  -ASTER      Transfers, Sit-Stand [place] contact guard assist  -ASTER      Transfers, Stand-Sit [place] contact guard assist  -ASTER      Transfers, Sit-Stand-Sit, Assist Device rolling walker  -ASTER      Toilet Transfer, [place] contact guard assist  -ASTER      Toilet Transfer, Assistive Device rolling walker  -ASTER      Recorded by [ASTER] Jorge Vargas PTA      Gait Assessment/Treatment    Gait, [place] Level contact guard assist  -ASTER      Gait, Assistive Device rolling walker  -ASTER      Gait, Distance (Feet) 332   15 ft  -ASTER      Gait, Gait Deviations sergio decreased  -ASTER      Gait, Comment required  standing rest breaks  -ASTER      Recorded by [ASTER] Jorge Vargas PTA      Therapy Exercises    Bilateral Lower Extremities AROM:;LAQ;15 reps;sitting;ankle pumps/circles;hip flexion;glut sets;hip abduction/adduction   2 sets of each. Edu on HEP and home safety  -ASTER      Bilateral Upper Extremity   AROM:;20 reps;sitting;elbow flexion/extension;hand pumps;shoulder abduction/adduction;shoulder circles;shoulder extension/flexion;shoulder ER/IR  -KD AROM:;20 reps;sitting;elbow flexion/extension;hand pumps;pronation/supination;shoulder extension/flexion;shoulder ER/IR  -CS    BUE Resistance  manual resistance- moderate   2lb HW; 2 sets   -KD manual resistance- minimal  -CS    Recorded by [ASTER] Jorge Vargas PTA [KD] UZAIR Esquivel/RAFAELA [CS] UZAIR Jhaveri/L    Positioning and Restraints    Pre-Treatment Position in bed  -ASTER in bed  -KD in bed  -CS    Post Treatment Position bed  -ASTER bed  -KD bed  -CS    In Bed sitting;call light within reach;encouraged to call for assist;with family/caregiver  -ASTER sitting EOB;call light within reach;encouraged to call for assist;exit alarm on  -KD sitting EOB;call light within reach  -CS    Recorded by [ASTER] Jorge Vargas PTA [KD] UZAIR Esquivel/RAFAELA [CS] RAUL Jhaveri      07/10/17 1400          Rehab Assessment/Intervention    Discipline physical therapy assistant  -AM      Document Type therapy note (daily note)  -AM      Subjective Information agree to therapy;no complaints  -AM      Patient Effort, Rehab Treatment good  -AM      Symptoms Noted During/After Treatment shortness of breath  -AM      Precautions/Limitations fall precautions;oxygen therapy device and L/min  -AM      Equipment Issued to Patient gait belt  -AM      Recorded by [AM] Oliver Moses PTA      Vital Signs    Pre Systolic BP Rehab 135  -AM      Pre Treatment Diastolic BP 78  -AM      Post Systolic BP Rehab 136  -AM      Post Treatment Diastolic BP 82  -AM      Pretreatment Heart Rate (beats/min) 98  -AM      Posttreatment Heart Rate (beats/min) 104  -AM      Pre SpO2 (%) 88  -AM      O2 Delivery Pre Treatment supplemental O2  -AM      Post SpO2 (%) 93  -AM      O2 Delivery Post Treatment supplemental O2  -AM      Pre Patient Position Sitting  -AM      Intra Patient Position  Standing  -AM      Post Patient Position Sitting  -AM      Recorded by [AM] Oliver Moses PTA      Pain Assessment    Pain Assessment 0-10  -AM      Pain Score 7  -AM      Post Pain Score 7  -AM      Pain Type Acute pain  -AM      Pain Location Generalized  -AM      Pain Descriptors Sore  -AM      Pain Frequency Constant/continuous  -AM      Date Pain First Started 07/07/17  -AM      Clinical Progression Not changed  -AM      Patient's Stated Pain Goal No pain  -AM      Pain Intervention(s) Medication (See MAR);Ambulation/increased activity  -AM      Result of Injury No  -AM      Work-Related Injury No  -AM      Multiple Pain Sites No  -AM      Recorded by [AM] Oliver Moses PTA      Cognitive Assessment/Intervention    Current Cognitive/Communication Assessment functional  -AM      Orientation Status oriented x 4  -AM      Follows Commands/Answers Questions 100% of the time  -AM      Personal Safety Interventions gait belt;nonskid shoes/slippers when out of bed;supervised activity  -AM      Recorded by [AM] Oliver Moses PTA      ROM (Range of Motion)    General ROM no range of motion deficits identified  -AM      Recorded by [AM] Oliver Moses PTA      Bed Mobility, Assessment/Treatment    Bed Mobility, Assistive Device head of bed elevated;bed rails  -AM      Bed Mobility, Roll Left, [place] not tested  -AM      Bed Mobility, Roll Right, [place] not tested  -AM      Bed Mobility, Scoot/Bridge, [place] not tested  -AM      Bed Mob, Supine to Sit, [place] conditional independence  -AM      Bed Mob, Sit to Supine, [place] conditional independence  -AM      Bed Mob, Sidelying to Sit, [place] not tested  -AM      Bed Mob, Sit to Sidelying, [place] not tested  -AM      Bed Mobility, Safety Issues decreased use of arms for pushing/pulling;decreased use of legs for bridging/pushing  -AM      Bed Mobility, Impairments strength decreased  -AM      Recorded by [AM] Oliver MORALEZ  BENJAMIN Moses      Transfer Assessment/Treatment    Transfers, Bed-Chair [place] contact guard assist  -AM      Transfers, Chair-Bed [place] contact guard assist  -AM      Transfers, Bed-Chair-Bed, Assist Device rolling walker  -AM      Transfers, Sit-Stand [place] contact guard assist  -AM      Transfers, Stand-Sit [place] contact guard assist  -AM      Transfers, Sit-Stand-Sit, Assist Device rolling walker  -AM      Toilet Transfer, [place] not tested  -AM      Walk-In Shower Transfer, [place] not tested  -AM      Bathtub Transfer, [place] not tested  -AM      Transfer, Maintain Weight Bearing Status able to maintain weight bearing status  -AM      Transfer, Safety Issues step length decreased   verbal cues to stay closer to walker  -AM      Transfer, Impairments strength decreased  -AM      Recorded by [AM] Oliver Moses PTA      Gait Assessment/Treatment    Gait, [place] Level contact guard assist  -AM      Gait, Assistive Device rolling walker  -AM      Gait, Distance (Feet) 100  -AM      Gait, Gait Pattern Analysis 3-point gait  -AM      Gait, Gait Deviations bilateral:;sergio decreased  -AM      Gait, Maintain Weight Bearing Status able to maintain weight bearing status  -AM      Gait, Safety Issues step length decreased  -AM      Gait, Impairments strength decreased  -AM      Recorded by [AM] Oliver Moses PTA      Stairs Assessment/Treatment    Stairs, [place] Level not tested  -AM      Recorded by [AM] Oliver Moses PTA      Therapy Exercises    Bilateral Lower Extremities AROM:;20 reps;sitting;ankle pumps/circles;glut sets;LAQ;other reps   pillow squeeze  -AM      Recorded by [AM] Oliver Moses PTA      Positioning and Restraints    Pre-Treatment Position in bed  -AM      Post Treatment Position chair  -AM      In Chair reclined;call light within reach;encouraged to call for assist;legs elevated  -AM      Recorded by [AM] Oliver Moses PTA         User Key  (r) = Recorded By, (t) = Taken By, (c) = Cosigned By    Initials Name Effective Dates    ASTER Jorge Vargas, PTA 10/17/16 -     AM Oliver Moses, PTA 10/17/16 -     KD Sylvie Mcadams, DUNNE/L 10/17/16 -     CS Liza Graham, DUNNE/L 10/17/16 -                 OT Goals       07/11/17 1045 07/10/17 1625 07/09/17 1338    Transfer Training OT STG    Transfer Training OT STG, Date Established   07/09/17  -    Transfer Training OT STG, Time to Achieve   by discharge  -    Transfer Training OT STG, Activity Type   all transfers  -    Transfer Training OT STG, [place] Level   supervision required  -    Transfer Training OT STG, Date Goal Reviewed 07/11/17  -KD 07/10/17  -     Transfer Training OT STG, Outcome  goal ongoing  -     Patient Education OT LTG    Patient Education OT LTG, Date Established   07/09/17  -    Patient Education OT LTG, Time to Achieve   by discharge  -    Patient Education OT LTG, Education Type   HEP;energy conservation;home safety;positioning;adaptive equipment mgmt;adaptive breathing  -    Patient Education OT LTG, Education Understanding   verbalizes understanding;demonstrates adequately;independent   with caregiver assist as needed  -    Patient Education OT LTG, Date Goal Reviewed 07/11/17  -KD 07/10/17  -     Patient Education OT LTG Outcome goal not met  -KD goal ongoing  -     ADL OT LTG    ADL OT LTG, Date Established   07/09/17  -    ADL OT LTG, Time to Achieve   by discharge  -    ADL OT LTG, Activity Type   ADL skills  -    ADL OT LTG, Additional Goal   set up feeding/grooming; SBA toileting/bathing/dressing - AE/AD as needed  -    ADL OT LTG, Date Goal Reviewed 07/11/17  -KD 07/10/17  -     ADL OT LTG, Outcome  goal ongoing  -     Activity Tolerance OT STG    Activity Tolerance Goal OT STG, Date Established   07/09/17  -    Activity Tolerance Goal OT STG, Time to Achieve   by discharge  -    Activity Tolerance Goal OT STG,  Activity Level   15 min activity;O2 sat >/equal to 88%;with 2 rest breaks  -    Activity Tolerance Goal OT STG, Date Goal Reviewed 07/11/17  -KD 07/10/17  -     Activity Tolerance Goal OT STG, Outcome goal not met  -KD goal ongoing  -CS     Endurance OT STG    Endurance Goal OT STG, Date Established   07/09/17  -    Endurance Goal OT STG, Time to Achieve   by discharge  -    Endurance Goal OT STG, Activity Level   endurance 2 fair  -BH    Endurance Goal OT STG, Date Goal Reviewed 07/11/17  -KD 07/10/17  -CS     Endurance Goal OT STG, Outcome  goal ongoing  -CS       User Key  (r) = Recorded By, (t) = Taken By, (c) = Cosigned By    Initials Name Provider Type     Dannielle Sanders, OTR/L Occupational Therapist     Sylvie Mcadams, DUNNE/L Occupational Therapy Assistant    CHANELL SparrowA/RAFAELA Occupational Therapy Assistant          Occupational Therapy Education     Title: PT OT SLP Therapies (Active)     Topic: Occupational Therapy (Active)     Point: ADL training (Done)    Description: Instruct learner(s) on proper safety adaptation and remediation techniques during self care or transfers.   Instruct in proper use of assistive devices.    Learning Progress Summary    Learner Readiness Method Response Comment Documented by Status   Patient Acceptance E VU Educated on OT and POC. Educated to call for assist and not get up on his own. Educated on importance of further rehab and concerns with safety with O2 dropping and with ADL. Educated on safety w/ t/f, mobility, and ADL.  07/09/17 1500 Done   Family Acceptance E VU Educated on OT and POC. Educated to call for assist and not get up on his own. Educated on importance of further rehab and concerns with safety with O2 dropping and with ADL. Educated on safety w/ t/f, mobility, and ADL.  07/09/17 1500 Done               Point: Home exercise program (Active)    Description: Instruct learner(s) on appropriate technique for monitoring, assisting and/or  progressing therapeutic exercises/activities.    Learning Progress Summary    Learner Readiness Method Response Comment Documented by Status   Patient Acceptance E,TB,D NR   07/10/17 9434 Active               Point: Precautions (Done)    Description: Instruct learner(s) on prescribed precautions during self-care and functional transfers.    Learning Progress Summary    Learner Readiness Method Response Comment Documented by Status   Patient Acceptance E VU Educated on OT and POC. Educated to call for assist and not get up on his own. Educated on importance of further rehab and concerns with safety with O2 dropping and with ADL. Educated on safety w/ t/f, mobility, and ADL.  07/09/17 1500 Done   Family Acceptance E VU Educated on OT and POC. Educated to call for assist and not get up on his own. Educated on importance of further rehab and concerns with safety with O2 dropping and with ADL. Educated on safety w/ t/f, mobility, and ADL.  07/09/17 1500 Done               Point: Body mechanics (Done)    Description: Instruct learner(s) on proper positioning and spine alignment during self-care, functional mobility activities and/or exercises.    Learning Progress Summary    Learner Readiness Method Response Comment Documented by Status   Patient Acceptance E VU Educated on OT and POC. Educated to call for assist and not get up on his own. Educated on importance of further rehab and concerns with safety with O2 dropping and with ADL. Educated on safety w/ t/f, mobility, and ADL.  07/09/17 1500 Done   Family Acceptance E VU Educated on OT and POC. Educated to call for assist and not get up on his own. Educated on importance of further rehab and concerns with safety with O2 dropping and with ADL. Educated on safety w/ t/f, mobility, and ADL.  07/09/17 1500 Done                      User Key     Initials Effective Dates Name Provider Type Discipline     10/17/16 -  Dannielle Sanders, OTR/L Occupational Therapist OT     CS 10/17/16 -  RAUL Jhaveri Occupational Therapy Assistant OT                  OT Recommendation and Plan  Anticipated Equipment Needs At Discharge: shower chair, bedside commode  Anticipated Discharge Disposition: skilled nursing facility, home with 24/7 care, home with home health (depends on progress)  Planned Therapy Interventions: activity intolerance, adaptive equipment training, ADL retraining, balance training, bed mobility training, energy conservation, fine motor coordination training, home exercise program, motor coordination training, strengthening, transfer training  Therapy Frequency: other (see comments) (3-14x a week)  Plan of Care Review  Outcome Summary/Follow up Plan: non new goals met this date        Outcome Measures       07/11/17 1333 07/11/17 1045 07/10/17 1600    How much help from another person do you currently need...    Turning from your back to your side while in flat bed without using bedrails? 4  -ASTER      Moving from lying on back to sitting on the side of a flat bed without bedrails? 4  -ASTER      Moving to and from a bed to a chair (including a wheelchair)? 3  -ASTER      Standing up from a chair using your arms (e.g., wheelchair, bedside chair)? 3  -ASTER      Climbing 3-5 steps with a railing? 2  -ASTER      To walk in hospital room? 3  -ASTER      AM-PAC 6 Clicks Score 19  -ASTER      How much help from another is currently needed...    Putting on and taking off regular lower body clothing? 2  -KD 2  -KD 2  -CS    Bathing (including washing, rinsing, and drying) 2  -KD 2  -KD 2  -CS    Toileting (which includes using toilet bed pan or urinal) 3  -KD 3  -KD 3  -CS    Putting on and taking off regular upper body clothing 3  -KD 3  -KD 3  -CS    Taking care of personal grooming (such as brushing teeth) 3  -KD 3  -KD 3  -CS    Eating meals 4  -KD 4  -KD 4  -CS    Score 17  -KD 17  -KD 17  -CS    Functional Assessment    Outcome Measure Options AM-PAC 6 Clicks Basic Mobility (PT)  -ASTER   -PAC 6 Clicks Daily Activity (OT)  -      07/10/17 1400 07/09/17 1339 07/09/17 0920    How much help from another person do you currently need...    Turning from your back to your side while in flat bed without using bedrails? 3  -AM  3  -JCA    Moving from lying on back to sitting on the side of a flat bed without bedrails? 4  -AM  3  -JCA    Moving to and from a bed to a chair (including a wheelchair)? 3  -AM  3  -JCA    Standing up from a chair using your arms (e.g., wheelchair, bedside chair)? 3  -AM  3  -JCA    Climbing 3-5 steps with a railing? 1  -AM  2  -JCA    To walk in hospital room? 3  -AM  3  -JCA    AM-PAC 6 Clicks Score 17  -AM  17  -JCA    How much help from another is currently needed...    Putting on and taking off regular lower body clothing?  2  -BH     Bathing (including washing, rinsing, and drying)  2  -BH     Toileting (which includes using toilet bed pan or urinal)  3  -BH     Putting on and taking off regular upper body clothing  3  -BH     Taking care of personal grooming (such as brushing teeth)  3  -BH     Eating meals  4  -     Score  17  -     Functional Assessment    Outcome Measure Options AM-PAC 6 Clicks Basic Mobility (PT)  - AM-PAC 6 Clicks Daily Activity (OT)  -[facility]-PAC 6 Clicks Basic Mobility (PT)  -JCA      User Key  (r) = Recorded By, (t) = Taken By, (c) = Cosigned By    Initials Name Provider Type     Dannielle Sanders, OTR/L Occupational Therapist    LIBERTY Chapa, PT Physical Therapist    ASTER Vargas, PTA Physical Therapy Assistant    AM Oliver Moses, PTA Physical Therapy Assistant    ROSANGELA Mcadams, DUNNE/L Occupational Therapy Assistant    RENATO Graham, DUNNE/L Occupational Therapy Assistant           Time Calculation:         Time Calculation- OT       07/11/17 1507          Time Calculation- OT    OT Start Time 1045  -KD      OT Stop Time 1108  -KD      OT Time Calculation (min) 23 min  -KD      Total Timed Code Minutes- OT 23 minute(s)   -KD      OT Received On 07/11/17  -KD        User Key  (r) = Recorded By, (t) = Taken By, (c) = Cosigned By    Initials Name Provider Type    UZAIR Lopez/L Occupational Therapy Assistant           Therapy Charges for Today     Code Description Service Date Service Provider Modifiers Qty    [id number] HC OT THER PROC EA 15 MIN 7/11/2017 RAUL Esquivel GO 2          OT G-codes  OT Professional Judgement Used?: Yes  OT Functional Scales Options: AM-PAC 6 Clicks Daily Activity (OT)  Score: 17  Functional Limitation: Self care  Self Care Current Status (): At least 40 percent but less than 60 percent impaired, limited or restricted  Self Care Goal Status (): At least 20 percent but less than 40 percent impaired, limited or restricted    RAUL Esquivel  7/11/2017

## 2017-07-11 NOTE — DISCHARGE SUMMARY
FAMILY MEDICINE DISCHARGE SUMMARY       NAME: Mickey Lind   PHYSICIAN: Ara Bach MD  : 1955  MRN: [id number]    ADMITTED: 2017   DISCHARGED: 17      ADMISSION DIAGNOSES     Present on Admission:  • Lower abdominal pain  • End stage liver disease  • Ascites due to alcoholic cirrhosis  • Chronic hepatitis C without hepatic coma  • Unilateral inguinal hernia without obstruction or gangrene  • Tobacco dependence  • Simple chronic bronchitis  • Multiple myeloma, stage 3  • Depression  • (Resolved) Bacterial pneumonia  • Pleural effusion  • Ulcer of sacral region, stage 1  • CAP (community acquired pneumonia), bilateral  • Acute respiratory failure with hypoxia        DISCHARGE DIAGNOSES     Principal Problem:    Ascites due to alcoholic cirrhosis  Active Problems:    End stage liver disease    Depression    Unilateral inguinal hernia without obstruction or gangrene    Multiple myeloma, stage 3    Chronic hepatitis C without hepatic coma    Lower abdominal pain    Tobacco dependence    Simple chronic bronchitis    Pleural effusion    Ulcer of sacral region, stage 1    CAP (community acquired pneumonia), bilateral    Does not have health insurance    Acute respiratory failure with hypoxia      SERVICE     Family Medicine.   Attending Dr. Leo  Resident Ara Bach MD    CONSULTS     Consult Orders (all)     Start     Ordered    07/10/17 0810  Inpatient Consult to Wound Care MD  Once     Specialty:  Wound Care  Provider:  (Not yet assigned)    07/10/17 0810    17 0249  Inpatient Consult to Nutrition  Once     Provider:  (Not yet assigned)    17 0249    17 0245  Inpatient Consult to Case Management   Once     Provider:  (Not yet assigned)    17 0246    17 1704  Surgery (on-call MD unless specified)  Once     Specialty:  General Surgery  Provider:  Mauro James MD    17 1703    17 1519   Surgery (on-call MD unless specified)  Once,   Status:  Canceled     Specialty:  General Surgery  Provider:  (Not yet assigned)    07/07/17 1509    07/07/17 1506  Family Practice - Resident (on-call MD unless specified)  Once     Specialty:  Family Medicine  Provider:  (Not yet assigned)    07/07/17 1506          PROCEDURES     U/S guided paracentesis with 3.9 L of fluid taken out by Dr. Field    HISTORY OF PRESENT ILLNESS:     Ahsan Jones is a 61 y.o. male who has a concurrent medical history of chronic viral hepatitis C, ascites, alcoholic liver disease who is presenting to the ER with a 2 week history of lower abdominal pain. Patient mentions the pain is albertina-umbilical in location with radiation throughout his entire abdomen. Patient states that the pain is described as a dull aching sensation that is rated on a scale of 1-10 as an 8 out of highest and 4 at its lowest. Patient mentions no precipitating factors or any alleviating factors for this pain. Patient does follow GI for his hepatitis C and does see heme oncology for multiple myeloma treatments. Patient mentions that he quit drinking chronically when he was diagnosed with hepatitis C but does mention occasional drinks of beer and states his last beer that he had was on 4 July. She denies any fevers, chest pain, shortness of breath does mention a history of cigarette smoking and is still continuing to smoke a pack per day. Patient denies any diarrhea as mentioned a week long history of constipation but does state he had a bowel movement this morning.    DIAGNOSTIC DATA     Results for AHSAN JONES (MRN [id number]) as of 7/11/2017 09:16   7/11/2017 06:35   Glucose 154 (H)   Sodium 132 (L)   Potassium 3.7   CO2 26.0   Chloride 99   Anion Gap 7.0   Creatinine 0.52 (L)   BUN 15   BUN/Creatinine Ratio 28.8 (H)   Calcium 7.8 (L)   eGFR Non African Amer 161 (H)   Alkaline Phosphatase 94   Total Protein 5.5 (L)   ALT (SGPT) 29   AST (SGOT) 36   Total  Bilirubin 0.4   Albumin 2.60 (L)   Globulin 2.9   A/G Ratio 0.9 (L)   WBC 12.08 (H)   RBC 3.46 (L)   Hemoglobin 10.4 (L)   Hematocrit 31.5 (L)   RDW 16.2 (H)   MCV 91.0   MCH 30.1   MCHC 33.0   MPV 11.6   Platelets 115 (L)     Body Fluid Culture   Specimen Information: Pleural Cavity; Body Fluid        Culture   No growth at 24 hours             Blood Culture   Culture   No growth at 3 days           Urine Culture   Specimen Information: Urine, Clean Catch; Urine        Culture   No growth at 24 hours             HOSPITAL COURSE     Patient was admitted to our hospital for abdominal pain secondary to ascites which was secondary to his chronic hepatitis C. U/S guided paracentesis was performed immediately and about 3.9 L was removed from his abdomen and pleural cavity by Dr. Field. Patient tolerated the procedure well. Please see his full procedure note for full detail. Patient was receiving levaquin and flagyl as empiric treatment. However, blood, urine and body fluid culture at 48 hours was negative for any organisms. So antibiotics were discontinued at this point. On 7/10/2017, patient's oxygen saturation dropped overnight secondary to cough spell, where he required oxygen.     Upon discharge, patient required 2L oxygen to maintain his saturation > 90%. Home health was ordered and patient's oxygen was called into [facility] pharmacy. Patient is agreeable to  oxygen today and follow-up with home health. He also expressed that he wants to keep me as the PCP.       DISCHARGE CONDITION     Stable    DISPOSITION     Home    DISCHARGE MEDICATION        Your medication list      START taking these medications       Instructions Last Dose Given Next Dose Due    levoFLOXacin 750 MG tablet   Commonly known as:  LEVAQUIN        Take 1 tablet by mouth Daily. Indications: Upper Respiratory Tract Infection           CONTINUE taking these medications       Instructions Last Dose Given Next Dose Due    acyclovir 400 MG  tablet   Commonly known as:  ZOVIRAX              atorvastatin 10 MG tablet   Commonly known as:  LIPITOR        Take 1 tablet by mouth Daily.         dexamethasone 4 MG tablet   Commonly known as:  DECADRON              furosemide 40 MG tablet   Commonly known as:  LASIX              gabapentin 400 MG capsule   Commonly known as:  NEURONTIN        Take 1 capsule by mouth 3 (Three) Times a Day.         GNP ACID REDUCER 75 MG tablet   Generic drug:  raNITIdine              polyethylene glycol powder   Commonly known as:  MIRALAX        Take 17 g by mouth Daily.         prochlorperazine 10 MG tablet   Commonly known as:  COMPAZINE        Take 1 tablet by mouth Every 6 (Six) Hours As Needed for nausea or vomiting.         sertraline 100 MG tablet   Commonly known as:  ZOLOFT        Take 2 tablets by mouth Daily.         spironolactone 50 MG tablet   Commonly known as:  ALDACTONE              sucralfate 1 GM/10ML suspension   Commonly known as:  CARAFATE        Take 10 mL by mouth Daily.              Where to Get Your Medications      These medications were sent to [place] [id number] [facility] [phone] [place] [id number] [address] [address]     Phone:  [phone]    • levoFLOXacin 750 MG tablet             INSTRUCTIONS:     Activity Instructions     Activity as Tolerated                   Diet Instructions     Diet: Regular; Thin Liquids, No Restrictions       Discharge Diet:  Regular   Fluid Consistency:  Thin Liquids, No Restrictions                 Special instructions: Patient instructed to call MD or return to ED with worsening shortness of breath, chest pain, fever greater than 100.4 degrees F or any other medical concerns..    FOLLOW UP     Additional Instructions for the Follow-ups that You Need to Schedule     Ambulatory Referral to Home Health    As directed    Face to Face Visit Date:  7/11/2017   Follow-up Provider for Plan of Care?:  I will be treating the  patient on an ongoing basis.  Please send me the Plan of Care for signature.   Follow-up Provider:  JOSÉ MIGUEL BACH   Reason/Clinical Findings:  severe deconditioning   Describe mobility limitations that make leaving home difficult:  deconditioning   Nursing/Therapeutic Services Requested:   Physical Therapy  Occupational Therapy      PT orders:   Therapeutic exercise  Gait Training  Transfer training  Strengthening  Home safety assessment      Weight Bearing Status:  As Tolerated   Occupational orders:   Activities of daily living  Strengthening  Fine motor  Home safety assessment      Frequency:  1 Week 1       Ambulatory Referral to Home Health    As directed    Face to Face Visit Date:  7/13/2017   Follow-up Provider for Plan of Care?:  I will be treating the patient on an ongoing basis.  Please send me the Plan of Care for signature.   Follow-up Provider:  JOSÉ MIGUEL BACH   Reason/Clinical Findings:  deconditioning   Describe mobility limitations that make leaving home difficult:  deconditioning   Nursing/Therapeutic Services Requested:   Skilled Nursing  Other      Skilled nursing orders:   Medication education  Cardiopulmonary assessments      Frequency:  1 Week 1             Follow-up Information     Follow up with José Miguel Bach MD. Schedule an appointment as soon as possible for a visit in 1 week(s).    Specialties:  Family Medicine, Emergency Medicine    Contact information:    [address] [place] [address] [phone]          Follow up with [facility] HOME CARE REFERRAL .    Specialty:  Home Health Services    Contact information:    [address] [place] [address]          Follow up with [facility] .    Specialty:  Home Health Services    Contact information:    [address] [place] [address] [phone]        Follow up with [facility] HOME CARE REFERRAL .    Specialty:  Home Health Services    Contact  information:    [address] [place] [phone]            Time: Discharge 30 min    Dr. Leo is the attending at time of discharge, she is aware of the patient's status and agrees with the above discharge summary.    SIGNATURE     This document has been electronically signed by Ara Bach MD on July 13, 2017 4:51 PM    Ara Bach MD, RODOLFO  [address] [address] [phone]

## 2017-07-12 PROBLEM — Z59.89 DOES NOT HAVE HEALTH INSURANCE: Status: ACTIVE | Noted: 2017-01-01

## 2017-07-12 PROBLEM — J96.01 ACUTE RESPIRATORY FAILURE WITH HYPOXIA (HCC): Status: ACTIVE | Noted: 2017-01-01

## 2017-07-12 PROBLEM — J18.9 CAP (COMMUNITY ACQUIRED PNEUMONIA): Status: ACTIVE | Noted: 2017-01-01

## 2017-07-12 NOTE — CONSULTS
"Adult Nutrition  Assessment    Patient Name:  Mickey Lind  YOB: 1955  MRN: [id number]  Admit Date:  7/7/2017    Assessment Date:  7/12/2017          Reason for Assessment       07/12/17 1524    Reason for Assessment    Reason For Assessment/Visit follow up protocol                Anthropometrics       07/12/17 1524    Anthropometrics    Height 177.8 cm (70\")    Weight 67.6 kg (149 lb 0.5 oz)    Ideal Body Weight (IBW)    Ideal Body Weight (IBW), Male (kg) 76.48    % Ideal Body Weight 88.57    Body Mass Index (BMI)    BMI (kg/m2) 21.43    BMI Grade 19.1 - 24.9 - normal            Labs/Tests/Procedures/Meds       07/12/17 1525    Labs/Tests/Procedures/Meds    Labs/Tests Review Hgb Hct;Glucose;Na+;Alb    Medication Review Diuretic;Reviewed, pertinent            Physical Findings       07/12/17 1527    Physical Appearance    Skin other (see comments)   right back puncture              Nutrition Prescription Ordered       07/12/17 1528    Nutrition Prescription PO    Current PO Diet Regular    Supplement Ensure Enlive    Supplement Frequency 3 times a day    Common Modifiers Other (comment)   no added salt            Evaluation of Received Nutrient/Fluid Intake       07/12/17 1529    PO Evaluation    Number of Meals 11    % PO Intake 100% - 3x, 75% - 5x, 50% - 3x            Comments:  Pt indicates his appetite varies and is pretty good at present.  Prefers Strawberry or Vanilla Ensure.  Intake 100%- 3x, 75% - 5x, 50% - 3x.  Reports having some nausea.  Pepcid, carafate, PRN compazine, PRN zofran prescribed.  Magic Butt prescribed due to wound.  Labs reviewed.          Electronically signed by:  Caprice Davila RD  07/12/17 3:31 PM  "

## 2017-07-12 NOTE — PLAN OF CARE
Problem: Patient Care Overview (Adult)  Goal: Plan of Care Review  Outcome: Ongoing (interventions implemented as appropriate)    07/12/17 0423   Coping/Psychosocial Response Interventions   Plan Of Care Reviewed With patient   Patient Care Overview   Progress no change   Outcome Evaluation   Outcome Summary/Follow up Plan O2 dropping into the high 80s when on room air, goes back up to mid 90s when back on nasal cannula       Goal: Adult Individualization and Mutuality  Outcome: Ongoing (interventions implemented as appropriate)  Goal: Discharge Needs Assessment  Outcome: Ongoing (interventions implemented as appropriate)    Problem: Fall Risk (Adult)  Goal: Absence of Falls  Outcome: Ongoing (interventions implemented as appropriate)    Problem: Anxiety (Adult)  Goal: Reduction/Resolution  Outcome: Ongoing (interventions implemented as appropriate)    Problem: Pain, Acute (Adult)  Goal: Acceptable Pain Control/Comfort Level  Outcome: Ongoing (interventions implemented as appropriate)

## 2017-07-12 NOTE — PROGRESS NOTES
FAMILY MEDICINE PROGRESS NOTE  NAME: Mickey Lind  : 1955  MRN: [id number]     LOS: 5 days   Full Code  PROVIDER OF SERVICE:     Chief Complaint:  Ascites due to alcoholic cirrhosis    Subjective      Interval History:  History taken from: patient  Subjective: Patient is a 62 yo  male who was admitted with abdominal pain and pneumonia.  He is feeling a little better today.  He is still weak.    Review of Systems:   Review of Systems   Constitutional: Positive for appetite change, chills and fatigue.   HENT: Positive for congestion.    Respiratory: Positive for cough and shortness of breath.    Gastrointestinal: Positive for abdominal distention, abdominal pain and nausea. Negative for vomiting.   Genitourinary: Negative for dysuria and hematuria.   Musculoskeletal: Positive for arthralgias and back pain.   Neurological: Positive for weakness.       Objective     Vital Signs  Temp:  [96.1 °F (35.6 °C)-97.4 °F (36.3 °C)] 97.2 °F (36.2 °C)  Heart Rate:  [74-86] 77  Resp:  [20] 20  BP: (118-164)/(64-79) 135/79    Physical Exam  Physical Exam   Constitutional: He is oriented to person, place, and time. He appears well-developed and well-nourished. He appears distressed.   Cardiovascular: Normal rate, regular rhythm and intact distal pulses.  Exam reveals no gallop and no friction rub.    Murmur heard.  Pulmonary/Chest: Effort normal. No respiratory distress. He has no wheezes. He has no rales.   Scattered rhonchi, diminished breath sounds on the right side   Abdominal: Soft. Bowel sounds are normal. There is tenderness. There is no rebound and no guarding.   Neurological: He is alert and oriented to person, place, and time.   Skin: He is not diaphoretic.   Psychiatric: He has a normal mood and affect. His behavior is normal. Judgment and thought content normal.   Nursing note and vitals reviewed.      Medication Review    Current Facility-Administered Medications:   •  acyclovir (ZOVIRAX) tablet 400  mg, 400 mg, Oral, Daily, Carlos Hahn MD, 400 mg at 07/12/17 0822  •  atorvastatin (LIPITOR) tablet 10 mg, 10 mg, Oral, Daily, Carlos Hahn MD, 10 mg at 07/12/17 0822  •  dexamethasone (DECADRON) tablet 4 mg, 4 mg, Oral, Daily With Breakfast, Carlos Hahn MD, 4 mg at 07/12/17 0822  •  famotidine (PEPCID) tablet 40 mg, 40 mg, Oral, Daily, Carlos Hahn MD, 40 mg at 07/12/17 0822  •  furosemide (LASIX) tablet 40 mg, 40 mg, Oral, Daily, Carlos Hahn MD, 40 mg at 07/12/17 0822  •  gabapentin (NEURONTIN) capsule 400 mg, 400 mg, Oral, TID, Carlos Hahn MD, 400 mg at 07/12/17 1526  •  hydrALAZINE (APRESOLINE) injection 10 mg, 10 mg, Intravenous, Q6H PRN, Carlos Hahn MD  •  levoFLOXacin (LEVAQUIN) tablet 750 mg, 750 mg, Oral, Q24H, Ara Bach MD, 750 mg at 07/12/17 1141  •  LORazepam (ATIVAN) tablet 2 mg, 2 mg, Oral, Q6H PRN, Ara Bach MD  •  magic butt ointment, , Topical, TID, Carlos Hahn MD  •  naproxen (NAPROSYN) tablet 500 mg, 500 mg, Oral, BID With Meals, Ara Bach MD, 500 mg at 07/12/17 1715  •  nicotine (NICODERM CQ) 21 MG/24HR patch 1 patch, 1 patch, Transdermal, Q24H, Carlos Hahn MD, 1 patch at 07/12/17 1820  •  ondansetron (ZOFRAN) injection 4 mg, 4 mg, Intravenous, Q6H PRN, Carlos Hahn MD, 4 mg at 07/11/17 1309  •  polyethylene glycol (MIRALAX) powder 17 g, 17 g, Oral, Daily, Carlos Hahn MD, 17 g at 07/07/17 2146  •  prochlorperazine (COMPAZINE) tablet 10 mg, 10 mg, Oral, Q6H PRN, Carlos Hahn MD  •  sertraline (ZOLOFT) tablet 200 mg, 200 mg, Oral, Daily, Carlos Hahn MD, 200 mg at 07/12/17 0822  •  sodium chloride 0.9 % flush 1-10 mL, 1-10 mL, Intravenous, PRN, Carlos Hahn MD, 10 mL at 07/11/17 1311  •  Insert peripheral IV, , , Once **AND** sodium chloride 0.9 % flush 10 mL, 10 mL, Intravenous, PRN, Wilver Dougherty MD  •  spironolactone (ALDACTONE) tablet 50 mg, 50 mg, Oral, BID, Carlos Hahn MD,  50 mg at 07/12/17 1715  •  sucralfate (CARAFATE) 1 GM/10ML suspension 1 g, 1 g, Oral, Daily, Carlos Hahn MD, 1 g at 07/12/17 0823     Diagnostic Data      I reviewed the patient's new clinical results and imaging.      Assessment/Plan     Active Hospital Problems (** Indicates Principal Problem)    Diagnosis Date Noted   • **Ascites due to alcoholic cirrhosis [K70.31] 07/07/2017     -patient had a CT scan of the abdomen and pelvis performed which showed a large fluid collection in his abdomen as well as right sided pleural effusion, patient underwent a ultrasound guided paracentesis which they took out 3.9 liters of fluid  -currently on levaquin and flagyl, blood culture and urine culture no growth  -d/c flagyl     • CAP (community acquired pneumonia), bilateral [J18.9] 07/12/2017     On levaquin daily     • Does not have health insurance [Z59.8] 07/12/2017     Case management is working on this.  I wrote for the need for home health and home oxygen, awaiting response from his insurance to see if he gets approved  Alternative plan is to have him follow-up with [facility] as they can provide the care he needs. This was discussed with the patient as an alternative and he is agreeable to this.      • Acute respiratory failure with hypoxia [J96.01] 07/12/2017   • Pleural effusion [J90] 07/10/2017     Continue lasix prn       • Ulcer of sacral region, stage 1 [L89.151] 07/10/2017     Wound care consulted     • Lower abdominal pain [R10.30] 07/07/2017     -secondary to ascites from hep. C as well as alcoholic cirrhosis, patient is afebrile currently and has been for the last few weeks     • Tobacco dependence [F17.200] 07/07/2017     -admits to smoking a PPD, will give 21mg nicotine patch     • Simple chronic bronchitis [J41.0] 07/07/2017     -will provide with supplemental oxygen as needed as well as breathing treatments PRN  -patient had oxygen all night. Will wean him off tonight to see if he tolerates  breathing without oxygen  - CXR to revisit the pleural effusion     • Chronic hepatitis C without hepatic coma [B18.2] 02/16/2017     -has been followed by Dr. Montalvo, will continue his home medication of zovirax     • Multiple myeloma, stage 3 [C90.00] 01/20/2017     -has a right sided port in, has been followed by Dr. Mendoza of heme-oncology. However, treatment on hold as patient does not have any insurance, will monitor      • Unilateral inguinal hernia without obstruction or gangrene [K40.90] 09/10/2016     -large right sided inguinal hernia palpated and seen on abdominal CT for now, surgery mentioned him as not an operative candidate, will manage medically     • End stage liver disease [K72.90] 08/31/2016     -secondary to chronic hepatitis C, is being followed by Dr. Montalvo as an outpatient, will continue to monitor      • Depression [F32.9] 08/31/2016     -continue home medication of zoloft        Resolved Hospital Problems    Diagnosis Date Noted Date Resolved   • Bacterial pneumonia [J15.9] 07/07/2017 07/11/2017     Daughter is going to medical card office today and will hopefully have it reinstated.  Patient will need home health and home oxygen.    I have reviewed the notes, assessments, and/or procedures performed by Dr. Bach, I concur with her documentation of Mickey Lind.      DVT prophylaxis: SCDs/TEDs      Disposition:Home with home health          This document has been electronically signed by Carolee Leo MD on July 12, 2017 7:00 PM          This document has been electronically signed by Carolee Leo MD on July 12, 2017 7:00 PM

## 2017-07-12 NOTE — PLAN OF CARE
Problem: Patient Care Overview (Adult)  Goal: Plan of Care Review  Outcome: Ongoing (interventions implemented as appropriate)  Encourage intake of meals and supplements.    07/12/17 1541   Coping/Psychosocial Response Interventions   Plan Of Care Reviewed With patient   Patient Care Overview   Progress improving   Outcome Evaluation   Outcome Summary/Follow up Plan Intake 100% - 3x, 75% - 5x, 505 - 3x.

## 2017-07-12 NOTE — PROGRESS NOTES
FAMILY MEDICINE DAILY PROGRESS NOTE  NAME: Mickey Lind  : 1955  MRN: [id number]      LOS: 5 days     PROVIDER OF SERVICE: Ara Bach MD    Chief Complaint: Ascites due to alcoholic cirrhosis    Subjective:     Interval History:  History taken from: patient and chart.    Overnight, patient oxygen dropped to 88% at room air and came back up to 93% with 3L of NC. This morning he is sitting comfortably eating his breakfast. He is currently on oxygen of 3L.     He is wanting to go home but is willing to await for the insurance to get approved for his oxygen and home health.    Review of Systems   Constitutional: Positive for activity change, appetite change and fatigue. Negative for fever.   HENT: Positive for congestion. Negative for ear pain and sore throat.    Eyes: Negative for pain and visual disturbance.   Respiratory: Positive for cough. Negative for shortness of breath and wheezing.    Cardiovascular: Negative for chest pain and palpitations.   Gastrointestinal: Negative for abdominal pain and nausea.   Endocrine: Negative for cold intolerance and heat intolerance.   Genitourinary: Negative for difficulty urinating and dysuria.   Musculoskeletal: Negative for arthralgias and gait problem.   Skin: Positive for wound (decubitus ulcer). Negative for color change and rash.   Neurological: Negative for dizziness, weakness and headaches.   Hematological: Negative for adenopathy. Does not bruise/bleed easily.   Psychiatric/Behavioral: Negative for agitation, confusion and sleep disturbance.       Objective:     Vital Signs  Temp:  [96.1 °F (35.6 °C)-98.7 °F (37.1 °C)] 96.1 °F (35.6 °C)  Heart Rate:  [74-87] 75  Resp:  [20] 20  BP: (115-164)/(63-79) 118/65  Body mass index is 21.38 kg/(m^2).    Physical Exam  Physical Exam   Constitutional: He is oriented to person, place, and time. He appears well-developed and well-nourished.   HENT:   Head: Normocephalic and atraumatic.   Eyes:   Wears glasses      Neck: Normal range of motion. Neck supple.   Cardiovascular: Normal rate, regular rhythm and normal heart sounds.    Pulmonary/Chest: Effort normal. He has no wheezes.   Abdominal: Soft. Bowel sounds are normal. He exhibits no distension. There is no tenderness. There is no rebound. A hernia (reducible and no pain elicited) is present. Hernia confirmed positive in the right inguinal area.   Musculoskeletal: Normal range of motion.   Port on right side under clavicle.    Neurological: He is alert and oriented to person, place, and time.   Skin: Skin is warm.   Psychiatric: He has a normal mood and affect. His behavior is normal. Judgment and thought content normal.   Nursing note and vitals reviewed.    Medication Review    Current Facility-Administered Medications:   •  acyclovir (ZOVIRAX) tablet 400 mg, 400 mg, Oral, Daily, Carlos Hahn MD, 400 mg at 07/11/17 0938  •  atorvastatin (LIPITOR) tablet 10 mg, 10 mg, Oral, Daily, Carlos Hahn MD, 10 mg at 07/11/17 0935  •  dexamethasone (DECADRON) tablet 4 mg, 4 mg, Oral, Daily With Breakfast, Carlos Hahn MD, 4 mg at 07/11/17 0937  •  famotidine (PEPCID) tablet 40 mg, 40 mg, Oral, Daily, Carlos Hahn MD, 40 mg at 07/11/17 0937  •  furosemide (LASIX) tablet 40 mg, 40 mg, Oral, Daily, Carlos Hahn MD, 40 mg at 07/11/17 0937  •  gabapentin (NEURONTIN) capsule 400 mg, 400 mg, Oral, TID, Carlos Hahn MD, 400 mg at 07/11/17 2003  •  hydrALAZINE (APRESOLINE) injection 10 mg, 10 mg, Intravenous, Q6H PRN, Carlos Hahn MD  •  levoFLOXacin (LEVAQUIN) tablet 750 mg, 750 mg, Oral, Q24H, Ara Bach MD, 750 mg at 07/11/17 1309  •  LORazepam (ATIVAN) tablet 2 mg, 2 mg, Oral, Q6H PRN, Ara Bach MD  •  magic butt ointment, , Topical, TID, Carlos Hahn MD  •  naproxen (NAPROSYN) tablet 500 mg, 500 mg, Oral, BID With Meals, Ara Bach MD, 500 mg at 07/11/17 5029  •  nicotine (NICODERM CQ) 21 MG/24HR patch 1 patch, 1 patch,  Transdermal, Q24H, Carlos Hahn MD, 1 patch at 07/11/17 1710  •  ondansetron (ZOFRAN) injection 4 mg, 4 mg, Intravenous, Q6H PRN, Carlos Hahn MD, 4 mg at 07/11/17 1309  •  polyethylene glycol (MIRALAX) powder 17 g, 17 g, Oral, Daily, Carlos Hahn MD, 17 g at 07/07/17 2146  •  prochlorperazine (COMPAZINE) tablet 10 mg, 10 mg, Oral, Q6H PRN, Carlos Hahn MD  •  sertraline (ZOLOFT) tablet 200 mg, 200 mg, Oral, Daily, Carlos Hahn MD, 200 mg at 07/11/17 0935  •  sodium chloride 0.9 % flush 1-10 mL, 1-10 mL, Intravenous, PRN, Carlos Hahn MD, 10 mL at 07/11/17 1311  •  Insert peripheral IV, , , Once **AND** sodium chloride 0.9 % flush 10 mL, 10 mL, Intravenous, PRN, Wilver Dougherty MD  •  spironolactone (ALDACTONE) tablet 50 mg, 50 mg, Oral, BID, Carlos Hahn MD, 50 mg at 07/11/17 1709  •  sucralfate (CARAFATE) 1 GM/10ML suspension 1 g, 1 g, Oral, Daily, Carlos Hahn MD, 1 g at 07/10/17 0837     Diagnostic Data    Lab Results (last 24 hours)     Procedure Component Value Units Date/Time    Blood Culture [[id number]]  (Normal) Collected:  07/08/17 0740    Specimen:  Blood from Wrist, Left Updated:  07/11/17 0901     Blood Culture No growth at 3 days    Blood Culture [[id number]]  (Normal) Collected:  07/07/17 1843    Specimen:  Blood from Hand, Left Updated:  07/11/17 2001     Blood Culture No growth at 4 days    Body Fluid Culture [[id number]]  (Normal) Collected:  07/07/17 1536    Specimen:  Body Fluid from Abdominal Cavity Updated:  07/12/17 0608     BF Culture No growth at 4 days     Gram Stain Result Few (2+) WBCs seen      No organisms seen    Body Fluid Culture [[id number]]  (Normal) Collected:  07/10/17 1302    Specimen:  Body Fluid from Pleural Cavity Updated:  07/12/17 0626     BF Culture No growth at 2 days     Gram Stain Result Many (4+) WBCs seen      No No organisms seen    CBC & Differential [[id number]] Collected:  07/12/17 0744    Specimen:  Blood  Updated:  07/12/17 0749    Narrative:       The following orders were created for panel order CBC & Differential.  Procedure                               Abnormality         Status                     ---------                               -----------         ------                     CBC Auto Differential[[id number]]        Abnormal            Final result                 Please view results for these tests on the individual orders.    CBC Auto Differential [[id number]]  (Abnormal) Collected:  07/12/17 0744    Specimen:  Blood Updated:  07/12/17 0749     WBC 13.11 (H) 10*3/mm3      RBC 3.56 (L) 10*6/mm3      Hemoglobin 10.8 (L) g/dL      Hematocrit 32.1 (L) %      MCV 90.2 fL      MCH 30.3 pg      MCHC 33.6 g/dL      RDW 16.2 (H) %      RDW-SD 53.4 (H) fl      MPV 11.1 fL      Platelets 129 (L) 10*3/mm3      Neutrophil % 82.4 (H) %      Lymphocyte % 9.0 (L) %      Monocyte % 8.1 %      Eosinophil % 0.1 %      Basophil % 0.1 %      Immature Grans % 0.3 %      Neutrophils, Absolute 10.81 (H) 10*3/mm3      Lymphocytes, Absolute 1.18 10*3/mm3      Monocytes, Absolute 1.06 (H) 10*3/mm3      Eosinophils, Absolute 0.01 10*3/mm3      Basophils, Absolute 0.01 10*3/mm3      Immature Grans, Absolute 0.04 (H) 10*3/mm3      nRBC 0.0 /100 WBC     Comprehensive Metabolic Panel [[id number]]  (Abnormal) Collected:  07/12/17 0744    Specimen:  Blood Updated:  07/12/17 0803     Glucose 112 (H) mg/dL      BUN 19 mg/dL      Creatinine 0.57 (L) mg/dL      Sodium 133 (L) mmol/L      Potassium 4.1 mmol/L      Chloride 99 mmol/L      CO2 27.0 mmol/L      Calcium 8.3 (L) mg/dL      Total Protein 5.7 (L) g/dL      Albumin 2.70 (L) g/dL      ALT (SGPT) 22 U/L      AST (SGOT) 36 U/L      Alkaline Phosphatase 86 U/L      Total Bilirubin 0.5 mg/dL      eGFR Non African Amer 145 mL/min/1.73      Globulin 3.0 gm/dL      A/G Ratio 0.9 (L) g/dL      BUN/Creatinine Ratio 33.3 (H)     Anion Gap 7.0 mmol/L             I reviewed the patient's new  clinical results.    Assessment/Plan:     Active Hospital Problems (** Indicates Principal Problem)    Diagnosis Date Noted   • **Ascites due to alcoholic cirrhosis [K70.31] 07/07/2017     -patient had a CT scan of the abdomen and pelvis performed which showed a large fluid collection in his abdomen as well as right sided pleural effusion, patient underwent a ultrasound guided paracentesis which they took out 3.9 liters of fluid  -currently on levaquin and flagyl, blood culture and urine culture no growth  -d/c flagyl     • CAP (community acquired pneumonia), bilateral [J18.9] 07/12/2017     On levaquin QD     • Does not have health insurance [Z59.8] 07/12/2017     Case management is working on this.  I wrote for the need for home health and home oxygen, awaiting response from his insurance to see if he gets approved  Alternative plan is to have him follow-up with [facility] as they can provide the care he needs. This was discussed with the patient as an alternative and he is agreeable to this.      • Pleural effusion [J90] 07/10/2017     Continue lasix prn       • Ulcer of sacral region, stage 1 [L89.151] 07/10/2017     Wound care consulted     • Lower abdominal pain [R10.30] 07/07/2017     -secondary to ascites from hep. C as well as alcoholic cirrhosis, patient is afebrile currently and has been for the last few weeks     • Tobacco dependence [F17.200] 07/07/2017     -admits to smoking a PPD, will give 21mg nicotine patch     • Simple chronic bronchitis [J41.0] 07/07/2017     -will provide with supplemental oxygen as needed as well as breathing treatments PRN  -patient had oxygen all night. Will wean him off tonight to see if he tolerates breathing without oxygen  - CXR to revisit the pleural effusion     • Chronic hepatitis C without hepatic coma [B18.2] 02/16/2017     -has been followed by Dr. Montalvo, will continue his home medication of zovirax     • Multiple myeloma, stage 3 [C90.00] 01/20/2017     -has a  right sided port in, has been followed by Dr. Mendoza of heme-oncology. However, treatment on hold as patient does not have any insurance, will monitor      • Unilateral inguinal hernia without obstruction or gangrene [K40.90] 09/10/2016     -large right sided inguinal hernia palpated and seen on abdominal CT for now, surgery mentioned him as not an operative candidate, will manage medically     • End stage liver disease [K72.90] 08/31/2016     -secondary to chronic hepatitis C, is being followed by Dr. Montalvo as an outpatient, will continue to monitor      • Depression [F32.9] 08/31/2016     -continue home medication of zoloft        Resolved Hospital Problems    Diagnosis Date Noted Date Resolved   • Bacterial pneumonia [J15.9] 07/07/2017 07/11/2017       DVT prophylaxis: SCDs TEDs  Code status is Full Code          This document has been electronically signed by Ara Bach MD on July 12, 2017 8:22 AM    Ara Bach MD, [facility]  [address] [address] [phone]

## 2017-07-12 NOTE — THERAPY TREATMENT NOTE
"Acute Care - Physical Therapy Treatment Note  [facility]     Patient Name: Mickey Lind  : 1955  MRN: [id number]  Today's Date: 2017  Onset of Illness/Injury or Date of Surgery Date: 17  Date of Referral to PT: 17  Referring Physician: Dr. Carolee Leo    Admit Date: 2017    Visit Dx:    ICD-10-CM ICD-9-CM   1. Lower abdominal pain R10.30 789.09   2. Other ascites R18.8 789.59   3. Recurrent right pleural effusion J90 511.9   4. Impaired physical mobility Z74.09 781.99   5. Impaired mobility and ADLs Z74.09 799.89   6. Acute respiratory failure with hypoxia J96.01 518.81   7. Severe muscle deconditioning R29.898 781.99     Patient Active Problem List   Diagnosis   • End stage liver disease   • Depression   • Low back pain with sciatica   • Hyperlipidemia   • Unilateral inguinal hernia without obstruction or gangrene   • Diarrhea   • Hepatic cirrhosis   • Epigastric pain   • Peripheral vascular disease   • Encounter for venous access device care   • Multiple myeloma, stage 3   • Anemia   • Chronic hepatitis C without hepatic coma   • Lower abdominal pain   • Ascites due to alcoholic cirrhosis   • Tobacco dependence   • Simple chronic bronchitis   • Pleural effusion   • Ulcer of sacral region, stage 1   • CAP (community acquired pneumonia), bilateral   • Does not have health insurance               Adult Rehabilitation Note       17 0950 17 1333 17 1045    Rehab Assessment/Intervention    Discipline physical therapy assistant  -ASTER physical therapy assistant  -ASTER occupational therapy assistant  -KD    Document Type therapy note (daily note)  -ASTER therapy note (daily note)  -ASTER therapy note (daily note)  -KD    Subjective Information agree to therapy  -ASTER agree to therapy  -ASTER agree to therapy  -KD    Patient Effort, Rehab Treatment good  -ASTER good  -ASTER     Patient Effort, Rehab Treatment Comment  pt reports he has poor blood flow in his legs causing them to \"give " "out\" at time.   -ASTER     Symptoms Noted During/After Treatment shortness of breath  -ASTER shortness of breath  -ASTER     Precautions/Limitations fall precautions;oxygen therapy device and L/min  -ASTER fall precautions;oxygen therapy device and L/min  -ASTER     Recorded by [ASTER] Jorge Vargas PTA [ASTER] Jorge Vargas PTA [KD] Sylvie Mcadams, DUNNE/L    Vital Signs    Pre Systolic BP Rehab 140  -ASTER 122  -ASTER 115  -KD    Pre Treatment Diastolic BP 72  -ASTER 63  -ASTER 70  -KD    Intra Treatment Diastolic BP  117  -ASTER     Post Systolic BP Rehab 124  -ASTER 64  -ASTER     Post Treatment Diastolic BP 78  -ASTER      Pretreatment Heart Rate (beats/min) 83  -ASTER 86  -ASTER 87  -KD    Intratreatment Heart Rate (beats/min)  86  -ASTER     Posttreatment Heart Rate (beats/min) 79  -ASTER 82  -ASTER 93  -KD    Pre SpO2 (%) 97  -ASTER 94  -ASTER 92  -KD    O2 Delivery Pre Treatment supplemental O2  -ASTER supplemental O2  -ASTER supplemental O2  -KD    Intra SpO2 (%) 92  -ASTER 88   after gait. improved to 93%  -ASTER     O2 Delivery Intra Treatment supplemental O2  -ASTER supplemental O2  -ASTER     Post SpO2 (%) 96  -ASTER 94  -ASTER 94  -KD    O2 Delivery Post Treatment supplemental O2  -ASTER supplemental O2  -ASTER supplemental O2  -KD    Pre Patient Position Sitting  -ASTER Supine  -ASTER Sitting  -KD    Intra Patient Position   Sitting  -KD    Post Patient Position Sitting  -ASTER Sitting  -ASTER Sitting  -KD    Recorded by [ASTER] Jorge Vargas PTA [ASTER] Jorge Vargas PTA [KD] Sylvie Mcadams, DUNNE/L    Pain Assessment    Pain Assessment 0-10  -ASTER 0-10  -ASTER 0-10  -KD    Pain Score 6  -ASTER 6  -ASTER 5  -KD    Post Pain Score 6  -ASTER 6  -ASTER 5  -KD    Pain Type   Acute pain  -KD    Pain Location Generalized  -ASTER Generalized  -ASTER Abdomen  -KD    Pain Intervention(s) Ambulation/increased activity;Repositioned   defers pain meds.   -ASTER Repositioned;Ambulation/increased activity   pt defers pain meds.   -ASTER Medication (See MAR)  -KD    Recorded by [ASTER] Jorge Vargas PTA [ASTER] Jorge Vargas PTA [KD] Sylvie GARCIA" Pema DUNNE/L    Vision Assessment/Intervention    Visual Impairment WFL with corrective lenses  -ASTER WFL with corrective lenses  -ASTER     Recorded by [ASTER] Jorge Vargas PTA [ASTER] Jorge Vargas PTA     Cognitive Assessment/Intervention    Current Cognitive/Communication Assessment functional  -ASTER functional  -ASTER functional  -KD    Orientation Status oriented x 4  -ASTER oriented x 4  -ASTER oriented x 4  -KD    Follows Commands/Answers Questions 100% of the time  -ASTER 100% of the time  -ASTER 100% of the time  -KD    Personal Safety Interventions gait belt;muscle strengthening facilitated;nonskid shoes/slippers when out of bed;supervised activity  -ASTER gait belt;muscle strengthening facilitated;nonskid shoes/slippers when out of bed;supervised activity  -ASTER gait belt  -KD    Recorded by [ASTER] Jorge Vargas PTA [ASTER] Jorge Vargas PTA [KD] Sylvie Mcadams, DUNNE/L    Bed Mobility, Assessment/Treatment    Bed Mobility, Assistive Device  bed rails;head of bed elevated  -ASTER     Bed Mob, Supine to Sit, [place]  conditional independence  -ASTER conditional independence  -KD    Bed Mob, Sit to Supine, [place]  conditional independence  -ASTER conditional independence  -KD    Recorded by  [ASTER] Jorge Vargas PTA [KD] Sylvie Mcadams, DUNNE/L    Transfer Assessment/Treatment    Transfers, Bed-Chair [place] contact guard assist  -ASTER contact guard assist  -ASTER     Transfers, Chair-Bed [place]  contact guard assist  -ASTER     Transfers, Bed-Chair-Bed, Assist Device rolling walker  -ASTER rolling walker  -ASTER     Transfers, Sit-Stand [place] contact guard assist  -ASTER contact guard assist  -ASTER     Transfers, Stand-Sit [place] contact guard assist  -ASTER contact guard assist  -ASTER     Transfers, Sit-Stand-Sit, Assist Device rolling walker  -ASTER rolling walker  -ASTER     Toilet Transfer, [place]  contact guard assist  -ASTER     Toilet Transfer, Assistive Device  rolling walker  -ASTER     Recorded by [ASTER] Jorge Vargas PTA  [ASTER] Jorge Varags PTA     Gait Assessment/Treatment    Gait, [place] Level contact guard assist  - contact guard assist  -     Gait, Assistive Device rolling walker  - rolling walker  -     Gait, Distance (Feet) 420  - 332   15 ft  -     Gait, Gait Deviations sergio decreased  - sergio decreased  -     Gait, Comment  required  standing rest breaks  -     Recorded by [ASTER] Jorge Vargas PTA [ASTER] Jorge Vargas PTA     Stairs Assessment/Treatment    Number of Stairs 2  -ASTER      Stairs, Handrail Location left side (ascending)  -      Stairs, [place] Level contact guard assist  -      Stairs, Assistive Device straight cane  -      Stairs, Technique Used step to step (ascending);step to step (descending)  -      Stairs, Comment 2 attempts  -      Recorded by [ASTER] Jorge Vargas PTA      Therapy Exercises    Bilateral Lower Extremities AROM:;20 reps;ankle pumps/circles;LAQ;hip flexion;hip abduction/adduction;glut sets  - AROM:;LAQ;15 reps;sitting;ankle pumps/circles;hip flexion;glut sets;hip abduction/adduction   2 sets of each. Edu on HEP and home safety  -     Bilateral Upper Extremity   AROM:;20 reps;sitting;elbow flexion/extension;hand pumps;shoulder abduction/adduction;shoulder circles;shoulder extension/flexion;shoulder ER/IR  -KD    BUE Resistance   manual resistance- moderate   2lb HW; 2 sets   -KD    Recorded by [ASTER] Jorge Vargas PTA [ASTER] Jorge Vargas PTA [KD] UZAIR Esquivel/L    Positioning and Restraints    Pre-Treatment Position in bed  -ASTER in bed  -ASTER in bed  -KD    Post Treatment Position chair  - bed  -ASTER bed  -KD    In Bed call light within reach;encouraged to call for assist   all needs met. spoke w/ nsg, aldair hose may be too small  - sitting;call light within reach;encouraged to call for assist;with family/caregiver  - sitting EOB;call light within reach;encouraged to call for assist;exit alarm on  -KD    Recorded by [ASTER] Jorge HAMMOND  BENJAMIN Vargas [ASTER] Jorge Vargas PTA [KD] Sylvie RADHA Mcadams, DUNNE/L      07/10/17 1558 07/10/17 1400       Rehab Assessment/Intervention    Discipline occupational therapy assistant  -CS physical therapy assistant  -AM     Document Type therapy note (daily note)  -CS therapy note (daily note)  -AM     Subjective Information agree to therapy  -CS agree to therapy;no complaints  -AM     Patient Effort, Rehab Treatment  good  -AM     Symptoms Noted During/After Treatment  shortness of breath  -AM     Precautions/Limitations  fall precautions;oxygen therapy device and L/min  -AM     Equipment Issued to Patient  gait belt  -AM     Recorded by [CS] UZAIR Jhaveri/RAFAELA [AM] Oliver Moses PTA     Vital Signs    Pre Systolic BP Rehab  135  -AM     Pre Treatment Diastolic BP  78  -AM     Post Systolic BP Rehab  136  -AM     Post Treatment Diastolic BP  82  -AM     Pretreatment Heart Rate (beats/min) 92  -CS 98  -AM     Posttreatment Heart Rate (beats/min) 92  -  -AM     Pre SpO2 (%) 94  -CS 88  -AM     O2 Delivery Pre Treatment supplemental O2  -CS supplemental O2  -AM     Post SpO2 (%) 94  -CS 93  -AM     O2 Delivery Post Treatment supplemental O2  -CS supplemental O2  -AM     Pre Patient Position Sitting  -CS Sitting  -AM     Intra Patient Position Sitting  -CS Standing  -AM     Post Patient Position Sitting  -CS Sitting  -AM     Recorded by [CS] UZAIR Jhaveri/RAFAELA [AM] Oliver Moses PTA     Pain Assessment    Pain Assessment 0-10  -CS 0-10  -AM     Pain Score 6  -CS 7  -AM     Post Pain Score 6  -CS 7  -AM     Pain Type  Acute pain  -AM     Pain Location Abdomen   legs, back, hernia  -CS Generalized  -AM     Pain Descriptors  Sore  -AM     Pain Frequency  Constant/continuous  -AM     Date Pain First Started  07/07/17  -AM     Clinical Progression  Not changed  -AM     Patient's Stated Pain Goal  No pain  -AM     Pain Intervention(s)  Medication (See MAR);Ambulation/increased activity  -AM     Result of  Injury  No  -AM     Work-Related Injury  No  -AM     Multiple Pain Sites  No  -AM     Recorded by [CS] UZAIR Jhaveri/RAFAELA [AM] Oliver Moses PTA     Cognitive Assessment/Intervention    Current Cognitive/Communication Assessment functional  -CS functional  -AM     Orientation Status oriented x 4  -CS oriented x 4  -AM     Follows Commands/Answers Questions 100% of the time  -% of the time  -AM     Personal Safety Interventions  gait belt;nonskid shoes/slippers when out of bed;supervised activity  -AM     Recorded by [CS] UZAIR Jhaveri/RAFAELA [AM] Oliver Moses PTA     ROM (Range of Motion)    General ROM  no range of motion deficits identified  -AM     Recorded by  [AM] Oliver Moses PTA     Bed Mobility, Assessment/Treatment    Bed Mobility, Assistive Device  head of bed elevated;bed rails  -AM     Bed Mobility, Roll Left, [place]  not tested  -AM     Bed Mobility, Roll Right, [place]  not tested  -AM     Bed Mobility, Scoot/Bridge, [place]  not tested  -AM     Bed Mob, Supine to Sit, [place]  conditional independence  -AM     Bed Mob, Sit to Supine, [place]  conditional independence  -AM     Bed Mob, Sidelying to Sit, [place]  not tested  -AM     Bed Mob, Sit to Sidelying, [place]  not tested  -AM     Bed Mobility, Safety Issues  decreased use of arms for pushing/pulling;decreased use of legs for bridging/pushing  -AM     Bed Mobility, Impairments  strength decreased  -AM     Recorded by  [AM] Oliver Moses PTA     Transfer Assessment/Treatment    Transfers, Bed-Chair [place]  contact guard assist  -AM     Transfers, Chair-Bed [place]  contact guard assist  -AM     Transfers, Bed-Chair-Bed, Assist Device  rolling walker  -AM     Transfers, Sit-Stand [place]  contact guard assist  -AM     Transfers, Stand-Sit [place]  contact guard assist  -AM     Transfers, Sit-Stand-Sit, Assist Device  rolling walker  -AM     Toilet Transfer,  [place]  not tested  -AM     Walk-In Shower Transfer, [place]  not tested  -AM     Bathtub Transfer, [place]  not tested  -AM     Transfer, Maintain Weight Bearing Status  able to maintain weight bearing status  -AM     Transfer, Safety Issues  step length decreased   verbal cues to stay closer to walker  -AM     Transfer, Impairments  strength decreased  -AM     Recorded by  [AM] Oliver Moses PTA     Gait Assessment/Treatment    Gait, [place] Level  contact guard assist  -AM     Gait, Assistive Device  rolling walker  -AM     Gait, Distance (Feet)  100  -AM     Gait, Gait Pattern Analysis  3-point gait  -AM     Gait, Gait Deviations  bilateral:;sergio decreased  -AM     Gait, Maintain Weight Bearing Status  able to maintain weight bearing status  -AM     Gait, Safety Issues  step length decreased  -AM     Gait, Impairments  strength decreased  -AM     Recorded by  [AM] Oliver Moses PTA     Stairs Assessment/Treatment    Stairs, [place] Level  not tested  -AM     Recorded by  [AM] Oliver Moses PTA     Therapy Exercises    Bilateral Lower Extremities  AROM:;20 reps;sitting;ankle pumps/circles;glut sets;LAQ;other reps   pillow squeeze  -AM     Bilateral Upper Extremity AROM:;20 reps;sitting;elbow flexion/extension;hand pumps;pronation/supination;shoulder extension/flexion;shoulder ER/IR  -CS      BUE Resistance manual resistance- minimal  -CS      Recorded by [CS] UZAIR Jhaveri/RAFAELA [AM] Oliver Moses PTA     Positioning and Restraints    Pre-Treatment Position in bed  -CS in bed  -AM     Post Treatment Position bed  -CS chair  -AM     In Bed sitting EOB;call light within reach  -CS      In Chair  reclined;call light within reach;encouraged to call for assist;legs elevated  -AM     Recorded by [CS] UZAIR Jhaveri/RAFAELA [AM] Oliver Moses PTA       User Key  (r) = Recorded By, (t) = Taken By, (c) = Cosigned By    Initials Name Effective Dates    ASTER Jorge Vargas,  PTA 10/17/16 -     AM Oliver Moses, PTA 10/17/16 -     KD Sylvie Mcadams, DUNNE/L 10/17/16 -     CS Liza Graham, DUNNE/L 10/17/16 -                 IP PT Goals       07/12/17 0950 07/11/17 1333 07/10/17 1400    Transfer Training PT LTG    Transfer Training PT  LTG, Date Goal Reviewed 07/12/17  -ASTER 07/11/17  -ASTER 07/10/17  -AM    Transfer Training PT LTG, Outcome goal ongoing  -ASTER goal ongoing  -ASTER goal not met  -AM    Gait Training PT LTG    Gait Training Goal PT LTG, Date Goal Reviewed 07/12/17  -ASTER 07/11/17  -ASTER 07/10/17  -AM    Gait Training Goal PT LTG, Outcome goal ongoing  -ASTER goal ongoing  -ASTER goal not met  -AM    Stair Training PT LTG    Stair Training Goal PT LTG, Date Goal Reviewed 07/12/17  -ASTER 07/11/17  -ASTER 07/10/17  -AM    Stair Training Goal PT LTG, Outcome goal ongoing  -ASTER goal ongoing  -ASTER goal not met  -AM    Strength Goal PT LTG    Strength Goal PT LTG, Date Goal Reviewed 07/12/17  -ASTER 07/11/17  -ASTER 07/10/17  -AM    Strength Goal PT LTG, Outcome goal ongoing  -ASTER goal ongoing  -ASTER goal partially met  -AM    Patient Education PT LTG    Patient Education PT LTG, Date Goal Reviewed 07/12/17  -ASTER 07/11/17  -ASTER 07/10/17  -AM    Patient Education PT LTG Outcome goal ongoing  -ASTER goal ongoing  -ASTER goal not met  -AM    Caregiver Training PT LTG    Caregiver Training PT LTG, Date Goal Reviewed 07/12/17  -ASTER  07/10/17  -AM    Caregiver Training PT LTG, Outcome goal ongoing  -ASTER  goal not met  -AM      07/09/17 1341          Transfer Training PT LTG    Transfer Training PT LTG, Date Established 07/09/17  -[facility]      Transfer Training PT LTG, Time to Achieve by discharge  -JCA      Transfer Training PT LTG, Activity Type bed to chair /chair to bed;sit to stand/stand to sit;toilet  -JCA      Transfer Training PT LTG, [place] Level supervision required;conditional independence  -JCA      Transfer Training PT LTG, Assist Device --   AAD  -JCA      Transfer Training PT LTG, Outcome goal ongoing  -JCA       Gait Training PT LTG    Gait Training Goal PT LTG, Date Established 07/09/17  -[facility]      Gait Training Goal PT LTG, Time to Achieve by discharge  -[facility]      Gait Training Goal PT LTG, [place] Level supervision required;conditional independence  -[facility]      Gait Training Goal PT LTG, Distance to Achieve 50 ft;O2 sats will not drop below 90%  -[facility]      Gait Training Goal PT LTG, Additional Goal 150ft;O2 sats will not drop below 92%  -[facility]      Gait Training Goal PT LTG, Outcome goal ongoing  -[facility]      Stair Training PT LTG    Stair Training Goal PT LTG, Date Established 07/09/17  -[facility]      Stair Training Goal PT LTG, Time to Achieve by discharge  -[facility]      Stair Training Goal PT LTG, Number of Steps 5  -[facility]      Stair Training Goal PT LTG, [place] Level supervision required;contact guard assist  -[facility]      Stair Training Goal PT LTG, Assist Device cane, straight  -[facility]      Stair Training Goal PT LTG, Outcome goal ongoing  -[facility]      Strength Goal PT LTG    Strength Goal PT LTG, Date Established 07/09/17  -[facility]      Strength Goal PT LTG, Time to Achieve by discharge  -[facility]      Strength Goal PT LTG, Measure to Achieve Pt will perform 15 reps of AROM exercises  -[facility]      Strength Goal PT LTG, Functional Goal Pt will progress to standing exercises  -[facility]      Strength Goal PT LTG, Outcome goal ongoing  -[facility]      Patient Education PT LTG    Patient Education PT LTG, Date Established 07/09/17  -[facility]      Patient Education PT LTG, Time to Achieve by discharge  -[facility]      Patient Education PT LTG, Education Type HEP;written program;posture/body mechanics;gait;transfers;home safety  -[facility]      Patient Education PT LTG Outcome goal ongoing  -[facility]      Caregiver Training PT LTG    Caregiver Training PT LTG, Date Established 07/09/17  -[facility]      Caregiver Training PT LTG, Time to Achieve by discharge  -[facility]      Caregiver Training PT LTG, Activity Type Homecaregiver will demonstrate understanding assisting pt with transfers/ambulation  with device as needed. Homecaregiver will acknowledge understanding of home care instructions  -[facility]      Caregiver Training PT LTG, Outcome goal ongoing  -[facility]        User Key  (r) = Recorded By, (t) = Taken By, (c) = Cosigned By    Initials Name Provider Type    JCA Erika Chapa, PT Physical Therapist    ASTER Jorge Vargas, PTA Physical Therapy Assistant    MARIAM Moses PTA Physical Therapy Assistant          Physical Therapy Education     Title: PT OT SLP Therapies (Active)     Topic: Physical Therapy (Active)     Point: Mobility training (Active)    Learning Progress Summary    Learner Readiness Method Response Comment Documented by Status   Patient Acceptance E NR stair training.  07/12/17 1240 Active    Acceptance E NR   07/11/17 1419 Active    Acceptance E,D NR  [facility] 07/09/17 1339 Active               Point: Home exercise program (Done)    Learning Progress Summary    Learner Readiness Method Response Comment Documented by Status   Patient Acceptance E DU reinforced HEP.  07/12/17 1245 Done    Acceptance E VASU ROBISON on HEP and home safety.  07/11/17 1439 Done               Point: Body mechanics (Active)    Learning Progress Summary    Learner Readiness Method Response Comment Documented by Status   Patient Acceptance E,D NR  [facility] 07/09/17 1339 Active               Point: Precautions (Active)    Learning Progress Summary    Learner Readiness Method Response Comment Documented by Status   Patient Acceptance E,D NR  [facility] 07/09/17 1339 Active                      User Key     Initials Effective Dates Name Provider Type Discipline    [facility] 10/17/16 -  Erika Chapa, PT Physical Therapist PT     10/17/16 -  Jorge Vargas PTA Physical Therapy Assistant PT                    PT Recommendation and Plan  Anticipated Equipment Needs At Discharge:  (pt has RW)  Anticipated Discharge Disposition: home with home health  Planned Therapy Interventions: balance training, bed mobility training, gait training,  home exercise program, patient/family education, postural re-education, neuromuscular re-education, stair training, strengthening, transfer training  PT Frequency: other (see comments) (5-14 times per weej)  Plan of Care Review  Plan Of Care Reviewed With: patient  Progress: progress toward functional goals as expected  Outcome Summary/Follow up Plan: pt resonded well to therapy w/ increased gait to 420 ft CGA. pt required CGA w/ stair training. pt w/ increased reps f ther ex. pt is progressing and would continue to benefit from PT services.           Outcome Measures       07/12/17 0950 07/11/17 1333 07/11/17 1045    How much help from another person do you currently need...    Turning from your back to your side while in flat bed without using bedrails? 4  -ASTER 4  -ASTER     Moving from lying on back to sitting on the side of a flat bed without bedrails? 4  -ASTER 4  -ASTER     Moving to and from a bed to a chair (including a wheelchair)? 3  -ASTER 3  -ASTER     Standing up from a chair using your arms (e.g., wheelchair, bedside chair)? 3  -ASTER 3  -ASTER     Climbing 3-5 steps with a railing? 3  -ASTER 2  -ASTER     To walk in hospital room? 3  -ASTER 3  -ASTER     AM-PAC 6 Clicks Score 20  -ASTER 19  -ASTER     How much help from another is currently needed...    Putting on and taking off regular lower body clothing?  2  -KD 2  -KD    Bathing (including washing, rinsing, and drying)  2  -KD 2  -KD    Toileting (which includes using toilet bed pan or urinal)  3  -KD 3  -KD    Putting on and taking off regular upper body clothing  3  -KD 3  -KD    Taking care of personal grooming (such as brushing teeth)  3  -KD 3  -KD    Eating meals  4  -KD 4  -KD    Score  17  -KD 17  -KD    Functional Assessment    Outcome Measure Options AM-PAC 6 Clicks Basic Mobility (PT)  -ASTER AM-PAC 6 Clicks Basic Mobility (PT)  -ASTER       07/10/17 1600 07/10/17 1400 07/09/17 1339    How much help from another person do you currently need...    Turning from your back to your side  while in flat bed without using bedrails?  3  -AM     Moving from lying on back to sitting on the side of a flat bed without bedrails?  4  -AM     Moving to and from a bed to a chair (including a wheelchair)?  3  -AM     Standing up from a chair using your arms (e.g., wheelchair, bedside chair)?  3  -AM     Climbing 3-5 steps with a railing?  1  -AM     To walk in hospital room?  3  -AM     AM-PAC 6 Clicks Score  17  -AM     How much help from another is currently needed...    Putting on and taking off regular lower body clothing? 2  -CS  2  -BH    Bathing (including washing, rinsing, and drying) 2  -CS  2  -BH    Toileting (which includes using toilet bed pan or urinal) 3  -CS  3  -BH    Putting on and taking off regular upper body clothing 3  -CS  3  -BH    Taking care of personal grooming (such as brushing teeth) 3  -CS  3  -BH    Eating meals 4  -CS  4  -BH    Score 17  -CS  17  -BH    Functional Assessment    Outcome Measure Options AM-PAC 6 Clicks Daily Activity (OT)  -CS AM-PAC 6 Clicks Basic Mobility (PT)  -AM AM-PAC 6 Clicks Daily Activity (OT)  -      User Key  (r) = Recorded By, (t) = Taken By, (c) = Cosigned By    Initials Name Provider Type     Dannielle Sandesr, OTR/L Occupational Therapist    ASTER Vargas PTA Physical Therapy Assistant    MARIAM Moses PTA Physical Therapy Assistant    ROSANGELA Mcadams DUNNE/L Occupational Therapy Assistant    RENATO Graham, DUNNE/L Occupational Therapy Assistant           Time Calculation:         PT Charges       07/12/17 1246          Time Calculation    Start Time 0950  -      Stop Time 1046  -      Time Calculation (min) 56 min  -ASTER      Time Calculation- PT    Total Timed Code Minutes- PT 56 minute(s)  -ASTER        User Key  (r) = Recorded By, (t) = Taken By, (c) = Cosigned By    Initials Name Provider Type    ASTER Vargas PTA Physical Therapy Assistant          Therapy Charges for Today     Code Description Service Date Service  Provider Modifiers Qty    01842811665 HC GAIT TRAINING EA 15 MIN 7/11/2017 Jorge Vargas PTA GP 1    90024475382 HC PT SELF CARE/MGMT/TRAIN EA 15 MIN 7/11/2017 Jorge Vargas PTA GP 1    85976313977 HC PT THER PROC EA 15 MIN 7/11/2017 Jorge Vargas PTA GP 2    80111739254 HC PT THERAPEUTIC ACT EA 15 MIN 7/12/2017 Jorge Vargas PTA GP 2    25085581736 HC PT THER PROC EA 15 MIN 7/12/2017 Jorge Vargas PTA GP 1    51562662217 HC GAIT TRAINING EA 15 MIN 7/12/2017 Jorge Vargas, PTA GP 1          PT G-Codes  PT Professional Judgement Used?: Yes  Outcome Measure Options: AM-PAC 6 Clicks Basic Mobility (PT)  Score: 17  Functional Limitation: Mobility: Walking and moving around  Mobility: Walking and Moving Around Current Status (): At least 40 percent but less than 60 percent impaired, limited or restricted  Mobility: Walking and Moving Around Goal Status (): At least 1 percent but less than 20 percent impaired, limited or restricted    Jorge Vargas PTA  7/12/2017

## 2017-07-12 NOTE — PLAN OF CARE
Problem: Patient Care Overview (Adult)  Goal: Plan of Care Review  Outcome: Ongoing (interventions implemented as appropriate)    07/12/17 0950   Coping/Psychosocial Response Interventions   Plan Of Care Reviewed With patient   Patient Care Overview   Progress progress toward functional goals as expected   Outcome Evaluation   Outcome Summary/Follow up Plan pt resonded well to therapy w/ increased gait to 420 ft CGA. pt required CGA w/ stair training. pt w/ increased reps of ther ex. pt is progressing and would continue to benefit from PT services.        Goal: Discharge Needs Assessment  Outcome: Ongoing (interventions implemented as appropriate)    07/10/17 1400 07/11/17 1344   Discharge Needs Assessment   Concerns To Be Addressed --  basic needs concerns   Readmission Within The Last 30 Days no previous admission in last 30 days --    Equipment Needed After Discharge walker, rolling --    Discharge Facility/Level Of Care Needs home with home health --    Current Discharge Risk chronically ill --    Current Health   Anticipated Changes Related to Illness inability to care for self --    Self-Care   Equipment Currently Used at Home cane, straight --    Living Environment   Transportation Available family or friend will provide --          Problem: Inpatient Physical Therapy  Goal: Transfer Training Goal 1 LTG- PT  Outcome: Ongoing (interventions implemented as appropriate)    07/09/17 1341 07/12/17 0950   Transfer Training PT LTG   Transfer Training PT LTG, Date Established 07/09/17 --    Transfer Training PT LTG, Time to Achieve by discharge --    Transfer Training PT LTG, Activity Type bed to chair /chair to bed;sit to stand/stand to sit;toilet --    Transfer Training PT LTG, [place] Level supervision required;conditional independence --    Transfer Training PT LTG, Assist Device (AAD) --    Transfer Training PT LTG, Date Goal Reviewed --  07/12/17   Transfer Training PT LTG, Outcome --  goal ongoing        Goal: Gait Training Goal LTG- PT  Outcome: Ongoing (interventions implemented as appropriate)    07/09/17 1341 07/12/17 0950   Gait Training PT LTG   Gait Training Goal PT LTG, Date Established 07/09/17 --    Gait Training Goal PT LTG, Time to Achieve by discharge --    Gait Training Goal PT LTG, [place] Level supervision required;conditional independence --    Gait Training Goal PT LTG, Distance to Achieve 50 ft;O2 sats will not drop below 90% --    Gait Training Goal PT LTG, Additional Goal 150ft;O2 sats will not drop below 92% --    Gait Training Goal PT LTG, Date Goal Reviewed --  07/12/17   Gait Training Goal PT LTG, Outcome --  goal ongoing       Goal: Stair Training Goal LTG- PT  Outcome: Ongoing (interventions implemented as appropriate)    07/09/17 1341 07/12/17 0950   Stair Training PT LTG   Stair Training Goal PT LTG, Date Established 07/09/17 --    Stair Training Goal PT LTG, Time to Achieve by discharge --    Stair Training Goal PT LTG, Number of Steps 5 --    Stair Training Goal PT LTG, [place] Level supervision required;contact guard assist --    Stair Training Goal PT LTG, Assist Device cane, straight --    Stair Training Goal PT LTG, Date Goal Reviewed --  07/12/17   Stair Training Goal PT LTG, Outcome --  goal ongoing       Goal: Strength Goal LTG- PT  Outcome: Ongoing (interventions implemented as appropriate)    07/09/17 1341 07/12/17 0950   Strength Goal PT LTG   Strength Goal PT LTG, Date Established 07/09/17 --    Strength Goal PT LTG, Time to Achieve by discharge --    Strength Goal PT LTG, Measure to Achieve Pt will perform 15 reps of AROM exercises --    Strength Goal PT LTG, Functional Goal Pt will progress to standing exercises --    Strength Goal PT LTG, Date Goal Reviewed --  07/12/17   Strength Goal PT LTG, Outcome --  goal ongoing       Goal: Patient Education Goal LTG- PT  Outcome: Outcome(s) achieved Date Met:  07/12/17 07/09/17 1341 07/12/17 0950   Patient Education  PT LTG   Patient Education PT LTG, Date Established 07/09/17 --    Patient Education PT LTG, Time to Achieve by discharge --    Patient Education PT LTG, Education Type HEP;written program;posture/body mechanics;gait;transfers;home safety --    Patient Education PT LTG, Date Goal Reviewed --  07/12/17   Patient Education PT LTG Outcome --  goal ongoing       Goal: Caregiver Training Goal LTG- PT  Outcome: Ongoing (interventions implemented as appropriate)    07/09/17 1341 07/12/17 0950   Caregiver Training PT LTG   Caregiver Training PT LTG, Date Established 07/09/17 --    Caregiver Training PT LTG, Time to Achieve by discharge --    Caregiver Training PT LTG, Activity Type Homecaregiver will demonstrate understanding assisting pt with transfers/ambulation with device as needed. Homecaregiver will acknowledge understanding of home care instructions --    Caregiver Training PT LTG, Date Goal Reviewed --  07/12/17   Caregiver Training PT LTG, Outcome --  goal ongoing

## 2017-07-12 NOTE — THERAPY TREATMENT NOTE
Acute Care - Occupational Therapy Treatment Note  [facility]     Patient Name: Mickey Lind  : 1955  MRN: [id number]  Today's Date: 2017  Onset of Illness/Injury or Date of Surgery Date: 17  Date of Referral to OT: 17  Referring Physician: Dr. Carolee Leo      Admit Date: 2017    Visit Dx:     ICD-10-CM ICD-9-CM   1. Lower abdominal pain R10.30 789.09   2. Other ascites R18.8 789.59   3. Recurrent right pleural effusion J90 511.9   4. Impaired physical mobility Z74.09 781.99   5. Impaired mobility and ADLs Z74.09 799.89   6. Acute respiratory failure with hypoxia J96.01 518.81   7. Severe muscle deconditioning R29.898 781.99     Patient Active Problem List   Diagnosis   • End stage liver disease   • Depression   • Low back pain with sciatica   • Hyperlipidemia   • Unilateral inguinal hernia without obstruction or gangrene   • Diarrhea   • Hepatic cirrhosis   • Epigastric pain   • Peripheral vascular disease   • Encounter for venous access device care   • Multiple myeloma, stage 3   • Anemia   • Chronic hepatitis C without hepatic coma   • Lower abdominal pain   • Ascites due to alcoholic cirrhosis   • Tobacco dependence   • Simple chronic bronchitis   • Pleural effusion   • Ulcer of sacral region, stage 1   • CAP (community acquired pneumonia), bilateral   • Does not have health insurance             Adult Rehabilitation Note       17 1320 17 0950 17 1333    Rehab Assessment/Intervention    Discipline occupational therapy assistant  -CS physical therapy assistant  -ASTER physical therapy assistant  -ASTER    Document Type therapy note (daily note)  -CS therapy note (daily note)  -ASTER therapy note (daily note)  -ASTER    Subjective Information agree to therapy  -CS agree to therapy  -ASTER agree to therapy  -ASTER    Patient Effort, Rehab Treatment good  -CS good  -ASTER good  -ASTER    Patient Effort, Rehab Treatment Comment   pt reports he has poor blood flow in his legs causing  "them to \"give out\" at time.   -ASTER    Symptoms Noted During/After Treatment shortness of breath  -CS shortness of breath  -ASTER shortness of breath  -ASTER    Precautions/Limitations fall precautions;oxygen therapy device and L/min  -CS fall precautions;oxygen therapy device and L/min  -ASTER fall precautions;oxygen therapy device and L/min  -ASTER    Recorded by [CS] CHANELL JhaveriA/L [ASTER] Jorge Vargas PTA [ASTER] Jorge Vargas PTA    Vital Signs    Pre Systolic BP Rehab  140  -ASTER 122  -ASTER    Pre Treatment Diastolic BP  72  -ASTER 63  -ASTER    Intra Treatment Diastolic BP   117  -ASTER    Post Systolic BP Rehab  124  -ASTER 64  -ASTER    Post Treatment Diastolic BP  78  -ASTER     Pretreatment Heart Rate (beats/min) 52  -CS 83  -ASTER 86  -ASTER    Intratreatment Heart Rate (beats/min)   86  -ASTER    Posttreatment Heart Rate (beats/min) 88  -CS 79  -ASTER 82  -ASTER    Pre SpO2 (%) 98  -CS 97  -ASTER 94  -ASTER    O2 Delivery Pre Treatment supplemental O2  -CS supplemental O2  -ASTER supplemental O2  -ASTER    Intra SpO2 (%)  92  -ASTER 88   after gait. improved to 93%  -ASTER    O2 Delivery Intra Treatment  supplemental O2  -ASTER supplemental O2  -ASTER    Post SpO2 (%) 96  -CS 96  -ASTER 94  -ASTER    O2 Delivery Post Treatment supplemental O2  -CS supplemental O2  -ASTER supplemental O2  -ASTER    Pre Patient Position Sitting  -CS Sitting  -ASTER Supine  -ASTER    Intra Patient Position Standing  -CS      Post Patient Position Sitting  -CS Sitting  -ASTER Sitting  -ASTER    Recorded by [CS] UZAIR Jhaveri/L [ASTER] Jorge Vargas PTA [ASTER] Jorge Vargas PTA    Pain Assessment    Pain Assessment 0-10  -CS 0-10  -ASTER 0-10  -ASTER    Pain Score 6  -CS 6  -ASTER 6  -ASTER    Post Pain Score 6  -CS 6  -ASTER 6  -ASTER    Pain Location Generalized  -CS Generalized  -ASTER Generalized  -ASTER    Pain Intervention(s)  Ambulation/increased activity;Repositioned   defers pain meds.   -ASTER Repositioned;Ambulation/increased activity   pt defers pain meds.   -ASTER    Recorded by [CS] UZAIR Jhaveri/L [ASTER] " Jorge Vargas PTA [ASTER] Jorge Vargas PTA    Vision Assessment/Intervention    Visual Impairment  WFL with corrective lenses  -ASTER WFL with corrective lenses  -ASTER    Recorded by  [ASTER] Jorge Vargas PTA [ASTER] Jorge Vargas PTA    Cognitive Assessment/Intervention    Current Cognitive/Communication Assessment functional  -CS functional  -ASTER functional  -ASTER    Orientation Status oriented x 4  -CS oriented x 4  -ASTER oriented x 4  -ASTER    Follows Commands/Answers Questions 100% of the time  -% of the time  -ASTER 100% of the time  -ASTER    Personal Safety Interventions  gait belt;muscle strengthening facilitated;nonskid shoes/slippers when out of bed;supervised activity  -ASTER gait belt;muscle strengthening facilitated;nonskid shoes/slippers when out of bed;supervised activity  -ASTER    Recorded by [CS] RAUL Jhaveri [ASTER] Jorge Vargas PTA [ASTER] Jorge Vargas PTA    Bed Mobility, Assessment/Treatment    Bed Mobility, Assistive Device   bed rails;head of bed elevated  -ASTER    Bed Mob, Supine to Sit, [place]   conditional independence  -ASTER    Bed Mob, Sit to Supine, [place]   conditional independence  -ASTER    Recorded by   [ASTER] Jorge Vargas PTA    Transfer Assessment/Treatment    Transfers, Bed-Chair [place]  contact guard assist  -ASTER contact guard assist  -ASTER    Transfers, Chair-Bed [place]   contact guard assist  -ASTER    Transfers, Bed-Chair-Bed, Assist Device  rolling walker  -ASTER rolling walker  -ASTER    Transfers, Sit-Stand [place] contact guard assist  -CS contact guard assist  -ASTER contact guard assist  -ASTER    Transfers, Stand-Sit [place] contact guard assist  -CS contact guard assist  -ASTER contact guard assist  -ASTER    Transfers, Sit-Stand-Sit, Assist Device rolling walker  -CS rolling walker  -ASTER rolling walker  -ASTER    Toilet Transfer, [place]   contact guard assist  -ASTER    Toilet Transfer, Assistive Device   rolling walker  -ASTER    Recorded by [CS] Liza RUSSO  Santos, DUNNE/L [ASTER] Jorge Vargas PTA [ASTER] Jorge Vargas PTA    Gait Assessment/Treatment    Gait, [place] Level  contact guard assist  -ASTER contact guard assist  -ASTER    Gait, Assistive Device  rolling walker  -ASTER rolling walker  -ASTER    Gait, Distance (Feet)  420  -ASTER 332   15 ft  -ASTER    Gait, Gait Deviations  sergio decreased  -ASTER sergio decreased  -    Gait, Comment   required  standing rest breaks  -ASTER    Recorded by  [ASTER] Jorge Vargas PTA [ASTER] Jorge Vargas PTA    Stairs Assessment/Treatment    Number of Stairs  2  -ASTER     Stairs, Handrail Location  left side (ascending)  -     Stairs, [place] Level  contact guard assist  -ASTER     Stairs, Assistive Device  straight cane  -     Stairs, Technique Used  step to step (ascending);step to step (descending)  -     Stairs, Comment  2 attempts  -ASTER     Recorded by  [ASTER] Jorge Vargas PTA     Balance Skills Training    Standing-Level of Assistance Contact guard  -      Static Standing Balance Support assistive device  -      Standing Balance # of Minutes 5  -CS      Recorded by [CS] UZAIR Jhaveri/L      Therapy Exercises    Bilateral Lower Extremities  AROM:;20 reps;ankle pumps/circles;LAQ;hip flexion;hip abduction/adduction;glut sets  - AROM:;LAQ;15 reps;sitting;ankle pumps/circles;hip flexion;glut sets;hip abduction/adduction   2 sets of each. Edu on HEP and home safety  -    Bilateral Upper Extremity --   UUE for 20 mins with 1 rest break.  -CS      Recorded by [CS] RAUL Jhaveri [ASTER] Jorge Vargas PTA [ASTER] Jorge Vargas PTA    Positioning and Restraints    Pre-Treatment Position in bed  -CS in bed  -ASTER in bed  -ASTER    Post Treatment Position bed  -CS chair  -ASTER bed  -ASTER    In Bed sitting EOB;call light within reach  -CS call light within reach;encouraged to call for assist   all needs met. spoke w/ nsg, aldair hose may be too small  - sitting;call light within reach;encouraged to call for assist;with  family/caregiver  -ASTER    Recorded by [CS] UZAIR Jhaveri/RAFAELA [ASTER] Jorge Vargas PTA [ASTER] Jorge Vargas, BENJAMIN      07/11/17 1045 07/10/17 1558 07/10/17 1400    Rehab Assessment/Intervention    Discipline occupational therapy assistant  -KD occupational therapy assistant  -CS physical therapy assistant  -AM    Document Type therapy note (daily note)  -KD therapy note (daily note)  -CS therapy note (daily note)  -AM    Subjective Information agree to therapy  -KD agree to therapy  -CS agree to therapy;no complaints  -AM    Patient Effort, Rehab Treatment   good  -AM    Symptoms Noted During/After Treatment   shortness of breath  -AM    Precautions/Limitations   fall precautions;oxygen therapy device and L/min  -AM    Equipment Issued to Patient   gait belt  -AM    Recorded by [KD] UZAIR Esquivel/RAFAELA [CS] UZAIR Jhaveri/RAFAELA [AM] Oliver Moses PTA    Vital Signs    Pre Systolic BP Rehab 115  -KD  135  -AM    Pre Treatment Diastolic BP 70  -KD  78  -AM    Post Systolic BP Rehab   136  -AM    Post Treatment Diastolic BP   82  -AM    Pretreatment Heart Rate (beats/min) 87  -KD 92  -CS 98  -AM    Posttreatment Heart Rate (beats/min) 93  -KD 92  -  -AM    Pre SpO2 (%) 92  -KD 94  -CS 88  -AM    O2 Delivery Pre Treatment supplemental O2  -KD supplemental O2  -CS supplemental O2  -AM    Post SpO2 (%) 94  -KD 94  -CS 93  -AM    O2 Delivery Post Treatment supplemental O2  -KD supplemental O2  -CS supplemental O2  -AM    Pre Patient Position Sitting  -KD Sitting  -CS Sitting  -AM    Intra Patient Position Sitting  -KD Sitting  -CS Standing  -AM    Post Patient Position Sitting  -KD Sitting  -CS Sitting  -AM    Recorded by [KD] UZAIR Esquivel/RAFAELA [CS] UZAIR Jhaveri/RAFAELA [AM] Oliver Moses PTA    Pain Assessment    Pain Assessment 0-10  -KD 0-10  -CS 0-10  -AM    Pain Score 5  -KD 6  -CS 7  -AM    Post Pain Score 5  -KD 6  -CS 7  -AM    Pain Type Acute pain  -KD  Acute pain  -AM    Pain  Location Abdomen  -KD Abdomen   legs, back, hernia  -CS Generalized  -AM    Pain Descriptors   Sore  -AM    Pain Frequency   Constant/continuous  -AM    Date Pain First Started   07/07/17  -AM    Clinical Progression   Not changed  -AM    Patient's Stated Pain Goal   No pain  -AM    Pain Intervention(s) Medication (See MAR)  -KD  Medication (See MAR);Ambulation/increased activity  -AM    Result of Injury   No  -AM    Work-Related Injury   No  -AM    Multiple Pain Sites   No  -AM    Recorded by [KD] UZAIR Esquivel/RAFAELA [CS] UZAIR Jhaveri/RAFAELA [AM] Oliver Moses PTA    Cognitive Assessment/Intervention    Current Cognitive/Communication Assessment functional  -KD functional  -CS functional  -AM    Orientation Status oriented x 4  -KD oriented x 4  -CS oriented x 4  -AM    Follows Commands/Answers Questions 100% of the time  -% of the time  -% of the time  -AM    Personal Safety Interventions gait belt  -KD  gait belt;nonskid shoes/slippers when out of bed;supervised activity  -AM    Recorded by [KD] UZAIR Esuqivel/RAFAELA [CS] UZAIR Jhaveri/RAFAELA [AM] Oliver Moses PTA    ROM (Range of Motion)    General ROM   no range of motion deficits identified  -AM    Recorded by   [AM] Oliver Moses PTA    Bed Mobility, Assessment/Treatment    Bed Mobility, Assistive Device   head of bed elevated;bed rails  -AM    Bed Mobility, Roll Left, [place]   not tested  -AM    Bed Mobility, Roll Right, [place]   not tested  -AM    Bed Mobility, Scoot/Bridge, [place]   not tested  -AM    Bed Mob, Supine to Sit, [place] conditional independence  -KD  conditional independence  -AM    Bed Mob, Sit to Supine, [place] conditional independence  -KD  conditional independence  -AM    Bed Mob, Sidelying to Sit, [place]   not tested  -AM    Bed Mob, Sit to Sidelying, [place]   not tested  -AM    Bed Mobility, Safety Issues   decreased use of arms for pushing/pulling;decreased use  of legs for bridging/pushing  -AM    Bed Mobility, Impairments   strength decreased  -AM    Recorded by [KD] UZAIR Esquivel/RAFAELA  [AM] Oliver Moses PTA    Transfer Assessment/Treatment    Transfers, Bed-Chair [place]   contact guard assist  -AM    Transfers, Chair-Bed [place]   contact guard assist  -AM    Transfers, Bed-Chair-Bed, Assist Device   rolling walker  -AM    Transfers, Sit-Stand [place]   contact guard assist  -AM    Transfers, Stand-Sit [place]   contact guard assist  -AM    Transfers, Sit-Stand-Sit, Assist Device   rolling walker  -AM    Toilet Transfer, [place]   not tested  -AM    Walk-In Shower Transfer, [place]   not tested  -AM    Bathtub Transfer, [place]   not tested  -AM    Transfer, Maintain Weight Bearing Status   able to maintain weight bearing status  -AM    Transfer, Safety Issues   step length decreased   verbal cues to stay closer to walker  -AM    Transfer, Impairments   strength decreased  -AM    Recorded by   [AM] Oliver Moses PTA    Gait Assessment/Treatment    Gait, [place] Level   contact guard assist  -AM    Gait, Assistive Device   rolling walker  -AM    Gait, Distance (Feet)   100  -AM    Gait, Gait Pattern Analysis   3-point gait  -AM    Gait, Gait Deviations   bilateral:;sergio decreased  -AM    Gait, Maintain Weight Bearing Status   able to maintain weight bearing status  -AM    Gait, Safety Issues   step length decreased  -AM    Gait, Impairments   strength decreased  -AM    Recorded by   [AM] Oliver Moses PTA    Stairs Assessment/Treatment    Stairs, [place] Level   not tested  -AM    Recorded by   [AM] Oliver Moses PTA    Therapy Exercises    Bilateral Lower Extremities   AROM:;20 reps;sitting;ankle pumps/circles;glut sets;LAQ;other reps   pillow squeeze  -AM    Bilateral Upper Extremity AROM:;20 reps;sitting;elbow flexion/extension;hand pumps;shoulder abduction/adduction;shoulder circles;shoulder  extension/flexion;shoulder ER/IR  -KD AROM:;20 reps;sitting;elbow flexion/extension;hand pumps;pronation/supination;shoulder extension/flexion;shoulder ER/IR  -CS     BUE Resistance manual resistance- moderate   2lb HW; 2 sets   -KD manual resistance- minimal  -CS     Recorded by [KD] CHANELL EsquivelA/RAFAELA [CS] Liza Graham DUNNE/L [AM] Oliver Moses PTA    Positioning and Restraints    Pre-Treatment Position in bed  -KD in bed  -CS in bed  -AM    Post Treatment Position bed  -KD bed  -CS chair  -AM    In Bed sitting EOB;call light within reach;encouraged to call for assist;exit alarm on  -KD sitting EOB;call light within reach  -CS     In Chair   reclined;call light within reach;encouraged to call for assist;legs elevated  -AM    Recorded by [KD] CHANELL EsquivelA/RAFAELA [CS] Liza Graham DUNNE/L [AM] Oliver Moses PTA      User Key  (r) = Recorded By, (t) = Taken By, (c) = Cosigned By    Initials Name Effective Dates    ASTER Jorge Vargas, BENJAMIN 10/17/16 -     AM Oliver Moses, BEJNAMIN 10/17/16 -     KD UZAIR Esquivel/L 10/17/16 -     CS UZAIR Jhaveri/RAFAELA 10/17/16 -                 OT Goals       07/12/17 1432 07/11/17 1045 07/10/17 1625    Transfer Training OT STG    Transfer Training OT STG, Date Goal Reviewed 07/12/17  -CS 07/11/17  -KD 07/10/17  -CS    Transfer Training OT STG, Outcome   goal ongoing  -CS    Patient Education OT LTG    Patient Education OT LTG, Date Goal Reviewed 07/12/17  -CS 07/11/17  -KD 07/10/17  -CS    Patient Education OT LTG Outcome  goal not met  -KD goal ongoing  -CS    ADL OT LTG    ADL OT LTG, Date Goal Reviewed 07/12/17  -CS 07/11/17  -KD 07/10/17  -CS    ADL OT LTG, Outcome   goal ongoing  -CS    Activity Tolerance OT STG    Activity Tolerance Goal OT STG, Date Goal Reviewed 07/12/17  -CS 07/11/17  -KD 07/10/17  -CS    Activity Tolerance Goal OT STG, Outcome  goal not met  -KD goal ongoing  -CS    Endurance OT STG    Endurance Goal OT STG, Date Goal Reviewed  07/12/17  -CS 07/11/17  -KD 07/10/17  -    Endurance Goal OT STG, Outcome   goal ongoing  -CS      07/09/17 1338          Transfer Training OT STG    Transfer Training OT STG, Date Established 07/09/17  [facility]      Transfer Training OT STG, Time to Achieve by discharge  -      Transfer Training OT STG, Activity Type all transfers  -      Transfer Training OT STG, [place] Level supervision required  -      Patient Education OT LTG    Patient Education OT LTG, Date Established 07/09/17  -      Patient Education OT LTG, Time to Achieve by discharge  -      Patient Education OT LTG, Education Type HEP;energy conservation;home safety;positioning;adaptive equipment mgmt;adaptive breathing  -      Patient Education OT LTG, Education Understanding verbalizes understanding;demonstrates adequately;independent   with caregiver assist as needed  -      ADL OT LTG    ADL OT LTG, Date Established 07/09/17  -      ADL OT LTG, Time to Achieve by discharge  -      ADL OT LTG, Activity Type ADL skills  -      ADL OT LTG, Additional Goal set up feeding/grooming; SBA toileting/bathing/dressing - AE/AD as needed  -      Activity Tolerance OT STG    Activity Tolerance Goal OT STG, Date Established 07/09/17  -      Activity Tolerance Goal OT STG, Time to Achieve by discharge  -      Activity Tolerance Goal OT STG, Activity Level 15 min activity;O2 sat >/equal to 88%;with 2 rest breaks  -      Endurance OT STG    Endurance Goal OT STG, Date Established 07/09/17  -      Endurance Goal OT STG, Time to Achieve by discharge  -      Endurance Goal OT STG, Activity Level endurance 2 fair  -        User Key  (r) = Recorded By, (t) = Taken By, (c) = Cosigned By    Initials Name Provider Type     JUAN Villalobos/L Occupational Therapist    UZAIR Lopez/L Occupational Therapy Assistant    UZAIR Sparrow/L Occupational Therapy Assistant          Occupational Therapy Education      Title: PT OT SLP Therapies (Active)     Topic: Occupational Therapy (Active)     Point: ADL training (Active)    Description: Instruct learner(s) on proper safety adaptation and remediation techniques during self care or transfers.   Instruct in proper use of assistive devices.    Learning Progress Summary    Learner Readiness Method Response Comment Documented by Status   Patient Acceptance E,D,TB NR Pt was educated on EC/WS.  07/12/17 1431 Active    Acceptance E VU Educated on OT and POC. Educated to call for assist and not get up on his own. Educated on importance of further rehab and concerns with safety with O2 dropping and with ADL. Educated on safety w/ t/f, mobility, and ADL.  07/09/17 1500 Done   Family Acceptance E VU Educated on OT and POC. Educated to call for assist and not get up on his own. Educated on importance of further rehab and concerns with safety with O2 dropping and with ADL. Educated on safety w/ t/f, mobility, and ADL.  07/09/17 1500 Done               Point: Home exercise program (Active)    Description: Instruct learner(s) on appropriate technique for monitoring, assisting and/or progressing therapeutic exercises/activities.    Learning Progress Summary    Learner Readiness Method Response Comment Documented by Status   Patient Acceptance E,D,TB NR Pt was educated on EC/WS.  07/12/17 1431 Active    Acceptance E,TB,D NR   07/10/17 1624 Active               Point: Precautions (Active)    Description: Instruct learner(s) on prescribed precautions during self-care and functional transfers.    Learning Progress Summary    Learner Readiness Method Response Comment Documented by Status   Patient Acceptance E,D,TB NR Pt was educated on EC/WS.  07/12/17 1431 Active    Acceptance E VU Educated on OT and POC. Educated to call for assist and not get up on his own. Educated on importance of further rehab and concerns with safety with O2 dropping and with ADL. Educated on safety w/ t/f,  mobility, and ADL.  07/09/17 1500 Done   Family Acceptance E VU Educated on OT and POC. Educated to call for assist and not get up on his own. Educated on importance of further rehab and concerns with safety with O2 dropping and with ADL. Educated on safety w/ t/f, mobility, and ADL.  07/09/17 1500 Done               Point: Body mechanics (Active)    Description: Instruct learner(s) on proper positioning and spine alignment during self-care, functional mobility activities and/or exercises.    Learning Progress Summary    Learner Readiness Method Response Comment Documented by Status   Patient Acceptance E,D,TB NR Pt was educated on EC/WS.  07/12/17 1431 Active    Acceptance E VU Educated on OT and POC. Educated to call for assist and not get up on his own. Educated on importance of further rehab and concerns with safety with O2 dropping and with ADL. Educated on safety w/ t/f, mobility, and ADL.  07/09/17 1500 Done   Family Acceptance E VU Educated on OT and POC. Educated to call for assist and not get up on his own. Educated on importance of further rehab and concerns with safety with O2 dropping and with ADL. Educated on safety w/ t/f, mobility, and ADL.  07/09/17 1500 Done                      User Key     Initials Effective Dates Name Provider Type Discipline     10/17/16 -  JUAN Villalobos/L Occupational Therapist OT     10/17/16 -  UZAIR Jhaveri/RAFAELA Occupational Therapy Assistant OT                  OT Recommendation and Plan  Anticipated Equipment Needs At Discharge: shower chair, bedside commode  Anticipated Discharge Disposition: skilled nursing facility, home with 24/7 care, home with home health (depends on progress)  Planned Therapy Interventions: activity intolerance, adaptive equipment training, ADL retraining, balance training, bed mobility training, energy conservation, fine motor coordination training, home exercise program, motor coordination training, strengthening,  transfer training  Therapy Frequency: other (see comments) (3-14x a week)  Plan of Care Review  Plan Of Care Reviewed With: patient  Progress: progress toward functional goals as expected  Outcome Summary/Follow up Plan: Pt tolerated tx well this date. Pt gave good effort with UE strengthening. Static standing x 5 mins. No goals met this tx. Continue POC.        Outcome Measures       07/12/17 1400 07/12/17 0950 07/11/17 1333    How much help from another person do you currently need...    Turning from your back to your side while in flat bed without using bedrails?  4  -ASTER 4  -ASTER    Moving from lying on back to sitting on the side of a flat bed without bedrails?  4  -ASTER 4  -ASTER    Moving to and from a bed to a chair (including a wheelchair)?  3  -ASTER 3  -ASTER    Standing up from a chair using your arms (e.g., wheelchair, bedside chair)?  3  -ASTER 3  -ASTER    Climbing 3-5 steps with a railing?  3  -ASTER 2  -ASTER    To walk in hospital room?  3  -ASTER 3  -ASTER    AM-PAC 6 Clicks Score  20  -ASTER 19  -ASTER    How much help from another is currently needed...    Putting on and taking off regular lower body clothing? 2  -CS  2  -KD    Bathing (including washing, rinsing, and drying) 2  -CS  2  -KD    Toileting (which includes using toilet bed pan or urinal) 3  -CS  3  -KD    Putting on and taking off regular upper body clothing 3  -CS  3  -KD    Taking care of personal grooming (such as brushing teeth) 3  -CS  3  -KD    Eating meals 4  -CS  4  -KD    Score 17  -CS  17  -KD    Functional Assessment    Outcome Measure Options AM-PAC 6 Clicks Daily Activity (OT)  -CS AM-PAC 6 Clicks Basic Mobility (PT)  -ASTER AM-PAC 6 Clicks Basic Mobility (PT)  -ASTER      07/11/17 1045 07/10/17 1600 07/10/17 1400    How much help from another person do you currently need...    Turning from your back to your side while in flat bed without using bedrails?   3  -AM    Moving from lying on back to sitting on the side of a flat bed without bedrails?   4  -AM     Moving to and from a bed to a chair (including a wheelchair)?   3  -AM    Standing up from a chair using your arms (e.g., wheelchair, bedside chair)?   3  -AM    Climbing 3-5 steps with a railing?   1  -AM    To walk in hospital room?   3  -AM    AM-PAC 6 Clicks Score   17  -AM    How much help from another is currently needed...    Putting on and taking off regular lower body clothing? 2  -KD 2  -CS     Bathing (including washing, rinsing, and drying) 2  -KD 2  -CS     Toileting (which includes using toilet bed pan or urinal) 3  -KD 3  -CS     Putting on and taking off regular upper body clothing 3  -KD 3  -CS     Taking care of personal grooming (such as brushing teeth) 3  -KD 3  -CS     Eating meals 4  -KD 4  -CS     Score 17  -KD 17  -CS     Functional Assessment    Outcome Measure Options  AM-PAC 6 Clicks Daily Activity (OT)  -CS AM-PAC 6 Clicks Basic Mobility (PT)  -AM      User Key  (r) = Recorded By, (t) = Taken By, (c) = Cosigned By    Initials Name Provider Type    ASTER Vargas PTA Physical Therapy Assistant    AM Oliver Moses PTA Physical Therapy Assistant    KD UZAIR Esquivel/L Occupational Therapy Assistant    RAUL Sparrow Occupational Therapy Assistant           Time Calculation:         Time Calculation- OT       07/12/17 1434          Time Calculation- OT    OT Start Time 1320  -CS      OT Stop Time 1413  -CS      OT Time Calculation (min) 53 min  -CS      Total Timed Code Minutes- OT 53 minute(s)  -CS      OT Received On 07/12/17  -CS        User Key  (r) = Recorded By, (t) = Taken By, (c) = Cosigned By    Initials Name Provider Type    RAUL Sparrow Occupational Therapy Assistant           Therapy Charges for Today     Code Description Service Date Service Provider Modifiers Qty    10455832502  OT SELF CARE/MGMT/TRAIN EA 15 MIN 7/12/2017 RAUL Jhaveri GO 1    [id number] HC OT THER PROC EA 15 MIN 7/12/2017 RAUL Jhaveri GO 2     [id number]  OT THERAPEUTIC ACT EA 15 MIN 7/12/2017 RAUL Jhaveri GO 1          OT G-codes  OT Professional Judgement Used?: Yes  OT Functional Scales Options: AM-PAC 6 Clicks Daily Activity (OT)  Score: 17  Functional Limitation: Self care  Self Care Current Status (): At least 40 percent but less than 60 percent impaired, limited or restricted  Self Care Goal Status (): At least 20 percent but less than 40 percent impaired, limited or restricted    RAUL Jhaveri  7/12/2017

## 2017-07-12 NOTE — PLAN OF CARE
Problem: Patient Care Overview (Adult)  Goal: Plan of Care Review  Outcome: Ongoing (interventions implemented as appropriate)    07/12/17 1432   Coping/Psychosocial Response Interventions   Plan Of Care Reviewed With patient   Patient Care Overview   Progress progress toward functional goals as expected   Outcome Evaluation   Outcome Summary/Follow up Plan Pt tolerated tx well this date. Pt gave good effort with UE strengthening. Static standing x 5 mins. No goals met this tx. Continue POC.         Problem: Inpatient Occupational Therapy  Goal: Transfer Training Goal 1 STG- OT  Outcome: Ongoing (interventions implemented as appropriate)    07/09/17 1338 07/10/17 1625 07/12/17 1432   Transfer Training OT STG   Transfer Training OT STG, Date Established 07/09/17 --  --    Transfer Training OT STG, Time to Achieve by discharge --  --    Transfer Training OT STG, Activity Type all transfers --  --    Transfer Training OT STG, [place] Level supervision required --  --    Transfer Training OT STG, Date Goal Reviewed --  --  07/12/17   Transfer Training OT STG, Outcome --  goal ongoing --        Goal: Patient Education Goal LTG- OT  Outcome: Ongoing (interventions implemented as appropriate)    07/09/17 1338 07/11/17 1045 07/12/17 1432   Patient Education OT LTG   Patient Education OT LTG, Date Established 07/09/17 --  --    Patient Education OT LTG, Time to Achieve by discharge --  --    Patient Education OT LTG, Education Type HEP;energy conservation;home safety;positioning;adaptive equipment mgmt;adaptive breathing --  --    Patient Education OT LTG, Education Understanding verbalizes understanding;demonstrates adequately;independent  (with caregiver assist as needed) --  --    Patient Education OT LTG, Date Goal Reviewed --  --  07/12/17   Patient Education OT LTG Outcome --  goal not met --        Goal: ADL Goal LTG- OT  Outcome: Ongoing (interventions implemented as appropriate)    07/09/17 1338 07/10/17 1627  07/12/17 1432   ADL OT LTG   ADL OT LTG, Date Established 07/09/17 --  --    ADL OT LTG, Time to Achieve by discharge --  --    ADL OT LTG, Activity Type ADL skills --  --    ADL OT LTG, Additional Goal set up feeding/grooming; SBA toileting/bathing/dressing - AE/AD as needed --  --    ADL OT LTG, Date Goal Reviewed --  --  07/12/17   ADL OT LTG, Outcome --  goal ongoing --        Goal: Activity Tolerance Goal STG- OT  Outcome: Ongoing (interventions implemented as appropriate)    07/09/17 1338 07/11/17 1045 07/12/17 1432   Activity Tolerance OT STG   Activity Tolerance Goal OT STG, Date Established 07/09/17 --  --    Activity Tolerance Goal OT STG, Time to Achieve by discharge --  --    Activity Tolerance Goal OT STG, Activity Level 15 min activity;O2 sat >/equal to 88%;with 2 rest breaks --  --    Activity Tolerance Goal OT STG, Date Goal Reviewed --  --  07/12/17   Activity Tolerance Goal OT STG, Outcome --  goal not met --        Goal: Endurance Goal STG- OT  Outcome: Ongoing (interventions implemented as appropriate)    07/09/17 1338 07/10/17 1625 07/12/17 1432   Endurance OT STG   Endurance Goal OT STG, Date Established 07/09/17 --  --    Endurance Goal OT STG, Time to Achieve by discharge --  --    Endurance Goal OT STG, Activity Level endurance 2 fair --  --    Endurance Goal OT STG, Date Goal Reviewed --  --  07/12/17   Endurance Goal OT STG, Outcome --  goal ongoing --

## 2017-07-13 NOTE — THERAPY TREATMENT NOTE
Acute Care - Physical Therapy Treatment Note  [facility]     Patient Name: Mickey Lind  : 1955  MRN: [id number]  Today's Date: 2017  Onset of Illness/Injury or Date of Surgery Date: 17  Date of Referral to PT: 17  Referring Physician: Dr. Carolee Leo    Admit Date: 2017    Visit Dx:    ICD-10-CM ICD-9-CM   1. Lower abdominal pain R10.30 789.09   2. Other ascites R18.8 789.59   3. Recurrent right pleural effusion J90 511.9   4. Impaired physical mobility Z74.09 781.99   5. Impaired mobility and ADLs Z74.09 799.89   6. Acute respiratory failure with hypoxia J96.01 518.81   7. Severe muscle deconditioning R29.898 781.99     Patient Active Problem List   Diagnosis   • End stage liver disease   • Depression   • Low back pain with sciatica   • Hyperlipidemia   • Unilateral inguinal hernia without obstruction or gangrene   • Diarrhea   • Hepatic cirrhosis   • Epigastric pain   • Peripheral vascular disease   • Encounter for venous access device care   • Multiple myeloma, stage 3   • Anemia   • Chronic hepatitis C without hepatic coma   • Lower abdominal pain   • Ascites due to alcoholic cirrhosis   • Tobacco dependence   • Simple chronic bronchitis   • Pleural effusion   • Ulcer of sacral region, stage 1   • CAP (community acquired pneumonia), bilateral   • Does not have health insurance   • Acute respiratory failure with hypoxia               Adult Rehabilitation Note       17 1134 17 1024 17 1320    Rehab Assessment/Intervention    Discipline occupational therapist  -RM physical therapy assistant  -ASTER occupational therapy assistant  -CS    Document Type therapy note (daily note)  -RM therapy note (daily note)  -ASTER therapy note (daily note)  -CS    Subjective Information agree to therapy;complains of;dyspnea  -RM agree to therapy  -ASTER agree to therapy  -CS    Patient Effort, Rehab Treatment good  -RM good  -ASTER good  -CS    Symptoms Noted During/After Treatment  shortness of breath  -RM shortness of breath  -ASTER shortness of breath  -CS    Precautions/Limitations fall precautions;oxygen therapy device and L/min  -RM fall precautions;oxygen therapy device and L/min  -ASTER fall precautions;oxygen therapy device and L/min  -CS    Recorded by [RM] America Warner OT [ASTER] Jorge Vargas PTA [CS] Liza Graham, DUNNE/L    Vital Signs    Pre Systolic BP Rehab 136  -RM      Pre Treatment Diastolic BP 84  -RM      Post Systolic BP Rehab 132  -  -ASTER     Post Treatment Diastolic BP 78  -RM 74  -ASTER     Pretreatment Heart Rate (beats/min) 71  -RM 80  -ASTER 52  -CS    Intratreatment Heart Rate (beats/min)  83  -ASTER     Posttreatment Heart Rate (beats/min) 74  -RM 73  -ASTER 88  -CS    Pre SpO2 (%) 98  -RM 96  -ASTER 98  -CS    O2 Delivery Pre Treatment supplemental O2  -RM room air  -ASTER supplemental O2  -CS    Intra SpO2 (%)  93  -ASTER     O2 Delivery Intra Treatment  supplemental O2  -ASTER     Post SpO2 (%) 95  -RM 98  -ASTER 96  -CS    O2 Delivery Post Treatment supplemental O2  -RM supplemental O2  -ASTER supplemental O2  -CS    Pre Patient Position Sitting  -RM Supine  -ASTER Sitting  -CS    Intra Patient Position   Standing  -CS    Post Patient Position Sitting  -RM Sitting  -ASTER Sitting  -CS    Recorded by [RM] America Warner OT [ASTER] Jorge Vargas PTA [CS] CHANELL JhaveriA/L    Pain Assessment    Pain Assessment No/denies pain  -RM 0-10  -ASTER 0-10  -CS    Pain Score  7  -ASTER 6  -CS    Post Pain Score  7  -ASTER 6  -CS    Pain Location  Generalized  -ASTER Generalized  -CS    Pain Intervention(s)  Ambulation/increased activity  -ASTER     Recorded by [RM] America Warner OT [ASTER] Jorge Vargas PTA [CS] CHANELL JhaveriA/L    Vision Assessment/Intervention    Visual Impairment  WFL with corrective lenses  -ASTER     Recorded by  [ASTER] Jorge Vargas PTA     Cognitive Assessment/Intervention    Current Cognitive/Communication Assessment  functional  -ASTER functional  -CS    Orientation Status   oriented x 4  -ASTER oriented x 4  -CS    Follows Commands/Answers Questions  100% of the time  -ASTER 100% of the time  -CS    Personal Safety Interventions  gait belt;muscle strengthening facilitated;nonskid shoes/slippers when out of bed;supervised activity  -ASTER     Recorded by  [ASTER] Jorge Vargas PTA [CS] UZAIR Jhaveri/L    Bed Mobility, Assessment/Treatment    Bed Mob, Supine to Sit, [place]  conditional independence  -ASTER     Recorded by  [ASTER] Jorge Vargas PTA     Transfer Assessment/Treatment    Transfers, Bed-Chair [place]  contact guard assist  -ASTER     Transfers, Chair-Bed [place]  contact guard assist  -ASTER     Transfers, Bed-Chair-Bed, Assist Device  rolling walker  -ASTER     Transfers, Sit-Stand [place]  contact guard assist  -ASTER contact guard assist  -CS    Transfers, Stand-Sit [place]  contact guard assist  -ASTER contact guard assist  -CS    Transfers, Sit-Stand-Sit, Assist Device  rolling walker  -ASTER rolling walker  -CS    Toilet Transfer, [place]  contact guard assist  -ASTER     Toilet Transfer, Assistive Device  rolling walker  -ASTER     Recorded by  [ASTER] Jorge Vargas PTA [CS] UZAIR Jhaveri/L    Gait Assessment/Treatment    Gait, [place] Level  contact guard assist  -ASTER     Gait, Assistive Device  rolling walker  -ASTER     Gait, Distance (Feet)  325   15 ft x2  -ASTER     Gait, Gait Deviations  sergio decreased;forward flexed posture;step length decreased  -     Gait, Comment  became very SOA  -ASTER     Recorded by  [ASTER] Jorge Vargas PTA     Stairs Assessment/Treatment    Number of Stairs  5  -ASTER     Stairs, Handrail Location  left side (ascending)  -ASTER     Stairs, [place] Level  contact guard assist  -ASTER     Stairs, Assistive Device  straight cane  -ASTER     Stairs, Technique Used  step to step (ascending);step to step (descending)  -     Stairs, Comment  2 attempts. pt reports he will have a HR placed within a couple days. pt edu on always have  "someone assist him with steps  -ASTER     Recorded by  [ASTER] Jorge Vargas PTA     Balance Skills Training    Standing-Level of Assistance   Contact guard  -CS    Static Standing Balance Support   assistive device  -CS    Standing Balance # of Minutes   5  -CS    Recorded by   [CS] UZAIR Jhaveri/L    Therapy Exercises    Bilateral Lower Extremities  AROM:;10 reps;standing;heel raises;hip flexion;mini squats;hip extension;hip abduction/adduction  -ASTER     Bilateral Upper Extremity   --   UUE for 20 mins with 1 rest break.  -CS    Recorded by  [ASTER] Jorge Vargas PTA [CS] RAUL Jhaveri    Positioning and Restraints    Pre-Treatment Position  in bed  -ASTER in bed  -CS    Post Treatment Position  bed  -ASTER bed  -CS    In Bed  sitting EOB;call light within reach;encouraged to call for assist   all needs met  -ASTER sitting EOB;call light within reach  -CS    Recorded by  [ASTER] Jorge Vargas PTA [CS] UZAIR Jhaveri/RAFAELA      07/12/17 0950 07/11/17 1333 07/11/17 1045    Rehab Assessment/Intervention    Discipline physical therapy assistant  -ASTER physical therapy assistant  -ASTER occupational therapy assistant  -KD    Document Type therapy note (daily note)  -ASTER therapy note (daily note)  -ASTER therapy note (daily note)  -KD    Subjective Information agree to therapy  -ASTER agree to therapy  -ASTER agree to therapy  -KD    Patient Effort, Rehab Treatment good  -ASTER good  -ASTER     Patient Effort, Rehab Treatment Comment  pt reports he has poor blood flow in his legs causing them to \"give out\" at time.   -ASTER     Symptoms Noted During/After Treatment shortness of breath  -ASTER shortness of breath  -ASTER     Precautions/Limitations fall precautions;oxygen therapy device and L/min  -ASTER fall precautions;oxygen therapy device and L/min  -ASTER     Recorded by [ASTER] Jorge Vargas PTA [ASTER] Jorge Vargas PTA [KD] UZAIR Esquivel/L    Vital Signs    Pre Systolic BP Rehab 140  -ASTER 122  -ASTER 115  -KD    Pre Treatment " Diastolic BP 72  -ASTER 63  -ASTER 70  -KD    Intra Treatment Diastolic BP  117  -ASTER     Post Systolic BP Rehab 124  -ASTER 64  -ASTER     Post Treatment Diastolic BP 78  -ASTER      Pretreatment Heart Rate (beats/min) 83  -ASTER 86  -ASTER 87  -KD    Intratreatment Heart Rate (beats/min)  86  -ASTER     Posttreatment Heart Rate (beats/min) 79  -ASTER 82  -ASTER 93  -KD    Pre SpO2 (%) 97  -ASTER 94  -ASTER 92  -KD    O2 Delivery Pre Treatment supplemental O2  -ASTER supplemental O2  -SATER supplemental O2  -KD    Intra SpO2 (%) 92  -ASTER 88   after gait. improved to 93%  -ASTER     O2 Delivery Intra Treatment supplemental O2  -ASTER supplemental O2  -ASTER     Post SpO2 (%) 96  -ASTER 94  -ASTER 94  -KD    O2 Delivery Post Treatment supplemental O2  -ASTER supplemental O2  -ASTER supplemental O2  -KD    Pre Patient Position Sitting  -ASTER Supine  -ASTER Sitting  -KD    Intra Patient Position   Sitting  -KD    Post Patient Position Sitting  -ASTER Sitting  -ASTER Sitting  -KD    Recorded by [ASTER] Jorge Vargas PTA [ASTER] Jorge Vargas PTA [KD] Sylvie Mcadams, DUNNE/L    Pain Assessment    Pain Assessment 0-10  -ASTER 0-10  -ASTER 0-10  -KD    Pain Score 6  -ASTER 6  -ASTER 5  -KD    Post Pain Score 6  -ASTER 6  -ASTER 5  -KD    Pain Type   Acute pain  -KD    Pain Location Generalized  -ASTER Generalized  -ASTER Abdomen  -KD    Pain Intervention(s) Ambulation/increased activity;Repositioned   defers pain meds.   -ASTER Repositioned;Ambulation/increased activity   pt defers pain meds.   -ASTER Medication (See MAR)  -KD    Recorded by [ASTER] Jorge Vargas PTA [ASTER] Jorge Vargas PTA [KD] Sylvie Mcadams, DUNNE/L    Vision Assessment/Intervention    Visual Impairment WFL with corrective lenses  -ASTER WFL with corrective lenses  -ASTER     Recorded by [ASTER] Jorge Vargas PTA [ASTER] Jorge Vargas PTA     Cognitive Assessment/Intervention    Current Cognitive/Communication Assessment functional  -ASTER functional  -ASTER functional  -KD    Orientation Status oriented x 4  -ASTER oriented x 4  -ASTER oriented x 4  -KD    Follows  Commands/Answers Questions 100% of the time  -ASTER 100% of the time  -ASTER 100% of the time  -KD    Personal Safety Interventions gait belt;muscle strengthening facilitated;nonskid shoes/slippers when out of bed;supervised activity  -ASTER gait belt;muscle strengthening facilitated;nonskid shoes/slippers when out of bed;supervised activity  -ASTER gait belt  -KD    Recorded by [ASTER] Jorge Vargas PTA [ASTER] Jorge Vargas PTA [KD] Sylvie Mcadams, DUNNE/L    Bed Mobility, Assessment/Treatment    Bed Mobility, Assistive Device  bed rails;head of bed elevated  -ASTER     Bed Mob, Supine to Sit, [place]  conditional independence  -ASTER conditional independence  -KD    Bed Mob, Sit to Supine, [place]  conditional independence  -ASTER conditional independence  -KD    Recorded by  [ASTER] Jorge Vargas PTA [KD] Sylvie Mcadams, DUNNE/L    Transfer Assessment/Treatment    Transfers, Bed-Chair [place] contact guard assist  -ASTER contact guard assist  -ASTER     Transfers, Chair-Bed [place]  contact guard assist  -ASTER     Transfers, Bed-Chair-Bed, Assist Device rolling walker  -ASTER rolling walker  -ASTER     Transfers, Sit-Stand [place] contact guard assist  -ASTER contact guard assist  -ASTER     Transfers, Stand-Sit [place] contact guard assist  -ASTER contact guard assist  -ASTER     Transfers, Sit-Stand-Sit, Assist Device rolling walker  -ATSER rolling walker  -ASTER     Toilet Transfer, [place]  contact guard assist  -ASTER     Toilet Transfer, Assistive Device  rolling walker  -ASTER     Recorded by [ASTER] Jorge Vargas PTA [ASTER] Jorge Vargas PTA     Gait Assessment/Treatment    Gait, [place] Level contact guard assist  -ASTER contact guard assist  -ASTER     Gait, Assistive Device rolling walker  -ASTER rolling walker  -ASTER     Gait, Distance (Feet) 420  -ASTER 332   15 ft  -ASTER     Gait, Gait Deviations sergio decreased  -ASTER sergio decreased  -ASTER     Gait, Comment  required  standing rest breaks  -ASTER     Recorded by [ASTER] Jorge Vargas  PTA [ASTER] Jorge Vargas PTA     Stairs Assessment/Treatment    Number of Stairs 2  -      Stairs, Handrail Location left side (ascending)  -      Stairs, [place] Level contact guard assist  -      Stairs, Assistive Device straight cane  -      Stairs, Technique Used step to step (ascending);step to step (descending)  -      Stairs, Comment 2 attempts  -      Recorded by [ASTER] Jorge Vargas PTA      Therapy Exercises    Bilateral Lower Extremities AROM:;20 reps;ankle pumps/circles;LAQ;hip flexion;hip abduction/adduction;glut sets  - AROM:;LAQ;15 reps;sitting;ankle pumps/circles;hip flexion;glut sets;hip abduction/adduction   2 sets of each. Edu on HEP and home safety  -     Bilateral Upper Extremity   AROM:;20 reps;sitting;elbow flexion/extension;hand pumps;shoulder abduction/adduction;shoulder circles;shoulder extension/flexion;shoulder ER/IR  -KD    BUE Resistance   manual resistance- moderate   2lb HW; 2 sets   -KD    Recorded by [ASTER] Jorge Vargas PTA [ASTER] Jorge Vargas PTA [KD] Sylvie Mcadams, DUNNE/L    Positioning and Restraints    Pre-Treatment Position in bed  - in bed  -ASTER in bed  -KD    Post Treatment Position chair  - bed  -ASTER bed  -KD    In Bed call light within reach;encouraged to call for assist   all needs met. spoke w/ nsg, aldair hose may be too small  - sitting;call light within reach;encouraged to call for assist;with family/caregiver  - sitting EOB;call light within reach;encouraged to call for assist;exit alarm on  -KD    Recorded by [ASTER] Jorge Vargas PTA [ASTER] Jorge Vargas PTA [KD] UZAIR Esquivel/L      07/10/17 1558 07/10/17 1400       Rehab Assessment/Intervention    Discipline occupational therapy assistant  -CS physical therapy assistant  -AM     Document Type therapy note (daily note)  -CS therapy note (daily note)  -AM     Subjective Information agree to therapy  -CS agree to therapy;no complaints  -AM     Patient Effort, Rehab Treatment   good  -AM     Symptoms Noted During/After Treatment  shortness of breath  -AM     Precautions/Limitations  fall precautions;oxygen therapy device and L/min  -AM     Equipment Issued to Patient  gait belt  -AM     Recorded by [CS] UZAIR Jhaveri/RAFAELA [AM] Oliver Moses PTA     Vital Signs    Pre Systolic BP Rehab  135  -AM     Pre Treatment Diastolic BP  78  -AM     Post Systolic BP Rehab  136  -AM     Post Treatment Diastolic BP  82  -AM     Pretreatment Heart Rate (beats/min) 92  -CS 98  -AM     Posttreatment Heart Rate (beats/min) 92  -  -AM     Pre SpO2 (%) 94  -CS 88  -AM     O2 Delivery Pre Treatment supplemental O2  -CS supplemental O2  -AM     Post SpO2 (%) 94  -CS 93  -AM     O2 Delivery Post Treatment supplemental O2  -CS supplemental O2  -AM     Pre Patient Position Sitting  -CS Sitting  -AM     Intra Patient Position Sitting  -CS Standing  -AM     Post Patient Position Sitting  -CS Sitting  -AM     Recorded by [CS] UZAIR Jhaveri/RAFAELA [AM] Oliver Moses PTA     Pain Assessment    Pain Assessment 0-10  -CS 0-10  -AM     Pain Score 6  -CS 7  -AM     Post Pain Score 6  -CS 7  -AM     Pain Type  Acute pain  -AM     Pain Location Abdomen   legs, back, hernia  -CS Generalized  -AM     Pain Descriptors  Sore  -AM     Pain Frequency  Constant/continuous  -AM     Date Pain First Started  07/07/17  -AM     Clinical Progression  Not changed  -AM     Patient's Stated Pain Goal  No pain  -AM     Pain Intervention(s)  Medication (See MAR);Ambulation/increased activity  -AM     Result of Injury  No  -AM     Work-Related Injury  No  -AM     Multiple Pain Sites  No  -AM     Recorded by [CS] UZAIR Jhaveri/RAFAELA [AM] Oliver Moses PTA     Cognitive Assessment/Intervention    Current Cognitive/Communication Assessment functional  -CS functional  -AM     Orientation Status oriented x 4  -CS oriented x 4  -AM     Follows Commands/Answers Questions 100% of the time  -% of the time  -AM      Personal Safety Interventions  gait belt;nonskid shoes/slippers when out of bed;supervised activity  -AM     Recorded by [CS] RAUL Jhaveri [AM] Oliver Moses PTA     ROM (Range of Motion)    General ROM  no range of motion deficits identified  -AM     Recorded by  [AM] Oliver Moses PTA     Bed Mobility, Assessment/Treatment    Bed Mobility, Assistive Device  head of bed elevated;bed rails  -AM     Bed Mobility, Roll Left, [place]  not tested  -AM     Bed Mobility, Roll Right, [place]  not tested  -AM     Bed Mobility, Scoot/Bridge, [place]  not tested  -AM     Bed Mob, Supine to Sit, [place]  conditional independence  -AM     Bed Mob, Sit to Supine, [place]  conditional independence  -AM     Bed Mob, Sidelying to Sit, [place]  not tested  -AM     Bed Mob, Sit to Sidelying, [place]  not tested  -AM     Bed Mobility, Safety Issues  decreased use of arms for pushing/pulling;decreased use of legs for bridging/pushing  -AM     Bed Mobility, Impairments  strength decreased  -AM     Recorded by  [AM] Oliver Moses PTA     Transfer Assessment/Treatment    Transfers, Bed-Chair [place]  contact guard assist  -AM     Transfers, Chair-Bed [place]  contact guard assist  -AM     Transfers, Bed-Chair-Bed, Assist Device  rolling walker  -AM     Transfers, Sit-Stand [place]  contact guard assist  -AM     Transfers, Stand-Sit [place]  contact guard assist  -AM     Transfers, Sit-Stand-Sit, Assist Device  rolling walker  -AM     Toilet Transfer, [place]  not tested  -AM     Walk-In Shower Transfer, [place]  not tested  -AM     Bathtub Transfer, [place]  not tested  -AM     Transfer, Maintain Weight Bearing Status  able to maintain weight bearing status  -AM     Transfer, Safety Issues  step length decreased   verbal cues to stay closer to walker  -AM     Transfer, Impairments  strength decreased  -AM     Recorded by  [AM] Oliver Moses PTA      Gait Assessment/Treatment    Gait, [place] Level  contact guard assist  -AM     Gait, Assistive Device  rolling walker  -AM     Gait, Distance (Feet)  100  -AM     Gait, Gait Pattern Analysis  3-point gait  -AM     Gait, Gait Deviations  bilateral:;sergio decreased  -AM     Gait, Maintain Weight Bearing Status  able to maintain weight bearing status  -AM     Gait, Safety Issues  step length decreased  -AM     Gait, Impairments  strength decreased  -AM     Recorded by  [AM] Oliver Moses PTA     Stairs Assessment/Treatment    Stairs, [place] Level  not tested  -AM     Recorded by  [AM] Oliver Moses PTA     Therapy Exercises    Bilateral Lower Extremities  AROM:;20 reps;sitting;ankle pumps/circles;glut sets;LAQ;other reps   pillow squeeze  -AM     Bilateral Upper Extremity AROM:;20 reps;sitting;elbow flexion/extension;hand pumps;pronation/supination;shoulder extension/flexion;shoulder ER/IR  -CS      BUE Resistance manual resistance- minimal  -CS      Recorded by [CS] UZAIR Jhaveri/RAFAELA [AM] Oliver Moses PTA     Positioning and Restraints    Pre-Treatment Position in bed  -CS in bed  -AM     Post Treatment Position bed  -CS chair  -AM     In Bed sitting EOB;call light within reach  -CS      In Chair  reclined;call light within reach;encouraged to call for assist;legs elevated  -AM     Recorded by [CS] UZAIR Jhaveri/RAFAELA [AM] Oliver Moses PTA       User Key  (r) = Recorded By, (t) = Taken By, (c) = Cosigned By    Initials Name Effective Dates    ASTER Jorge Vargas, BENJAMIN 10/17/16 -     AM Oliver Moses PTA 10/17/16 -     CHANELL LopezA/L 10/17/16 -     CS CHANELL JhaveriA/L 10/17/16 -     RM America Warner, OT 03/22/17 -                 IP PT Goals       07/13/17 1024 07/12/17 0950 07/11/17 1333    Transfer Training PT LTG    Transfer Training PT  LTG, Date Goal Reviewed 07/13/17  -ASTER 07/12/17  -ASTER 07/11/17  -ASTER    Transfer Training PT LTG, Outcome goal ongoing   -ASTER goal ongoing  -ASTER goal ongoing  -ASTER    Gait Training PT LTG    Gait Training Goal PT LTG, Date Goal Reviewed 07/13/17  -ASTER 07/12/17  -ASTER 07/11/17  -ASTER    Gait Training Goal PT LTG, Outcome goal ongoing  -ASTER goal ongoing  -ASTER goal ongoing  -ASTER    Stair Training PT LTG    Stair Training Goal PT LTG, Date Goal Reviewed 07/13/17  -ASTER 07/12/17  -ASTER 07/11/17  -ASTER    Stair Training Goal PT LTG, Outcome goal ongoing  -ASTER goal ongoing  -ASTER goal ongoing  -ASTER    Strength Goal PT LTG    Strength Goal PT LTG, Date Goal Reviewed 07/13/17  -ASTER 07/12/17  -ASTER 07/11/17  -ASTER    Strength Goal PT LTG, Outcome goal met  -ASTER goal ongoing  -ASTER goal ongoing  -ASTER    Patient Education PT LTG    Patient Education PT LTG, Date Goal Reviewed  07/12/17  -ASTER 07/11/17  -ASTER    Patient Education PT LTG Outcome  goal ongoing  -ASTER goal ongoing  -ASTER    Caregiver Training PT LTG    Caregiver Training PT LTG, Date Goal Reviewed 07/13/17  -ASTER 07/12/17  -ASTER     Caregiver Training PT LTG, Outcome goal ongoing  -ASTER goal ongoing  -ASTER       07/10/17 1400 07/09/17 1341       Transfer Training PT LTG    Transfer Training PT LTG, Date Established  07/09/17  -JCA     Transfer Training PT LTG, Time to Achieve  by discharge  -JCA     Transfer Training PT LTG, Activity Type  bed to chair /chair to bed;sit to stand/stand to sit;toilet  -JCA     Transfer Training PT LTG, [place] Level  supervision required;conditional independence  -JCA     Transfer Training PT LTG, Assist Device  --   AAD  -JCA     Transfer Training PT  LTG, Date Goal Reviewed 07/10/17  -AM      Transfer Training PT LTG, Outcome goal not met  -AM goal ongoing  -JCA     Gait Training PT LTG    Gait Training Goal PT LTG, Date Established  07/09/17  -JCA     Gait Training Goal PT LTG, Time to Achieve  by discharge  -JCA     Gait Training Goal PT LTG, [place] Level  supervision required;conditional independence  -JCA     Gait Training Goal PT LTG, Distance to Achieve  50 ft;O2 sats will not  drop below 90%  -[facility]     Gait Training Goal PT LTG, Additional Goal  150ft;O2 sats will not drop below 92%  -[facility]     Gait Training Goal PT LTG, Date Goal Reviewed 07/10/17  -AM      Gait Training Goal PT LTG, Outcome goal not met  -AM goal ongoing  -[facility]     Stair Training PT LTG    Stair Training Goal PT LTG, Date Established  07/09/17  -[facility]     Stair Training Goal PT LTG, Time to Achieve  by discharge  -[facility]     Stair Training Goal PT LTG, Number of Steps  5  -[facility]     Stair Training Goal PT LTG, [place] Level  supervision required;contact guard assist  -[facility]     Stair Training Goal PT LTG, Assist Device  cane, straight  -[facility]     Stair Training Goal PT LTG, Date Goal Reviewed 07/10/17  -AM      Stair Training Goal PT LTG, Outcome goal not met  -AM goal ongoing  -[facility]     Strength Goal PT LTG    Strength Goal PT LTG, Date Established  07/09/17  -[facility]     Strength Goal PT LTG, Time to Achieve  by discharge  -[facility]     Strength Goal PT LTG, Measure to Achieve  Pt will perform 15 reps of AROM exercises  -[facility]     Strength Goal PT LTG, Functional Goal  Pt will progress to standing exercises  -[facility]     Strength Goal PT LTG, Date Goal Reviewed 07/10/17  -AM      Strength Goal PT LTG, Outcome goal partially met  -AM goal ongoing  -[facility]     Patient Education PT LTG    Patient Education PT LTG, Date Established  07/09/17  -[facility]     Patient Education PT LTG, Time to Achieve  by discharge  -[facility]     Patient Education PT LTG, Education Type  HEP;written program;posture/body mechanics;gait;transfers;home safety  -[facility]     Patient Education PT LTG, Date Goal Reviewed 07/10/17  -AM      Patient Education PT LTG Outcome goal not met  -AM goal ongoing  -[facility]     Caregiver Training PT LTG    Caregiver Training PT LTG, Date Established  07/09/17  -[facility]     Caregiver Training PT LTG, Time to Achieve  by discharge  -[facility]     Caregiver Training PT LTG, Activity Type  Homecaregiver will demonstrate understanding assisting pt with  transfers/ambulation with device as needed. Homecaregiver will acknowledge understanding of home care instructions  -[facility]     Caregiver Training PT LTG, Date Goal Reviewed 07/10/17  -AM      Caregiver Training PT LTG, Outcome goal not met  -AM goal ongoing  -[facility]       User Key  (r) = Recorded By, (t) = Taken By, (c) = Cosigned By    Initials Name Provider Type    [facility] Erika Chapa, PT Physical Therapist    ASTER Vargas, PTA Physical Therapy Assistant    MARIAM Moses [facility] Physical Therapy Assistant          Physical Therapy Education     Title: PT OT SLP Therapies (Active)     Topic: Physical Therapy (Active)     Point: Mobility training (Active)    Learning Progress Summary    Learner Readiness Method Response Comment Documented by Status   Patient Acceptance E NR gait training  07/13/17 1232 Active    Acceptance E NR stair training.  07/12/17 1240 Active    Acceptance E NR   07/11/17 1419 Active    Acceptance E,D NR  [facility] 07/09/17 1339 Active               Point: Home exercise program (Done)    Learning Progress Summary    Learner Readiness Method Response Comment Documented by Status   Patient Acceptance E DU reinforced HEP.  07/12/17 1245 Done    Acceptance E VASU ROBISON on HEP and home safety.  07/11/17 1439 Done               Point: Body mechanics (Active)    Learning Progress Summary    Learner Readiness Method Response Comment Documented by Status   Patient Acceptance E,D NR  [facility] 07/09/17 1339 Active               Point: Precautions (Active)    Learning Progress Summary    Learner Readiness Method Response Comment Documented by Status   Patient Acceptance E,D NR  [facility] 07/09/17 1339 Active                      User Key     Initials Effective Dates Name Provider Type Discipline    [facility] 10/17/16 -  Erika Chapa, PT Physical Therapist PT     10/17/16 -  Jorge Vargas, BENJAMIN Physical Therapy Assistant PT                    PT Recommendation and Plan  Anticipated Equipment Needs At Discharge:   (pt has RW)  Anticipated Discharge Disposition: home with home health  Planned Therapy Interventions: balance training, bed mobility training, gait training, home exercise program, patient/family education, postural re-education, neuromuscular re-education, stair training, strengthening, transfer training  PT Frequency: other (see comments) (5-14 times per weej)  Plan of Care Review  Plan Of Care Reviewed With: patient  Progress: progress toward functional goals as expected  Outcome Summary/Follow up Plan: pt responded well to therapy. pt became very SOA after gait. pt requires extended rest breaks while monitoring vitals. pt becomes slightly unsteady w/ gait and t/fs when fatigued. pt met 1 new goal this tx. pt would continue to benefit from PT services.           Outcome Measures       07/13/17 1024 07/12/17 1400 07/12/17 0950    How much help from another person do you currently need...    Turning from your back to your side while in flat bed without using bedrails? 4  -ASTER  4  -ASTER    Moving from lying on back to sitting on the side of a flat bed without bedrails? 4  -ASTER  4  -ASTER    Moving to and from a bed to a chair (including a wheelchair)? 3  -ASTER  3  -ASTER    Standing up from a chair using your arms (e.g., wheelchair, bedside chair)? 3  -ASTER  3  -ASTER    Climbing 3-5 steps with a railing? 3  -ASTER  3  -ASTER    To walk in hospital room? 3  -ASTER  3  -ASTER    AM-PAC 6 Clicks Score 20  -ASTER  20  -ASTER    How much help from another is currently needed...    Putting on and taking off regular lower body clothing?  2  -CS     Bathing (including washing, rinsing, and drying)  2  -CS     Toileting (which includes using toilet bed pan or urinal)  3  -CS     Putting on and taking off regular upper body clothing  3  -CS     Taking care of personal grooming (such as brushing teeth)  3  -CS     Eating meals  4  -CS     Score  17  -CS     Functional Assessment    Outcome Measure Options AM-PAC 6 Clicks Basic Mobility (PT)  -ASTER AM-PAC 6  Clicks Daily Activity (OT)  -CS AM-PAC 6 Clicks Basic Mobility (PT)  -ASTER      07/11/17 1333 07/11/17 1045 07/10/17 1600    How much help from another person do you currently need...    Turning from your back to your side while in flat bed without using bedrails? 4  -ASTER      Moving from lying on back to sitting on the side of a flat bed without bedrails? 4  -ASTER      Moving to and from a bed to a chair (including a wheelchair)? 3  -ASTER      Standing up from a chair using your arms (e.g., wheelchair, bedside chair)? 3  -ASTER      Climbing 3-5 steps with a railing? 2  -ASTER      To walk in hospital room? 3  -ASTER      AM-PAC 6 Clicks Score 19  -ASTER      How much help from another is currently needed...    Putting on and taking off regular lower body clothing? 2  -KD 2  -KD 2  -CS    Bathing (including washing, rinsing, and drying) 2  -KD 2  -KD 2  -CS    Toileting (which includes using toilet bed pan or urinal) 3  -KD 3  -KD 3  -CS    Putting on and taking off regular upper body clothing 3  -KD 3  -KD 3  -CS    Taking care of personal grooming (such as brushing teeth) 3  -KD 3  -KD 3  -CS    Eating meals 4  -KD 4  -KD 4  -CS    Score 17  -KD 17  -KD 17  -CS    Functional Assessment    Outcome Measure Options AM-PAC 6 Clicks Basic Mobility (PT)  -ASTER  AM-PAC 6 Clicks Daily Activity (OT)  -CS      07/10/17 1400          How much help from another person do you currently need...    Turning from your back to your side while in flat bed without using bedrails? 3  -AM      Moving from lying on back to sitting on the side of a flat bed without bedrails? 4  -AM      Moving to and from a bed to a chair (including a wheelchair)? 3  -AM      Standing up from a chair using your arms (e.g., wheelchair, bedside chair)? 3  -AM      Climbing 3-5 steps with a railing? 1  -AM      To walk in hospital room? 3  -AM      AM-PAC 6 Clicks Score 17  -AM      Functional Assessment    Outcome Measure Options AM-PAC 6 Clicks Basic Mobility (PT)  -AM         User Key  (r) = Recorded By, (t) = Taken By, (c) = Cosigned By    Initials Name Provider Type    ASTER Jorge Vargas PTA Physical Therapy Assistant    MARIAM Moses PTA Physical Therapy Assistant    ROSANGELA Mcadams, DUNNE/L Occupational Therapy Assistant    RENATO Graham, DUNNE/L Occupational Therapy Assistant           Time Calculation:         PT Charges       07/13/17 1236          Time Calculation    Start Time 1024  -ASTER      Stop Time 1118  -ASTER      Time Calculation (min) 54 min  -ASTER      Time Calculation- PT    Total Timed Code Minutes- PT 54 minute(s)  -ASTER        User Key  (r) = Recorded By, (t) = Taken By, (c) = Cosigned By    Initials Name Provider Type    ASTER Jorge Vargas PTA Physical Therapy Assistant          Therapy Charges for Today     Code Description Service Date Service Provider Modifiers Qty    32459169856 HC PT THERAPEUTIC ACT EA 15 MIN 7/12/2017 Jorge Vargas PTA GP 2    87460833930 HC PT THER PROC EA 15 MIN 7/12/2017 Jorge Vargas PTA GP 1    11889239286 HC GAIT TRAINING EA 15 MIN 7/12/2017 Jorge Vargas PTA GP 1    83969541969 HC GAIT TRAINING EA 15 MIN 7/13/2017 Jorge Vargas, PTA GP 1    42185651367 HC PT THER PROC EA 15 MIN 7/13/2017 Jorge Vargas, PTA GP 1    12184763930 HC PT THERAPEUTIC ACT EA 15 MIN 7/13/2017 Jorge Vargas PTA GP 2          PT G-Codes  PT Professional Judgement Used?: Yes  Outcome Measure Options: AM-PAC 6 Clicks Basic Mobility (PT)  Score: 17  Functional Limitation: Mobility: Walking and moving around  Mobility: Walking and Moving Around Current Status (): At least 40 percent but less than 60 percent impaired, limited or restricted  Mobility: Walking and Moving Around Goal Status (): At least 1 percent but less than 20 percent impaired, limited or restricted    Jorge Vargas PTA  7/13/2017

## 2017-07-13 NOTE — PLAN OF CARE
Problem: Patient Care Overview (Adult)  Goal: Plan of Care Review  Outcome: Ongoing (interventions implemented as appropriate)    07/13/17 0340   Coping/Psychosocial Response Interventions   Plan Of Care Reviewed With patient   Patient Care Overview   Progress no change   Outcome Evaluation   Outcome Summary/Follow up Plan Wanting pain medications, not tylenol       Goal: Adult Individualization and Mutuality  Outcome: Ongoing (interventions implemented as appropriate)    Problem: Fall Risk (Adult)  Goal: Absence of Falls  Outcome: Ongoing (interventions implemented as appropriate)    Problem: Anxiety (Adult)  Goal: Reduction/Resolution  Outcome: Ongoing (interventions implemented as appropriate)

## 2017-07-13 NOTE — PLAN OF CARE
Problem: Patient Care Overview (Adult)  Goal: Plan of Care Review  Outcome: Ongoing (interventions implemented as appropriate)    07/13/17 1134   Coping/Psychosocial Response Interventions   Plan Of Care Reviewed With patient   Outcome Evaluation   Outcome Summary/Follow up Plan OT Tx complete. Pt participated in session working to increase strength, ADL capability, transfer strength. He will continue to benefit from OT intervention.         Problem: Inpatient Occupational Therapy  Goal: Transfer Training Goal 1 STG- OT  Outcome: Ongoing (interventions implemented as appropriate)    07/09/17 1338 07/13/17 1134   Transfer Training OT STG   Transfer Training OT STG, Date Established 07/09/17 --    Transfer Training OT STG, Time to Achieve by discharge --    Transfer Training OT STG, Activity Type all transfers --    Transfer Training OT STG, [place] Level supervision required --    Transfer Training OT STG, Date Goal Reviewed --  07/13/17   Transfer Training OT STG, Outcome --  goal ongoing       Goal: Patient Education Goal LTG- OT  Outcome: Outcome(s) achieved Date Met:  07/13/17 07/09/17 1338 07/13/17 1134   Patient Education OT LTG   Patient Education OT LTG, Date Established 07/09/17 --    Patient Education OT LTG, Time to Achieve by discharge --    Patient Education OT LTG, Education Type HEP;energy conservation;home safety;positioning;adaptive equipment mgmt;adaptive breathing --    Patient Education OT LTG, Education Understanding verbalizes understanding;demonstrates adequately;independent  (with caregiver assist as needed) --    Patient Education OT LTG, Date Goal Reviewed --  07/13/17   Patient Education OT LTG Outcome --  goal met       Goal: ADL Goal LTG- OT  Outcome: Ongoing (interventions implemented as appropriate)    07/09/17 1338 07/13/17 1134   ADL OT LTG   ADL OT LTG, Date Established 07/09/17 --    ADL OT LTG, Time to Achieve by discharge --    ADL OT LTG, Activity Type ADL skills --     ADL OT LTG, Additional Goal set up feeding/grooming; SBA toileting/bathing/dressing - AE/AD as needed --    ADL OT LTG, Date Goal Reviewed --  07/13/17   ADL OT LTG, Outcome --  goal ongoing       Goal: Activity Tolerance Goal STG- OT  Outcome: Ongoing (interventions implemented as appropriate)    07/09/17 1338 07/13/17 1134   Activity Tolerance OT STG   Activity Tolerance Goal OT STG, Date Established 07/09/17 --    Activity Tolerance Goal OT STG, Time to Achieve by discharge --    Activity Tolerance Goal OT STG, Activity Level 15 min activity;O2 sat >/equal to 88%;with 2 rest breaks --    Activity Tolerance Goal OT STG, Date Goal Reviewed --  07/13/17   Activity Tolerance Goal OT STG, Outcome --  goal ongoing       Goal: Endurance Goal STG- OT  Outcome: Ongoing (interventions implemented as appropriate)    07/13/17 1134   Endurance OT STG   Endurance Goal OT STG, Date Goal Reviewed 07/13/17   Endurance Goal OT STG, Outcome goal ongoing

## 2017-07-13 NOTE — PROGRESS NOTES
FAMILY MEDICINE DAILY PROGRESS NOTE  NAME: Mickey Lind  : 1955  MRN: [id number]      LOS: 6 days     PROVIDER OF SERVICE: Ara Bach MD    Chief Complaint: Ascites due to alcoholic cirrhosis    Subjective:     Interval History:  History taken from: patient and chart.    Overnight, patient had no complaints but was on continuous oxygen to maintain his saturation.     He is wanting to go home but is willing to await for the insurance to get approved for his oxygen and home health.    Review of Systems   Constitutional: Positive for fatigue. Negative for activity change, appetite change and fever.   HENT: Negative for congestion, ear pain and sore throat.    Eyes: Negative for pain and visual disturbance.   Respiratory: Negative for cough, shortness of breath and wheezing.    Cardiovascular: Negative for chest pain and palpitations.   Gastrointestinal: Negative for abdominal pain and nausea.   Endocrine: Negative for cold intolerance and heat intolerance.   Genitourinary: Negative for difficulty urinating and dysuria.   Musculoskeletal: Negative for arthralgias and gait problem.   Skin: Positive for wound (decubitus ulcer). Negative for color change and rash.   Neurological: Negative for dizziness, weakness and headaches.   Hematological: Negative for adenopathy. Does not bruise/bleed easily.   Psychiatric/Behavioral: Negative for agitation, confusion and sleep disturbance.       Objective:     Vital Signs  Temp:  [96.6 °F (35.9 °C)-97.9 °F (36.6 °C)] 96.9 °F (36.1 °C)  Heart Rate:  [75-88] 80  Resp:  [18-20] 18  BP: (134-157)/(70-79) 142/71  Body mass index is 21.38 kg/(m^2).    Physical Exam  Physical Exam   Constitutional: He is oriented to person, place, and time. He appears well-developed and well-nourished.   HENT:   Head: Normocephalic and atraumatic.   Eyes:   Wears glasses   Neck: Normal range of motion. Neck supple.   Cardiovascular: Normal rate, regular rhythm and normal heart sounds.       Pulmonary/Chest: Effort normal. He has no wheezes.   Abdominal: Soft. Bowel sounds are normal. He exhibits no distension. There is no tenderness. There is no rebound. A hernia (reducible and no pain elicited) is present. Hernia confirmed positive in the right inguinal area.   Musculoskeletal: Normal range of motion.   Port on right side under clavicle.    Neurological: He is alert and oriented to person, place, and time.   Skin: Skin is warm.   Psychiatric: He has a normal mood and affect. His behavior is normal. Judgment and thought content normal.   Nursing note and vitals reviewed.    Medication Review    Current Facility-Administered Medications:   •  acetaminophen (TYLENOL) tablet 650 mg, 650 mg, Oral, Once, Gaurang Gallegos MD  •  acyclovir (ZOVIRAX) tablet 400 mg, 400 mg, Oral, Daily, Carlos Hahn MD, 400 mg at 07/12/17 0822  •  atorvastatin (LIPITOR) tablet 10 mg, 10 mg, Oral, Daily, Carlos Hahn MD, 10 mg at 07/12/17 0822  •  dexamethasone (DECADRON) tablet 4 mg, 4 mg, Oral, Daily With Breakfast, Carlos Hahn MD, 4 mg at 07/12/17 0822  •  famotidine (PEPCID) tablet 40 mg, 40 mg, Oral, Daily, Carlos Hahn MD, 40 mg at 07/12/17 0822  •  furosemide (LASIX) tablet 40 mg, 40 mg, Oral, Daily, Carlos Hahn MD, 40 mg at 07/12/17 0822  •  gabapentin (NEURONTIN) capsule 400 mg, 400 mg, Oral, TID, Carlos Hahn MD, 400 mg at 07/12/17 2209  •  hydrALAZINE (APRESOLINE) injection 10 mg, 10 mg, Intravenous, Q6H PRN, Carlos Hahn MD  •  levoFLOXacin (LEVAQUIN) tablet 750 mg, 750 mg, Oral, Q24H, Ara Bach MD, 750 mg at 07/12/17 1141  •  LORazepam (ATIVAN) tablet 2 mg, 2 mg, Oral, Q6H PRN, Ara Bach MD  •  magic butt ointment, , Topical, TID, Carlos Hahn MD  •  naproxen (NAPROSYN) tablet 500 mg, 500 mg, Oral, BID With Meals, Ara Bach MD, 500 mg at 07/12/17 1715  •  nicotine (NICODERM CQ) 21 MG/24HR patch 1 patch, 1 patch, Transdermal, Q24H, Carlos Hahn,  MD, 1 patch at 07/12/17 1820  •  ondansetron (ZOFRAN) injection 4 mg, 4 mg, Intravenous, Q6H PRN, Carlos Hahn MD, 4 mg at 07/11/17 1309  •  polyethylene glycol (MIRALAX) powder 17 g, 17 g, Oral, Daily, Carlos Hahn MD, 17 g at 07/07/17 2146  •  prochlorperazine (COMPAZINE) tablet 10 mg, 10 mg, Oral, Q6H PRN, Carlos Hahn MD  •  sertraline (ZOLOFT) tablet 200 mg, 200 mg, Oral, Daily, Carlos Hahn MD, 200 mg at 07/12/17 0822  •  sodium chloride 0.9 % flush 1-10 mL, 1-10 mL, Intravenous, PRN, Carlos Hahn MD, 10 mL at 07/11/17 1311  •  Insert peripheral IV, , , Once **AND** sodium chloride 0.9 % flush 10 mL, 10 mL, Intravenous, PRN, Wilver Dougherty MD  •  spironolactone (ALDACTONE) tablet 50 mg, 50 mg, Oral, BID, Carlos Hahn MD, 50 mg at 07/12/17 1715  •  sucralfate (CARAFATE) 1 GM/10ML suspension 1 g, 1 g, Oral, Daily, Carlos Hahn MD, 1 g at 07/12/17 0823     Diagnostic Data    Lab Results (last 24 hours)     Procedure Component Value Units Date/Time    Blood Culture [[id number]]  (Normal) Collected:  07/08/17 0740    Specimen:  Blood from Wrist, Left Updated:  07/12/17 0901     Blood Culture No growth at 4 days    Blood Culture [[id number]]  (Normal) Collected:  07/07/17 1843    Specimen:  Blood from Hand, Left Updated:  07/12/17 2001     Blood Culture No growth at 5 days    Body Fluid Culture [[id number]]  (Normal) Collected:  07/07/17 1536    Specimen:  Body Fluid from Abdominal Cavity Updated:  07/13/17 0641     BF Culture No growth at 5 days     Gram Stain Result Few (2+) WBCs seen      No organisms seen    Body Fluid Culture [[id number]]  (Normal) Collected:  07/10/17 1302    Specimen:  Body Fluid from Pleural Cavity Updated:  07/13/17 0642     BF Culture No growth at 3 days     Gram Stain Result Many (4+) WBCs seen      No No organisms seen    CBC Auto Differential [[id number]]  (Abnormal) Collected:  07/13/17 0640    Specimen:  Blood Updated:  07/13/17 0702     WBC  11.64 (H) 10*3/mm3      RBC 3.53 (L) 10*6/mm3      Hemoglobin 10.6 (L) g/dL      Hematocrit 31.2 (L) %      MCV 88.4 fL      MCH 30.0 pg      MCHC 34.0 g/dL      RDW 16.9 (H) %      RDW-SD 55.1 (H) fl      MPV -- fL       INSTRUMENT UNABLE TO CALCULATE RESULTS        Platelets 157 10*3/mm3      Neutrophil % 76.3 %      Lymphocyte % 12.1 %      Monocyte % 10.2 %      Eosinophil % 1.0 %      Basophil % 0.1 %      Immature Grans % 0.3 %      Neutrophils, Absolute 8.87 (H) 10*3/mm3      Lymphocytes, Absolute 1.41 10*3/mm3      Monocytes, Absolute 1.19 (H) 10*3/mm3      Eosinophils, Absolute 0.12 10*3/mm3      Basophils, Absolute 0.01 10*3/mm3      Immature Grans, Absolute 0.04 (H) 10*3/mm3      nRBC 0.0 /100 WBC     CBC & Differential [[id number]] Collected:  07/13/17 0640    Specimen:  Blood Updated:  07/13/17 0702    Narrative:       The following orders were created for panel order CBC & Differential.  Procedure                               Abnormality         Status                     ---------                               -----------         ------                     Scan Slide[[id number]]                                                                  CBC Auto Differential[[id number]]        Abnormal            Final result                 Please view results for these tests on the individual orders.    Comprehensive Metabolic Panel [[id number]]  (Abnormal) Collected:  07/13/17 0646    Specimen:  Blood Updated:  07/13/17 0703     Glucose 140 (H) mg/dL      BUN 22 (H) mg/dL      Creatinine 0.58 (L) mg/dL      Sodium 132 (L) mmol/L      Potassium 3.9 mmol/L      Chloride 99 mmol/L      CO2 27.0 mmol/L      Calcium 8.3 (L) mg/dL      Total Protein 5.4 (L) g/dL      Albumin 2.60 (L) g/dL      ALT (SGPT) 29 U/L      AST (SGOT) 37 U/L      Alkaline Phosphatase 97 U/L      Total Bilirubin 0.3 mg/dL      eGFR Non African Amer 142 (H) mL/min/1.73      Globulin 2.8 gm/dL      A/G Ratio 0.9 (L) g/dL      BUN/Creatinine  Ratio 37.9 (H)     Anion Gap 6.0 mmol/L             I reviewed the patient's new clinical results.    Assessment/Plan:     Active Hospital Problems (** Indicates Principal Problem)    Diagnosis Date Noted   • **Ascites due to alcoholic cirrhosis [K70.31] 07/07/2017     -patient had a CT scan of the abdomen and pelvis performed which showed a large fluid collection in his abdomen as well as right sided pleural effusion, patient underwent a ultrasound guided paracentesis which they took out 3.9 liters of fluid  -currently on levaquin and flagyl, blood culture and urine culture no growth  -d/c flagyl     • CAP (community acquired pneumonia), bilateral [J18.9] 07/12/2017     On levaquin daily     • Does not have health insurance [Z59.8] 07/12/2017     Case management is working on this.  I wrote for the need for home health and home oxygen, awaiting response from his insurance to see if he gets approved  Alternative plan is to have him follow-up with [facility] as they can provide the care he needs. This was discussed with the patient as an alternative and he is agreeable to this. However, he can't afford his oxygen as of right now. He gets his check every month on the 3rd.   I told him that [facility] would provide him the oxygen and will just bill him so he can get it through them as long as he can pay them by the 3rd.      • Acute respiratory failure with hypoxia [J96.01] 07/12/2017     Currently on 2 L continuous oxyegn     • Pleural effusion [J90] 07/10/2017     Continue lasix prn       • Ulcer of sacral region, stage 1 [L89.151] 07/10/2017     Wound care consulted     • Lower abdominal pain [R10.30] 07/07/2017     -secondary to ascites from hep. C as well as alcoholic cirrhosis, patient is afebrile currently and has been for the last few weeks     • Tobacco dependence [F17.200] 07/07/2017     -admits to smoking a PPD, will give 21mg nicotine patch     • Simple chronic bronchitis [J41.0] 07/07/2017     -will  provide with supplemental oxygen as needed as well as breathing treatments PRN  -patient had oxygen all night. Will wean him off tonight to see if he tolerates breathing without oxygen  - CXR to revisit the pleural effusion     • Chronic hepatitis C without hepatic coma [B18.2] 02/16/2017     -has been followed by Dr. Montalvo, will continue his home medication of zovirax     • Multiple myeloma, stage 3 [C90.00] 01/20/2017     -has a right sided port in, has been followed by Dr. Mendoza of heme-oncology. However, treatment on hold as patient does not have any insurance, will monitor      • Unilateral inguinal hernia without obstruction or gangrene [K40.90] 09/10/2016     -large right sided inguinal hernia palpated and seen on abdominal CT for now, surgery mentioned him as not an operative candidate, will manage medically     • End stage liver disease [K72.90] 08/31/2016     -secondary to chronic hepatitis C, is being followed by Dr. Montalvo as an outpatient, will continue to monitor      • Depression [F32.9] 08/31/2016     -continue home medication of zoloft        Resolved Hospital Problems    Diagnosis Date Noted Date Resolved   • Bacterial pneumonia [J15.9] 07/07/2017 07/11/2017       DVT prophylaxis: SCDs TEDs  Code status is Full Code          This document has been electronically signed by Ara Bach MD on July 13, 2017 8:20 AM    Ara Bach MD, RODOLFO  [address] [address] [phone]

## 2017-07-13 NOTE — PLAN OF CARE
Problem: Patient Care Overview (Adult)  Goal: Plan of Care Review  Outcome: Ongoing (interventions implemented as appropriate)    07/13/17 1024   Coping/Psychosocial Response Interventions   Plan Of Care Reviewed With patient   Patient Care Overview   Progress progress toward functional goals as expected   Outcome Evaluation   Outcome Summary/Follow up Plan pt responded well to therapy. pt became very SOA after gait. pt requires extended rest breaks while monitoring vitals. pt becomes slightly unsteady w/ gait and t/fs when fatigued. pt met 1 new goal this tx. pt would continue to benefit from PT services.        Goal: Discharge Needs Assessment  Outcome: Ongoing (interventions implemented as appropriate)    07/10/17 1400 07/11/17 1344   Discharge Needs Assessment   Concerns To Be Addressed --  basic needs concerns   Readmission Within The Last 30 Days no previous admission in last 30 days --    Equipment Needed After Discharge walker, rolling --    Discharge Facility/Level Of Care Needs home with home health --    Current Discharge Risk chronically ill --    Current Health   Anticipated Changes Related to Illness inability to care for self --    Self-Care   Equipment Currently Used at Home cane, straight --    Living Environment   Transportation Available family or friend will provide --          Problem: Inpatient Physical Therapy  Goal: Transfer Training Goal 1 LTG- PT  Outcome: Ongoing (interventions implemented as appropriate)    07/09/17 1341 07/13/17 1024   Transfer Training PT LTG   Transfer Training PT LTG, Date Established 07/09/17 --    Transfer Training PT LTG, Time to Achieve by discharge --    Transfer Training PT LTG, Activity Type bed to chair /chair to bed;sit to stand/stand to sit;toilet --    Transfer Training PT LTG, [place] Level supervision required;conditional independence --    Transfer Training PT LTG, Assist Device (AAD) --    Transfer Training PT LTG, Date Goal Reviewed --  07/13/17    Transfer Training PT LTG, Outcome --  goal ongoing       Goal: Gait Training Goal LTG- PT  Outcome: Ongoing (interventions implemented as appropriate)    07/09/17 1341 07/13/17 1024   Gait Training PT LTG   Gait Training Goal PT LTG, Date Established 07/09/17 --    Gait Training Goal PT LTG, Time to Achieve by discharge --    Gait Training Goal PT LTG, [place] Level supervision required;conditional independence --    Gait Training Goal PT LTG, Distance to Achieve 50 ft;O2 sats will not drop below 90% --    Gait Training Goal PT LTG, Additional Goal 150ft;O2 sats will not drop below 92% --    Gait Training Goal PT LTG, Date Goal Reviewed --  07/13/17   Gait Training Goal PT LTG, Outcome --  goal ongoing       Goal: Stair Training Goal LTG- PT  Outcome: Ongoing (interventions implemented as appropriate)    07/09/17 1341 07/13/17 1024   Stair Training PT LTG   Stair Training Goal PT LTG, Date Established 07/09/17 --    Stair Training Goal PT LTG, Time to Achieve by discharge --    Stair Training Goal PT LTG, Number of Steps 5 --    Stair Training Goal PT LTG, [place] Level supervision required;contact guard assist --    Stair Training Goal PT LTG, Assist Device cane, straight --    Stair Training Goal PT LTG, Date Goal Reviewed --  07/13/17   Stair Training Goal PT LTG, Outcome --  goal ongoing       Goal: Strength Goal LTG- PT  Outcome: Outcome(s) achieved Date Met:  07/13/17 07/09/17 1341 07/13/17 1024   Strength Goal PT LTG   Strength Goal PT LTG, Date Established 07/09/17 --    Strength Goal PT LTG, Time to Achieve by discharge --    Strength Goal PT LTG, Measure to Achieve Pt will perform 15 reps of AROM exercises --    Strength Goal PT LTG, Functional Goal Pt will progress to standing exercises --    Strength Goal PT LTG, Date Goal Reviewed --  07/13/17   Strength Goal PT LTG, Outcome --  goal met       Goal: Caregiver Training Goal LTG- PT  Outcome: Ongoing (interventions implemented as  appropriate)    07/09/17 1341 07/13/17 1024   Caregiver Training PT LTG   Caregiver Training PT LTG, Date Established 07/09/17 --    Caregiver Training PT LTG, Time to Achieve by discharge --    Caregiver Training PT LTG, Activity Type Homecaregiver will demonstrate understanding assisting pt with transfers/ambulation with device as needed. Homecaregiver will acknowledge understanding of home care instructions --    Caregiver Training PT LTG, Date Goal Reviewed --  07/13/17   Caregiver Training PT LTG, Outcome --  goal ongoing

## 2017-07-13 NOTE — PROGRESS NOTES
[address] [address]  T - [id number]         PROGRESS NOTE         SUBJECTIVE:   Patient Care Team:  Ara Bach MD as PCP - General  Rafa Mendoza MD as Consulting Physician (Hematology and Oncology)  Serena Modi LCSW as  (Oncology)    Chief Complaint:   Chief Compliant: weakness    Subjective     Patient is 61 y.o. male presents with weakness.She is feeling better..      ROS/HISTORY/ CURRENT MEDICATIONS/OBJECTIVE/VS/PE:   Review of Systems:   Review of Systems    History:     Past Medical History:   Diagnosis Date   • Abdominal pain    • Alcoholic liver damage    • Alcoholic polyneuropathy    • Amblyopia     refractive os   • Anemia    • Ascites    • Ascites    • Asthma    • Astigmatism    • Cellulitis of left lower leg    • Chronic hepatitis C    • Chronic viral hepatitis C    • Cirrhosis of liver    • Claudication    • Encounter for immunization    • Generalized edema    • History of blood transfusion    • History of colon polyps    • Hypermetropia    • Inguinal hernia     - Rih      • Low back pain    • Major depressive disorder     recurrent , moderate   • Myocardial infarction    • Pain in joint, lower leg    • Peripheral vascular disease    • Recurrent major depressive episodes    • Tinea unguium    • Tobacco use      Past Surgical History:   Procedure Laterality Date   • CARDIAC CATHETERIZATION      (Successful PCI to a totally occluded RCA with a 2.5 x 28 and a 2.5 x 8 mm Xience stent. Nonobstructive disease in the left coronary artery system.)   11/24/2012    • CARDIAC SURGERY     • COLONOSCOPY  10/13/2016   • COLONOSCOPY N/A 3/29/2017    Procedure: COLONOSCOPY;  Surgeon: Jose Daniel David MD;  Location: [facility] ENDOSCOPY;  Service:    • ENDOSCOPY  10/13/2016   • ENDOSCOPY N/A 3/29/2017    Procedure: ESOPHAGOGASTRODUODENOSCOPY (U/S FIRST @0945) ;  Surgeon: Jose Daniel David MD;  Location: [facility] ENDOSCOPY;  Service:    • UPPER GASTROINTESTINAL  ENDOSCOPY  10/13/2016     Family History   Problem Relation Age of Onset   • Cancer Mother    • Heart disease Mother    • Cancer Father    • Other Father      ischemic heart disease   • Heart disease Father    • Other Son      depressive disorder   • Diabetes Maternal Grandmother    • Cancer Paternal Grandfather    • No Known Problems Daughter    • No Known Problems Son      Social History   Substance Use Topics   • Smoking status: Current Every Day Smoker     Packs/day: 0.50   • Smokeless tobacco: Never Used      Comment: Smokes approx 1 ppd of Cigarettes.smoking since last 45 yr   • Alcohol use No      Comment: no alcohol since 7/4/2017     Prescriptions Prior to Admission   Medication Sig Dispense Refill Last Dose   • acyclovir (ZOVIRAX) 400 MG tablet Take 400 mg by mouth Daily.   Taking   • atorvastatin (LIPITOR) 10 MG tablet Take 1 tablet by mouth Daily. 30 tablet 3 Taking   • dexamethasone (DECADRON) 4 MG tablet Take 4 mg by mouth.   Taking   • furosemide (LASIX) 40 MG tablet Take 40 mg by mouth daily.   Taking   • gabapentin (NEURONTIN) 400 MG capsule Take 1 capsule by mouth 3 (Three) Times a Day. 90 capsule 2 Taking   • GNP ACID REDUCER 75 MG tablet    Taking   • polyethylene glycol (MIRALAX) powder Take 17 g by mouth Daily. 527 g 1 Taking   • prochlorperazine (COMPAZINE) 10 MG tablet Take 1 tablet by mouth Every 6 (Six) Hours As Needed for nausea or vomiting. 60 tablet 1 Taking   • sertraline (ZOLOFT) 100 MG tablet Take 2 tablets by mouth Daily. 60 tablet 2 Taking   • spironolactone (ALDACTONE) 50 MG tablet Take 50 mg by mouth 2 (two) times a day.   Taking   • sucralfate (CARAFATE) 1 GM/10ML suspension Take 10 mL by mouth Daily. 30 g 3 Taking     Allergies:  Codeine; Other; and Penicillins    Current Medications:     Current Facility-Administered Medications   Medication Dose Route Frequency Provider Last Rate Last Dose   • acetaminophen (TYLENOL) tablet 650 mg  650 mg Oral Once Gaurang Gallegos MD       •  acyclovir (ZOVIRAX) tablet 400 mg  400 mg Oral Daily Ali Pete Hahn MD   400 mg at 07/13/17 0945   • atorvastatin (LIPITOR) tablet 10 mg  10 mg Oral Daily Ali Pete Hahn MD   10 mg at 07/13/17 0945   • dexamethasone (DECADRON) tablet 4 mg  4 mg Oral Daily With Breakfast Ali Pete Hahn MD   4 mg at 07/13/17 0945   • famotidine (PEPCID) tablet 40 mg  40 mg Oral Daily Ali Pete Hahn MD   40 mg at 07/13/17 0945   • furosemide (LASIX) tablet 40 mg  40 mg Oral Daily Ali Pete Hahn MD   40 mg at 07/13/17 0946   • gabapentin (NEURONTIN) capsule 400 mg  400 mg Oral TID Ali Pete Hahn MD   400 mg at 07/13/17 0946   • hydrALAZINE (APRESOLINE) injection 10 mg  10 mg Intravenous Q6H PRN Ali Pete Hahn MD       • levoFLOXacin (LEVAQUIN) tablet 750 mg  750 mg Oral Q24H Ara Bach MD   750 mg at 07/13/17 1227   • LORazepam (ATIVAN) tablet 2 mg  2 mg Oral Q6H PRN Ara Bach MD       • magic butt ointment   Topical TID Ali Pete Hahn MD       • naproxen (NAPROSYN) tablet 500 mg  500 mg Oral BID With Meals Ara Bach MD   500 mg at 07/13/17 0945   • nicotine (NICODERM CQ) 21 MG/24HR patch 1 patch  1 patch Transdermal Q24H Ali Pete Hahn MD   1 patch at 07/12/17 1820   • ondansetron (ZOFRAN) injection 4 mg  4 mg Intravenous Q6H PRN Ali Pete Hahn MD   4 mg at 07/11/17 1309   • polyethylene glycol (MIRALAX) powder 17 g  17 g Oral Daily Ara Bach MD   17 g at 07/13/17 0957   • prochlorperazine (COMPAZINE) tablet 10 mg  10 mg Oral Q6H PRN Carlos Hahn MD       • sertraline (ZOLOFT) tablet 200 mg  200 mg Oral Daily Ali Pete Hahn MD   200 mg at 07/13/17 0945   • sodium chloride 0.9 % flush 1-10 mL  1-10 mL Intravenous PRN Carlos Hahn MD   10 mL at 07/11/17 1311   • sodium chloride 0.9 % flush 10 mL  10 mL Intravenous PRN Wilver Dougherty MD       • spironolactone (ALDACTONE) tablet 50 mg  50 mg Oral BID Carlos Hahn MD   50 mg at 07/13/17 0946   • sucralfate  (CARAFATE) 1 GM/10ML suspension 1 g  1 g Oral Daily Carlos Hahn MD   1 g at 07/13/17 0947       Objective     Physical Exam:   Temp:  [96.9 °F (36.1 °C)-98 °F (36.7 °C)] 98 °F (36.7 °C)  Heart Rate:  [75-92] 77  Resp:  [18-20] 20  BP: (134-158)/(70-79) 158/74    Physical Exam   Constitutional: He appears well-developed and well-nourished.   HENT:   Head: Normocephalic and atraumatic.   Cardiovascular: Normal rate, regular rhythm and normal heart sounds.  Exam reveals no gallop and no friction rub.    No murmur heard.  Pulmonary/Chest: Effort normal and breath sounds normal. No respiratory distress. He has no wheezes. He has no rales.   Abdominal: Soft. Bowel sounds are normal. He exhibits no distension. There is no tenderness.   Skin: Skin is warm and dry.   Vitals reviewed.           Results Review:   Lab Results   Component Value Date    GLUCOSE 140 (H) 07/13/2017    BUN 22 (H) 07/13/2017    CREATININE 0.58 (L) 07/13/2017    EGFRIFNONA 142 (H) 07/13/2017    BCR 37.9 (H) 07/13/2017    CO2 27.0 07/13/2017    CALCIUM 8.3 (L) 07/13/2017    PROTENTOTREF 5.4 (L) 03/09/2017    ALBUMIN 2.60 (L) 07/13/2017    LABIL2 0.9 (L) 07/13/2017    AST 37 07/13/2017    ALT 29 07/13/2017         WBC WBC   Date Value Ref Range Status   07/13/2017 11.64 (H) 3.20 - 9.80 10*3/mm3 Final   07/12/2017 13.11 (H) 3.20 - 9.80 10*3/mm3 Final   07/11/2017 12.08 (H) 3.20 - 9.80 10*3/mm3 Final      HGB Hemoglobin   Date Value Ref Range Status   07/13/2017 10.6 (L) 13.7 - 17.3 g/dL Final   07/12/2017 10.8 (L) 13.7 - 17.3 g/dL Final   07/11/2017 10.4 (L) 13.7 - 17.3 g/dL Final      HCT Hematocrit   Date Value Ref Range Status   07/13/2017 31.2 (L) 39.0 - 49.0 % Final   07/12/2017 32.1 (L) 39.0 - 49.0 % Final   07/11/2017 31.5 (L) 39.0 - 49.0 % Final      Platlets Platelets   Date Value Ref Range Status   07/13/2017 157 150 - 450 10*3/mm3 Final   07/12/2017 129 (L) 150 - 450 10*3/mm3 Final   07/11/2017 115 (L) 150 - 450 10*3/mm3 Final                 Imaging Results (last 24 hours)     ** No results found for the last 24 hours. **           I reviewed the patient's new clinical results.  I reviewed the patient's new imaging results and agree with the interpretation.     ASSESSMENT/PLAN:   Assessment/Plan   Principal Problem:    Ascites due to alcoholic cirrhosis  Active Problems:    End stage liver disease    Depression    Unilateral inguinal hernia without obstruction or gangrene    Multiple myeloma, stage 3    Chronic hepatitis C without hepatic coma    Lower abdominal pain    Tobacco dependence    Simple chronic bronchitis    Pleural effusion    Ulcer of sacral region, stage 1    CAP (community acquired pneumonia), bilateral    Does not have health insurance    Acute respiratory failure with hypoxia      Will continue with current treatment.  I have reviewed the notes, assessments, and/or procedures performed by the resident  , I concur with her/his documentation of Mickey Lind.    I discussed the patients findings and my recommendations with patient.      Wellington Whelan MD  07/13/17  1:14 PM

## 2017-07-13 NOTE — THERAPY TREATMENT NOTE
Acute Care - Occupational Therapy Treatment Note  [facility]     Patient Name: Mickey Lind  : 1955  MRN: [id number]  Today's Date: 2017  Onset of Illness/Injury or Date of Surgery Date: 17  Date of Referral to OT: 17  Referring Physician: Dr. Carolee Leo      Admit Date: 2017    Visit Dx:     ICD-10-CM ICD-9-CM   1. Lower abdominal pain R10.30 789.09   2. Other ascites R18.8 789.59   3. Recurrent right pleural effusion J90 511.9   4. Impaired physical mobility Z74.09 781.99   5. Impaired mobility and ADLs Z74.09 799.89   6. Acute respiratory failure with hypoxia J96.01 518.81   7. Severe muscle deconditioning R29.898 781.99     Patient Active Problem List   Diagnosis   • End stage liver disease   • Depression   • Low back pain with sciatica   • Hyperlipidemia   • Unilateral inguinal hernia without obstruction or gangrene   • Diarrhea   • Hepatic cirrhosis   • Epigastric pain   • Peripheral vascular disease   • Encounter for venous access device care   • Multiple myeloma, stage 3   • Anemia   • Chronic hepatitis C without hepatic coma   • Lower abdominal pain   • Ascites due to alcoholic cirrhosis   • Tobacco dependence   • Simple chronic bronchitis   • Pleural effusion   • Ulcer of sacral region, stage 1   • CAP (community acquired pneumonia), bilateral   • Does not have health insurance   • Acute respiratory failure with hypoxia             Adult Rehabilitation Note       17 1134 17 1024 17 1320    Rehab Assessment/Intervention    Discipline occupational therapist  -RM physical therapy assistant  -ASTER occupational therapy assistant  -CS    Document Type therapy note (daily note)  -RM therapy note (daily note)  -ASTER therapy note (daily note)  -CS    Subjective Information agree to therapy;complains of;dyspnea  -RM agree to therapy  -ASTER agree to therapy  -CS    Patient Effort, Rehab Treatment good  -RM good  -ASTER good  -CS    Symptoms Noted During/After Treatment  shortness of breath  -RM shortness of breath  -ASTER shortness of breath  -CS    Precautions/Limitations fall precautions;oxygen therapy device and L/min  -RM fall precautions;oxygen therapy device and L/min  -ASTER fall precautions;oxygen therapy device and L/min  -CS    Recorded by [RM] America Warner OT [ASTER] Jorge Vargas PTA [CS] Liza Graham, DUNNE/L    Vital Signs    Pre Systolic BP Rehab 136  -RM      Pre Treatment Diastolic BP 84  -RM      Post Systolic BP Rehab 132  -  -ASTER     Post Treatment Diastolic BP 78  -RM 74  -ASTER     Pretreatment Heart Rate (beats/min) 71  -RM 80  -ASTER 52  -CS    Intratreatment Heart Rate (beats/min)  83  -ASTER     Posttreatment Heart Rate (beats/min) 74  -RM 73  -ASTER 88  -CS    Pre SpO2 (%) 98  -RM 96  -ASTER 98  -CS    O2 Delivery Pre Treatment supplemental O2  -RM room air  -ASTER supplemental O2  -CS    Intra SpO2 (%)  93  -ASTER     O2 Delivery Intra Treatment  supplemental O2  -ASTER     Post SpO2 (%) 95  -RM 98  -ASTER 96  -CS    O2 Delivery Post Treatment supplemental O2  -RM supplemental O2  -ASTER supplemental O2  -CS    Pre Patient Position Sitting  -RM Supine  -ASTER Sitting  -CS    Intra Patient Position   Standing  -CS    Post Patient Position Sitting  -RM Sitting  -ASTER Sitting  -CS    Recorded by [RM] America Warner OT [ASTER] Jorge Vargas PTA [CS] CHANELL JhaveriA/L    Pain Assessment    Pain Assessment No/denies pain  -RM 0-10  -ASTER 0-10  -CS    Pain Score  7  -ASTER 6  -CS    Post Pain Score  7  -ASTER 6  -CS    Pain Location  Generalized  -ASTER Generalized  -CS    Pain Intervention(s)  Ambulation/increased activity  -ASTER     Recorded by [RM] America Warner OT [ASTER] Jorge Vargas PTA [CS] CHANELL JhaveriA/L    Vision Assessment/Intervention    Visual Impairment  WFL with corrective lenses  -ASTER     Recorded by  [ASTER] Jorge Vargas PTA     Cognitive Assessment/Intervention    Current Cognitive/Communication Assessment functional  -RM functional  -ASTER functional  -CS    Orientation  Status oriented x 4  -RM oriented x 4  -ASTER oriented x 4  -CS    Follows Commands/Answers Questions 100% of the time  -% of the time  -ASTER 100% of the time  -CS    Personal Safety WNL/WFL  -RM      Personal Safety Interventions  gait belt;muscle strengthening facilitated;nonskid shoes/slippers when out of bed;supervised activity  -ASTER     Recorded by [RM] America Warner OT [ASTER] Jorge Vargas PTA [CS] UZAIR Jhaveri/L    Bed Mobility, Assessment/Treatment    Bed Mobility, Roll Left, [place] conditional independence  -RM      Bed Mobility, Roll Right, [place] conditional independence  -RM      Bed Mobility, Scoot/Bridge, [place] conditional independence  -RM      Bed Mob, Supine to Sit, [place] conditional independence  -RM conditional independence  -ASTER     Bed Mob, Sit to Supine, [place] conditional independence  -RM      Recorded by [RM] America Warner OT [ASTER] Jorge Vargas PTA     Transfer Assessment/Treatment    Transfers, Bed-Chair [place]  contact guard assist  -ASTER     Transfers, Chair-Bed [place]  contact guard assist  -ASTER     Transfers, Bed-Chair-Bed, Assist Device  rolling walker  -ASTER     Transfers, Sit-Stand [place] contact guard assist  -RM contact guard assist  -ASTER contact guard assist  -CS    Transfers, Stand-Sit [place] contact guard assist  -RM contact guard assist  -ASTER contact guard assist  -CS    Transfers, Sit-Stand-Sit, Assist Device  rolling walker  -ASTER rolling walker  -CS    Toilet Transfer, [place]  contact guard assist  -ASTER     Toilet Transfer, Assistive Device  rolling walker  -ASTER     Recorded by [RM] America Warner OT [ASTER] Jorge Vargas PTA [CS] Liza Graham, UZAIR/L    Gait Assessment/Treatment    Gait, [place] Level  contact guard assist  -ASTER     Gait, Assistive Device  rolling walker  -ASTER     Gait, Distance (Feet)  325   15 ft x2  -ASTER     Gait, Gait Deviations  sergio decreased;forward flexed posture;step length  decreased  -     Gait, Comment  became very SOA  -ASTER     Recorded by  [ASTER] Jorge Vargas PTA     Stairs Assessment/Treatment    Number of Stairs  5  -ASTER     Stairs, Handrail Location  left side (ascending)  -     Stairs, [place] Level  contact guard assist  -     Stairs, Assistive Device  straight cane  -     Stairs, Technique Used  step to step (ascending);step to step (descending)  -     Stairs, Comment  2 attempts. pt reports he will have a HR placed within a couple days. pt edu on always have someone assist him with steps  -ASTER     Recorded by  [ASTER] Jorge Vargas PTA     Balance Skills Training    Standing-Level of Assistance   Contact guard  -CS    Static Standing Balance Support   assistive device  -    Standing Balance # of Minutes   5  -CS    Recorded by   [CS] RAUL Jhaveri    Therapy Exercises    Bilateral Lower Extremities  AROM:;10 reps;standing;heel raises;hip flexion;mini squats;hip extension;hip abduction/adduction  -ASTER     Bilateral Upper Extremity AROM:;15 reps;elbow flexion/extension;hand pumps;pronation/supination;shoulder abduction/adduction;shoulder extension/flexion;shoulder ER/IR;shoulder protraction/retraction  -RM  --   UUE for 20 mins with 1 rest break.  -CS    Recorded by [RM] America Warner OT [ASTER] Jorge Vargas PTA [CS] RAUL Jhaveri    Positioning and Restraints    Pre-Treatment Position in bed  -RM in bed  -ASTER in bed  -CS    Post Treatment Position bed  -RM bed  - bed  -CS    In Bed encouraged to call for assist  - sitting EOB;call light within reach;encouraged to call for assist   all needs met  - sitting EOB;call light within reach  -CS    Recorded by [RM] America Warner OT [ASTER] Jorge Vargas PTA [CS] RAUL Jhaveri      07/12/17 0950 07/11/17 1333 07/11/17 1045    Rehab Assessment/Intervention    Discipline physical therapy assistant  -ASTER physical therapy assistant  -ASTER occupational therapy assistant  -ROSANGELA    Document  "Type therapy note (daily note)  -ASTER therapy note (daily note)  -ASTER therapy note (daily note)  -KD    Subjective Information agree to therapy  -ASTER agree to therapy  -ASTER agree to therapy  -KD    Patient Effort, Rehab Treatment good  -ASTER good  -ASTER     Patient Effort, Rehab Treatment Comment  pt reports he has poor blood flow in his legs causing them to \"give out\" at time.   -ASTER     Symptoms Noted During/After Treatment shortness of breath  -ASTER shortness of breath  -ASTER     Precautions/Limitations fall precautions;oxygen therapy device and L/min  -ASTER fall precautions;oxygen therapy device and L/min  -ASTER     Recorded by [ASTER] Jorge Vargas PTA [ASTER] Jorge Vargas PTA [KD] Sylvie Mcadams, DUNNE/L    Vital Signs    Pre Systolic BP Rehab 140  -ASTER 122  -ASTER 115  -KD    Pre Treatment Diastolic BP 72  -ASTER 63  -ASTER 70  -KD    Intra Treatment Diastolic BP  117  -ASTER     Post Systolic BP Rehab 124  -ASTER 64  -ASTER     Post Treatment Diastolic BP 78  -ASTER      Pretreatment Heart Rate (beats/min) 83  -ASTER 86  -ASTER 87  -KD    Intratreatment Heart Rate (beats/min)  86  -ASTER     Posttreatment Heart Rate (beats/min) 79  -ASTER 82  -ASTER 93  -KD    Pre SpO2 (%) 97  -ASTER 94  -ASTER 92  -KD    O2 Delivery Pre Treatment supplemental O2  -ASTER supplemental O2  -ASTER supplemental O2  -KD    Intra SpO2 (%) 92  -ASTER 88   after gait. improved to 93%  -ASTER     O2 Delivery Intra Treatment supplemental O2  -ASTER supplemental O2  -ASTER     Post SpO2 (%) 96  -ASTER 94  -ASTER 94  -KD    O2 Delivery Post Treatment supplemental O2  -ASTER supplemental O2  -ASTER supplemental O2  -KD    Pre Patient Position Sitting  -ASTER Supine  -ASTER Sitting  -KD    Intra Patient Position   Sitting  -KD    Post Patient Position Sitting  -ASTER Sitting  -ASTER Sitting  -KD    Recorded by [ASTER] Jorge Vargas PTA [ASTER] Jorge Vargas PTA [KD] Sylvie Mcadams, DUNNE/L    Pain Assessment    Pain Assessment 0-10  -ASTER 0-10  -ASTER 0-10  -KD    Pain Score 6  -ASTER 6  -ASTER 5  -KD    Post Pain Score 6  -ASTER 6  -ASTER 5  -KD    Pain " Type   Acute pain  -KD    Pain Location Generalized  -ASTER Generalized  -ASTER Abdomen  -KD    Pain Intervention(s) Ambulation/increased activity;Repositioned   defers pain meds.   -ASTER Repositioned;Ambulation/increased activity   pt defers pain meds.   -ASTER Medication (See MAR)  -KD    Recorded by [ASTER] Jorge Vargas PTA [ASTER] Jorge Vargas PTA [KD] Sylvie Mcadams, DUNNE/L    Vision Assessment/Intervention    Visual Impairment WFL with corrective lenses  -ASTER WFL with corrective lenses  -ASTER     Recorded by [ASTER] Jorge Vargas PTA [ASTER] Jorge Vargas PTA     Cognitive Assessment/Intervention    Current Cognitive/Communication Assessment functional  -ASTER functional  -ASTER functional  -KD    Orientation Status oriented x 4  -ASTER oriented x 4  -ASTER oriented x 4  -KD    Follows Commands/Answers Questions 100% of the time  -ASTER 100% of the time  -ASTER 100% of the time  -KD    Personal Safety Interventions gait belt;muscle strengthening facilitated;nonskid shoes/slippers when out of bed;supervised activity  -ASTER gait belt;muscle strengthening facilitated;nonskid shoes/slippers when out of bed;supervised activity  -ASTER gait belt  -KD    Recorded by [ASTER] Jorge Vargas PTA [ASTER] Jorge Vargas PTA [KD] Sylvie Mcadams, DUNNE/L    Bed Mobility, Assessment/Treatment    Bed Mobility, Assistive Device  bed rails;head of bed elevated  -ASTER     Bed Mob, Supine to Sit, [place]  conditional independence  -ASTER conditional independence  -KD    Bed Mob, Sit to Supine, [place]  conditional independence  -ASTER conditional independence  -KD    Recorded by  [ASTER] Jorge Vargas PTA [KD] Sylvie Mcadams, DUNNE/L    Transfer Assessment/Treatment    Transfers, Bed-Chair [place] contact guard assist  -ASTER contact guard assist  -ASTER     Transfers, Chair-Bed [place]  contact guard assist  -ASTER     Transfers, Bed-Chair-Bed, Assist Device rolling walker  -ASTER rolling walker  -ASTER     Transfers, Sit-Stand [place] contact guard assist  -ASTER  contact guard assist  -ASTER     Transfers, Stand-Sit [place] contact guard assist  -ASTER contact guard assist  -ASTER     Transfers, Sit-Stand-Sit, Assist Device rolling walker  -ASTER rolling walker  -ASTER     Toilet Transfer, [place]  contact guard assist  -ASTER     Toilet Transfer, Assistive Device  rolling walker  -ASTER     Recorded by [ASETR] Jorge Vargas PTA [ASTER] Jorge Vargas PTA     Gait Assessment/Treatment    Gait, [place] Level contact guard assist  -ASTER contact guard assist  -ASTER     Gait, Assistive Device rolling walker  -ASTER rolling walker  -ASTER     Gait, Distance (Feet) 420  -ASTER 332   15 ft  -ASTER     Gait, Gait Deviations sergio decreased  -ASTER sergio decreased  -     Gait, Comment  required  standing rest breaks  -ASTER     Recorded by [ASTER] Jorge Vargas PTA [ASTER] Jorge Vargas PTA     Stairs Assessment/Treatment    Number of Stairs 2  -      Stairs, Handrail Location left side (ascending)  -      Stairs, [place] Level contact guard assist  -      Stairs, Assistive Device straight cane  -      Stairs, Technique Used step to step (ascending);step to step (descending)  -      Stairs, Comment 2 attempts  -ASTER      Recorded by [ASTER] Jorge Vargas PTA      Therapy Exercises    Bilateral Lower Extremities AROM:;20 reps;ankle pumps/circles;LAQ;hip flexion;hip abduction/adduction;glut sets  - AROM:;LAQ;15 reps;sitting;ankle pumps/circles;hip flexion;glut sets;hip abduction/adduction   2 sets of each. Edu on HEP and home safety  -ASTER     Bilateral Upper Extremity   AROM:;20 reps;sitting;elbow flexion/extension;hand pumps;shoulder abduction/adduction;shoulder circles;shoulder extension/flexion;shoulder ER/IR  -KD    BUE Resistance   manual resistance- moderate   2lb HW; 2 sets   -KD    Recorded by [ASTER] Jorge Vargas PTA [ASTER] Jorge Vargas PTA [KD] UZAIR Esquivel/L    Positioning and Restraints    Pre-Treatment Position in bed  -ASTER in bed  -ASTER in bed  -KD    Post Treatment  Position chair  -ASTER bed  -ASTER bed  -KD    In Bed call light within reach;encouraged to call for assist   all needs met. spoke w/ nsg, aldair hose may be too small  - sitting;call light within reach;encouraged to call for assist;with family/caregiver  - sitting EOB;call light within reach;encouraged to call for assist;exit alarm on  -KD    Recorded by [ASTER] Jorge Vargas PTA [ASTER] Jorge Vargas PTA [KD] UZAIR Esquivel/L      07/10/17 1558 07/10/17 1400       Rehab Assessment/Intervention    Discipline occupational therapy assistant  -CS physical therapy assistant  -AM     Document Type therapy note (daily note)  -CS therapy note (daily note)  -AM     Subjective Information agree to therapy  -CS agree to therapy;no complaints  -AM     Patient Effort, Rehab Treatment  good  -AM     Symptoms Noted During/After Treatment  shortness of breath  -AM     Precautions/Limitations  fall precautions;oxygen therapy device and L/min  -AM     Equipment Issued to Patient  gait belt  -AM     Recorded by [CS] UZAIR Jhaveri/RAFAELA [AM] Oliver Moses PTA     Vital Signs    Pre Systolic BP Rehab  135  -AM     Pre Treatment Diastolic BP  78  -AM     Post Systolic BP Rehab  136  -AM     Post Treatment Diastolic BP  82  -AM     Pretreatment Heart Rate (beats/min) 92  -CS 98  -AM     Posttreatment Heart Rate (beats/min) 92  -  -AM     Pre SpO2 (%) 94  -CS 88  -AM     O2 Delivery Pre Treatment supplemental O2  -CS supplemental O2  -AM     Post SpO2 (%) 94  -CS 93  -AM     O2 Delivery Post Treatment supplemental O2  -CS supplemental O2  -AM     Pre Patient Position Sitting  -CS Sitting  -AM     Intra Patient Position Sitting  -CS Standing  -AM     Post Patient Position Sitting  -CS Sitting  -AM     Recorded by [CS] UZAIR Jhaveri/RAFAELA [AM] Oliver Moses PTA     Pain Assessment    Pain Assessment 0-10  -CS 0-10  -AM     Pain Score 6  -CS 7  -AM     Post Pain Score 6  -CS 7  -AM     Pain Type  Acute pain  -AM      Pain Location Abdomen   legs, back, hernia  -CS Generalized  -AM     Pain Descriptors  Sore  -AM     Pain Frequency  Constant/continuous  -AM     Date Pain First Started  07/07/17  -AM     Clinical Progression  Not changed  -AM     Patient's Stated Pain Goal  No pain  -AM     Pain Intervention(s)  Medication (See MAR);Ambulation/increased activity  -AM     Result of Injury  No  -AM     Work-Related Injury  No  -AM     Multiple Pain Sites  No  -AM     Recorded by [CS] UZAIR Jhaveri/RAFAELA [AM] Oliver Moses PTA     Cognitive Assessment/Intervention    Current Cognitive/Communication Assessment functional  -CS functional  -AM     Orientation Status oriented x 4  -CS oriented x 4  -AM     Follows Commands/Answers Questions 100% of the time  -% of the time  -AM     Personal Safety Interventions  gait belt;nonskid shoes/slippers when out of bed;supervised activity  -AM     Recorded by [CS] UZAIR Jhaveri/RAFAELA [AM] Oliver Moses PTA     ROM (Range of Motion)    General ROM  no range of motion deficits identified  -AM     Recorded by  [AM] Oliver Moses PTA     Bed Mobility, Assessment/Treatment    Bed Mobility, Assistive Device  head of bed elevated;bed rails  -AM     Bed Mobility, Roll Left, [place]  not tested  -AM     Bed Mobility, Roll Right, [place]  not tested  -AM     Bed Mobility, Scoot/Bridge, [place]  not tested  -AM     Bed Mob, Supine to Sit, [place]  conditional independence  -AM     Bed Mob, Sit to Supine, [place]  conditional independence  -AM     Bed Mob, Sidelying to Sit, [place]  not tested  -AM     Bed Mob, Sit to Sidelying, [place]  not tested  -AM     Bed Mobility, Safety Issues  decreased use of arms for pushing/pulling;decreased use of legs for bridging/pushing  -AM     Bed Mobility, Impairments  strength decreased  -AM     Recorded by  [AM] Oliver Moses PTA     Transfer Assessment/Treatment    Transfers, Bed-Chair [place]  contact  guard assist  -AM     Transfers, Chair-Bed [place]  contact guard assist  -AM     Transfers, Bed-Chair-Bed, Assist Device  rolling walker  -AM     Transfers, Sit-Stand [place]  contact guard assist  -AM     Transfers, Stand-Sit [place]  contact guard assist  -AM     Transfers, Sit-Stand-Sit, Assist Device  rolling walker  -AM     Toilet Transfer, [place]  not tested  -AM     Walk-In Shower Transfer, [place]  not tested  -AM     Bathtub Transfer, [place]  not tested  -AM     Transfer, Maintain Weight Bearing Status  able to maintain weight bearing status  -AM     Transfer, Safety Issues  step length decreased   verbal cues to stay closer to walker  -AM     Transfer, Impairments  strength decreased  -AM     Recorded by  [AM] Oliver Moses PTA     Gait Assessment/Treatment    Gait, [place] Level  contact guard assist  -AM     Gait, Assistive Device  rolling walker  -AM     Gait, Distance (Feet)  100  -AM     Gait, Gait Pattern Analysis  3-point gait  -AM     Gait, Gait Deviations  bilateral:;sergio decreased  -AM     Gait, Maintain Weight Bearing Status  able to maintain weight bearing status  -AM     Gait, Safety Issues  step length decreased  -AM     Gait, Impairments  strength decreased  -AM     Recorded by  [AM] Oliver Moses PTA     Stairs Assessment/Treatment    Stairs, [place] Level  not tested  -AM     Recorded by  [AM] Oliver Moses PTA     Therapy Exercises    Bilateral Lower Extremities  AROM:;20 reps;sitting;ankle pumps/circles;glut sets;LAQ;other reps   pillow squeeze  -AM     Bilateral Upper Extremity AROM:;20 reps;sitting;elbow flexion/extension;hand pumps;pronation/supination;shoulder extension/flexion;shoulder ER/IR  -CS      BUE Resistance manual resistance- minimal  -CS      Recorded by [CS] UZIAR Jhaveri/RAFAELA [AM] Oliver Moses PTA     Positioning and Restraints    Pre-Treatment Position in bed  -CS in bed  -AM     Post Treatment Position  bed  -CS chair  -AM     In Bed sitting EOB;call light within reach  -CS      In Chair  reclined;call light within reach;encouraged to call for assist;legs elevated  -AM     Recorded by [CS] Liza Graham, DUNNE/L [AM] Oliver Moses, PTA       User Key  (r) = Recorded By, (t) = Taken By, (c) = Cosigned By    Initials Name Effective Dates    ASTER Jorge Vargas, PTA 10/17/16 -     AM Oliver Moses, PTA 10/17/16 -     KD Sylvie Mcadams, DUNNE/L 10/17/16 -     CS Liza Graham, DUNNE/L 10/17/16 -     RM America Warner, OT 03/22/17 -                 OT Goals       07/13/17 1134 07/12/17 1432 07/11/17 1045    Transfer Training OT STG    Transfer Training OT STG, Date Goal Reviewed 07/13/17  -RM 07/12/17  -CS 07/11/17  -KD    Transfer Training OT STG, Outcome goal ongoing  -RM      Patient Education OT LTG    Patient Education OT LTG, Date Goal Reviewed 07/13/17  -RM 07/12/17  -CS 07/11/17  -KD    Patient Education OT LTG Outcome goal met  -RM  goal not met  -KD    ADL OT LTG    ADL OT LTG, Date Goal Reviewed 07/13/17  -RM 07/12/17  -CS 07/11/17  -KD    ADL OT LTG, Outcome goal ongoing  -RM      Activity Tolerance OT STG    Activity Tolerance Goal OT STG, Date Goal Reviewed 07/13/17  -RM 07/12/17  -CS 07/11/17  -KD    Activity Tolerance Goal OT STG, Outcome goal ongoing  -RM  goal not met  -KD    Endurance OT STG    Endurance Goal OT STG, Date Goal Reviewed 07/13/17  -RM 07/12/17  -CS 07/11/17  -KD    Endurance Goal OT STG, Outcome goal ongoing  -RM        07/10/17 1625 07/09/17 1338       Transfer Training OT STG    Transfer Training OT STG, Date Established  07/09/17  -BH     Transfer Training OT STG, Time to Achieve  by discharge  -BH     Transfer Training OT STG, Activity Type  all transfers  -BH     Transfer Training OT STG, [place] Level  supervision required  -BH     Transfer Training OT STG, Date Goal Reviewed 07/10/17  -CS      Transfer Training OT STG, Outcome goal ongoing  -CS      Patient Education OT  LTG    Patient Education OT LTG, Date Established  07/09/17  -     Patient Education OT LTG, Time to Achieve  by discharge  -     Patient Education OT LTG, Education Type  HEP;energy conservation;home safety;positioning;adaptive equipment mgmt;adaptive breathing  -     Patient Education OT LTG, Education Understanding  verbalizes understanding;demonstrates adequately;independent   with caregiver assist as needed  -     Patient Education OT LTG, Date Goal Reviewed 07/10/17  -      Patient Education OT LTG Outcome goal ongoing  -      ADL OT LTG    ADL OT LTG, Date Established  07/09/17  -     ADL OT LTG, Time to Achieve  by discharge  -     ADL OT LTG, Activity Type  ADL skills  -     ADL OT LTG, Additional Goal  set up feeding/grooming; SBA toileting/bathing/dressing - AE/AD as needed  -     ADL OT LTG, Date Goal Reviewed 07/10/17  -      ADL OT LTG, Outcome goal ongoing  -      Activity Tolerance OT STG    Activity Tolerance Goal OT STG, Date Established  07/09/17  -     Activity Tolerance Goal OT STG, Time to Achieve  by discharge  -     Activity Tolerance Goal OT STG, Activity Level  15 min activity;O2 sat >/equal to 88%;with 2 rest breaks  -     Activity Tolerance Goal OT STG, Date Goal Reviewed 07/10/17  -      Activity Tolerance Goal OT STG, Outcome goal ongoing  [facility]      Endurance OT STG    Endurance Goal OT STG, Date Established  07/09/17  -     Endurance Goal OT STG, Time to Achieve  by discharge  -     Endurance Goal OT STG, Activity Level  endurance 2 fair  -     Endurance Goal OT STG, Date Goal Reviewed 07/10/17  -      Endurance Goal OT STG, Outcome goal ongoing  -        User Key  (r) = Recorded By, (t) = Taken By, (c) = Cosigned By    Initials Name Provider Type     Dannielle Sanders, OTR/L Occupational Therapist    ROSANGELA Mcadams DUNNE/L Occupational Therapy Assistant    UZAIR Sparrow/L Occupational Therapy Assistant    RM America Warner, OT  Occupational Therapist          Occupational Therapy Education     Title: PT OT SLP Therapies (Active)     Topic: Occupational Therapy (Active)     Point: ADL training (Active)    Description: Instruct learner(s) on proper safety adaptation and remediation techniques during self care or transfers.   Instruct in proper use of assistive devices.    Learning Progress Summary    Learner Readiness Method Response Comment Documented by Status   Patient Acceptance E NR   07/13/17 1312 Active    Acceptance E,D,TB NR Pt was educated on EC/WS.  07/12/17 1431 Active    Acceptance E VU Educated on OT and POC. Educated to call for assist and not get up on his own. Educated on importance of further rehab and concerns with safety with O2 dropping and with ADL. Educated on safety w/ t/f, mobility, and ADL.  07/09/17 1500 Done   Family Acceptance E VU Educated on OT and POC. Educated to call for assist and not get up on his own. Educated on importance of further rehab and concerns with safety with O2 dropping and with ADL. Educated on safety w/ t/f, mobility, and ADL.  07/09/17 1500 Done               Point: Home exercise program (Active)    Description: Instruct learner(s) on appropriate technique for monitoring, assisting and/or progressing therapeutic exercises/activities.    Learning Progress Summary    Learner Readiness Method Response Comment Documented by Status   Patient Acceptance E NR   07/13/17 1312 Active    Acceptance E,D,TB NR Pt was educated on EC/WS.  07/12/17 1431 Active    Acceptance E,TB,D NR   07/10/17 1624 Active               Point: Precautions (Active)    Description: Instruct learner(s) on prescribed precautions during self-care and functional transfers.    Learning Progress Summary    Learner Readiness Method Response Comment Documented by Status   Patient Acceptance E NR   07/13/17 1312 Active    Acceptance E,D,TB NR Pt was educated on EC/WS.  07/12/17 1431 Active    Acceptance E VU  Educated on OT and POC. Educated to call for assist and not get up on his own. Educated on importance of further rehab and concerns with safety with O2 dropping and with ADL. Educated on safety w/ t/f, mobility, and ADL.  07/09/17 1500 Done   Family Acceptance E VU Educated on OT and POC. Educated to call for assist and not get up on his own. Educated on importance of further rehab and concerns with safety with O2 dropping and with ADL. Educated on safety w/ t/f, mobility, and ADL.  07/09/17 1500 Done               Point: Body mechanics (Active)    Description: Instruct learner(s) on proper positioning and spine alignment during self-care, functional mobility activities and/or exercises.    Learning Progress Summary    Learner Readiness Method Response Comment Documented by Status   Patient Acceptance E,D,TB NR Pt was educated on EC/WS.  07/12/17 1431 Active    Acceptance E VU Educated on OT and POC. Educated to call for assist and not get up on his own. Educated on importance of further rehab and concerns with safety with O2 dropping and with ADL. Educated on safety w/ t/f, mobility, and ADL.  07/09/17 1500 Done   Family Acceptance E VU Educated on OT and POC. Educated to call for assist and not get up on his own. Educated on importance of further rehab and concerns with safety with O2 dropping and with ADL. Educated on safety w/ t/f, mobility, and ADL.  07/09/17 1500 Done                      User Key     Initials Effective Dates Name Provider Type Discipline     10/17/16 -  JUAN Villalobos/L Occupational Therapist OT     10/17/16 -  UZAIR Jhaveri/RAFAELA Occupational Therapy Assistant OT     03/22/17 -  America Warner OT Occupational Therapist OT                  OT Recommendation and Plan  Anticipated Equipment Needs At Discharge: shower chair, bedside commode  Anticipated Discharge Disposition: skilled nursing facility, home with 24/7 care, home with home health (depends on  progress)  Planned Therapy Interventions: activity intolerance, adaptive equipment training, ADL retraining, balance training, bed mobility training, energy conservation, fine motor coordination training, home exercise program, motor coordination training, strengthening, transfer training  Therapy Frequency: other (see comments) (3-14x a week)  Plan of Care Review  Plan Of Care Reviewed With: patient  Outcome Summary/Follow up Plan: OT Tx complete.  Pt participated in session working to increase strength, ADL capability, transfer strength.  He will continue to benefit from OT intervention.        Outcome Measures       07/13/17 1024 07/12/17 1400 07/12/17 0950    How much help from another person do you currently need...    Turning from your back to your side while in flat bed without using bedrails? 4  -ASTER  4  -ASTER    Moving from lying on back to sitting on the side of a flat bed without bedrails? 4  -ASTER  4  -ASTER    Moving to and from a bed to a chair (including a wheelchair)? 3  -ASTER  3  -ASTER    Standing up from a chair using your arms (e.g., wheelchair, bedside chair)? 3  -ASTER  3  -ASTER    Climbing 3-5 steps with a railing? 3  -ASTER  3  -ASTER    To walk in hospital room? 3  -ASTER  3  -ASTER    AM-PAC 6 Clicks Score 20  -ASTER  20  -ASTER    How much help from another is currently needed...    Putting on and taking off regular lower body clothing?  2  -CS     Bathing (including washing, rinsing, and drying)  2  -CS     Toileting (which includes using toilet bed pan or urinal)  3  -CS     Putting on and taking off regular upper body clothing  3  -CS     Taking care of personal grooming (such as brushing teeth)  3  -CS     Eating meals  4  -CS     Score  17  -CS     Functional Assessment    Outcome Measure Options AM-PAC 6 Clicks Basic Mobility (PT)  -ASTER AM-PAC 6 Clicks Daily Activity (OT)  -CS AM-PAC 6 Clicks Basic Mobility (PT)  -ASTER      07/11/17 1333 07/11/17 1045 07/10/17 1600    How much help from another person do you currently  need...    Turning from your back to your side while in flat bed without using bedrails? 4  -ASTER      Moving from lying on back to sitting on the side of a flat bed without bedrails? 4  -ASTER      Moving to and from a bed to a chair (including a wheelchair)? 3  -ASTER      Standing up from a chair using your arms (e.g., wheelchair, bedside chair)? 3  -ASTER      Climbing 3-5 steps with a railing? 2  -ASTER      To walk in hospital room? 3  -ASTER      AM-PAC 6 Clicks Score 19  -ASTER      How much help from another is currently needed...    Putting on and taking off regular lower body clothing? 2  -KD 2  -KD 2  -CS    Bathing (including washing, rinsing, and drying) 2  -KD 2  -KD 2  -CS    Toileting (which includes using toilet bed pan or urinal) 3  -KD 3  -KD 3  -CS    Putting on and taking off regular upper body clothing 3  -KD 3  -KD 3  -CS    Taking care of personal grooming (such as brushing teeth) 3  -KD 3  -KD 3  -CS    Eating meals 4  -KD 4  -KD 4  -CS    Score 17  -KD 17  -KD 17  -CS    Functional Assessment    Outcome Measure Options AM-PAC 6 Clicks Basic Mobility (PT)  -ASTER  AM-PAC 6 Clicks Daily Activity (OT)  -CS      07/10/17 1400          How much help from another person do you currently need...    Turning from your back to your side while in flat bed without using bedrails? 3  -AM      Moving from lying on back to sitting on the side of a flat bed without bedrails? 4  -AM      Moving to and from a bed to a chair (including a wheelchair)? 3  -AM      Standing up from a chair using your arms (e.g., wheelchair, bedside chair)? 3  -AM      Climbing 3-5 steps with a railing? 1  -AM      To walk in hospital room? 3  -AM      AM-PAC 6 Clicks Score 17  -AM      Functional Assessment    Outcome Measure Options AM-PAC 6 Clicks Basic Mobility (PT)  -AM        User Key  (r) = Recorded By, (t) = Taken By, (c) = Cosigned By    Initials Name Provider Type    ASTER Vargas PTA Physical Therapy Assistant    MARIAM Moses,  PTA Physical Therapy Assistant    CHANELL LopezA/L Occupational Therapy Assistant    CHANELL SparrowA/L Occupational Therapy Assistant           Time Calculation:         Time Calculation- OT       07/13/17 1134          Time Calculation- OT    OT Start Time 1134  -RM      OT Stop Time 1200  -RM      OT Time Calculation (min) 26 min  -RM      Total Timed Code Minutes- OT 26 minute(s)  -RM      OT Received On 07/13/17  -RM        User Key  (r) = Recorded By, (t) = Taken By, (c) = Cosigned By    Initials Name Provider Type     America Warner OT Occupational Therapist           Therapy Charges for Today     Code Description Service Date Service Provider Modifiers Qty    [id number]  OT THER PROC EA 15 MIN 7/13/2017 America Warner OT GO 1    [id number]  OT SELF CARE/MGMT/TRAIN EA 15 MIN 7/13/2017 America Warner OT GO 1          OT G-codes  OT Professional Judgement Used?: Yes  OT Functional Scales Options: AM-PAC 6 Clicks Daily Activity (OT)  Score: 17  Functional Limitation: Self care  Self Care Current Status (): At least 40 percent but less than 60 percent impaired, limited or restricted  Self Care Goal Status (): At least 20 percent but less than 40 percent impaired, limited or restricted    America Warner OT  7/13/2017

## 2017-07-13 NOTE — DISCHARGE PLACEMENT REQUEST
"Mickey Lind (61 y.o. Male)     Date of Birth Social Security Number Address Home Phone MRN    1955  [address] [id number] [phone] [id number]    Buddhist Marital Status          Anabaptism        Admission Date Admission Type Admitting Provider Attending Provider Department, Room/Bed    17 Emergency Carolee Leo MD Sound, Sara, MD [address], 372/1    Discharge Date Discharge Disposition Discharge Destination                      Attending Provider: Ara Bach MD     Allergies:  Codeine, Other, Penicillins    Isolation:  None   Infection:  None   Code Status:  FULL    Ht:  70\" (177.8 cm)   Wt:  149 lb 0.5 oz (67.6 kg)    Admission Cmt:  None   Principal Problem:  Ascites due to alcoholic cirrhosis [K70.31] More...                 Active Insurance as of 2017     Patient has no active insurance coverage on file for 2017.          Emergency Contacts      (Rel.) Home Phone Work Phone Mobile Phone    Pratik Lind (Son) -- -- [phone]    TimoteoAra (Daughter) -- -- [phone]           [phone]   Oxygen Therapy   SpO2 (!) 88 % 93 %   O2 Device room air nasal cannula with humidification   Flow (L/min)   --  2        17 1615 17 1617 17 1620   Oxygen Therapy   SpO2 90 % (!) 87 % 95 %   O2 Device room air  (sitting) room air  (walking) nasal cannula   Flow (L/min) --  --  2.5         [address] [address]  Phone: [phone]  Fax:  Date Ordered: 2017         Patient: Mickey Lind MRN: [id number]   [address] [id number] : 1955  SSN:    Phone: [phone] Sex: M     Weight: 149 lb 0.5 oz (67.6 kg)         Ht Readings from Last 1 Encounters:   17 70\" (177.8 cm)         Oxygen Therapy (Order ID: [id number])   Diagnosis: Acute respiratory failure with hypoxia (J96.01 [ICD-10-CM] " 518.81 [ICD-9-CM])   Quantity: 1     Delivery Modality: Nasal Cannula  Liters Per Minute: 2  Duration: Continuous  Equipment:  Oxygen Concentrator &  &  Portable Gaseous Oxygen System & Portable Oxygen Contents Gaseous &  Conserving Regulator  Length of Need (99 Months = Lifetime): 99 Months = Lifetime            Authorizing Provider:Ara Bach MD  Authorizing Provider's NPI: [id number]  Order Entered By: Ara Bach MD 2017 1:36 PM     Electronically signed by: Ara Bach MD 2017 1:36 PM              History & Physical      Ali Pete Hahn MD at 2017  4:22 PM     Attestation signed by Carolee Leo MD at 2017  1:24 PM        I have reviewed the notes, assessments, and/or procedures performed by Dr. Hahn, I concur with his documentation of Mickey Lind.        This document has been electronically signed by Carolee Leo MD on 2017 1:23 PM                                       HISTORY AND PHYSICAL  NAME: Mickey Lind  : 1955  MRN: [id number]    DATE OF ADMISSION: 17    DATE & TIME SEEN: 17 4:47 PM    PCP: Ara Bach MD    CODE STATUS: No Order    CHIEF COMPLAINT lower abdominal pain    HPI:  Mickey Lind is a 61 y.o. male who has a concurrent medical history of chronic viral hepatitis C, ascites, alcoholic liver disease who is presenting to the ER with a 2 week history of lower abdominal pain.  Patient mentions the pain is albertina-umbilical in location with radiation throughout his entire abdomen.  Patient states that the pain is described as a dull aching sensation that is rated on a scale of 1-10 as an 8 out of highest and 4 at its lowest.  Patient mentions no precipitating factors or any alleviating factors for this pain.  Patient does follow GI for his hepatitis C and does see heme oncology for multiple myeloma treatments.  Patient mentions that he quit drinking chronically when he was diagnosed with hepatitis C but does  mention occasional drinks of beer and states his last beer that he had was on 4 July.  She denies any fevers, chest pain, shortness of breath does mention a history of cigarette smoking and is still continuing to smoke a pack per day.  Patient denies any diarrhea as mentioned a week long history of constipation but does state he had a bowel movement this morning  CONCURRENT MEDICAL HISTORY:  Past Medical History:   Diagnosis Date   • Abdominal pain    • Alcoholic liver damage    • Alcoholic polyneuropathy    • Amblyopia     refractive os   • Ascites    • Ascites    • Asthma    • Astigmatism    • Cellulitis of left lower leg    • Chronic hepatitis C    • Chronic viral hepatitis C    • Cirrhosis of liver    • Claudication    • Encounter for immunization    • Generalized edema    • History of colon polyps    • Hypermetropia    • Inguinal hernia     - Rih      • Low back pain    • Major depressive disorder     recurrent , moderate   • Pain in joint, lower leg    • Peripheral vascular disease    • Recurrent major depressive episodes    • Tinea unguium    • Tobacco use        PAST SURGICAL HISTORY:  Past Surgical History:   Procedure Laterality Date   • CARDIAC CATHETERIZATION      (Successful PCI to a totally occluded RCA with a 2.5 x 28 and a 2.5 x 8 mm Xience stent. Nonobstructive disease in the left coronary artery system.)   11/24/2012    • CARDIAC SURGERY     • COLONOSCOPY  10/13/2016   • COLONOSCOPY N/A 3/29/2017    Procedure: COLONOSCOPY;  Surgeon: Jose Daniel David MD;  Location: [facility] ENDOSCOPY;  Service:    • ENDOSCOPY  10/13/2016   • ENDOSCOPY N/A 3/29/2017    Procedure: ESOPHAGOGASTRODUODENOSCOPY (U/S FIRST @6122) ;  Surgeon: Jose Daniel David MD;  Location: [facility] ENDOSCOPY;  Service:    • UPPER GASTROINTESTINAL ENDOSCOPY  10/13/2016       FAMILY HISTORY:  Family History   Problem Relation Age of Onset   • Cancer Mother    • Cancer Father    • Other Father      ischemic heart disease   • Other Son       depressive disorder   • Diabetes Maternal Grandmother    • Cancer Paternal Grandfather         SOCIAL HISTORY:  Social History     Social History   • Marital status:      Spouse name: N/A   • Number of children: N/A   • Years of education: N/A     Occupational History   • Not on file.     Social History Main Topics   • Smoking status: Current Every Day Smoker     Packs/day: 0.50   • Smokeless tobacco: Not on file      Comment: Smokes approx 1 ppd of Cigarettes.smoking since last 45 yr   • Alcohol use No      Comment: no alcohol since October 2015   • Drug use: No   • Sexual activity: Defer     Other Topics Concern   • Not on file     Social History Narrative       HOME MEDICATIONS:    Current Facility-Administered Medications:   •  Insert peripheral IV, , , Once **AND** sodium chloride 0.9 % flush 10 mL, 10 mL, Intravenous, PRN, Wilver Dougherty MD  •  sodium chloride 0.9 % infusion, 125 mL/hr, Intravenous, Continuous, Wilver Dougherty MD, Last Rate: 125 mL/hr at 07/07/17 1307, 125 mL/hr at 07/07/17 1307    Current Outpatient Prescriptions:   •  acyclovir (ZOVIRAX) 400 MG tablet, Take 400 mg by mouth Daily., Disp: , Rfl:   •  atorvastatin (LIPITOR) 10 MG tablet, Take 1 tablet by mouth Daily., Disp: 30 tablet, Rfl: 3  •  dexamethasone (DECADRON) 4 MG tablet, Take 4 mg by mouth., Disp: , Rfl:   •  furosemide (LASIX) 40 MG tablet, Take 40 mg by mouth daily., Disp: , Rfl:   •  gabapentin (NEURONTIN) 400 MG capsule, Take 1 capsule by mouth 3 (Three) Times a Day., Disp: 90 capsule, Rfl: 2  •  GNP ACID REDUCER 75 MG tablet, , Disp: , Rfl:   •  polyethylene glycol (MIRALAX) powder, Take 17 g by mouth Daily., Disp: 527 g, Rfl: 1  •  prochlorperazine (COMPAZINE) 10 MG tablet, Take 1 tablet by mouth Every 6 (Six) Hours As Needed for nausea or vomiting., Disp: 60 tablet, Rfl: 1  •  sertraline (ZOLOFT) 100 MG tablet, Take 2 tablets by mouth Daily., Disp: 60 tablet, Rfl: 2  •  spironolactone (ALDACTONE) 50 MG tablet, Take 50  mg by mouth 2 (two) times a day., Disp: , Rfl:   •  sucralfate (CARAFATE) 1 GM/10ML suspension, Take 10 mL by mouth Daily., Disp: 30 g, Rfl: 3    ALLERGIES:  Codeine; Other; and Penicillins    REVIEW OF SYSTEMS  Review of Systems  Review of systems     Constitutional: no weight loss, fever, chills, does mention generalized weakness  HEENT: no visual loss, blurred vision, double vision, yellow scalarea  Skin: no rash, no discoloration   CVS: no chest pain, palpitations  Chest: no shortness of air, cough, dyspnea  GI: abdominal pain, constipation  : no burning in urination, change in odor, no change in frequency  Neuro: no headache, dizziness, syncope, paralysis, ataxia, numbness  Musculoskeletal: no muscle, back pain, joint pain, stiffness  Lymphatics: no enlarged nodes  Endo: no reports of sweating, cold or heat intolerance, no polyuria      PHYSICAL EXAM:  Temp:  [97.8 °F (36.6 °C)] 97.8 °F (36.6 °C)  Heart Rate:  [78-88] 84  Resp:  [20] 20  BP: (136-180)/(83-95) 162/95  Body mass index is 25.54 kg/(m^2).  Physical Exam    GENERAL APPEARANCE: was in mild distress from the distended abdomen  SKIN: Inspection of the skin reveals no rashes, ulcerations or petechiae.  NECK: Supple and symmetric. There was no thyroid enlargement, and no tenderness, or masses were felt.  CHEST: Normal AP diameter and normal contour without any kyphoscoliosis.  LUNGS: decrease breath sounds bilaterally, wheezes present bilaterally on inspiration  CARDIOVASCULAR: There was a regular rate and rhythm without any murmurs, gallops, rubs. The carotid pulses were normal and 2+ bilaterally without bruits. Peripheral pulses were 2+ and symmetric.  ABDOMEN: Bowel sounds positive in all 4 quadrants, abdomen is tight, patient does have tenderness to palpation in all quadrants of the abdomen, right inguinal hernia was palpated, scrotal edema was present   LYMPH NODES: No lymphadenopathy was appreciated in the neck, axillae or  groin.  MUSCULOSKELETAL: Gait was normal. There was no tenderness or effusions noted. Muscle strength and tone were normal.      DIAGNOSTIC DATA:   Lab Results (last 24 hours)     Procedure Component Value Units Date/Time    CBC Auto Differential [[id number]]  (Abnormal) Collected:  07/07/17 1158    Specimen:  Blood Updated:  07/07/17 1216     WBC 4.99 10*3/mm3      RBC 3.80 (L) 10*6/mm3      Hemoglobin 11.5 (L) g/dL      Hematocrit 33.7 (L) %      MCV 88.7 fL      MCH 30.3 pg      MCHC 34.1 g/dL      RDW 16.4 (H) %      RDW-SD 53.3 (H) fl      MPV -- fL       Instrument unable to calculate results        Platelets 117 (L) 10*3/mm3      Neutrophil % 68.6 %      Lymphocyte % 16.2 %      Monocyte % 9.2 %      Eosinophil % 5.2 %      Basophil % 0.6 %      Immature Grans % 0.2 %      Neutrophils, Absolute 3.42 10*3/mm3      Lymphocytes, Absolute 0.81 10*3/mm3      Monocytes, Absolute 0.46 10*3/mm3      Eosinophils, Absolute 0.26 10*3/mm3      Basophils, Absolute 0.03 10*3/mm3      Immature Grans, Absolute 0.01 10*3/mm3     Comprehensive Metabolic Panel [[id number]]  (Abnormal) Collected:  07/07/17 1158    Specimen:  Blood Updated:  07/07/17 1224     Glucose 101 (H) mg/dL      BUN 10 mg/dL      Creatinine 0.47 (L) mg/dL      Sodium 133 (L) mmol/L      Potassium 3.6 mmol/L      Chloride 102 mmol/L      CO2 25.0 mmol/L      Calcium 8.0 (L) mg/dL      Total Protein 5.8 (L) g/dL      Albumin 2.70 (L) g/dL      ALT (SGPT) 27 U/L      AST (SGOT) 32 U/L      Alkaline Phosphatase 114 U/L      Total Bilirubin 0.8 mg/dL      eGFR Non African Amer >150 mL/min/1.73      Globulin 3.1 gm/dL      A/G Ratio 0.9 (L) g/dL      BUN/Creatinine Ratio 21.3     Anion Gap 6.0 mmol/L     Lipase [[id number]]  (Normal) Collected:  07/07/17 1158    Specimen:  Blood Updated:  07/07/17 1224     Lipase 97 U/L     Protime-INR [[id number]]  (Normal) Collected:  07/07/17 1158    Specimen:  Blood Updated:  07/07/17 1239     Protime 14.6 Seconds      INR 1.15     Narrative:       Therapeutic range for most indications is 2.0-3.0 INR,  or 2.5-3.5 for mechanical heart valves.    aPTT [[id number]]  (Abnormal) Collected:  07/07/17 1158    Specimen:  Blood Updated:  07/07/17 1239     PTT 56.6 (H) seconds     Narrative:       The recommended Heparin therapeutic range is 68-97 seconds.    Extra Tubes [[id number]] Collected:  07/07/17 1158    Specimen:  Blood from Blood, Venous Line Updated:  07/07/17 1301    Narrative:       The following orders were created for panel order Extra Tubes.  Procedure                               Abnormality         Status                     ---------                               -----------         ------                     Gold Top - SST[[id number]]                                   Final result                 Please view results for these tests on the individual orders.    Gold Top - SST [[id number]] Collected:  07/07/17 1158    Specimen:  Blood Updated:  07/07/17 1301     Extra Tube Hold for add-ons.      Auto resulted.       CBC & Differential [[id number]] Collected:  07/07/17 1158    Specimen:  Blood Updated:  07/07/17 1315    Narrative:       The following orders were created for panel order CBC & Differential.  Procedure                               Abnormality         Status                     ---------                               -----------         ------                     Scan Slide[[id number]]                                       Final result               CBC Auto Differential[[id number]]        Abnormal            Final result                 Please view results for these tests on the individual orders.    Scan Slide [[id number]] Collected:  07/07/17 1158    Specimen:  Blood Updated:  07/07/17 1315     Anisocytosis Slight/1+      Few Ovalocytes, eliptocytes, and Dacrocytes- tear drop cells observed  Rare hypochromic cell observed        WBC Morphology Normal     Platelet Estimate Decreased    Urinalysis With / Culture If Indicated  [[id number]]  (Abnormal) Collected:  07/07/17 1514    Specimen:  Urine from Urine, Clean Catch Updated:  07/07/17 1535     Color, UA Yellow     Appearance, UA Clear     pH, UA 7.0     Specific Gravity, UA 1.015     Glucose, UA Negative     Ketones, UA Negative     Bilirubin, UA Negative     Blood, UA Negative     Protein, UA 30 mg/dL (1+) (A)     Leuk Esterase, UA Negative     Nitrite, UA Negative     Urobilinogen, UA 4.0 E.U./dL (A)    Urinalysis, Microscopic Only [[id number]]  (Abnormal) Collected:  07/07/17 1514    Specimen:  Urine from Urine, Clean Catch Updated:  07/07/17 1538     RBC, UA 0-2 (A) /HPF      WBC, UA 0-2 /HPF      Bacteria, UA None Seen /HPF      Squamous Epithelial Cells, UA None Seen /HPF      Hyaline Casts, UA None Seen /LPF      Methodology Automated Microscopy    Body Fluid Culture [[id number]] Collected:  07/07/17 1536    Specimen:  Body Fluid from Abdominal Cavity Updated:  07/07/17 1609           Imaging Results (last 24 hours)     Procedure Component Value Units Date/Time    XR Chest 1 View [[id number]] Collected:  07/07/17 1153     Updated:  07/07/17 1215    Narrative:       Chest single view.       CLINICAL INDICATION: Shortness of breath abdominal pain    COMPARISON: Chest December 14, 2016.    FINDINGS: Heart normal size. Dense consolidation right lower lobe  and right-sided effusion. Significant unfavorable change since  prior exam December 14, 2016. Left lung remains clear.      Impression:       CONCLUSION: Dense consolidation involving portions the right  lower lobe either pneumonitis or atelectasis. Moderate to large  right-sided effusion. Unfavorable change since prior chest x-ray  exam.     CT chest with contrast may be useful in further assessment of  right lower lobe consolidation and effusion.        Electronically signed by:  Golden Field MD  7/7/2017 12:14 PM CDT  Workstation: [id number]    CT Abdomen Pelvis With Contrast [[id number]] Collected:  07/07/17 1317     Updated:   07/07/17 1420    Narrative:       DATE OF PROCEDURE:  7/7/2017 1:17 PM CDT    PROCEDURE: CT ABDOMEN PELVIS WITH IV CONTRAST    INDICATION FOR PROCEDURE:   61 years -old patient presents for  evaluation of cirrhosis and hepatitis C.    TECHNIQUE: Contiguous axial images were obtained from lung bases  to the proximal thighs after intravenous administration of 93 mL  of Isovue-300..   Oral contrast was also administered.  Multiplanar reformations are submitted for interpretation. Dose  length product is 364.      This exam was performed according to our departmental  dose-optimization program, which includes automated exposure  control, adjustment of the mA and/or kV according to patient size  and/or use of iterative reconstruction technique.    COMPARISON:  CT of the abdomen and pelvis dated December 14, 2016.    FINDINGS: What is seen of the chest wall has a normal appearance.  What is seen of the heart has a normal appearance without obvious  pericardial effusion.     There is right lower lobe airspace consolidation and probable  compressive atelectasis. A large right-sided pleural effusion is  visible. There is reticulonodular interstitial thickening visible  in the left lower lobe.  No left-sided pleural effusion is  visible.    The liver is small in size consistent with history of cirrhosis.  No focal liver masses are visualized. There are no dilated  intrahepatic biliary ducts. The gallbladder is present. The  spleen is mildly enlarged measuring 13.4 cm in greatest  dimension. There are calcified splenic granulomas.     Both adrenal glands are within normal limits. The pancreas  appears atrophic.    Both kidneys have a normal appearance without obvious perinephric  fluid or hydronephrosis. There is no evidence for hydroureter or  radiopaque ureteral calculi.     The urinary bladder has a normal appearance. Prostate has a  normal appearance.     The distal esophagus has a normal appearance. The stomach has  a  normal appearance. There is a large amount of ascites. There is  no obvious pneumoperitoneum.      There appears to be delayed transit of enteric contrast through  the small bowel. Maximum transverse dimension of the wall small  bowel measures approximately 2.7 cm in greatest transverse  dimension. This suggests possibility of partial small bowel  obstruction. The small bowel has a normal appearance, otherwise.   Stool is visualized throughout the colon.  There may be mild  thickening of the walls of the colon.  Scattered colonic  diverticula are visualized.  No abnormal appendix is visualized.    Abdominal aorta is tortuous with extensive atherosclerotic  calcification. There appears be high-grade stenosis of the  origins of the common iliac arteries.. The inferior vena cava has  a normal appearance.    The abdominal wall has a normal appearance. There is a large  right inguinal hernia containing a large amount of ascites.    There is mild compression of the superior endplate of T12. The  bones appear osteopenic. There are mild degenerative changes of  both hips. Imaged thoracic and lumbar vertebral bodies have  normal alignment and appearance, otherwise. Intervertebral disc  spaces are within normal limits.  Bony pelvis has a normal CT  appearance.      Impression:         1.  Large right-sided pleural effusion and compressive  atelectasis and airspace disease.  2.  Large amount of ascites probably related to hepatorenal  syndrome with cirrhosis and mild splenomegaly.  3.  Possible sequela of small bowel ileus versus a partial small  bowel obstruction.  4.  Large right-sided inguinal hernia containing ascites. This is  larger than was apparent on the previous CT.    Electronically signed by:  Mariajose Dutta MD  7/7/2017 2:18 PM CDT  Workstation: [id number] [facility] Paracentesis [[id number]] Collected:  07/07/17 1535    Specimen:  Body Fluid Updated:  07/07/17 1628    Narrative:       Ultrasound  paracentesis.    HISTORY: Ascites.    Using ultrasound guidance and sterile technique a path was  selected for paracentesis. The right lateral abdominal wall was  punctured under ultrasound guidance a 5 French multipurpose  drainage catheter. 3.9 L of clear yellowish fluid was drained  without difficulty.    The patient tolerated the procedure well with no evidence of any  immediate complications.      Impression:       CONCLUSION: Technically successful and uneventful ultrasound  guided paracentesis.    Electronically signed by:  Golden Field MD  7/7/2017 4:26 PM CDT  Workstation: [id number]    Paracentesis performed by Dr. Shin Vasquez. Dr. Vasquez however is   unavailable to dictate the examination (on vacation).   Examination is therefore dictated by Dr. Golden Field.          I reviewed the patient's new clinical results.    ASSESSMENT AND PLAN: This is a 61 y.o. male with:    Active Hospital Problems (** Indicates Principal Problem)    Diagnosis Date Noted   • **Ascites due to alcoholic cirrhosis [K70.31] 07/07/2017     -patient had a CT scan of the abdomen and pelvis performed which showed a large fluid collection in his abdomen as well as right sided pleural effusion, patient underwent a ultrasound guided paracentesis which they took out 3.9 liters of fluid     • Lower abdominal pain [R10.30] 07/07/2017     -secondary to ascites from hep. C as well as alcoholic cirrhosis, patient is afebrile currently and has been for the last few weeks, no need for antibiotics at this time     • Tobacco dependence [F17.200] 07/07/2017     -admits to smoking a PPD, will give 21mg nicotine patch     • Simple chronic bronchitis [J41.0] 07/07/2017     -will provide with supplemental oxygen as needed as well as breathing treatments PRN     • Chronic hepatitis C without hepatic coma [B18.2] 02/16/2017     -has been followed by Dr. Montalvo, will continue his home medication of zovirax     • Multiple myeloma, stage 3 [C90.00]  01/20/2017     -has a right sided port in, has been followed by Dr. Mendoza of heme-oncology, will monitor      • Unilateral inguinal hernia without obstruction or gangrene [K40.90] 09/10/2016     -large right sided inguinal hernia palpated and seen on abdominal CT for now, will observe for now, possible surgery outpatient follow up     • End stage liver disease [K72.90] 08/31/2016     -secondary to chronic hepatitis C, is being followed by Dr. David as an outpatient, will continue to monitor      • Depression [F32.9] 08/31/2016     -continue home medication of zoloft        Resolved Hospital Problems    Diagnosis Date Noted Date Resolved   No resolved problems to display.         DVT prophylaxis: SCDs TEDs  Code status is No Order         I discussed the patients findings and my recommendations with patient, family and primary care team.     Dr. Leo is the attending on record at time of admission, she is aware of the patient's status and agrees with the above history and physical.          This document has been electronically signed by Carlos Hahn MD on July 7, 2017 4:47 PM         Electronically signed by Carolee Leo MD at 7/8/2017  1:24 PM      Carolee Leo MD at 7/7/2017  5:44 PM          Ascites due to alcoholic cirrhosis  Subjective:     Mickey Lind is a 61 y.o. male who presents for abdominal pain that started 3 weeks ago.  It got worse so he came into the ER.  He is also having higher than normal back pain.  The pain aches and throbs.  He states his hernia has gotten worse as well.  He is having bad pain in his lower abdomen.  He is also coughing up green to brown phlegm.  He has a lot of nausea and cannot eat.  He has a headache as well and a rash in his groin.  He sees Dr. David for his cirrhosis and Dr. Mendoza for multiple myeloma.  His last treatment for myeloma was in March.  He has also had blood transfusions at the [facility].  He is weak and doesn't feel well.  No hematochezia,  hematemesis, hematuria, or hemoptysis.      The following portions of the patient's history were reviewed and updated as appropriate: allergies, current medications, past family history, past medical history, past social history, past surgical history and problem list.    Concurrent Medical Hx:  Past Medical History:   Diagnosis Date   • Abdominal pain    • Alcoholic liver damage    • Alcoholic polyneuropathy    • Amblyopia     refractive os   • Anemia    • Ascites    • Ascites    • Asthma    • Astigmatism    • Cellulitis of left lower leg    • Chronic hepatitis C    • Chronic viral hepatitis C    • Cirrhosis of liver    • Claudication    • Encounter for immunization    • Generalized edema    • History of blood transfusion    • History of colon polyps    • Hypermetropia    • Inguinal hernia     - Rih      • Low back pain    • Major depressive disorder     recurrent , moderate   • Myocardial infarction    • Pain in joint, lower leg    • Peripheral vascular disease    • Recurrent major depressive episodes    • Tinea unguium    • Tobacco use        Past Surgical Hx:  Past Surgical History:   Procedure Laterality Date   • CARDIAC CATHETERIZATION      (Successful PCI to a totally occluded RCA with a 2.5 x 28 and a 2.5 x 8 mm Xience stent. Nonobstructive disease in the left coronary artery system.)   11/24/2012    • CARDIAC SURGERY     • COLONOSCOPY  10/13/2016   • COLONOSCOPY N/A 3/29/2017    Procedure: COLONOSCOPY;  Surgeon: Jose Daniel David MD;  Location: [facility] ENDOSCOPY;  Service:    • ENDOSCOPY  10/13/2016   • ENDOSCOPY N/A 3/29/2017    Procedure: ESOPHAGOGASTRODUODENOSCOPY (U/S FIRST @1771) ;  Surgeon: Jose Daniel David MD;  Location: [facility] ENDOSCOPY;  Service:    • UPPER GASTROINTESTINAL ENDOSCOPY  10/13/2016     Family Hx:  Family History   Problem Relation Age of Onset   • Cancer Mother    • Heart disease Mother    • Cancer Father    • Other Father      ischemic heart disease   • Heart disease Father    • Other  Son      depressive disorder   • Diabetes Maternal Grandmother    • Cancer Paternal Grandfather    • No Known Problems Daughter    • No Known Problems Son       Social History:  Social History     Social History   • Marital status:      Spouse name: N/A   • Number of children: N/A   • Years of education: N/A     Occupational History   • Not on file.     Social History Main Topics   • Smoking status: Current Every Day Smoker     Packs/day: 0.50   • Smokeless tobacco: Never Used      Comment: Smokes approx 1 ppd of Cigarettes.smoking since last 45 yr   • Alcohol use No      Comment: no alcohol since 2017   • Drug use: No   • Sexual activity: Defer     Other Topics Concern   • Not on file     Social History Narrative    , wife  2017. Lives with son and his son's girlfriend.  He has worked as , mine equipment, coal mines.  He served in the [employer] for 1.5 years.  Used to smoke 2 ppd down to 1/2 ppd for 35 years or more.  Drank a 12 pack of beer a day for 20 years, last drink was 1/2 a beer on .  He last drank in 10/2015.  He  drugs 20-30 years ago including cocaine, meth, heroin, pot, and other drugs.  He smoked, snorted, and shot up drugs.       Home Medications:  No current facility-administered medications on file prior to encounter.      Current Outpatient Prescriptions on File Prior to Encounter   Medication Sig Dispense Refill   • acyclovir (ZOVIRAX) 400 MG tablet Take 400 mg by mouth Daily.     • atorvastatin (LIPITOR) 10 MG tablet Take 1 tablet by mouth Daily. 30 tablet 3   • dexamethasone (DECADRON) 4 MG tablet Take 4 mg by mouth.     • furosemide (LASIX) 40 MG tablet Take 40 mg by mouth daily.     • gabapentin (NEURONTIN) 400 MG capsule Take 1 capsule by mouth 3 (Three) Times a Day. 90 capsule 2   • GNP ACID REDUCER 75 MG tablet      • polyethylene glycol (MIRALAX) powder Take 17 g by mouth Daily. 527 g 1   • prochlorperazine (COMPAZINE) 10 MG tablet Take 1 tablet by  mouth Every 6 (Six) Hours As Needed for nausea or vomiting. 60 tablet 1   • sertraline (ZOLOFT) 100 MG tablet Take 2 tablets by mouth Daily. 60 tablet 2   • spironolactone (ALDACTONE) 50 MG tablet Take 50 mg by mouth 2 (two) times a day.     • sucralfate (CARAFATE) 1 GM/10ML suspension Take 10 mL by mouth Daily. 30 g 3       Allergies:  Codeine; Other; and Penicillins  I reviewed the patient's new clinical results.  Review of Systems  Review of Systems   Constitutional: Positive for activity change, appetite change, chills, fatigue and unexpected weight change. Negative for diaphoresis and fever.   HENT: Positive for congestion. Negative for ear pain, hearing loss, mouth sores, nosebleeds, sore throat and trouble swallowing.    Eyes: Negative for photophobia and visual disturbance.   Respiratory: Positive for cough, shortness of breath and wheezing.    Cardiovascular: Positive for leg swelling. Negative for chest pain and palpitations.   Gastrointestinal: Positive for abdominal distention, abdominal pain and nausea. Negative for blood in stool, constipation, diarrhea, rectal pain and vomiting.   Genitourinary: Positive for scrotal swelling. Negative for decreased urine volume, difficulty urinating, dysuria and hematuria.   Musculoskeletal: Positive for arthralgias, back pain, gait problem, joint swelling, myalgias, neck pain and neck stiffness.   Skin: Positive for rash.   Allergic/Immunologic: Positive for environmental allergies and immunocompromised state.   Neurological: Positive for weakness and headaches. Negative for seizures and speech difficulty.   Hematological: Bruises/bleeds easily.   Psychiatric/Behavioral: Positive for sleep disturbance. Negative for agitation, behavioral problems, confusion, decreased concentration, dysphoric mood, hallucinations, self-injury and suicidal ideas. The patient is not nervous/anxious and is not hyperactive.        Objective:     /84 (BP Location: Left arm, Patient  "Position: Lying)  Pulse 89  Temp 97 °F (36.1 °C) (Oral)   Resp 20  Ht 70\" (177.8 cm)  Wt 149 lb (67.6 kg)  SpO2 92%  BMI 21.38 kg/m2  Physical Exam   Constitutional: He is oriented to person, place, and time. He appears well-developed. No distress.   Appears older than stated age   HENT:   Head: Normocephalic and atraumatic.   Right Ear: External ear normal.   Left Ear: External ear normal.   Nose: Nose normal.   Mouth/Throat: Oropharynx is clear and moist. No oropharyngeal exudate.   Eyes: Conjunctivae and EOM are normal. Pupils are equal, round, and reactive to light. Right eye exhibits no discharge. Left eye exhibits no discharge. No scleral icterus.   Neck: No JVD present. No tracheal deviation present.   Cardiovascular: Normal rate, regular rhythm and intact distal pulses.  Exam reveals no gallop and no friction rub.    Murmur heard.  Pulmonary/Chest: Effort normal. No respiratory distress. He has wheezes. He has no rales. He exhibits no tenderness.   Scattered rhonchi, diminished breath sounds on the right side   Abdominal: Bowel sounds are normal. He exhibits distension. There is tenderness. There is no rebound and no guarding. A hernia is present.   Diffusely tender no guarding or rebound, large right side inguinal hernia, small umbilical hernia   Genitourinary:   Genitourinary Comments: Swelling in the scrotum   Musculoskeletal: He exhibits tenderness. He exhibits no edema.   Lymphadenopathy:     He has no cervical adenopathy.   Neurological: He is alert and oriented to person, place, and time. He displays normal reflexes. No cranial nerve deficit. He exhibits normal muscle tone.   Plantar reflex down going bilaterally, no dysmetria noted   Skin: Skin is dry. Rash noted. He is not diaphoretic.   Excoriations and erythema in the groin and on the abdomen   Psychiatric: He has a normal mood and affect. His behavior is normal. Judgment and thought content normal.   Nursing note and vitals reviewed.    I " reviewed the patient's new clinical results.  Assessment/Plan:     Active Hospital Problems (** Indicates Principal Problem)    Diagnosis Date Noted   • **Ascites due to alcoholic cirrhosis [K70.31] 07/07/2017     -patient had a CT scan of the abdomen and pelvis performed which showed a large fluid collection in his abdomen as well as right sided pleural effusion, patient underwent a ultrasound guided paracentesis which they took out 3.9 liters of fluid  -currently on levaquin and flagyl IV, blood culture and urine culture no growth     • Lower abdominal pain [R10.30] 07/07/2017     -secondary to ascites from hep. C as well as alcoholic cirrhosis, patient is afebrile currently and has been for the last few weeks, no need for antibiotics at this time     • Tobacco dependence [F17.200] 07/07/2017     -admits to smoking a PPD, will give 21mg nicotine patch     • Simple chronic bronchitis [J41.0] 07/07/2017     -will provide with supplemental oxygen as needed as well as breathing treatments PRN     • Bacterial pneumonia [J15.9] 07/07/2017   • Chronic hepatitis C without hepatic coma [B18.2] 02/16/2017     -has been followed by Dr. Montalvo, will continue his home medication of zovirax     • Multiple myeloma, stage 3 [C90.00] 01/20/2017     -has a right sided port in, has been followed by Dr. Mendoza of heme-oncology, will monitor      • Unilateral inguinal hernia without obstruction or gangrene [K40.90] 09/10/2016     -large right sided inguinal hernia palpated and seen on abdominal CT for now, surgery was consulted from the ER  -surgery was consulted and stated that it was reducible and that he was not an operative candidate      • End stage liver disease [K72.90] 08/31/2016     -secondary to chronic hepatitis C, is being followed by Dr. Montalvo as an outpatient, will continue to monitor      • Depression [F32.9] 08/31/2016     -continue home medication of zoloft        Resolved Hospital Problems    Diagnosis Date Noted  Date Resolved   No resolved problems to display.     Patient appears to have pneumonia as well as possible spontaneous bacterial peritonitis.  Treat with IV antibiotics.    I have seen and examined the patient.  I have reviewed the notes, assessments, and/or procedures performed by Dr. Hahn, I concur with his documentation and assessment and plan for Mickey Lind.        This document has been electronically signed by Carolee Leo MD on 2017 1:41 PM          This document has been electronically signed by Carolee Leo MD on 2017 1:41 PM          Electronically signed by Carolee Leo MD at 2017  1:45 PM           Physician Progress Notes (last 24 hours) (Notes from 2017  4:01 PM through 2017  4:01 PM)      Ara Bach MD at 2017  8:16 AM  Version 1 of 1    Attestation signed by Wellington Whelan MD at 2017  1:26 PM        I have reviewed the notes, assessments, and/or procedures performed by the resident  , I concur with her/his documentation of Mickey Lind.        This document has been electronically signed by Wellington Whelan MD on 2017 1:26 PM                                       FAMILY MEDICINE DAILY PROGRESS NOTE  NAME: Mickey Lind  : 1955  MRN: [id number]      LOS: 6 days     PROVIDER OF SERVICE: Ara Bach MD    Chief Complaint: Ascites due to alcoholic cirrhosis    Subjective:     Interval History:  History taken from: patient and chart.    Overnight, patient had no complaints but was on continuous oxygen to maintain his saturation.     He is wanting to go home but is willing to await for the insurance to get approved for his oxygen and home health.    Review of Systems   Constitutional: Positive for fatigue. Negative for activity change, appetite change and fever.   HENT: Negative for congestion, ear pain and sore throat.    Eyes: Negative for pain and visual disturbance.   Respiratory: Negative for cough, shortness of breath  and wheezing.    Cardiovascular: Negative for chest pain and palpitations.   Gastrointestinal: Negative for abdominal pain and nausea.   Endocrine: Negative for cold intolerance and heat intolerance.   Genitourinary: Negative for difficulty urinating and dysuria.   Musculoskeletal: Negative for arthralgias and gait problem.   Skin: Positive for wound (decubitus ulcer). Negative for color change and rash.   Neurological: Negative for dizziness, weakness and headaches.   Hematological: Negative for adenopathy. Does not bruise/bleed easily.   Psychiatric/Behavioral: Negative for agitation, confusion and sleep disturbance.       Objective:     Vital Signs  Temp:  [96.6 °F (35.9 °C)-97.9 °F (36.6 °C)] 96.9 °F (36.1 °C)  Heart Rate:  [75-88] 80  Resp:  [18-20] 18  BP: (134-157)/(70-79) 142/71  Body mass index is 21.38 kg/(m^2).    Physical Exam  Physical Exam   Constitutional: He is oriented to person, place, and time. He appears well-developed and well-nourished.   HENT:   Head: Normocephalic and atraumatic.   Eyes:   Wears glasses   Neck: Normal range of motion. Neck supple.   Cardiovascular: Normal rate, regular rhythm and normal heart sounds.    Pulmonary/Chest: Effort normal. He has no wheezes.   Abdominal: Soft. Bowel sounds are normal. He exhibits no distension. There is no tenderness. There is no rebound. A hernia (reducible and no pain elicited) is present. Hernia confirmed positive in the right inguinal area.   Musculoskeletal: Normal range of motion.   Port on right side under clavicle.    Neurological: He is alert and oriented to person, place, and time.   Skin: Skin is warm.   Psychiatric: He has a normal mood and affect. His behavior is normal. Judgment and thought content normal.   Nursing note and vitals reviewed.    Medication Review    Current Facility-Administered Medications:   •  acetaminophen (TYLENOL) tablet 650 mg, 650 mg, Oral, Once, Gauarng Gallegos MD  •  acyclovir (ZOVIRAX) tablet 400 mg, 400 mg,  Oral, Daily, Carlos Hahn MD, 400 mg at 07/12/17 0822  •  atorvastatin (LIPITOR) tablet 10 mg, 10 mg, Oral, Daily, Carlos Hahn MD, 10 mg at 07/12/17 0822  •  dexamethasone (DECADRON) tablet 4 mg, 4 mg, Oral, Daily With Breakfast, Carlos Hahn MD, 4 mg at 07/12/17 0822  •  famotidine (PEPCID) tablet 40 mg, 40 mg, Oral, Daily, Carlos Hahn MD, 40 mg at 07/12/17 0822  •  furosemide (LASIX) tablet 40 mg, 40 mg, Oral, Daily, Carlos Hahn MD, 40 mg at 07/12/17 0822  •  gabapentin (NEURONTIN) capsule 400 mg, 400 mg, Oral, TID, Carlos Hahn MD, 400 mg at 07/12/17 2209  •  hydrALAZINE (APRESOLINE) injection 10 mg, 10 mg, Intravenous, Q6H PRN, Carlos Hahn MD  •  levoFLOXacin (LEVAQUIN) tablet 750 mg, 750 mg, Oral, Q24H, Ara Bach MD, 750 mg at 07/12/17 1141  •  LORazepam (ATIVAN) tablet 2 mg, 2 mg, Oral, Q6H PRN, Ara Bach MD  •  magic butt ointment, , Topical, TID, Carlos Hahn MD  •  naproxen (NAPROSYN) tablet 500 mg, 500 mg, Oral, BID With Meals, Ara Bach MD, 500 mg at 07/12/17 1715  •  nicotine (NICODERM CQ) 21 MG/24HR patch 1 patch, 1 patch, Transdermal, Q24H, Carlos Hahn MD, 1 patch at 07/12/17 1820  •  ondansetron (ZOFRAN) injection 4 mg, 4 mg, Intravenous, Q6H PRN, Carlos Hahn MD, 4 mg at 07/11/17 1309  •  polyethylene glycol (MIRALAX) powder 17 g, 17 g, Oral, Daily, Carlos Hahn MD, 17 g at 07/07/17 2146  •  prochlorperazine (COMPAZINE) tablet 10 mg, 10 mg, Oral, Q6H PRN, Carlos Hahn MD  •  sertraline (ZOLOFT) tablet 200 mg, 200 mg, Oral, Daily, Carlos Hahn MD, 200 mg at 07/12/17 0822  •  sodium chloride 0.9 % flush 1-10 mL, 1-10 mL, Intravenous, PRN, Carlos Hahn MD, 10 mL at 07/11/17 1311  •  Insert peripheral IV, , , Once **AND** sodium chloride 0.9 % flush 10 mL, 10 mL, Intravenous, PRN, Wilver Dougherty MD  •  spironolactone (ALDACTONE) tablet 50 mg, 50 mg, Oral, BID, Carlos Hahn MD, 50 mg at  07/12/17 1715  •  sucralfate (CARAFATE) 1 GM/10ML suspension 1 g, 1 g, Oral, Daily, Carlos Hahn MD, 1 g at 07/12/17 0823     Diagnostic Data    Lab Results (last 24 hours)     Procedure Component Value Units Date/Time    Blood Culture [[id number]]  (Normal) Collected:  07/08/17 0740    Specimen:  Blood from Wrist, Left Updated:  07/12/17 0901     Blood Culture No growth at 4 days    Blood Culture [[id number]]  (Normal) Collected:  07/07/17 1843    Specimen:  Blood from Hand, Left Updated:  07/12/17 2001     Blood Culture No growth at 5 days    Body Fluid Culture [[id number]]  (Normal) Collected:  07/07/17 1536    Specimen:  Body Fluid from Abdominal Cavity Updated:  07/13/17 0641     BF Culture No growth at 5 days     Gram Stain Result Few (2+) WBCs seen      No organisms seen    Body Fluid Culture [[id number]]  (Normal) Collected:  07/10/17 1302    Specimen:  Body Fluid from Pleural Cavity Updated:  07/13/17 0642     BF Culture No growth at 3 days     Gram Stain Result Many (4+) WBCs seen      No No organisms seen    CBC Auto Differential [[id number]]  (Abnormal) Collected:  07/13/17 0640    Specimen:  Blood Updated:  07/13/17 0702     WBC 11.64 (H) 10*3/mm3      RBC 3.53 (L) 10*6/mm3      Hemoglobin 10.6 (L) g/dL      Hematocrit 31.2 (L) %      MCV 88.4 fL      MCH 30.0 pg      MCHC 34.0 g/dL      RDW 16.9 (H) %      RDW-SD 55.1 (H) fl      MPV -- fL       INSTRUMENT UNABLE TO CALCULATE RESULTS        Platelets 157 10*3/mm3      Neutrophil % 76.3 %      Lymphocyte % 12.1 %      Monocyte % 10.2 %      Eosinophil % 1.0 %      Basophil % 0.1 %      Immature Grans % 0.3 %      Neutrophils, Absolute 8.87 (H) 10*3/mm3      Lymphocytes, Absolute 1.41 10*3/mm3      Monocytes, Absolute 1.19 (H) 10*3/mm3      Eosinophils, Absolute 0.12 10*3/mm3      Basophils, Absolute 0.01 10*3/mm3      Immature Grans, Absolute 0.04 (H) 10*3/mm3      nRBC 0.0 /100 WBC     CBC & Differential [[id number]] Collected:  07/13/17 0640     Specimen:  Blood Updated:  07/13/17 0702    Narrative:       The following orders were created for panel order CBC & Differential.  Procedure                               Abnormality         Status                     ---------                               -----------         ------                     Scan Slide[[id number]]                                                                  CBC Auto Differential[[id number]]        Abnormal            Final result                 Please view results for these tests on the individual orders.    Comprehensive Metabolic Panel [[id number]]  (Abnormal) Collected:  07/13/17 0646    Specimen:  Blood Updated:  07/13/17 0703     Glucose 140 (H) mg/dL      BUN 22 (H) mg/dL      Creatinine 0.58 (L) mg/dL      Sodium 132 (L) mmol/L      Potassium 3.9 mmol/L      Chloride 99 mmol/L      CO2 27.0 mmol/L      Calcium 8.3 (L) mg/dL      Total Protein 5.4 (L) g/dL      Albumin 2.60 (L) g/dL      ALT (SGPT) 29 U/L      AST (SGOT) 37 U/L      Alkaline Phosphatase 97 U/L      Total Bilirubin 0.3 mg/dL      eGFR Non African Amer 142 (H) mL/min/1.73      Globulin 2.8 gm/dL      A/G Ratio 0.9 (L) g/dL      BUN/Creatinine Ratio 37.9 (H)     Anion Gap 6.0 mmol/L             I reviewed the patient's new clinical results.    Assessment/Plan:     Active Hospital Problems (** Indicates Principal Problem)    Diagnosis Date Noted   • **Ascites due to alcoholic cirrhosis [K70.31] 07/07/2017     -patient had a CT scan of the abdomen and pelvis performed which showed a large fluid collection in his abdomen as well as right sided pleural effusion, patient underwent a ultrasound guided paracentesis which they took out 3.9 liters of fluid  -currently on levaquin and flagyl, blood culture and urine culture no growth  -d/c flagyl     • CAP (community acquired pneumonia), bilateral [J18.9] 07/12/2017     On levaquin daily     • Does not have health insurance [Z59.8] 07/12/2017     Case management is working  on this.  I wrote for the need for home health and home oxygen, awaiting response from his insurance to see if he gets approved  Alternative plan is to have him follow-up with [facility] as they can provide the care he needs. This was discussed with the patient as an alternative and he is agreeable to this. However, he can't afford his oxygen as of right now. He gets his check every month on the 3rd.   I told him that [facility] would provide him the oxygen and will just bill him so he can get it through them as long as he can pay them by the 3rd.      • Acute respiratory failure with hypoxia [J96.01] 07/12/2017     Currently on 2 L continuous oxyegn     • Pleural effusion [J90] 07/10/2017     Continue lasix prn       • Ulcer of sacral region, stage 1 [L89.151] 07/10/2017     Wound care consulted     • Lower abdominal pain [R10.30] 07/07/2017     -secondary to ascites from hep. C as well as alcoholic cirrhosis, patient is afebrile currently and has been for the last few weeks     • Tobacco dependence [F17.200] 07/07/2017     -admits to smoking a PPD, will give 21mg nicotine patch     • Simple chronic bronchitis [J41.0] 07/07/2017     -will provide with supplemental oxygen as needed as well as breathing treatments PRN  -patient had oxygen all night. Will wean him off tonight to see if he tolerates breathing without oxygen  - CXR to revisit the pleural effusion     • Chronic hepatitis C without hepatic coma [B18.2] 02/16/2017     -has been followed by Dr. Montalvo, will continue his home medication of zovirax     • Multiple myeloma, stage 3 [C90.00] 01/20/2017     -has a right sided port in, has been followed by Dr. Mendoza of heme-oncology. However, treatment on hold as patient does not have any insurance, will monitor      • Unilateral inguinal hernia without obstruction or gangrene [K40.90] 09/10/2016     -large right sided inguinal hernia palpated and seen on abdominal CT for now, surgery mentioned him as not an  operative candidate, will manage medically     • End stage liver disease [K72.90] 08/31/2016     -secondary to chronic hepatitis C, is being followed by Dr. Montalvo as an outpatient, will continue to monitor      • Depression [F32.9] 08/31/2016     -continue home medication of zoloft        Resolved Hospital Problems    Diagnosis Date Noted Date Resolved   • Bacterial pneumonia [J15.9] 07/07/2017 07/11/2017       DVT prophylaxis: SCDs TEDs  Code status is Full Code          This document has been electronically signed by Ara Bach MD on July 13, 2017 8:20 AM    Ara Bach MD, RODOLFO  [facility]  [address] [place] [address]  [phone]           Electronically signed by Wellington Whelan MD at 7/13/2017  1:26 PM      Wellington Whelan MD at 7/13/2017  1:13 PM  Version 1 of 1          [facility]  [address], [place]. [address]  T - [phone]         PROGRESS NOTE         SUBJECTIVE:   Patient Care Team:  Ara Bach MD as PCP - General  Rafa eMndoza MD as Consulting Physician (Hematology and Oncology)  Serena Modi LCSW as  (Oncology)    Chief Complaint:   Chief Compliant: weakness    Subjective     Patient is 61 y.o. male presents with weakness.She is feeling better..      ROS/HISTORY/ CURRENT MEDICATIONS/OBJECTIVE/VS/PE:   Review of Systems:   Review of Systems    History:     Past Medical History:   Diagnosis Date   • Abdominal pain    • Alcoholic liver damage    • Alcoholic polyneuropathy    • Amblyopia     refractive os   • Anemia    • Ascites    • Ascites    • Asthma    • Astigmatism    • Cellulitis of left lower leg    • Chronic hepatitis C    • Chronic viral hepatitis C    • Cirrhosis of liver    • Claudication    • Encounter for immunization    • Generalized edema    • History of blood transfusion    • History of colon polyps    • Hypermetropia    • Inguinal hernia     - Rih      • Low back pain    • Major depressive  disorder     recurrent , moderate   • Myocardial infarction    • Pain in joint, lower leg    • Peripheral vascular disease    • Recurrent major depressive episodes    • Tinea unguium    • Tobacco use      Past Surgical History:   Procedure Laterality Date   • CARDIAC CATHETERIZATION      (Successful PCI to a totally occluded RCA with a 2.5 x 28 and a 2.5 x 8 mm Xience stent. Nonobstructive disease in the left coronary artery system.)   11/24/2012    • CARDIAC SURGERY     • COLONOSCOPY  10/13/2016   • COLONOSCOPY N/A 3/29/2017    Procedure: COLONOSCOPY;  Surgeon: Jose Daniel David MD;  Location: [facility] ENDOSCOPY;  Service:    • ENDOSCOPY  10/13/2016   • ENDOSCOPY N/A 3/29/2017    Procedure: ESOPHAGOGASTRODUODENOSCOPY (U/S FIRST @0945) ;  Surgeon: Jose Daniel David MD;  Location: [facility] ENDOSCOPY;  Service:    • UPPER GASTROINTESTINAL ENDOSCOPY  10/13/2016     Family History   Problem Relation Age of Onset   • Cancer Mother    • Heart disease Mother    • Cancer Father    • Other Father      ischemic heart disease   • Heart disease Father    • Other Son      depressive disorder   • Diabetes Maternal Grandmother    • Cancer Paternal Grandfather    • No Known Problems Daughter    • No Known Problems Son      Social History   Substance Use Topics   • Smoking status: Current Every Day Smoker     Packs/day: 0.50   • Smokeless tobacco: Never Used      Comment: Smokes approx 1 ppd of Cigarettes.smoking since last 45 yr   • Alcohol use No      Comment: no alcohol since 7/4/2017     Prescriptions Prior to Admission   Medication Sig Dispense Refill Last Dose   • acyclovir (ZOVIRAX) 400 MG tablet Take 400 mg by mouth Daily.   Taking   • atorvastatin (LIPITOR) 10 MG tablet Take 1 tablet by mouth Daily. 30 tablet 3 Taking   • dexamethasone (DECADRON) 4 MG tablet Take 4 mg by mouth.   Taking   • furosemide (LASIX) 40 MG tablet Take 40 mg by mouth daily.   Taking   • gabapentin (NEURONTIN) 400 MG capsule Take 1 capsule by mouth 3 (Three)  Times a Day. 90 capsule 2 Taking   • GNP ACID REDUCER 75 MG tablet    Taking   • polyethylene glycol (MIRALAX) powder Take 17 g by mouth Daily. 527 g 1 Taking   • prochlorperazine (COMPAZINE) 10 MG tablet Take 1 tablet by mouth Every 6 (Six) Hours As Needed for nausea or vomiting. 60 tablet 1 Taking   • sertraline (ZOLOFT) 100 MG tablet Take 2 tablets by mouth Daily. 60 tablet 2 Taking   • spironolactone (ALDACTONE) 50 MG tablet Take 50 mg by mouth 2 (two) times a day.   Taking   • sucralfate (CARAFATE) 1 GM/10ML suspension Take 10 mL by mouth Daily. 30 g 3 Taking     Allergies:  Codeine; Other; and Penicillins    Current Medications:     Current Facility-Administered Medications   Medication Dose Route Frequency Provider Last Rate Last Dose   • acetaminophen (TYLENOL) tablet 650 mg  650 mg Oral Once Gaurang Gallegos MD       • acyclovir (ZOVIRAX) tablet 400 mg  400 mg Oral Daily Ali Pete Hahn MD   400 mg at 07/13/17 0945   • atorvastatin (LIPITOR) tablet 10 mg  10 mg Oral Daily Ali Pete Hahn MD   10 mg at 07/13/17 0945   • dexamethasone (DECADRON) tablet 4 mg  4 mg Oral Daily With Breakfast Ali Pete Hahn MD   4 mg at 07/13/17 0945   • famotidine (PEPCID) tablet 40 mg  40 mg Oral Daily Ali Pete Hahn MD   40 mg at 07/13/17 0945   • furosemide (LASIX) tablet 40 mg  40 mg Oral Daily Ali Pete Hahn MD   40 mg at 07/13/17 0946   • gabapentin (NEURONTIN) capsule 400 mg  400 mg Oral TID Ali Pete Hahn MD   400 mg at 07/13/17 0946   • hydrALAZINE (APRESOLINE) injection 10 mg  10 mg Intravenous Q6H PRN Carlos Hahn MD       • levoFLOXacin (LEVAQUIN) tablet 750 mg  750 mg Oral Q24H Ara Bach MD   750 mg at 07/13/17 1227   • LORazepam (ATIVAN) tablet 2 mg  2 mg Oral Q6H PRN Ara Bach MD       • magic butt ointment   Topical TID Ali Pete Hahn MD       • naproxen (NAPROSYN) tablet 500 mg  500 mg Oral BID With Meals Ara Bach MD   500 mg at 07/13/17 0945   • nicotine (NICODERM  CQ) 21 MG/24HR patch 1 patch  1 patch Transdermal Q24H Ali Pete Hahn MD   1 patch at 07/12/17 1820   • ondansetron (ZOFRAN) injection 4 mg  4 mg Intravenous Q6H PRN Ali Pete Hahn MD   4 mg at 07/11/17 1309   • polyethylene glycol (MIRALAX) powder 17 g  17 g Oral Daily Ara Bach MD   17 g at 07/13/17 0957   • prochlorperazine (COMPAZINE) tablet 10 mg  10 mg Oral Q6H PRN Ali Pete Hahn MD       • sertraline (ZOLOFT) tablet 200 mg  200 mg Oral Daily Ali Pete Hahn MD   200 mg at 07/13/17 0945   • sodium chloride 0.9 % flush 1-10 mL  1-10 mL Intravenous PRN Ali Pete Hahn MD   10 mL at 07/11/17 1311   • sodium chloride 0.9 % flush 10 mL  10 mL Intravenous PRN Wilver Dougherty MD       • spironolactone (ALDACTONE) tablet 50 mg  50 mg Oral BID Ali Pete Hahn MD   50 mg at 07/13/17 0946   • sucralfate (CARAFATE) 1 GM/10ML suspension 1 g  1 g Oral Daily Ali Pete Hahn MD   1 g at 07/13/17 0947       Objective     Physical Exam:   Temp:  [96.9 °F (36.1 °C)-98 °F (36.7 °C)] 98 °F (36.7 °C)  Heart Rate:  [75-92] 77  Resp:  [18-20] 20  BP: (134-158)/(70-79) 158/74    Physical Exam   Constitutional: He appears well-developed and well-nourished.   HENT:   Head: Normocephalic and atraumatic.   Cardiovascular: Normal rate, regular rhythm and normal heart sounds.  Exam reveals no gallop and no friction rub.    No murmur heard.  Pulmonary/Chest: Effort normal and breath sounds normal. No respiratory distress. He has no wheezes. He has no rales.   Abdominal: Soft. Bowel sounds are normal. He exhibits no distension. There is no tenderness.   Skin: Skin is warm and dry.   Vitals reviewed.           Results Review:   Lab Results   Component Value Date    GLUCOSE 140 (H) 07/13/2017    BUN 22 (H) 07/13/2017    CREATININE 0.58 (L) 07/13/2017    EGFRIFNONA 142 (H) 07/13/2017    BCR 37.9 (H) 07/13/2017    CO2 27.0 07/13/2017    CALCIUM 8.3 (L) 07/13/2017    PROTENTOTREF 5.4 (L) 03/09/2017    ALBUMIN 2.60  (L) 07/13/2017    LABIL2 0.9 (L) 07/13/2017    AST 37 07/13/2017    ALT 29 07/13/2017         WBC WBC   Date Value Ref Range Status   07/13/2017 11.64 (H) 3.20 - 9.80 10*3/mm3 Final   07/12/2017 13.11 (H) 3.20 - 9.80 10*3/mm3 Final   07/11/2017 12.08 (H) 3.20 - 9.80 10*3/mm3 Final      HGB Hemoglobin   Date Value Ref Range Status   07/13/2017 10.6 (L) 13.7 - 17.3 g/dL Final   07/12/2017 10.8 (L) 13.7 - 17.3 g/dL Final   07/11/2017 10.4 (L) 13.7 - 17.3 g/dL Final      HCT Hematocrit   Date Value Ref Range Status   07/13/2017 31.2 (L) 39.0 - 49.0 % Final   07/12/2017 32.1 (L) 39.0 - 49.0 % Final   07/11/2017 31.5 (L) 39.0 - 49.0 % Final      Platlets Platelets   Date Value Ref Range Status   07/13/2017 157 150 - 450 10*3/mm3 Final   07/12/2017 129 (L) 150 - 450 10*3/mm3 Final   07/11/2017 115 (L) 150 - 450 10*3/mm3 Final          Imaging Results (last 24 hours)     ** No results found for the last 24 hours. **           I reviewed the patient's new clinical results.  I reviewed the patient's new imaging results and agree with the interpretation.     ASSESSMENT/PLAN:   Assessment/Plan   Principal Problem:    Ascites due to alcoholic cirrhosis  Active Problems:    End stage liver disease    Depression    Unilateral inguinal hernia without obstruction or gangrene    Multiple myeloma, stage 3    Chronic hepatitis C without hepatic coma    Lower abdominal pain    Tobacco dependence    Simple chronic bronchitis    Pleural effusion    Ulcer of sacral region, stage 1    CAP (community acquired pneumonia), bilateral    Does not have health insurance    Acute respiratory failure with hypoxia      Will continue with current treatment.  I have reviewed the notes, assessments, and/or procedures performed by the resident  , I concur with her/his documentation of Mickey Lind.    I discussed the patients findings and my recommendations with patient.      Wellington Whelan MD  07/13/17  1:14 PM          Electronically signed by  Wellington Whelan MD at 7/13/2017  1:15 PM

## 2017-07-14 NOTE — THERAPY DISCHARGE NOTE
Acute Care - Physical Therapy Discharge Summary  [facility]       Patient Name: Mickey Lind  : 1955  MRN: [id number]    Today's Date: 2017  Onset of Illness/Injury or Date of Surgery Date: 17    Date of Referral to PT: 17  Referring Physician: Dr. Carolee Leo      Admit Date: 2017      PT Recommendation and Plan    Visit Dx:    ICD-10-CM ICD-9-CM   1. Lower abdominal pain R10.30 789.09   2. Other ascites R18.8 789.59   3. Recurrent right pleural effusion J90 511.9   4. Impaired physical mobility Z74.09 781.99   5. Impaired mobility and ADLs Z74.09 799.89   6. Acute respiratory failure with hypoxia J96.01 518.81   7. Severe muscle deconditioning R29.898 781.99             Outcome Measures       17 1024 17 1400 17 0950    How much help from another person do you currently need...    Turning from your back to your side while in flat bed without using bedrails? 4  -ASTER  4  -ASTER    Moving from lying on back to sitting on the side of a flat bed without bedrails? 4  -ASTER  4  -ASTER    Moving to and from a bed to a chair (including a wheelchair)? 3  -ASTER  3  -ASTER    Standing up from a chair using your arms (e.g., wheelchair, bedside chair)? 3  -ASTER  3  -ASTER    Climbing 3-5 steps with a railing? 3  -ASTER  3  -ASTER    To walk in hospital room? 3  -ASTER  3  -ASTER    AM-PAC 6 Clicks Score 20  -ASTER  20  -ASTER    How much help from another is currently needed...    Putting on and taking off regular lower body clothing?  2  -CS     Bathing (including washing, rinsing, and drying)  2  -CS     Toileting (which includes using toilet bed pan or urinal)  3  -CS     Putting on and taking off regular upper body clothing  3  -CS     Taking care of personal grooming (such as brushing teeth)  3  -CS     Eating meals  4  -CS     Score  17  -CS     Functional Assessment    Outcome Measure Options AM-PAC 6 Clicks Basic Mobility (PT)  -ASTER AM-PAC 6 Clicks Daily Activity (OT)  -CS AM-PAC 6 Clicks Basic  Mobility (PT)  -ASTER      07/11/17 1333          How much help from another person do you currently need...    Turning from your back to your side while in flat bed without using bedrails? 4  -ASTER      Moving from lying on back to sitting on the side of a flat bed without bedrails? 4  -ASTER      Moving to and from a bed to a chair (including a wheelchair)? 3  -ASTER      Standing up from a chair using your arms (e.g., wheelchair, bedside chair)? 3  -ASTER      Climbing 3-5 steps with a railing? 2  -ASTER      To walk in hospital room? 3  -ASTER      AM-PAC 6 Clicks Score 19  -ASTER      How much help from another is currently needed...    Putting on and taking off regular lower body clothing? 2  -KD      Bathing (including washing, rinsing, and drying) 2  -KD      Toileting (which includes using toilet bed pan or urinal) 3  -KD      Putting on and taking off regular upper body clothing 3  -KD      Taking care of personal grooming (such as brushing teeth) 3  -KD      Eating meals 4  -KD      Score 17  -KD      Functional Assessment    Outcome Measure Options AM-PAC 6 Clicks Basic Mobility (PT)  -ASTER        User Key  (r) = Recorded By, (t) = Taken By, (c) = Cosigned By    Initials Name Provider Type    ASTER Vargas PTA Physical Therapy Assistant    KD UZAIR Esquivel/L Occupational Therapy Assistant    UZAIR Sparrow/RAFAELA Occupational Therapy Assistant                      IP PT Goals       07/14/17 1149 07/13/17 1024 07/12/17 0950    Transfer Training PT LTG    Transfer Training PT  LTG, Date Goal Reviewed 07/14/17  -MN 07/13/17  -ASTER 07/12/17  -ASTER    Transfer Training PT LTG, Outcome goal not met  -MN goal ongoing  -ASTER goal ongoing  -ASTER    Transfer Training PT LTG, Reason Goal Not Met discharged from facility  -[place]      Gait Training PT LTG    Gait Training Goal PT LTG, Date Goal Reviewed 07/14/17  -[place] 07/13/17  -ASTER 07/12/17  -ASTER    Gait Training Goal PT LTG, Outcome goal not met  -MN goal ongoing  -ASTER goal ongoing   -ASTER    Gait Training Goal PT LTG, Reason Goal Not Met discharged from facility  -MN      Stair Training PT LTG    Stair Training Goal PT LTG, Date Goal Reviewed 07/14/17  -MN 07/13/17  -ASTER 07/12/17  -ASTER    Stair Training Goal PT LTG, Outcome goal not met  -MN goal ongoing  -ASTER goal ongoing  -ASTER    Stair Training Goal PT LTG, Reason Goal Not Met discharged from facility  -MN      Strength Goal PT LTG    Strength Goal PT LTG, Date Goal Reviewed 07/14/17  -[place] 07/13/17  -ASTER 07/12/17  -ASTER    Strength Goal PT LTG, Outcome goal met  -MN goal met  -ASTER goal ongoing  -ASTER    Strength Goal PT LTG, Reason Goal Not Met discharged from facility  -MN      Patient Education PT LTG    Patient Education PT LTG, Date Goal Reviewed 07/14/17  -MN  07/12/17  -ASTER    Patient Education PT LTG Outcome goal not met  -MN  goal ongoing  -ASTER    Patient Education PT LTG, Reason Goal Not Met discharged from facility  -[place]      Caregiver Training PT LTG    Caregiver Training PT LTG, Date Goal Reviewed 07/14/17  -[place] 07/13/17  -ASTER 07/12/17  -ASTER    Caregiver Training PT LTG, Outcome goal not met  -MN goal ongoing  -ASTER goal ongoing  -ASTER    Caregiver Training PT LTG, Reason Goal Not Met discharged from facility  -MN        07/11/17 1333 07/10/17 1400 07/09/17 1341    Transfer Training PT LTG    Transfer Training PT LTG, Date Established   07/09/17  -JCA    Transfer Training PT LTG, Time to Achieve   by discharge  -JCA    Transfer Training PT LTG, Activity Type   bed to chair /chair to bed;sit to stand/stand to sit;toilet  -JCA    Transfer Training PT LTG, [place] Level   supervision required;conditional independence  -JCA    Transfer Training PT LTG, Assist Device   --   AAD  -JCA    Transfer Training PT  LTG, Date Goal Reviewed 07/11/17  -ASTER 07/10/17  -AM     Transfer Training PT LTG, Outcome goal ongoing  -ASTER goal not met  -AM goal ongoing  -JCA    Gait Training PT LTG    Gait Training Goal PT LTG, Date Established   07/09/17  -JCA    Gait Training  Goal PT LTG, Time to Achieve   by discharge  -[facility]    Gait Training Goal PT LTG, [place] Level   supervision required;conditional independence  -[facility]    Gait Training Goal PT LTG, Distance to Achieve   50 ft;O2 sats will not drop below 90%  -[facility]    Gait Training Goal PT LTG, Additional Goal   150ft;O2 sats will not drop below 92%  -[facility]    Gait Training Goal PT LTG, Date Goal Reviewed 07/11/17  -ASTER 07/10/17  -AM     Gait Training Goal PT LTG, Outcome goal ongoing  - goal not met  -AM goal ongoing  -[facility]    Stair Training PT LTG    Stair Training Goal PT LTG, Date Established   07/09/17  -[facility]    Stair Training Goal PT LTG, Time to Achieve   by discharge  -[facility]    Stair Training Goal PT LTG, Number of Steps   5  -[facility]    Stair Training Goal PT LTG, [place] Level   supervision required;contact guard assist  -[facility]    Stair Training Goal PT LTG, Assist Device   cane, straight  -[facility]    Stair Training Goal PT LTG, Date Goal Reviewed 07/11/17  -ASTER 07/10/17  -AM     Stair Training Goal PT LTG, Outcome goal ongoing  - goal not met  -AM goal ongoing  -[facility]    Strength Goal PT LTG    Strength Goal PT LTG, Date Established   07/09/17  -[facility]    Strength Goal PT LTG, Time to Achieve   by discharge  -[facility]    Strength Goal PT LTG, Measure to Achieve   Pt will perform 15 reps of AROM exercises  -[facility]    Strength Goal PT LTG, Functional Goal   Pt will progress to standing exercises  -[facility]    Strength Goal PT LTG, Date Goal Reviewed 07/11/17  -ASTER 07/10/17  -AM     Strength Goal PT LTG, Outcome goal ongoing  - goal partially met  -AM goal ongoing  -[facility]    Patient Education PT LTG    Patient Education PT LTG, Date Established   07/09/17  -[facility]    Patient Education PT LTG, Time to Achieve   by discharge  -[facility]    Patient Education PT LTG, Education Type   HEP;written program;posture/body mechanics;gait;transfers;home safety  -[facility]    Patient Education PT LTG, Date Goal Reviewed 07/11/17  -ASTER 07/10/17  -AM     Patient Education PT LTG  Outcome goal ongoing  -ASTER goal not met  -AM goal ongoing  -[facility]    Caregiver Training PT LTG    Caregiver Training PT LTG, Date Established   07/09/17  -[facility]    Caregiver Training PT LTG, Time to Achieve   by discharge  -[facility]    Caregiver Training PT LTG, Activity Type   Homecaregiver will demonstrate understanding assisting pt with transfers/ambulation with device as needed. Homecaregiver will acknowledge understanding of home care instructions  -[facility]    Caregiver Training PT LTG, Date Goal Reviewed  07/10/17  -AM     Caregiver Training PT LTG, Outcome  goal not met  -AM goal ongoing  -[facility]      User Key  (r) = Recorded By, (t) = Taken By, (c) = Cosigned By    Initials Name Provider Type    JENIFFER Whaley, PT Physical Therapist    LIBERTY Chapa, PT Physical Therapist    ASTER Vargas, PTA Physical Therapy Assistant    MARIAM Moses, PTA Physical Therapy Assistant              PT Discharge Summary  Anticipated Discharge Disposition: home with home health  Reason for Discharge: Discharge from facility, Per MD order  Outcomes Achieved: Patient able to partially acheive established goals  Discharge Destination: Home with home health      Aye Whaley, PT   7/14/2017

## 2017-07-14 NOTE — PLAN OF CARE
Problem: Inpatient Physical Therapy  Goal: Transfer Training Goal 1 LTG- PT  Outcome: Unable to achieve outcome(s) by discharge Date Met:  07/14/17 07/09/17 1341 07/14/17 1149   Transfer Training PT LTG   Transfer Training PT LTG, Date Established 07/09/17 --    Transfer Training PT LTG, Time to Achieve by discharge --    Transfer Training PT LTG, Activity Type bed to chair /chair to bed;sit to stand/stand to sit;toilet --    Transfer Training PT LTG, [place] Level supervision required;conditional independence --    Transfer Training PT LTG, Assist Device (AAD) --    Transfer Training PT LTG, Date Goal Reviewed --  07/14/17   Transfer Training PT LTG, Outcome --  goal not met   Transfer Training PT LTG, Reason Goal Not Met --  discharged from facility       Goal: Gait Training Goal LTG- PT  Outcome: Unable to achieve outcome(s) by discharge Date Met:  07/14/17 07/09/17 1341 07/14/17 1149   Gait Training PT LTG   Gait Training Goal PT LTG, Date Established 07/09/17 --    Gait Training Goal PT LTG, Time to Achieve by discharge --    Gait Training Goal PT LTG, [place] Level supervision required;conditional independence --    Gait Training Goal PT LTG, Distance to Achieve 50 ft;O2 sats will not drop below 90% --    Gait Training Goal PT LTG, Additional Goal 150ft;O2 sats will not drop below 92% --    Gait Training Goal PT LTG, Date Goal Reviewed --  07/14/17   Gait Training Goal PT LTG, Outcome --  goal not met   Gait Training Goal PT LTG, Reason Goal Not Met --  discharged from facility       Goal: Stair Training Goal LTG- PT  Outcome: Unable to achieve outcome(s) by discharge Date Met:  07/14/17 07/09/17 1341 07/14/17 1149   Stair Training PT LTG   Stair Training Goal PT LTG, Date Established 07/09/17 --    Stair Training Goal PT LTG, Time to Achieve by discharge --    Stair Training Goal PT LTG, Number of Steps 5 --    Stair Training Goal PT LTG, [place] Level supervision required;contact  guard assist --    Stair Training Goal PT LTG, Assist Device cane, straight --    Stair Training Goal PT LTG, Date Goal Reviewed --  07/14/17   Stair Training Goal PT LTG, Outcome --  goal not met   Stair Training Goal PT LTG, Reason Goal Not Met --  discharged from facility       Goal: Strength Goal LTG- PT  Outcome: Outcome(s) achieved Date Met:  07/14/17 07/09/17 1341 07/14/17 1149   Strength Goal PT LTG   Strength Goal PT LTG, Date Established 07/09/17 --    Strength Goal PT LTG, Time to Achieve by discharge --    Strength Goal PT LTG, Measure to Achieve Pt will perform 15 reps of AROM exercises --    Strength Goal PT LTG, Functional Goal Pt will progress to standing exercises --    Strength Goal PT LTG, Date Goal Reviewed --  07/14/17   Strength Goal PT LTG, Outcome --  goal met   Strength Goal PT LTG, Reason Goal Not Met --  discharged from facility       Goal: Patient Education Goal LTG- PT  Outcome: Unable to achieve outcome(s) by discharge Date Met:  07/14/17 07/09/17 1341 07/14/17 1149   Patient Education PT LTG   Patient Education PT LTG, Date Established 07/09/17 --    Patient Education PT LTG, Time to Achieve by discharge --    Patient Education PT LTG, Education Type HEP;written program;posture/body mechanics;gait;transfers;home safety --    Patient Education PT LTG, Date Goal Reviewed --  07/14/17   Patient Education PT LTG Outcome --  goal not met   Patient Education PT LTG, Reason Goal Not Met --  discharged from facility       Goal: Caregiver Training Goal LTG- PT  Outcome: Unable to achieve outcome(s) by discharge Date Met:  07/14/17 07/09/17 1341 07/14/17 1149   Caregiver Training PT LTG   Caregiver Training PT LTG, Date Established 07/09/17 --    Caregiver Training PT LTG, Time to Achieve by discharge --    Caregiver Training PT LTG, Activity Type Homecaregiver will demonstrate understanding assisting pt with transfers/ambulation with device as needed. Homecaregiver will acknowledge  understanding of home care instructions --    Caregiver Training PT LTG, Date Goal Reviewed --  07/14/17   Caregiver Training PT LTG, Outcome --  goal not met   Caregiver Training PT LTG, Reason Goal Not Met --  discharged from facility

## 2017-07-14 NOTE — PLAN OF CARE
Problem: Inpatient Occupational Therapy  Goal: Transfer Training Goal 1 STG- OT  Outcome: Unable to achieve outcome(s) by discharge Date Met:  07/14/17 07/09/17 1338 07/13/17 1134 07/14/17 0837   Transfer Training OT STG   Transfer Training OT STG, Date Established 07/09/17 --  --    Transfer Training OT STG, Time to Achieve by discharge --  --    Transfer Training OT STG, Activity Type all transfers --  --    Transfer Training OT STG, [place] Level supervision required --  --    Transfer Training OT STG, Date Goal Reviewed --  --  07/14/17   Transfer Training OT STG, Outcome --  goal ongoing --        Goal: ADL Goal LTG- OT  Outcome: Unable to achieve outcome(s) by discharge Date Met:  07/14/17 07/09/17 1338 07/13/17 1134 07/14/17 0837   ADL OT LTG   ADL OT LTG, Date Established 07/09/17 --  --    ADL OT LTG, Time to Achieve by discharge --  --    ADL OT LTG, Activity Type ADL skills --  --    ADL OT LTG, Additional Goal set up feeding/grooming; SBA toileting/bathing/dressing - AE/AD as needed --  --    ADL OT LTG, Date Goal Reviewed --  --  07/14/17   ADL OT LTG, Outcome --  goal ongoing --        Goal: Activity Tolerance Goal STG- OT  Outcome: Unable to achieve outcome(s) by discharge Date Met:  07/14/17 07/09/17 1338 07/13/17 1134 07/14/17 0837   Activity Tolerance OT STG   Activity Tolerance Goal OT STG, Date Established 07/09/17 --  --    Activity Tolerance Goal OT STG, Time to Achieve by discharge --  --    Activity Tolerance Goal OT STG, Activity Level 15 min activity;O2 sat >/equal to 88%;with 2 rest breaks --  --    Activity Tolerance Goal OT STG, Date Goal Reviewed --  --  07/14/17   Activity Tolerance Goal OT STG, Outcome --  goal ongoing --        Goal: Endurance Goal STG- OT  Outcome: Unable to achieve outcome(s) by discharge Date Met:  07/14/17 07/09/17 1338 07/13/17 1134 07/14/17 0837   Endurance OT STG   Endurance Goal OT STG, Date Established 07/09/17 --  --    Endurance Goal OT  STG, Time to Achieve by discharge --  --    Endurance Goal OT STG, Activity Level endurance 2 fair --  --    Endurance Goal OT STG, Date Goal Reviewed --  --  07/14/17   Endurance Goal OT STG, Outcome --  goal ongoing --

## 2017-07-18 PROBLEM — A09 INFECTIOUS COLITIS: Status: ACTIVE | Noted: 2017-01-01

## 2017-07-18 PROBLEM — K52.9 COLITIS: Status: ACTIVE | Noted: 2017-01-01

## 2017-07-18 PROBLEM — E87.1 HYPONATREMIA: Status: ACTIVE | Noted: 2017-01-01

## 2017-07-18 NOTE — PROGRESS NOTES
Discharge Planning Assessment  [facility]     Patient Name: Mickey Lind  MRN: [id number]  Today's Date: 7/18/2017    Admit Date: 7/17/2017          Discharge Needs Assessment       07/18/17 1500    Living Environment    Lives With child(edwardo), adult   Patient's son, Pratik, and daughter-in-law are currently staying with patient. Patient  stated that he has 24/7 care available.    Living Arrangements house   Contact information on face sheet confirmed with patient.    Transportation Available car;family or friend will provide   Patient does not drive. Patient states that his family assists with transportation.     Living Environment    Provides Primary Care For no one    Quality Of Family Relationships supportive   Patient states that he has 24/7 care if needed provided by his son, Pratik.     Able to Return to Prior Living Arrangements yes    Discharge Needs Assessment    Concerns To Be Addressed financial/insurance concerns;medication concerns    Readmission Within The Last 30 Days current reason for admission unrelated to previous admission   Patient recently d/c with dx of pneumonia. Patient readmitted with dx of colitis.     Equipment Currently Used at Home walker, rolling;cane, straight;oxygen    Equipment Needed After Discharge none    Discharge Facility/Level Of Care Needs home with home health   Patient was active with home health services prior to admission. Patient had PT/OT and skilled nursing ordered.     Current Discharge Risk chronically ill    Discharge Disposition home healthcare service   Plan is to return home with home health services.             Discharge Plan       07/18/17 1505    Case Management/Social Work Plan    Plan home with [facility]    Additional Comments Patient was recently d/c. He had agreed to pay for home oxygen provided by [employer]. Per patient, he is on oxygen @ 2liters at night and PRN during the day. He was also d/c home with [facility]. TANIYA spoke  "with [facility] representative, Kaur, re: services. Kaur reviewed clinicals. Patient only had his levaquin at home and had informed  nurse that he had not had home medications for 3 months.  nurse had notified patient's doctor. TANIYA will contact pharmacy re: home medications as patient stated that his pharmacy had called and they have several medications for him. Also, TANIYA spoke with Bela CM and patient is still not in the Medicaid System  showing eligibility. Patient's daughter and patient was notified. She had then called Medicaid office and was informed that patient had not signed a required document. Daughter stated that she is planning on going to the Medicaid office tomorrow to sign required form and then patient will be Medicaid \"pending\". .......[facility]        Discharge Placement     No information found                Demographic Summary       07/18/17 1447    Referral Information    Admission Type inpatient    Arrived From home or self-care    Record Reviewed clinical discipline documentation;history and physical;medical record    Primary Care Physician Information    Name Ara Gonzalez      07/18/17 1350    Referral Information    Admission Type --    Arrived From --    Record Reviewed --            Functional Status       07/18/17 1452    Functional Status Prior    Ambulation 1-->assistive equipment   Patient uses a rolling walker and/or cane to assist with ambulation    Transferring 0-->independent    Toileting 0-->independent    Bathing 0-->independent    Dressing 0-->independent    Eating 0-->independent    Communication 0-->understands/communicates without difficulty    IADL    Medications --   rx verified. Patient does not have prescription drug coverage available.    Meal Preparation assistive person    Housekeeping assistive person    Laundry assistive person    Shopping assistive person    IADL Comments Patient and his family share house keeping tasks.     " Activity Tolerance    Current Activity Limitations none    Usual Activity Tolerance moderate    Cognitive/Perceptual/Developmental    Current Mental Status/Cognitive Functioning no deficits noted    Recent Changes in Mental Status/Cognitive Functioning no changes            Psychosocial     None            Abuse/Neglect     None            Legal     None            Substance Abuse     None            Patient Forms     None          LONDON Andrea

## 2017-07-18 NOTE — ED NOTES
Patient has adhesive residue on right posterior/lateral thoracic region from a recent thoracentesis. Attempted to remove with several things such as warm soapy water, chlora-prerp, and lotion; very difficult to remove. Skin was irritated and bruised prior to attempt to remove adhesive.      Radha Mendoza RN  07/18/17 1218

## 2017-07-18 NOTE — ED PROVIDER NOTES
Subjective   Patient is a 61 y.o. male presenting with abdominal pain.   Abdominal Pain   Pain location:  Generalized  Pain quality: aching    Pain radiates to:  Does not radiate  Pain severity:  Moderate  Onset quality:  Gradual  Duration:  4 days  Timing:  Constant  Progression:  Worsening  Chronicity:  Recurrent  Context: alcohol use (previous hx, quit)    Context: not awakening from sleep, not diet changes, not eating, not laxative use, not medication withdrawal, not previous surgeries, not recent illness, not recent sexual activity, not retching, not sick contacts, not suspicious food intake and not trauma    Context comment:  H/o cirrhosis with ascites and bone cancer untreated  presents for swelling of the abdomen and abdominal pain for the past 4 days  Relieved by:  Nothing  Worsened by:  Nothing  Associated symptoms: shortness of breath    Associated symptoms: no anorexia, no belching, no chest pain, no chills, no constipation, no cough, no diarrhea, no dysuria, no fatigue, no fever, no hematemesis, no hematuria, no melena, no nausea, no sore throat and no vomiting    Risk factors: no alcohol abuse, no aspirin use, not elderly, has not had multiple surgeries, no NSAID use, not obese and no recent hospitalization        Review of Systems   Constitutional: Negative.  Negative for chills, fatigue and fever.   HENT: Negative.  Negative for sore throat.    Eyes: Negative.    Respiratory: Positive for shortness of breath. Negative for cough.    Cardiovascular: Negative.  Negative for chest pain.   Gastrointestinal: Positive for abdominal pain. Negative for anorexia, constipation, diarrhea, hematemesis, melena, nausea and vomiting.   Genitourinary: Negative for dysuria and hematuria.   Musculoskeletal: Negative.    Skin: Negative.    Neurological: Negative.        Past Medical History:   Diagnosis Date   • Abdominal pain    • Alcoholic liver damage    • Alcoholic polyneuropathy    • Amblyopia     refractive os   •  Anemia    • Ascites    • Ascites    • Asthma    • Astigmatism    • Cellulitis of left lower leg    • Chronic hepatitis C    • Chronic viral hepatitis C    • Cirrhosis of liver    • Claudication    • Encounter for immunization    • Generalized edema    • History of blood transfusion    • History of colon polyps    • Hypermetropia    • Inguinal hernia     - Rih      • Low back pain    • Major depressive disorder     recurrent , moderate   • Myocardial infarction    • Pain in joint, lower leg    • Peripheral vascular disease    • Recurrent major depressive episodes    • Tinea unguium    • Tobacco use        Allergies   Allergen Reactions   • Codeine Other (See Comments)     Childhood, hives   • Other      MRSA COLONIZATION   • Penicillins Swelling       Past Surgical History:   Procedure Laterality Date   • CARDIAC CATHETERIZATION      (Successful PCI to a totally occluded RCA with a 2.5 x 28 and a 2.5 x 8 mm Xience stent. Nonobstructive disease in the left coronary artery system.)   11/24/2012    • CARDIAC SURGERY     • COLONOSCOPY  10/13/2016   • COLONOSCOPY N/A 3/29/2017    Procedure: COLONOSCOPY;  Surgeon: Jose Daniel David MD;  Location: [facility] ENDOSCOPY;  Service:    • ENDOSCOPY  10/13/2016   • ENDOSCOPY N/A 3/29/2017    Procedure: ESOPHAGOGASTRODUODENOSCOPY (U/S FIRST @0945) ;  Surgeon: Jose Daniel David MD;  Location: [facility] ENDOSCOPY;  Service:    • UPPER GASTROINTESTINAL ENDOSCOPY  10/13/2016       Family History   Problem Relation Age of Onset   • Cancer Mother    • Heart disease Mother    • Cancer Father    • Other Father      ischemic heart disease   • Heart disease Father    • Other Son      depressive disorder   • Diabetes Maternal Grandmother    • Cancer Paternal Grandfather    • No Known Problems Daughter    • No Known Problems Son        Social History     Social History   • Marital status:      Spouse name: N/A   • Number of children: N/A   • Years of education: N/A     Social History Main Topics    • Smoking status: Current Every Day Smoker     Packs/day: 0.50   • Smokeless tobacco: Never Used      Comment: Smokes approx 1 ppd of Cigarettes.smoking since last 45 yr   • Alcohol use No      Comment: no alcohol since 2017   • Drug use: No   • Sexual activity: Defer     Other Topics Concern   • None     Social History Narrative    , wife  2017. Lives with son and his son's girlfriend.  He has worked as , mine equipment, coal mines.  He served in the [employer] for 1.5 years.  Used to smoke 2 ppd down to 1/2 ppd for 35 years or more.  Drank a 12 pack of beer a day for 20 years, last drink was 1/2 a beer on .  He last drank in 10/2015.  He  drugs 20-30 years ago including cocaine, meth, heroin, pot, and other drugs.  He smoked, snorted, and shot up drugs.           Objective   Physical Exam   Constitutional: He is oriented to person, place, and time. He appears well-developed and well-nourished. No distress.   HENT:   Head: Normocephalic and atraumatic.   Right Ear: External ear normal.   Left Ear: External ear normal.   Nose: Nose normal.   Mouth/Throat: Oropharynx is clear and moist.   Eyes: Conjunctivae and EOM are normal. Pupils are equal, round, and reactive to light. Right eye exhibits no discharge. Left eye exhibits no discharge. No scleral icterus.   Neck: Normal range of motion. Neck supple. No JVD present. No tracheal deviation present.   Cardiovascular: Normal rate, regular rhythm, normal heart sounds and intact distal pulses.  Exam reveals no gallop and no friction rub.    No murmur heard.  Pulmonary/Chest: Effort normal and breath sounds normal. No respiratory distress. He has no wheezes. He has no rales. He exhibits no tenderness.   Abdominal: Soft. Bowel sounds are normal. He exhibits distension (moderate). He exhibits no mass. There is tenderness (diffuse). There is no rebound and no guarding. No hernia.   Musculoskeletal: Normal range of motion. He exhibits no  edema, tenderness or deformity.   Neurological: He is alert and oriented to person, place, and time. He displays normal reflexes. No cranial nerve deficit. He exhibits normal muscle tone. Coordination normal.   Skin: Skin is warm and dry. No rash noted. He is not diaphoretic. No erythema. No pallor.   Nursing note and vitals reviewed.      Procedures         ED Course  ED Course      Labs Reviewed   COMPREHENSIVE METABOLIC PANEL - Abnormal; Notable for the following:        Result Value    Glucose 109 (*)     Creatinine 0.58 (*)     Sodium 129 (*)     Calcium 8.0 (*)     Total Protein 5.7 (*)     Albumin 2.70 (*)     Alkaline Phosphatase 132 (*)     A/G Ratio 0.9 (*)     BUN/Creatinine Ratio 36.2 (*)     All other components within normal limits   URINALYSIS W/ CULTURE IF INDICATED - Abnormal; Notable for the following:     Blood, UA Trace (*)     Protein,  mg/dL (2+) (*)     All other components within normal limits   APTT - Abnormal; Notable for the following:     PTT 69.6 (*)     All other components within normal limits    Narrative:     The recommended Heparin therapeutic range is 68-97 seconds.   CBC WITH AUTO DIFFERENTIAL - Abnormal; Notable for the following:     RBC 3.62 (*)     Hemoglobin 11.0 (*)     Hematocrit 32.6 (*)     RDW 17.1 (*)     RDW-SD 56.6 (*)     Platelets 129 (*)     Immature Grans, Absolute 0.05 (*)     All other components within normal limits   URINALYSIS, MICROSCOPIC ONLY - Abnormal; Notable for the following:     Mucus, UA Small/1+ (*)     All other components within normal limits   LIPASE - Normal   PROTIME-INR - Normal    Narrative:     Therapeutic range for most indications is 2.0-3.0 INR,  or 2.5-3.5 for mechanical heart valves.   RAINBOW DRAW    Narrative:     The following orders were created for panel order [place] Draw.  Procedure                               Abnormality         Status                     ---------                               -----------         ------                      Light Blue Top[[id number]]                                   Final result               Green Top (Gel)[[id number]]                                  Final result               Lavender Top[[id number]]                                     Final result               Gold Top - SST[[id number]]                                   Final result                 Please view results for these tests on the individual orders.   LIGHT BLUE TOP   GREEN TOP   LAVENDER TOP   GOLD TOP - SST   CBC AND DIFFERENTIAL    Narrative:     The following orders were created for panel order CBC & Differential.  Procedure                               Abnormality         Status                     ---------                               -----------         ------                     CBC Auto Differential[[id number]]        Abnormal            Final result                 Please view results for these tests on the individual orders.       CT Abdomen Pelvis With Contrast   Final Result   1. New diffuse colonic bowel wall thickening suggesting a diffuse   colitis most likely an infectious colitis as above.   2. Some improvement in right pleural effusion.   3. Large amount of ascites throughout the abdomen and pelvis.   Changes of cirrhosis with evidence of portal hypertension are   again noted.   4. Right inguinal hernia containing nonobstructed small bowel and   some ascites.      Electronically signed by:  Micheal Maxwell  7/18/2017 1:02 AM   CDT Workstation: [id number]      spoke with Dr Gallegos, will admit for colitis and IV abx            MDM    Final diagnoses:   Colitis        Samuel Barragan MD  07/18/17 0342

## 2017-07-18 NOTE — PROGRESS NOTES
Patient seen and doing well. Will address the hyponatremia and consult nephrology if needed.           This document has been electronically signed by Ara Bach MD on July 18, 2017 7:50 AM    Ara Bach MD, RODOLFO  [address] [address] [phone]

## 2017-07-18 NOTE — H&P
Infectious colitis  Subjective:     Mickey Lind is a 61 y.o. male who presents for abdominal pain that he has had for a while but it got worse last night.  He was recently in the hospital for pneumonia and had a right pleural effusion drained.  He states his cough is better but his stomach is bad.  He had some watery stool this am about 2 but denies any diarrhea at home.  The only thing that makes the stomach pain better is the pain medicine.  Nothing really makes the pain worse, it is just there.  It felt like someone was putting a tressing around his stomach.  He has some rash on his stomach and groin as well.  He states the hernia is killing him as well.  He is constantly nauseated but hasn't thrown up.  He is hungry.  He always has blurry vision.  No hemoptysis, hematemesis, or hematochezia.  He has a little swelling in his legs.  He hurts worse in his lower stomach.  He thinks he has had colitis before but doesn't remember when.    The following portions of the patient's history were reviewed and updated as appropriate: allergies, current medications, past family history, past medical history, past social history, past surgical history and problem list.    Concurrent Medical Hx:  Past Medical History:   Diagnosis Date   • Abdominal pain    • Alcoholic liver damage    • Alcoholic polyneuropathy    • Amblyopia     refractive os   • Anemia    • Ascites    • Ascites    • Asthma    • Astigmatism    • Cellulitis of left lower leg    • Chronic hepatitis C    • Chronic viral hepatitis C    • Cirrhosis of liver    • Claudication    • Encounter for immunization    • Generalized edema    • History of blood transfusion    • History of colon polyps    • Hypermetropia    • Inguinal hernia     - Rih      • Low back pain    • Major depressive disorder     recurrent , moderate   • Myocardial infarction    • Pain in joint, lower leg    • Peripheral vascular disease    • Recurrent major depressive episodes    • Tinea unguium     • Tobacco use        Past Surgical Hx:  Past Surgical History:   Procedure Laterality Date   • CARDIAC CATHETERIZATION      (Successful PCI to a totally occluded RCA with a 2.5 x 28 and a 2.5 x 8 mm Xience stent. Nonobstructive disease in the left coronary artery system.)   2012    • CARDIAC SURGERY     • COLONOSCOPY  10/13/2016   • COLONOSCOPY N/A 3/29/2017    Procedure: COLONOSCOPY;  Surgeon: Jose Daniel David MD;  Location: [facility] ENDOSCOPY;  Service:    • ENDOSCOPY  10/13/2016   • ENDOSCOPY N/A 3/29/2017    Procedure: ESOPHAGOGASTRODUODENOSCOPY (U/S FIRST @0945) ;  Surgeon: Jose Daniel David MD;  Location: [facility] ENDOSCOPY;  Service:    • UPPER GASTROINTESTINAL ENDOSCOPY  10/13/2016     Family Hx:  Family History   Problem Relation Age of Onset   • Cancer Mother    • Heart disease Mother    • Cancer Father    • Other Father      ischemic heart disease   • Heart disease Father    • Other Son      depressive disorder   • Diabetes Maternal Grandmother    • Cancer Paternal Grandfather    • No Known Problems Daughter    • No Known Problems Son       Social History:  Social History     Social History   • Marital status:      Spouse name: N/A   • Number of children: N/A   • Years of education: N/A     Occupational History   • Not on file.     Social History Main Topics   • Smoking status: Current Every Day Smoker     Packs/day: 0.50   • Smokeless tobacco: Never Used      Comment: Smokes approx 1 ppd of Cigarettes.smoking since last 45 yr   • Alcohol use No      Comment: no alcohol since 2017   • Drug use: No   • Sexual activity: Defer     Other Topics Concern   • Not on file     Social History Narrative    , wife  2017. Lives with son and his son's girlfriend.  He has worked as , mine equipment, coal mines.  He served in the [employer] for 1.5 years.  Used to smoke 2 ppd down to 1/2 ppd for 35 years or more.  Drank a 12 pack of beer a day for 20 years, last drink was 1/2 a beer on  .  He last drank in 10/2015.  He  drugs 20-30 years ago including cocaine, meth, heroin, pot, and other drugs.  He smoked, snorted, and shot up drugs.       Home Medications:  No current facility-administered medications on file prior to encounter.      Current Outpatient Prescriptions on File Prior to Encounter   Medication Sig Dispense Refill   • gabapentin (NEURONTIN) 400 MG capsule Take 1 capsule by mouth 3 (Three) Times a Day. 90 capsule 2   • sertraline (ZOLOFT) 100 MG tablet Take 2 tablets by mouth Daily. 60 tablet 2   • acyclovir (ZOVIRAX) 400 MG tablet Take 1 tablet by mouth Daily. 30 tablet 0   • atorvastatin (LIPITOR) 10 MG tablet Take 1 tablet by mouth Daily. 30 tablet 3   • dexamethasone (DECADRON) 4 MG tablet Take 4 mg by mouth.     • furosemide (LASIX) 40 MG tablet Take 1 tablet by mouth Daily. 30 tablet 0   • GNP ACID REDUCER 75 MG tablet      • levoFLOXacin (LEVAQUIN) 750 MG tablet Take 1 tablet by mouth Daily. Indications: Upper Respiratory Tract Infection 4 tablet 0   • polyethylene glycol (MIRALAX) powder Take 17 g by mouth Daily. 527 g 1   • prochlorperazine (COMPAZINE) 10 MG tablet Take 1 tablet by mouth Every 6 (Six) Hours As Needed for nausea or vomiting. 60 tablet 1   • spironolactone (ALDACTONE) 50 MG tablet Take 1 tablet by mouth 2 (Two) Times a Day for 30 days. 60 tablet 0   • sucralfate (CARAFATE) 1 GM/10ML suspension Take 10 mL by mouth Daily. 30 g 3       Allergies:  Codeine; Other; and Penicillins  I reviewed the patient's new clinical results.  Review of Systems  Review of Systems   Constitutional: Positive for activity change, appetite change, chills, fatigue and unexpected weight change. Negative for diaphoresis and fever.   HENT: Positive for congestion. Negative for ear pain, hearing loss, mouth sores, nosebleeds, sore throat and trouble swallowing.    Eyes: Negative for photophobia and visual disturbance.   Respiratory: Positive for cough and shortness of breath. Negative  "for wheezing.    Cardiovascular: Positive for leg swelling. Negative for chest pain and palpitations.   Gastrointestinal: Positive for abdominal distention, abdominal pain and nausea. Negative for blood in stool, constipation, diarrhea, rectal pain and vomiting.   Genitourinary: Positive for scrotal swelling. Negative for decreased urine volume, difficulty urinating, dysuria and hematuria.   Musculoskeletal: Positive for arthralgias, back pain, gait problem, joint swelling, myalgias, neck pain and neck stiffness.   Skin: Positive for rash.   Allergic/Immunologic: Positive for environmental allergies and immunocompromised state.   Neurological: Positive for weakness and headaches. Negative for seizures and speech difficulty.   Hematological: Bruises/bleeds easily.   Psychiatric/Behavioral: Positive for sleep disturbance. Negative for agitation, behavioral problems, confusion, decreased concentration, dysphoric mood, hallucinations, self-injury and suicidal ideas. The patient is not nervous/anxious and is not hyperactive.        Objective:     /69 (BP Location: Right arm, Patient Position: Lying)  Pulse 73  Temp 96.4 °F (35.8 °C) (Axillary)   Resp 18  Ht 70\" (177.8 cm)  Wt 156 lb 9.6 oz (71 kg)  SpO2 94%  BMI 22.47 kg/m2  Physical Exam   Constitutional: He is oriented to person, place, and time. He appears well-developed. No distress.   Appears older than stated age, chronically ill   HENT:   Head: Normocephalic and atraumatic.   Right Ear: External ear normal.   Left Ear: External ear normal.   Nose: Nose normal.   Mouth/Throat: Oropharynx is clear and moist. No oropharyngeal exudate.   Eyes: Conjunctivae and EOM are normal. Pupils are equal, round, and reactive to light. Right eye exhibits no discharge. Left eye exhibits no discharge. No scleral icterus.   Neck: No JVD present. No tracheal deviation present.   Cardiovascular: Normal rate, regular rhythm and intact distal pulses.  Exam reveals no gallop " and no friction rub.    Murmur heard.  Pulmonary/Chest: Effort normal and breath sounds normal. No respiratory distress. He has no wheezes. He has no rales. He exhibits no tenderness.   diminished breath sounds in the bases   Abdominal: Bowel sounds are normal. He exhibits distension. There is tenderness. There is no rebound and no guarding. A hernia is present.   Diffusely tender no guarding or rebound, large right side inguinal hernia, small umbilical hernia   Genitourinary:   Genitourinary Comments: Swelling in the scrotum   Musculoskeletal: He exhibits tenderness. He exhibits no edema.   Lymphadenopathy:     He has no cervical adenopathy.   Neurological: He is alert and oriented to person, place, and time. He displays normal reflexes. No cranial nerve deficit. He exhibits normal muscle tone.   Plantar reflex down going bilaterally, no dysmetria noted   Skin: Skin is dry. Rash noted. He is not diaphoretic.   Excoriations and erythema in the groin and on the abdomen, small blisters around belly button, right flank large ecchymosis   Psychiatric: He has a normal mood and affect. His behavior is normal. Judgment and thought content normal.   Nursing note and vitals reviewed.    I reviewed the patient's new clinical results.  Assessment/Plan:     Active Hospital Problems (** Indicates Principal Problem)    Diagnosis Date Noted   • **Infectious colitis [A09] 07/18/2017     -will start patient on levaquin and flagyl IV  -advance diet and give IVFs  - will order a CRP and lactic acid, WBC count WNL  -pain management morphine IV 2 mg q 4 hours     • Hyponatremia [E87.1] 07/18/2017   • Pleural effusion [J90] 07/10/2017     Continue lasix prn       • Ascites due to alcoholic cirrhosis [K70.31] 07/07/2017     -patient had a CT scan of the abdomen and pelvis performed which showed a large fluid collection in his abdomen as well as right sided pleural effusion, patient underwent a ultrasound guided paracentesis which they  took out 3.9 liters of fluid  -will give patient dose of albumin IV     • Lower abdominal pain [R10.30] 07/07/2017     -secondary to ascites from hep. C as well as alcoholic cirrhosis, patient is afebrile     • Tobacco dependence [F17.200] 07/07/2017     -admits to smoking a PPD, will give 21mg nicotine patch     • Chronic hepatitis C without hepatic coma [B18.2] 02/16/2017     -has been followed by Dr. Montalvo, will continue his home medication of zovirax     • Multiple myeloma, stage 3 [C90.00] 01/20/2017     -has a right sided port in, has been followed by Dr. Mendoza of heme-oncology. However, treatment on hold as patient does not have any insurance, will monitor      • End stage liver disease [K72.90] 08/31/2016     -secondary to chronic hepatitis C, is being followed by Dr. Montalvo as an outpatient, will continue to monitor      • Depression [F32.9] 08/31/2016     -continue home medication of zoloft     • Hyperlipidemia [E78.5] 08/31/2016     -continue atorvastatin     • Low back pain with sciatica [M54.40] 08/31/2016     -home neurontin        Resolved Hospital Problems    Diagnosis Date Noted Date Resolved   No resolved problems to display.     Advance his diet.  Monitor the rash on his abdomen.  Cream for the groin.    I have seen and examined the patient.  I have reviewed the notes, assessments, and/or procedures performed by Dr. Avina, I concur with his documentation and assessment and plan for Mickey Lind.        This document has been electronically signed by Carolee Leo MD on July 18, 2017 11:02 AM          This document has been electronically signed by Carolee Leo MD on July 18, 2017 11:02 AM

## 2017-07-18 NOTE — H&P
HISTORY AND PHYSICAL  NAME: Mickey Lind  : 1955  MRN: [id number]    DATE OF ADMISSION: 17    DATE & TIME SEEN: 17 1:59 AM    PCP: Ara Bach MD    CODE STATUS: Full Code    CHIEF COMPLAINT Abdominal pain    HPI:  Mickey Lind is a 61 y.o. male who presents with abdominal pain, recently discharged from the hospital due to his ascites, pleural effusion, and abdominal pain. Patient has a history of liver cirrhosis, chronic hepatitis C, asthma, alcoholism, low back pain, hyperlipidemia, depression, inguinal hernia, and tobacco abuse. Patient states that for the past four days since his release from the hospital he has been experiencing diffuse abdominal pain. He describes it as aching and it does not radiate. It is rated at 8/10 at this time. It is constant in timing and has only gotten worse since onset. Patient has continued eating food at baseline and denies any nausea, vomiting, blood in stool, constipation, fever, or diarrhea. Patient has no other complaints or concerns.    CONCURRENT MEDICAL HISTORY:  Past Medical History:   Diagnosis Date   • Abdominal pain    • Alcoholic liver damage    • Alcoholic polyneuropathy    • Amblyopia     refractive os   • Anemia    • Ascites    • Ascites    • Asthma    • Astigmatism    • Cellulitis of left lower leg    • Chronic hepatitis C    • Chronic viral hepatitis C    • Cirrhosis of liver    • Claudication    • Encounter for immunization    • Generalized edema    • History of blood transfusion    • History of colon polyps    • Hypermetropia    • Inguinal hernia     - Rih      • Low back pain    • Major depressive disorder     recurrent , moderate   • Myocardial infarction    • Pain in joint, lower leg    • Peripheral vascular disease    • Recurrent major depressive episodes    • Tinea unguium    • Tobacco use        PAST SURGICAL HISTORY:  Past Surgical History:   Procedure Laterality Date   • CARDIAC CATHETERIZATION      (Successful PCI to a  totally occluded RCA with a 2.5 x 28 and a 2.5 x 8 mm Xience stent. Nonobstructive disease in the left coronary artery system.)   2012    • CARDIAC SURGERY     • COLONOSCOPY  10/13/2016   • COLONOSCOPY N/A 3/29/2017    Procedure: COLONOSCOPY;  Surgeon: Jose Daniel David MD;  Location: [facility] ENDOSCOPY;  Service:    • ENDOSCOPY  10/13/2016   • ENDOSCOPY N/A 3/29/2017    Procedure: ESOPHAGOGASTRODUODENOSCOPY (U/S FIRST @0945) ;  Surgeon: Jose Daniel David MD;  Location: [facility] ENDOSCOPY;  Service:    • UPPER GASTROINTESTINAL ENDOSCOPY  10/13/2016       FAMILY HISTORY:  Family History   Problem Relation Age of Onset   • Cancer Mother    • Heart disease Mother    • Cancer Father    • Other Father      ischemic heart disease   • Heart disease Father    • Other Son      depressive disorder   • Diabetes Maternal Grandmother    • Cancer Paternal Grandfather    • No Known Problems Daughter    • No Known Problems Son         SOCIAL HISTORY:  Social History     Social History   • Marital status:      Spouse name: N/A   • Number of children: N/A   • Years of education: N/A     Occupational History   • Not on file.     Social History Main Topics   • Smoking status: Current Every Day Smoker     Packs/day: 0.50   • Smokeless tobacco: Never Used      Comment: Smokes approx 1 ppd of Cigarettes.smoking since last 45 yr   • Alcohol use No      Comment: no alcohol since 2017   • Drug use: No   • Sexual activity: Defer     Other Topics Concern   • Not on file     Social History Narrative    , wife  2017. Lives with son and his son's girlfriend.  He has worked as , mine equipment, coal mines.  He served in the [employer] for 1.5 years.  Used to smoke 2 ppd down to 1/2 ppd for 35 years or more.  Drank a 12 pack of beer a day for 20 years, last drink was 1/2 a beer on .  He last drank in 10/2015.  He  drugs 20-30 years ago including cocaine, meth, heroin, pot, and other drugs.  He smoked, snorted,  and shot up drugs.       HOME MEDICATIONS:    Current Facility-Administered Medications:   •  levoFLOXacin (LEVAQUIN) 750 mg/150 mL D5W (premix) (LEVAQUIN) 750 mg, 750 mg, Intravenous, Once, Samuel Barragan MD  •  metroNIDAZOLE (FLAGYL) IVPB 500 mg, 500 mg, Intravenous, Once, Samuel Barragan MD  •  sodium chloride 0.9 % flush 10 mL, 10 mL, Intravenous, PRN, Samuel Barragan MD, 10 mL at 07/18/17 0126    Current Outpatient Prescriptions:   •  gabapentin (NEURONTIN) 400 MG capsule, Take 1 capsule by mouth 3 (Three) Times a Day., Disp: 90 capsule, Rfl: 2  •  sertraline (ZOLOFT) 100 MG tablet, Take 2 tablets by mouth Daily., Disp: 60 tablet, Rfl: 2  •  acyclovir (ZOVIRAX) 400 MG tablet, Take 1 tablet by mouth Daily., Disp: 30 tablet, Rfl: 0  •  atorvastatin (LIPITOR) 10 MG tablet, Take 1 tablet by mouth Daily., Disp: 30 tablet, Rfl: 3  •  dexamethasone (DECADRON) 4 MG tablet, Take 4 mg by mouth., Disp: , Rfl:   •  furosemide (LASIX) 40 MG tablet, Take 1 tablet by mouth Daily., Disp: 30 tablet, Rfl: 0  •  GNP ACID REDUCER 75 MG tablet, , Disp: , Rfl:   •  levoFLOXacin (LEVAQUIN) 750 MG tablet, Take 1 tablet by mouth Daily. Indications: Upper Respiratory Tract Infection, Disp: 4 tablet, Rfl: 0  •  polyethylene glycol (MIRALAX) powder, Take 17 g by mouth Daily., Disp: 527 g, Rfl: 1  •  prochlorperazine (COMPAZINE) 10 MG tablet, Take 1 tablet by mouth Every 6 (Six) Hours As Needed for nausea or vomiting., Disp: 60 tablet, Rfl: 1  •  spironolactone (ALDACTONE) 50 MG tablet, Take 1 tablet by mouth 2 (Two) Times a Day for 30 days., Disp: 60 tablet, Rfl: 0  •  sucralfate (CARAFATE) 1 GM/10ML suspension, Take 10 mL by mouth Daily., Disp: 30 g, Rfl: 3    ALLERGIES:  Codeine; Other; and Penicillins    REVIEW OF SYSTEMS  Review of Systems  General:negative for - chills, fatigue, fever, hot flashes, malaise, night sweats, weight gain or weight loss  Psychological: negative for - anxiety, depression, sleep disturbances or suicidal  ideation  Ophthalmic: negative for - blurry vision or loss of vision  ENT: negative for - hearing change, nasal congestion or sore throat  Hematological and Lymphatic: negative for - jaundice  Endocrine: negative for - hair pattern changes, skin changes or temperature intolerance  Respiratory: no cough, shortness of breath, or wheezing  Cardiovascular: no chest pain, edema or dyspnea on exertion  Gastrointestinal: no  Nausea/vomiting, change in bowel habits, or black or bloody stools. Positive for diffuse abdominal pain.  Genito-Urinary: no dysuria, trouble voiding, or hematuria  Musculoskeletal: negative for - joint pain or muscle pain  Neurological: negative for - dizziness, headaches, numbness/tingling or seizures  Dermatological: negative for rash and skin lesion changes    PHYSICAL EXAM:  Temp:  [98.6 °F (37 °C)] 98.6 °F (37 °C)  Heart Rate:  [96] 96  Resp:  [18] 18  BP: (138-139)/(78-79) 138/79  Body mass index is 22.96 kg/(m^2).     Physical Exam  General Appearance:    Alert, cooperative, no distress, appears stated age   Head:    Normocephalic, without obvious abnormality, atraumatic   Eyes:    PERRL, conjunctiva/corneas clear, EOM's intact   Ears:    Normal external ear canals, both ears   Nose:   Nares normal, septum midline, mucosa normal, no drainage     or sinus tenderness   Throat:   Lips, mucosa, and tongue normal; teeth and gums normal   Neck:   Supple, symmetrical, trachea midline, no adenopathy   Back:     Symmetric, no curvature, ROM normal   Lungs:     Clear to auscultation bilaterally, respirations unlabored   Chest Wall:    No tenderness or deformity    Heart:    Regular rate and rhythm, S1 and S2 normal, no murmur, rub    or gallop   Abdomen:     Distended, diffusely tender to palpation, bowel sounds active all four quadrants, no masses, no organomegaly   Extremities:   Extremities normal, atraumatic, no cyanosis or edema   Pulses:   2+ and symmetric all extremities   Skin:   Skin color,  texture, turgor normal, no rashes or lesions   Lymph nodes:   Cervical, supraclavicular nodes normal   Neurologic:   CNII-XII grossly intact     DIAGNOSTIC DATA:   Lab Results (last 24 hours)     Procedure Component Value Units Date/Time    CBC & Differential [[id number]] Collected:  07/17/17 2316    Specimen:  Blood Updated:  07/17/17 2355    Narrative:       The following orders were created for panel order CBC & Differential.  Procedure                               Abnormality         Status                     ---------                               -----------         ------                     CBC Auto Differential[[id number]]        Abnormal            Final result                 Please view results for these tests on the individual orders.    CBC Auto Differential [[id number]]  (Abnormal) Collected:  07/17/17 2316    Specimen:  Blood Updated:  07/17/17 2355     WBC 9.60 10*3/mm3      RBC 3.62 (L) 10*6/mm3      Hemoglobin 11.0 (L) g/dL      Hematocrit 32.6 (L) %      MCV 90.1 fL      MCH 30.4 pg      MCHC 33.7 g/dL      RDW 17.1 (H) %      RDW-SD 56.6 (H) fl      MPV 11.6 fL      Platelets 129 (L) 10*3/mm3      Neutrophil % 76.2 %      Lymphocyte % 12.8 %      Monocyte % 7.5 %      Eosinophil % 2.8 %      Basophil % 0.2 %      Immature Grans % 0.5 %      Neutrophils, Absolute 7.31 10*3/mm3      Lymphocytes, Absolute 1.23 10*3/mm3      Monocytes, Absolute 0.72 10*3/mm3      Eosinophils, Absolute 0.27 10*3/mm3      Basophils, Absolute 0.02 10*3/mm3      Immature Grans, Absolute 0.05 (H) 10*3/mm3     Urinalysis With / Culture If Indicated [[id number]]  (Abnormal) Collected:  07/17/17 2351    Specimen:  Urine from Urine, Clean Catch Updated:  07/18/17 0008     Color, UA Dark Yellow     Appearance, UA Clear     pH, UA 5.5     Specific Gravity, UA 1.027     Glucose, UA Negative     Ketones, UA Negative     Bilirubin, UA Negative     Blood, UA Trace (A)     Protein,  mg/dL (2+) (A)     Leuk Esterase, UA Negative      Nitrite, UA Negative     Urobilinogen, UA 0.2 E.U./dL    Protime-INR [[id number]]  (Normal) Collected:  07/17/17 2316    Specimen:  Blood Updated:  07/18/17 0009     Protime 14.5 Seconds      INR 1.14    Narrative:       Therapeutic range for most indications is 2.0-3.0 INR,  or 2.5-3.5 for mechanical heart valves.    aPTT [[id number]]  (Abnormal) Collected:  07/17/17 2316    Specimen:  Blood Updated:  07/18/17 0010     PTT 69.6 (H) seconds     Narrative:       The recommended Heparin therapeutic range is 68-97 seconds.    Comprehensive Metabolic Panel [[id number]]  (Abnormal) Collected:  07/17/17 2316    Specimen:  Blood Updated:  07/18/17 0013     Glucose 109 (H) mg/dL      BUN 21 mg/dL      Creatinine 0.58 (L) mg/dL      Sodium 129 (L) mmol/L      Potassium 3.5 mmol/L      Chloride 98 mmol/L      CO2 24.0 mmol/L      Calcium 8.0 (L) mg/dL      Total Protein 5.7 (L) g/dL      Albumin 2.70 (L) g/dL      ALT (SGPT) 40 U/L      AST (SGOT) 53 U/L      Alkaline Phosphatase 132 (H) U/L      Total Bilirubin 0.7 mg/dL      eGFR Non African Amer 142 mL/min/1.73      Globulin 3.0 gm/dL      A/G Ratio 0.9 (L) g/dL      BUN/Creatinine Ratio 36.2 (H)     Anion Gap 7.0 mmol/L     Lipase [[id number]]  (Normal) Collected:  07/17/17 2316    Specimen:  Blood Updated:  07/18/17 0013     Lipase 91 U/L     Iota Draw [[id number]] Collected:  07/17/17 2316    Specimen:  Blood Updated:  07/18/17 0101    Narrative:       The following orders were created for panel order Iota Draw.  Procedure                               Abnormality         Status                     ---------                               -----------         ------                     Light Blue Top[[id number]]                                   Final result               Green Top (Gel)[[id number]]                                  Final result               Lavender Top[[id number]]                                     Final result               Gold Top - SST[[id number]]                                    Final result                 Please view results for these tests on the individual orders.    Light Blue Top [[id number]] Collected:  07/17/17 2316    Specimen:  Blood Updated:  07/18/17 0101     Extra Tube hold for add-on      Auto resulted       Green Top (Gel) [[id number]] Collected:  07/17/17 2316    Specimen:  Blood Updated:  07/18/17 0101     Extra Tube Hold for add-ons.      Auto resulted.       Lavender Top [[id number]] Collected:  07/17/17 2316    Specimen:  Blood Updated:  07/18/17 0101     Extra Tube hold for add-on      Auto resulted       Gold Top - SST [[id number]] Collected:  07/17/17 2316    Specimen:  Blood Updated:  07/18/17 0101     Extra Tube Hold for add-ons.      Auto resulted.       Urinalysis, Microscopic Only [[id number]]  (Abnormal) Collected:  07/17/17 2351    Specimen:  Urine from Urine, Clean Catch Updated:  07/18/17 0122     RBC, UA None Seen /HPF      WBC, UA 0-2 /HPF      Bacteria, UA None Seen /HPF      Squamous Epithelial Cells, UA None Seen /HPF      Hyaline Casts, UA None Seen /LPF      Calcium Oxalate Crystals, UA Moderate/2+ /HPF      Mucus, UA Small/1+ (A) /HPF      Methodology Manual Light Microscopy           Imaging Results (last 24 hours)     Procedure Component Value Units Date/Time    CT Abdomen Pelvis With Contrast [[id number]] Collected:  07/18/17 0033     Updated:  07/18/17 0103    Narrative:       CT abdomen and pelvis with contrast on  7/18/2017     CLINICAL INDICATION: Ascites, history of cirrhosis, hepatitis C    TECHNIQUE: Multiple axial images are obtained throughout the  abdomen and pelvis following the administration of IV contrast,  100 mL of Isovue-300 contrast was administered intravenously  without complication. This study was performed with techniques to  keep radiation doses as low as reasonably achievable, (ALARA).   Total DLP is 356.6 mGy*cm.    COMPARISON: 7/7/2017    FINDINGS:  Abdomen: There has been some decrease in size  of a small to  moderate-sized right pleural effusion with mild adjacent right  basilar atelectasis. Vascular calcifications are noted. Liver is  small consistent with the patient's history of cirrhosis. Mild  splenomegaly is noted with the spleen measuring 14.3 cm in  greatest lonx-pr-kynf length. There are small esophageal varices  consistent with changes of portal hypertension. There is a stable  subcutaneous likely sebaceous cyst within the left anterior  abdominal wall subcutaneous tissues on image 23. Solid abdominal  organs are otherwise unremarkable. There is no abdominal  adenopathy. Large amount of ascites is noted in the abdomen and  pelvis. There is no free air. There is bowel wall thickening of  the entire colon suggesting a diffuse colitis. This could be  bowel related changes from portal hypertension but favor a  colitis. Most likely this would be an infectious colitis.    Pelvis: Large amount of ascites is noted in the pelvis. There is  a large right inguinal hernia containing some nonobstructed small  bowel and moderate amount of ascites. There is no pelvic  adenopathy. Pelvic portion of the GI tract is otherwise  unremarkable. No acute bony abnormality is noted.      Impression:       1. New diffuse colonic bowel wall thickening suggesting a diffuse  colitis most likely an infectious colitis as above.  2. Some improvement in right pleural effusion.  3. Large amount of ascites throughout the abdomen and pelvis.  Changes of cirrhosis with evidence of portal hypertension are  again noted.  4. Right inguinal hernia containing nonobstructed small bowel and  some ascites.    Electronically signed by:  Micheal Maxwell  7/18/2017 1:02 AM  CDT Workstation: [id number] reviewed the patient's new clinical results.    ASSESSMENT AND PLAN: This is a 61 y.o. male with:    Active Hospital Problems (** Indicates Principal Problem)    Diagnosis Date Noted   • **Infectious colitis [A09] 07/18/2017      -will start patient on levaquin and flagyl IV  -keep NPO and give IVFs  - will order a CRP and lactic acid, WBC count WNL  -pain management morphine IV 2 mg q 4 hours     • Pleural effusion [J90] 07/10/2017     Continue lasix prn       • Ascites due to alcoholic cirrhosis [K70.31] 07/07/2017     -patient had a CT scan of the abdomen and pelvis performed which showed a large fluid collection in his abdomen as well as right sided pleural effusion, patient underwent a ultrasound guided paracentesis which they took out 3.9 liters of fluid  -will give patient dose of albumin IV     • Lower abdominal pain [R10.30] 07/07/2017     -secondary to ascites from hep. C as well as alcoholic cirrhosis, patient is afebrile     • Tobacco dependence [F17.200] 07/07/2017     -admits to smoking a PPD, will give 21mg nicotine patch     • Chronic hepatitis C without hepatic coma [B18.2] 02/16/2017     -has been followed by Dr. Montalvo, will continue his home medication of zovirax     • Multiple myeloma, stage 3 [C90.00] 01/20/2017     -has a right sided port in, has been followed by Dr. Mendoza of heme-oncology. However, treatment on hold as patient does not have any insurance, will monitor      • End stage liver disease [K72.90] 08/31/2016     -secondary to chronic hepatitis C, is being followed by Dr. Montalvo as an outpatient, will continue to monitor      • Depression [F32.9] 08/31/2016     -continue home medication of zoloft     • Hyperlipidemia [E78.5] 08/31/2016     -continue atorvastatin     • Low back pain with sciatica [M54.40] 08/31/2016     -home neurontin        Resolved Hospital Problems    Diagnosis Date Noted Date Resolved   No resolved problems to display.         DVT prophylaxis: SCDs/TEDs  Code status is Full Code  [facility] #[id number], reviewed and consistent with patient reported medications.      I discussed the patients findings and my recommendations with patient.     Dr. Leo is the attending on record at time of  admission, she is aware of the patient's status and agrees with the above history and physical.          This document has been electronically signed by Gaurang Agrawal MD on July 18, 2017 1:59 AM

## 2017-07-18 NOTE — NURSING NOTE
Patient has dry reddened buttocks. Needs good skin care off  loading and protection. POA yes. Excoriation.

## 2017-07-18 NOTE — PLAN OF CARE
Problem: Patient Care Overview (Adult)  Goal: Plan of Care Review  Outcome: Ongoing (interventions implemented as appropriate)    07/18/17 1209   Coping/Psychosocial Response Interventions   Plan Of Care Reviewed With patient;family   Patient Care Overview   Progress no change   Outcome Evaluation   Outcome Summary/Follow up Plan Pt reports abdominal pain is better than it was and feels ready to eat something. Diet advanced, voiced food preferences for lunch. Will add Ensure to optimize protein/calorie intake.         Problem: Nutrition, Imbalanced: Inadequate Oral Intake (Adult)  Goal: Identify Related Risk Factors and Signs and Symptoms  Outcome: Ongoing (interventions implemented as appropriate)

## 2017-07-18 NOTE — PLAN OF CARE
Problem: Patient Care Overview (Adult)  Goal: Plan of Care Review  Outcome: Ongoing (interventions implemented as appropriate)    07/18/17 0608   Coping/Psychosocial Response Interventions   Plan Of Care Reviewed With patient   Patient Care Overview   Progress no change       Goal: Adult Individualization and Mutuality  Outcome: Ongoing (interventions implemented as appropriate)  Goal: Discharge Needs Assessment  Outcome: Ongoing (interventions implemented as appropriate)    07/18/17 0608   Discharge Needs Assessment   Concerns To Be Addressed denies needs/concerns at this time   Equipment Needed After Discharge cane, straight;raised toilet;walker, rolling   Self-Care   Equipment Currently Used at Home walker, rolling;cane, straight   Living Environment   Transportation Available car;family or friend will provide         Problem: Fall Risk (Adult)  Goal: Identify Related Risk Factors and Signs and Symptoms  Outcome: Ongoing (interventions implemented as appropriate)    07/18/17 0608   Fall Risk   Fall Risk: Related Risk Factors gait/mobility problems   Fall Risk: Signs and Symptoms presence of risk factors       Goal: Absence of Falls  Outcome: Ongoing (interventions implemented as appropriate)    07/18/17 0608   Fall Risk (Adult)   Absence of Falls making progress toward outcome         Problem: Nutrition, Imbalanced: Inadequate Oral Intake (Adult)  Goal: Identify Related Risk Factors and Signs and Symptoms  Outcome: Ongoing (interventions implemented as appropriate)    07/18/17 0608   Nutrition, Imbalanced: Inadequate Oral Intake   Nutrition Imbalanced: Less than Body Requirements: Related Risk Factors appetite decreased;chronic illness/infection   Signs and Symptoms (Nutrition Imbalance, Inadequate Oral Intake: Signs and Symptoms) edema/ascites       Goal: Improved Oral Intake  Outcome: Ongoing (interventions implemented as appropriate)    07/18/17 0608   Nutrition, Imbalanced: Inadequate Oral Intake (Adult)    Improved Oral Intake (npo)       Goal: Prevent Further Weight Loss  Outcome: Ongoing (interventions implemented as appropriate)    07/18/17 0608   Nutrition, Imbalanced: Inadequate Oral Intake (Adult)   Prevent Further Weight Loss making progress toward outcome

## 2017-07-19 NOTE — PROGRESS NOTES
FAMILY MEDICINE DAILY PROGRESS NOTE     NAME: Mickey Lind  : 1955  MRN: [id number]     LOS: 1 day     PROVIDER OF SERVICE: Ara Bach MD    Chief Complaint: Infectious colitis    SUBJECTIVE     Interval History:  History taken from: patient chart  Patient Complaints: of mild abdominal pain.  Patient Denies:  Headaches, dysuria, seizures, syncopal episodes and SOB.     REIVIEW OF SYSTEMS     Review of Systems   Constitutional: Positive for appetite change and fatigue. Negative for activity change and fever.   HENT: Negative for ear pain and sore throat.    Eyes: Negative for pain and visual disturbance.   Respiratory: Negative for cough and shortness of breath.    Cardiovascular: Negative for chest pain and palpitations.   Gastrointestinal: Positive for abdominal pain and nausea.   Endocrine: Negative for cold intolerance and heat intolerance.   Genitourinary: Negative for difficulty urinating and dysuria.   Musculoskeletal: Negative for arthralgias and gait problem.   Skin: Negative for color change and rash.   Neurological: Negative for dizziness, weakness and headaches.   Hematological: Negative for adenopathy. Does not bruise/bleed easily.   Psychiatric/Behavioral: Negative for agitation, confusion and sleep disturbance.       OBJECTIVE     Vital Sign Min/Max for last 24 hours  Temp  Min: 97 °F (36.1 °C)  Max: 99.7 °F (37.6 °C)   BP  Min: 121/73  Max: 154/71   Pulse  Min: 73  Max: 98   Resp  Min: 18  Max: 18   SpO2  Min: 91 %  Max: 98 %   No Data Recorded   Weight  Min: 156 lb (70.8 kg)  Max: 159 lb (72.1 kg)     PHYSICAL EXAM     Physical Exam   Constitutional: He is oriented to person, place, and time. He appears well-developed and well-nourished.   HENT:   Head: Normocephalic and atraumatic.   Neck: Normal range of motion. Neck supple.   Cardiovascular: Normal rate, regular rhythm and normal heart sounds.    Pulmonary/Chest:  Effort normal and breath sounds normal.   Abdominal: Soft. Bowel sounds are normal. He exhibits no distension. There is tenderness (epigastric). There is no rebound.   Musculoskeletal: Normal range of motion.   Neurological: He is alert and oriented to person, place, and time.   Skin: Skin is warm.   Psychiatric: He has a normal mood and affect. His behavior is normal. Judgment and thought content normal.   Nursing note and vitals reviewed.      MEDICATIONS       Current Facility-Administered Medications:   •  acetaminophen (TYLENOL) tablet 650 mg, 650 mg, Oral, Q4H PRN, Gaurang Gallegos MD  •  acyclovir (ZOVIRAX) tablet 400 mg, 400 mg, Oral, Daily, Gaurang Gallegos MD, 400 mg at 07/18/17 0952  •  atorvastatin (LIPITOR) tablet 10 mg, 10 mg, Oral, Daily, Gaurang Gallegos MD, 10 mg at 07/18/17 0953  •  bisacodyl (DULCOLAX) EC tablet 5 mg, 5 mg, Oral, Daily PRN, Gaurang Gallegos MD, 5 mg at 07/19/17 0516  •  bisacodyl (DULCOLAX) suppository 10 mg, 10 mg, Rectal, Daily PRN, Gaurang Gallegos MD  •  famotidine (PEPCID) tablet 20 mg, 20 mg, Oral, BID, Gaurang Gallegos MD, 20 mg at 07/18/17 1746  •  furosemide (LASIX) tablet 40 mg, 40 mg, Oral, Daily, Gaurang Gallegos MD, 40 mg at 07/18/17 0953  •  gabapentin (NEURONTIN) capsule 400 mg, 400 mg, Oral, TID, Gaurang Gallegos MD, 400 mg at 07/18/17 2129  •  levoFLOXacin (LEVAQUIN) 500 mg/100 mL D5W (premix) (LEVAQUIN) 500 mg, 500 mg, Intravenous, Q24H, Gaurang Gallegos MD, Stopped at 07/19/17 0204  •  magic butt ointment, , Topical, BID, Carolee Leo MD  •  metroNIDAZOLE (FLAGYL) IVPB 500 mg, 500 mg, Intravenous, Once, Samuel Barragan MD  •  metroNIDAZOLE (FLAGYL) IVPB 500 mg, 500 mg, Intravenous, Q8H, Gaurang Gallegos MD, Stopped at 07/19/17 0636  •  Morphine sulfate (PF) injection 2 mg, 2 mg, Intravenous, Q4H PRN, Gaurang Gallegos MD, 2 mg at 07/19/17 0408  •  nicotine (NICODERM CQ) 21 MG/24HR patch 1 patch, 1 patch, Transdermal, Q24H, Gaurang Gallegos MD, 1 patch at 07/19/17 0516  •   nystatin-triamcinolone (MYCOLOG) 059494-3.1 UNIT/GM-% ointment, , Topical, Q12H, Carolee Leo MD  •  ondansetron (ZOFRAN) injection 4 mg, 4 mg, Intravenous, Q6H PRN, Gaurang Gallegos MD  •  polyethylene glycol (MIRALAX) powder 17 g, 17 g, Oral, Daily, Gaurang Gallegos MD  •  prochlorperazine (COMPAZINE) tablet 10 mg, 10 mg, Oral, Q6H PRN, Gaurang Gallegos MD  •  sertraline (ZOLOFT) tablet 200 mg, 200 mg, Oral, Daily, Gaurang Gallegos MD, 200 mg at 07/18/17 0952  •  sodium chloride 0.9 % flush 1-10 mL, 1-10 mL, Intravenous, PRN, Gaurang Gallegos MD  •  sodium chloride 0.9 % flush 10 mL, 10 mL, Intravenous, PRN, Samuel Barragan MD, 10 mL at 07/18/17 1457  •  sodium chloride tablet 2 g, 2 g, Oral, BID With Meals, Ara Bach MD  •  spironolactone (ALDACTONE) tablet 50 mg, 50 mg, Oral, BID, Gaurang Gallegos MD, 50 mg at 07/18/17 1746  •  sucralfate (CARAFATE) 1 GM/10ML suspension 1 g, 1 g, Oral, Daily, Gaurang Gallegos MD, 1 g at 07/18/17 0953     LAB RESULTS       Recent Results (from the past 24 hour(s))   Comprehensive Metabolic Panel    Collection Time: 07/19/17  5:14 AM   Result Value Ref Range    Glucose 90 60 - 100 mg/dL    BUN 19 7 - 21 mg/dL    Creatinine 0.62 (L) 0.70 - 1.30 mg/dL    Sodium 129 (L) 137 - 145 mmol/L    Potassium 3.8 3.5 - 5.1 mmol/L    Chloride 100 95 - 110 mmol/L    CO2 22.0 22.0 - 31.0 mmol/L    Calcium 7.6 (L) 8.4 - 10.2 mg/dL    Total Protein 5.3 (L) 6.3 - 8.6 g/dL    Albumin 2.50 (L) 3.40 - 4.80 g/dL    ALT (SGPT) 45 21 - 72 U/L    AST (SGOT) 70 (H) 17 - 59 U/L    Alkaline Phosphatase 159 (H) 38 - 126 U/L    Total Bilirubin 0.5 0.2 - 1.3 mg/dL    eGFR Non African Amer 131 >60 mL/min/1.73    Globulin 2.8 2.3 - 3.5 gm/dL    A/G Ratio 0.9 (L) 1.1 - 1.8 g/dL    BUN/Creatinine Ratio 30.6 (H) 7.0 - 25.0    Anion Gap 7.0 5.0 - 15.0 mmol/L   CBC Auto Differential    Collection Time: 07/19/17  5:14 AM   Result Value Ref Range    WBC 8.07 3.20 - 9.80 10*3/mm3    RBC 3.50 (L) 4.37 - 5.74 10*6/mm3    Hemoglobin  10.7 (L) 13.7 - 17.3 g/dL    Hematocrit 31.0 (L) 39.0 - 49.0 %    MCV 88.6 80.0 - 98.0 fL    MCH 30.6 26.5 - 34.0 pg    MCHC 34.5 31.5 - 36.3 g/dL    RDW 17.0 (H) 11.5 - 14.5 %    RDW-SD 55.3 (H) 35.1 - 43.9 fl    MPV 11.1 8.0 - 12.0 fL    Platelets 106 (L) 150 - 450 10*3/mm3    Neutrophil % 74.4 37.0 - 80.0 %    Lymphocyte % 13.9 10.0 - 50.0 %    Monocyte % 8.6 0.0 - 12.0 %    Eosinophil % 2.5 0.0 - 7.0 %    Basophil % 0.4 0.0 - 2.0 %    Immature Grans % 0.2 0.0 - 0.5 %    Neutrophils, Absolute 6.01 2.00 - 8.60 10*3/mm3    Lymphocytes, Absolute 1.12 0.60 - 4.20 10*3/mm3    Monocytes, Absolute 0.69 0.00 - 0.90 10*3/mm3    Eosinophils, Absolute 0.20 0.00 - 0.70 10*3/mm3    Basophils, Absolute 0.03 0.00 - 0.20 10*3/mm3    Immature Grans, Absolute 0.02 0.00 - 0.02 10*3/mm3       Imaging Results (last 24 hours)     ** No results found for the last 24 hours. **          I reviewed the patient's new clinical results.  I reviewed the patient's new imaging results and agree with the interpretation.    ASSESSMENT/PLAN     Active Hospital Problems (** Indicates Principal Problem)    Diagnosis Date Noted   • **Infectious colitis [A09] 07/18/2017     -will transition patient on levaquin and flagyl PO  -advance diet  - will trend a CRP and lactic acid, WBC count WNL  -pain management norco 5/325 mg q8h     • Hyponatremia [E87.1] 07/18/2017     Will start on salt tabs  Restrict fluid to 1.8 L/day  Get CXR and CT of head to r/o malignancies     • Pleural effusion [J90] 07/10/2017     Continue lasix prn       • Ascites due to alcoholic cirrhosis [K70.31] 07/07/2017     -patient had a CT scan of the abdomen and pelvis performed which showed a large fluid collection in his abdomen as well as right sided pleural effusion, patient underwent a ultrasound guided paracentesis which they took out 3.9 liters of fluid  -will give patient dose of albumin IV     • Lower abdominal pain [R10.30] 07/07/2017     -secondary to ascites from hep. C  as well as alcoholic cirrhosis, patient is afebrile     • Tobacco dependence [F17.200] 07/07/2017     -admits to smoking a PPD, will give 21mg nicotine patch     • Chronic hepatitis C without hepatic coma [B18.2] 02/16/2017     -has been followed by Dr. Montalvo, will continue his home medication of zovirax     • Multiple myeloma, stage 3 [C90.00] 01/20/2017     -has a right sided port in, has been followed by Dr. Mendoza of heme-oncology. However, treatment on hold as patient does not have any insurance, will monitor      • End stage liver disease [K72.90] 08/31/2016     -secondary to chronic hepatitis C, is being followed by Dr. Montalvo as an outpatient, will continue to monitor      • Depression [F32.9] 08/31/2016     -continue home medication of zoloft     • Hyperlipidemia [E78.5] 08/31/2016     -continue atorvastatin     • Low back pain with sciatica [M54.40] 08/31/2016     -home neurontin        Resolved Hospital Problems    Diagnosis Date Noted Date Resolved   No resolved problems to display.           DVT prophylaxis: scd/teds  Code status is Full Code    Plan for disposition:Where: home health  Time: 20 min    SIGNATURE     This document has been electronically signed by Ara Bach MD on July 19, 2017 8:12 AM    Ara Bach MD, RODOLFO  [place] [phone]

## 2017-07-19 NOTE — PLAN OF CARE
Problem: Patient Care Overview (Adult)  Goal: Plan of Care Review  Outcome: Ongoing (interventions implemented as appropriate)    07/18/17 2012   Outcome Evaluation   Outcome Summary/Follow up Plan Patient has been able to eat some meals without needing antiemetic, but has complained of abd pain. Will contiinue to watch pt's I and O       Goal: Adult Individualization and Mutuality  Outcome: Ongoing (interventions implemented as appropriate)  Goal: Discharge Needs Assessment  Outcome: Ongoing (interventions implemented as appropriate)    Problem: Fall Risk (Adult)  Goal: Identify Related Risk Factors and Signs and Symptoms  Outcome: Outcome(s) achieved Date Met:  07/18/17  Goal: Absence of Falls  Outcome: Outcome(s) achieved Date Met:  07/18/17    Problem: Nutrition, Imbalanced: Inadequate Oral Intake (Adult)  Goal: Identify Related Risk Factors and Signs and Symptoms  Outcome: Ongoing (interventions implemented as appropriate)  Goal: Improved Oral Intake  Outcome: Ongoing (interventions implemented as appropriate)  Goal: Prevent Further Weight Loss  Outcome: Ongoing (interventions implemented as appropriate)    Problem: Bowel Disease, Inflammatory (Adult)  Goal: Signs and Symptoms of Listed Potential Problems Will be Absent or Manageable (Bowel Disease, Inflammatory)  Outcome: Ongoing (interventions implemented as appropriate)    Problem: Pressure Ulcer Risk (Garret Scale) (Adult,Obstetrics,Pediatric)  Goal: Identify Related Risk Factors and Signs and Symptoms  Outcome: Ongoing (interventions implemented as appropriate)  Goal: Skin Integrity  Outcome: Ongoing (interventions implemented as appropriate)

## 2017-07-19 NOTE — PLAN OF CARE
Problem: Patient Care Overview (Adult)  Goal: Plan of Care Review  Outcome: Ongoing (interventions implemented as appropriate)    07/19/17 0452   Coping/Psychosocial Response Interventions   Plan Of Care Reviewed With patient   Patient Care Overview   Progress no change       Goal: Adult Individualization and Mutuality  Outcome: Ongoing (interventions implemented as appropriate)  Goal: Discharge Needs Assessment  Outcome: Ongoing (interventions implemented as appropriate)    Problem: Nutrition, Imbalanced: Inadequate Oral Intake (Adult)  Goal: Identify Related Risk Factors and Signs and Symptoms  Outcome: Ongoing (interventions implemented as appropriate)  Goal: Improved Oral Intake  Outcome: Ongoing (interventions implemented as appropriate)  Goal: Prevent Further Weight Loss  Outcome: Ongoing (interventions implemented as appropriate)    Problem: Bowel Disease, Inflammatory (Adult)  Goal: Signs and Symptoms of Listed Potential Problems Will be Absent or Manageable (Bowel Disease, Inflammatory)  Outcome: Ongoing (interventions implemented as appropriate)    Problem: Pressure Ulcer Risk (Garret Scale) (Adult,Obstetrics,Pediatric)  Goal: Identify Related Risk Factors and Signs and Symptoms  Outcome: Ongoing (interventions implemented as appropriate)  Goal: Skin Integrity  Outcome: Ongoing (interventions implemented as appropriate)

## 2017-07-19 NOTE — PLAN OF CARE
Problem: Patient Care Overview (Adult)  Goal: Plan of Care Review  Outcome: Ongoing (interventions implemented as appropriate)    07/19/17 1813   Coping/Psychosocial Response Interventions   Plan Of Care Reviewed With patient   Patient Care Overview   Progress no change       Goal: Adult Individualization and Mutuality  Outcome: Ongoing (interventions implemented as appropriate)  Goal: Discharge Needs Assessment  Outcome: Ongoing (interventions implemented as appropriate)    Problem: Fall Risk (Adult)  Goal: Absence of Falls  Outcome: Ongoing (interventions implemented as appropriate)    Problem: Nutrition, Imbalanced: Inadequate Oral Intake (Adult)  Goal: Identify Related Risk Factors and Signs and Symptoms  Outcome: Outcome(s) achieved Date Met:  07/19/17  Goal: Improved Oral Intake  Outcome: Ongoing (interventions implemented as appropriate)  Goal: Prevent Further Weight Loss  Outcome: Ongoing (interventions implemented as appropriate)    Problem: Bowel Disease, Inflammatory (Adult)  Goal: Signs and Symptoms of Listed Potential Problems Will be Absent or Manageable (Bowel Disease, Inflammatory)  Outcome: Ongoing (interventions implemented as appropriate)    Problem: Pressure Ulcer Risk (Garret Scale) (Adult,Obstetrics,Pediatric)  Goal: Identify Related Risk Factors and Signs and Symptoms  Outcome: Outcome(s) achieved Date Met:  07/19/17  Goal: Skin Integrity  Outcome: Ongoing (interventions implemented as appropriate)

## 2017-07-19 NOTE — PROGRESS NOTES
[address] [address]  T - [id number]         PROGRESS NOTE         SUBJECTIVE:   Patient Care Team:  Ara Bach MD as PCP - General  Rafa Mendoza MD as Consulting Physician (Hematology and Oncology)  Serena Modi LCSW as  (Oncology)    Chief Complaint:   Chief Compliant: abdominal pain    Subjective     Patient is 61 y.o. male presents with abdominal pain. He is still having some abdominal pain.      ROS/HISTORY/ CURRENT MEDICATIONS/OBJECTIVE/VS/PE:   Review of Systems:   Review of Systems    History:     Past Medical History:   Diagnosis Date   • Abdominal pain    • Alcoholic liver damage    • Alcoholic polyneuropathy    • Amblyopia     refractive os   • Anemia    • Ascites    • Ascites    • Asthma    • Astigmatism    • Cellulitis of left lower leg    • Chronic hepatitis C    • Chronic viral hepatitis C    • Cirrhosis of liver    • Claudication    • Encounter for immunization    • Generalized edema    • History of blood transfusion    • History of colon polyps    • Hypermetropia    • Inguinal hernia     - Rih      • Low back pain    • Major depressive disorder     recurrent , moderate   • Myocardial infarction    • Pain in joint, lower leg    • Peripheral vascular disease    • Recurrent major depressive episodes    • Tinea unguium    • Tobacco use      Past Surgical History:   Procedure Laterality Date   • CARDIAC CATHETERIZATION      (Successful PCI to a totally occluded RCA with a 2.5 x 28 and a 2.5 x 8 mm Xience stent. Nonobstructive disease in the left coronary artery system.)   11/24/2012    • CARDIAC SURGERY     • COLONOSCOPY  10/13/2016   • COLONOSCOPY N/A 3/29/2017    Procedure: COLONOSCOPY;  Surgeon: Jose Daniel David MD;  Location: [facility] ENDOSCOPY;  Service:    • ENDOSCOPY  10/13/2016   • ENDOSCOPY N/A 3/29/2017    Procedure: ESOPHAGOGASTRODUODENOSCOPY (U/S FIRST @0945) ;  Surgeon: Jose Daniel David MD;  Location: [facility] ENDOSCOPY;   Service:    • UPPER GASTROINTESTINAL ENDOSCOPY  10/13/2016     Family History   Problem Relation Age of Onset   • Cancer Mother    • Heart disease Mother    • Cancer Father    • Other Father      ischemic heart disease   • Heart disease Father    • Other Son      depressive disorder   • Diabetes Maternal Grandmother    • Cancer Paternal Grandfather    • No Known Problems Daughter    • No Known Problems Son      Social History   Substance Use Topics   • Smoking status: Current Every Day Smoker     Packs/day: 0.50   • Smokeless tobacco: Never Used      Comment: Smokes approx 1 ppd of Cigarettes.smoking since last 45 yr   • Alcohol use No      Comment: no alcohol since 7/4/2017     Prescriptions Prior to Admission   Medication Sig Dispense Refill Last Dose   • gabapentin (NEURONTIN) 400 MG capsule Take 1 capsule by mouth 3 (Three) Times a Day. 90 capsule 2 Past Week at Unknown time   • sertraline (ZOLOFT) 100 MG tablet Take 2 tablets by mouth Daily. 60 tablet 2 Past Week at Unknown time   • acyclovir (ZOVIRAX) 400 MG tablet Take 1 tablet by mouth Daily. 30 tablet 0 More than a month at Unknown time   • atorvastatin (LIPITOR) 10 MG tablet Take 1 tablet by mouth Daily. 30 tablet 3 More than a month at Unknown time   • dexamethasone (DECADRON) 4 MG tablet Take 4 mg by mouth.   More than a month at Unknown time   • furosemide (LASIX) 40 MG tablet Take 1 tablet by mouth Daily. 30 tablet 0 More than a month at Unknown time   • GNP ACID REDUCER 75 MG tablet    More than a month at Unknown time   • levoFLOXacin (LEVAQUIN) 750 MG tablet Take 1 tablet by mouth Daily. Indications: Upper Respiratory Tract Infection 4 tablet 0 More than a month at Unknown time   • polyethylene glycol (MIRALAX) powder Take 17 g by mouth Daily. 527 g 1 More than a month at Unknown time   • prochlorperazine (COMPAZINE) 10 MG tablet Take 1 tablet by mouth Every 6 (Six) Hours As Needed for nausea or vomiting. 60 tablet 1 More than a month at Unknown  time   • spironolactone (ALDACTONE) 50 MG tablet Take 1 tablet by mouth 2 (Two) Times a Day for 30 days. 60 tablet 0 More than a month at Unknown time   • sucralfate (CARAFATE) 1 GM/10ML suspension Take 10 mL by mouth Daily. 30 g 3 More than a month at Unknown time     Allergies:  Codeine; Other; and Penicillins    Current Medications:     Current Facility-Administered Medications   Medication Dose Route Frequency Provider Last Rate Last Dose   • acetaminophen (TYLENOL) tablet 650 mg  650 mg Oral Q4H PRN Gaurang Gallegos MD       • acyclovir (ZOVIRAX) tablet 400 mg  400 mg Oral Daily Gaurang Gallegos MD   400 mg at 07/19/17 1006   • atorvastatin (LIPITOR) tablet 10 mg  10 mg Oral Daily Gaurang Gallegos MD   10 mg at 07/19/17 1006   • bisacodyl (DULCOLAX) EC tablet 5 mg  5 mg Oral Daily PRN Gaurang Gallegos MD   5 mg at 07/19/17 0516   • bisacodyl (DULCOLAX) suppository 10 mg  10 mg Rectal Daily PRN Gaurang Gallegos MD       • famotidine (PEPCID) tablet 20 mg  20 mg Oral BID Gaurang Gallegos MD   20 mg at 07/19/17 1007   • furosemide (LASIX) tablet 40 mg  40 mg Oral Daily Gaurang Gallegos MD   40 mg at 07/19/17 1007   • gabapentin (NEURONTIN) capsule 400 mg  400 mg Oral TID Gaurang Gallegos MD   400 mg at 07/19/17 1006   • HYDROcodone-acetaminophen (NORCO) 5-325 MG per tablet 1 tablet  1 tablet Oral Q8H PRN Ara Bach MD   1 tablet at 07/19/17 1006   • levoFLOXacin (LEVAQUIN) tablet 750 mg  750 mg Oral Q24H Ara Bach MD   750 mg at 07/19/17 1008   • magic butt ointment   Topical BID Carolee Leo MD       • metroNIDAZOLE (FLAGYL) tablet 500 mg  500 mg Oral Q8H Ara Bach MD       • nicotine (NICODERM CQ) 21 MG/24HR patch 1 patch  1 patch Transdermal Q24H Gaurang Gallegos MD   1 patch at 07/19/17 0516   • nystatin-triamcinolone (MYCOLOG) 182147-5.1 UNIT/GM-% ointment   Topical Q12H Carolee Leo MD       • ondansetron (ZOFRAN) injection 4 mg  4 mg Intravenous Q6H PRN Gaurang Gallegos MD   4 mg at 07/19/17 1047   • polyethylene  glycol (MIRALAX) powder 17 g  17 g Oral Daily Gaurang Gallegos MD       • prochlorperazine (COMPAZINE) tablet 10 mg  10 mg Oral Q6H PRN Gaurang Gallegos MD       • sertraline (ZOLOFT) tablet 200 mg  200 mg Oral Daily Gaurang Gallegos MD   200 mg at 07/19/17 1006   • sodium chloride 0.9 % flush 1-10 mL  1-10 mL Intravenous PRN Gaurang Gallegos MD       • sodium chloride 0.9 % flush 10 mL  10 mL Intravenous PRN Samuel Barragan MD   10 mL at 07/18/17 1457   • sodium chloride tablet 2 g  2 g Oral BID With Meals rAa Bach MD   2 g at 07/19/17 1007   • spironolactone (ALDACTONE) tablet 50 mg  50 mg Oral BID Gaurang Gallegos MD   50 mg at 07/19/17 1007   • sucralfate (CARAFATE) 1 GM/10ML suspension 1 g  1 g Oral Daily Gaurang Gallegos MD   1 g at 07/19/17 1010       Objective     Physical Exam:   Temp:  [97 °F (36.1 °C)-99.7 °F (37.6 °C)] 97.8 °F (36.6 °C)  Heart Rate:  [73-98] 79  Resp:  [18] 18  BP: (121-154)/(66-80) 154/71    Physical Exam   Constitutional: He appears well-developed and well-nourished.   Cardiovascular: Normal rate, regular rhythm and normal heart sounds.  Exam reveals no gallop and no friction rub.    No murmur heard.  Pulmonary/Chest: Effort normal and breath sounds normal. No respiratory distress. He has no wheezes. He has no rales.   Abdominal: Soft. There is tenderness.   Vitals reviewed.           Results Review:   Lab Results   Component Value Date    GLUCOSE 90 07/19/2017    BUN 19 07/19/2017    CREATININE 0.62 (L) 07/19/2017    EGFRIFNONA 131 07/19/2017    BCR 30.6 (H) 07/19/2017    CO2 22.0 07/19/2017    CALCIUM 7.6 (L) 07/19/2017    PROTENTOTREF 5.4 (L) 03/09/2017    ALBUMIN 2.50 (L) 07/19/2017    LABIL2 0.9 (L) 07/19/2017    AST 70 (H) 07/19/2017    ALT 45 07/19/2017         WBC WBC   Date Value Ref Range Status   07/19/2017 8.07 3.20 - 9.80 10*3/mm3 Final   07/17/2017 9.60 3.20 - 9.80 10*3/mm3 Final      HGB Hemoglobin   Date Value Ref Range Status   07/19/2017 10.7 (L) 13.7 - 17.3 g/dL Final    07/17/2017 11.0 (L) 13.7 - 17.3 g/dL Final      HCT Hematocrit   Date Value Ref Range Status   07/19/2017 31.0 (L) 39.0 - 49.0 % Final   07/17/2017 32.6 (L) 39.0 - 49.0 % Final      Platlets Platelets   Date Value Ref Range Status   07/19/2017 106 (L) 150 - 450 10*3/mm3 Final   07/17/2017 129 (L) 150 - 450 10*3/mm3 Final          Imaging Results (last 24 hours)     Procedure Component Value Units Date/Time    CT Head Without Contrast [[id number]] Updated:  07/19/17 0845    XR Chest PA & Lateral [[id number]] Updated:  07/19/17 0907           I reviewed the patient's new clinical results.  I reviewed the patient's new imaging results and agree with the interpretation.     ASSESSMENT/PLAN:   Assessment/Plan   Principal Problem:    Infectious colitis  Active Problems:    End stage liver disease    Depression    Low back pain with sciatica    Hyperlipidemia    Multiple myeloma, stage 3    Chronic hepatitis C without hepatic coma    Lower abdominal pain    Ascites due to alcoholic cirrhosis    Tobacco dependence    Pleural effusion    Hyponatremia      Continue with current plans.  I have reviewed the notes, assessments, and/or procedures performed by the resident  , I concur with her/his documentation of Mickey Lind.    I discussed the patients findings and my recommendations with patient.      Wellington Whelan MD  07/19/17  11:20 AM

## 2017-07-20 PROBLEM — Z59.89 DOES NOT HAVE HEALTH INSURANCE: Status: ACTIVE | Noted: 2017-01-01

## 2017-07-20 NOTE — PROGRESS NOTES
FAMILY MEDICINE DAILY PROGRESS NOTE     NAME: Mickey Lind  : 1955  MRN: [id number]     LOS: 2 days     PROVIDER OF SERVICE: Ara Bach MD    Chief Complaint: Infectious colitis    SUBJECTIVE     Interval History:  History taken from: patient chart  Patient Complaints: of mild abdominal pain. But also complaining of some SOB while walking and exerting himself. He mentions at times he forgets things that I have told him.   Patient Denies:  Headaches, dysuria, seizures, syncopal episodes.     REIVIEW OF SYSTEMS     Review of Systems   Constitutional: Positive for appetite change and fatigue. Negative for activity change and fever.   HENT: Negative for ear pain and sore throat.    Eyes: Negative for pain and visual disturbance.   Respiratory: Positive for shortness of breath (upon exertion). Negative for cough.    Cardiovascular: Negative for chest pain and palpitations.   Gastrointestinal: Positive for abdominal pain and nausea.   Endocrine: Negative for cold intolerance and heat intolerance.   Genitourinary: Negative for difficulty urinating and dysuria.   Musculoskeletal: Negative for arthralgias and gait problem.   Skin: Negative for color change and rash.   Neurological: Negative for dizziness, weakness and headaches.        Forgetfulness at times   Hematological: Negative for adenopathy. Does not bruise/bleed easily.   Psychiatric/Behavioral: Negative for agitation, confusion and sleep disturbance.       OBJECTIVE     Vital Sign Min/Max for last 24 hours  Temp  Min: 97.3 °F (36.3 °C)  Max: 98 °F (36.7 °C)   BP  Min: 113/63  Max: 154/71   Pulse  Min: 72  Max: 89   Resp  Min: 18  Max: 20   SpO2  Min: 90 %  Max: 94 %   No Data Recorded   Weight  Min: 159 lb (72.1 kg)  Max: 159 lb (72.1 kg)     PHYSICAL EXAM     Physical Exam   Constitutional: He is oriented to person, place, and time. He appears well-developed and well-nourished.    HENT:   Head: Normocephalic and atraumatic.   Neck: Normal range of motion. Neck supple.   Cardiovascular: Normal rate, regular rhythm and normal heart sounds.    Pulmonary/Chest: Effort normal and breath sounds normal.   Abdominal: Soft. Bowel sounds are normal. He exhibits no distension. There is tenderness (epigastric). There is no rebound.   Musculoskeletal: Normal range of motion.   Neurological: He is alert and oriented to person, place, and time.   Skin: Skin is warm.   Psychiatric: He has a normal mood and affect. His behavior is normal. Judgment and thought content normal.   Nursing note and vitals reviewed.      MEDICATIONS       Current Facility-Administered Medications:   •  acetaminophen (TYLENOL) tablet 650 mg, 650 mg, Oral, Q4H PRN, Gaurang Gallegos MD, 650 mg at 07/19/17 2032  •  acyclovir (ZOVIRAX) tablet 400 mg, 400 mg, Oral, Daily, Gaurang Gallegos MD, 400 mg at 07/19/17 1006  •  atorvastatin (LIPITOR) tablet 10 mg, 10 mg, Oral, Daily, Gaurang Gallegos MD, 10 mg at 07/19/17 1006  •  bisacodyl (DULCOLAX) EC tablet 5 mg, 5 mg, Oral, Daily PRN, Gaurang Gallegos MD, 5 mg at 07/19/17 0516  •  bisacodyl (DULCOLAX) suppository 10 mg, 10 mg, Rectal, Daily PRN, Gaurang Gallegos MD  •  famotidine (PEPCID) tablet 20 mg, 20 mg, Oral, BID, Gaurang Gallegos MD, 20 mg at 07/19/17 1830  •  furosemide (LASIX) tablet 40 mg, 40 mg, Oral, Daily, Gaurang Gallegos MD, 40 mg at 07/19/17 1007  •  gabapentin (NEURONTIN) capsule 400 mg, 400 mg, Oral, TID, Gaurang Gallegos MD, 400 mg at 07/19/17 2019  •  HYDROcodone-acetaminophen (NORCO) 5-325 MG per tablet 1 tablet, 1 tablet, Oral, Q8H PRN, Ara Bach MD, 1 tablet at 07/20/17 0016  •  levoFLOXacin (LEVAQUIN) tablet 750 mg, 750 mg, Oral, Q24H, Ara Bach MD, 750 mg at 07/19/17 1008  •  magic butt ointment, , Topical, BID, Carolee Leo MD  •  metroNIDAZOLE (FLAGYL) tablet 500 mg, 500 mg, Oral, Q8H, Ara Bach MD, 500 mg at 07/20/17 0554  •  nicotine (NICODERM CQ) 21 MG/24HR patch 1  patch, 1 patch, Transdermal, Q24H, Gaurang Gallegos MD, 1 patch at 07/20/17 0554  •  nystatin-triamcinolone (MYCOLOG) 196177-0.1 UNIT/GM-% ointment, , Topical, Q12H, Carolee Leo MD  •  ondansetron (ZOFRAN) injection 4 mg, 4 mg, Intravenous, Q6H PRN, Gaurang Gallegos MD, 4 mg at 07/19/17 1047  •  polyethylene glycol (MIRALAX) powder 17 g, 17 g, Oral, Daily, Gaurang Gallegos MD  •  prochlorperazine (COMPAZINE) tablet 10 mg, 10 mg, Oral, Q6H PRN, Gaurang Gallegos MD  •  sertraline (ZOLOFT) tablet 200 mg, 200 mg, Oral, Daily, Gaurang Gallegos MD, 200 mg at 07/19/17 1006  •  sodium chloride 0.9 % flush 1-10 mL, 1-10 mL, Intravenous, PRN, Gaurang Gallegos MD  •  sodium chloride 0.9 % flush 10 mL, 10 mL, Intravenous, PRN, Samuel Barragan MD, 10 mL at 07/18/17 1457  •  sodium chloride tablet 2 g, 2 g, Oral, BID With Meals, Ara Bach MD, 2 g at 07/19/17 1830  •  spironolactone (ALDACTONE) tablet 50 mg, 50 mg, Oral, BID, Gaurang Gallegos MD, 50 mg at 07/19/17 1830  •  sucralfate (CARAFATE) 1 GM/10ML suspension 1 g, 1 g, Oral, Daily, Gaurang Gallegos MD, 1 g at 07/19/17 1010     LAB RESULTS       Recent Results (from the past 24 hour(s))   Comprehensive Metabolic Panel    Collection Time: 07/20/17  5:45 AM   Result Value Ref Range    Glucose 88 60 - 100 mg/dL    BUN 17 7 - 21 mg/dL    Creatinine 0.59 (L) 0.70 - 1.30 mg/dL    Sodium 130 (L) 137 - 145 mmol/L    Potassium 3.7 3.5 - 5.1 mmol/L    Chloride 101 95 - 110 mmol/L    CO2 25.0 22.0 - 31.0 mmol/L    Calcium 7.8 (L) 8.4 - 10.2 mg/dL    Total Protein 5.3 (L) 6.3 - 8.6 g/dL    Albumin 2.60 (L) 3.40 - 4.80 g/dL    ALT (SGPT) 49 21 - 72 U/L    AST (SGOT) 46 17 - 59 U/L    Alkaline Phosphatase 150 (H) 38 - 126 U/L    Total Bilirubin 0.7 0.2 - 1.3 mg/dL    eGFR Non  Amer 139 (H) 49 - 113 mL/min/1.73    Globulin 2.7 2.3 - 3.5 gm/dL    A/G Ratio 1.0 (L) 1.1 - 1.8 g/dL    BUN/Creatinine Ratio 28.8 (H) 7.0 - 25.0    Anion Gap 4.0 (L) 5.0 - 15.0 mmol/L   CBC Auto Differential     Collection Time: 07/20/17  5:45 AM   Result Value Ref Range    WBC 7.60 3.20 - 9.80 10*3/mm3    RBC 3.54 (L) 4.37 - 5.74 10*6/mm3    Hemoglobin 10.7 (L) 13.7 - 17.3 g/dL    Hematocrit 31.9 (L) 39.0 - 49.0 %    MCV 90.1 80.0 - 98.0 fL    MCH 30.2 26.5 - 34.0 pg    MCHC 33.5 31.5 - 36.3 g/dL    RDW 17.1 (H) 11.5 - 14.5 %    RDW-SD 56.5 (H) 35.1 - 43.9 fl    MPV 11.2 8.0 - 12.0 fL    Platelets 110 (L) 150 - 450 10*3/mm3    Neutrophil % 73.1 37.0 - 80.0 %    Lymphocyte % 14.6 10.0 - 50.0 %    Monocyte % 9.2 0.0 - 12.0 %    Eosinophil % 2.5 0.0 - 7.0 %    Basophil % 0.3 0.0 - 2.0 %    Immature Grans % 0.3 0.0 - 0.5 %    Neutrophils, Absolute 5.56 2.00 - 8.60 10*3/mm3    Lymphocytes, Absolute 1.11 0.60 - 4.20 10*3/mm3    Monocytes, Absolute 0.70 0.00 - 0.90 10*3/mm3    Eosinophils, Absolute 0.19 0.00 - 0.70 10*3/mm3    Basophils, Absolute 0.02 0.00 - 0.20 10*3/mm3    Immature Grans, Absolute 0.02 0.00 - 0.02 10*3/mm3       Imaging Results (last 24 hours)     Procedure Component Value Units Date/Time    CT Head Without Contrast [[id number]] Collected:  07/19/17 0844     Updated:  07/19/17 1242    Narrative:       Noncontrast CT examination of the brain.    INDICATION: Multiple myeloma. Alteration mental status, dizziness    Technique: Axial 5 mm contiguous images with brain parenchymal  and bone windows    This exam was performed according to our departmental  dose-optimization program, which includes automated exposure  control, adjustment of the mA and/or kV according to patient size  and/or use of iterative reconstruction technique.    Prior relevant examination: None.    There is a decreased CT attenuation right posterior parietal lobe  compatible with old infarction. There are involutional, atrophic  changes. There are a few tiny pockets of bony lesions within the  skull, frontal bone compatible with involvement by multiple  myeloma. The skull is otherwise unremarkable.    CT brain without contrast is otherwise  unremarkable.      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  posterior branches right middle cerebral artery. Involutional,  atrophic changes.    Several tiny punched-out bony lesions in the right right and left  frontal bone compatible with involvement of myeloma.    Otherwise unremarkable CT brain without contrast. No acute  changes are present.    Electronically signed by:  Golden Field MD  7/19/2017 12:41 PM CDT  Workstation: [id number]    XR Chest PA & Lateral [[id number]] Collected:  07/19/17 0859     Updated:  07/19/17 1441    Narrative:           PROCEDURE: Chest PA and lateral    REASON FOR EXAM: hx of smoking, hyponatremia, K52.9 Noninfective  gastroenteritis and colitis, unspecified    FINDINGS: Comparison study dated July 11, 2017.  Right jugular  Port-A-Cath with tip overlying the SVC. . Cardiac and pulmonary  vasculature are normal . Right lung base patchy opacity. Lungs  otherwise clear. Worsening right pleural effusion with moderate  to large right pleural effusion. No acute osseous abnormality.      Impression:       1.  Worsening right pleural effusion with moderate to large right  pleural effusion.  2.  Right lung base patchy opacities suspicious for passive  atelectasis and/or pneumonia.    Electronically signed by:  Leland Garcia MD  7/19/2017 2:40 PM CDT  Workstation: [id number]          I reviewed the patient's new clinical results.  I reviewed the patient's new imaging results and agree with the interpretation.    ASSESSMENT/PLAN     Active Hospital Problems (** Indicates Principal Problem)    Diagnosis Date Noted   • **Infectious colitis [A09] 07/18/2017     -will transition patient on levaquin and flagyl PO  -advance diet, currently on liquid diet  - will trend a CRP, lactic acid, and WBC count   -pain management norco 5/325 mg q8h     • Does not have health insurance [Z59.8] 07/20/2017     Case management on board.   Patient refused [facility] and wants  to f/u with me.  Awaiting insurance approval today.   If not, will give him drugs on 4 dollar list.       • Hyponatremia [E87.1] 07/18/2017     on salt tabs  Restrict fluid to 1.8 L/day       • Pleural effusion [J90] 07/10/2017     Worsening on the right side  Continue lasix prn       • Ascites due to alcoholic cirrhosis [K70.31] 07/07/2017     -patient had a CT scan of the abdomen and pelvis performed which showed a large fluid collection in his abdomen as well as right sided pleural effusion, patient underwent a ultrasound guided paracentesis which they took out 3.9 liters of fluid  -will give patient dose of albumin IV     • Lower abdominal pain [R10.30] 07/07/2017     -secondary to ascites from hep. C as well as alcoholic cirrhosis, patient is afebrile     • Tobacco dependence [F17.200] 07/07/2017     -admits to smoking a PPD, will give 21mg nicotine patch     • Chronic hepatitis C without hepatic coma [B18.2] 02/16/2017     -has been followed by Dr. Montalvo, will continue his home medication of zovirax     • Multiple myeloma, stage 3 [C90.00] 01/20/2017     -has a right sided port in, has been followed by Dr. Mendoza of heme-oncology. However, treatment on hold as patient does not have any insurance, will monitor   CT scan of head showed several punched-out lesions in right and left frontal brain, no acute changes     • End stage liver disease [K72.90] 08/31/2016     -secondary to chronic hepatitis C, is being followed by Dr. Montalvo as an outpatient, will continue to monitor      • Depression [F32.9] 08/31/2016     -continue home medication of zoloft     • Hyperlipidemia [E78.5] 08/31/2016     -continue atorvastatin     • Low back pain with sciatica [M54.40] 08/31/2016     -home neurontin        Resolved Hospital Problems    Diagnosis Date Noted Date Resolved   No resolved problems to display.           DVT prophylaxis: scd/teds  Code status is Full Code    Plan for disposition:Where: home health  Time: 20  min    SIGNATURE     This document has been electronically signed by Ara Bach MD on July 20, 2017 7:54 AM    Ara Bach MD, RODOLFO  [address] [address] [phone]

## 2017-07-20 NOTE — PLAN OF CARE
Problem: Fall Risk (Adult)  Goal: Absence of Falls  Outcome: Ongoing (interventions implemented as appropriate)    Problem: Pressure Ulcer Risk (Garret Scale) (Adult,Obstetrics,Pediatric)  Goal: Skin Integrity  Outcome: Ongoing (interventions implemented as appropriate)

## 2017-07-20 NOTE — PLAN OF CARE
Problem: Patient Care Overview (Adult)  Goal: Plan of Care Review  Outcome: Ongoing (interventions implemented as appropriate)    07/20/17 0455   Coping/Psychosocial Response Interventions   Plan Of Care Reviewed With patient   Patient Care Overview   Progress no change   Outcome Evaluation   Outcome Summary/Follow up Plan Patient has had C/O abd pain relived somewhat with pain medication. Pt had a bowel movement and has had no C/O N/V.          Problem: Fall Risk (Adult)  Goal: Absence of Falls  Outcome: Ongoing (interventions implemented as appropriate)    Problem: Nutrition, Imbalanced: Inadequate Oral Intake (Adult)  Goal: Improved Oral Intake  Outcome: Ongoing (interventions implemented as appropriate)  Goal: Prevent Further Weight Loss  Outcome: Ongoing (interventions implemented as appropriate)    Problem: Bowel Disease, Inflammatory (Adult)  Goal: Signs and Symptoms of Listed Potential Problems Will be Absent or Manageable (Bowel Disease, Inflammatory)  Outcome: Ongoing (interventions implemented as appropriate)    Problem: Pressure Ulcer Risk (Garret Scale) (Adult,Obstetrics,Pediatric)  Goal: Skin Integrity  Outcome: Ongoing (interventions implemented as appropriate)

## 2017-07-20 NOTE — PRE-PROCEDURE NOTE
Patient for right sided CT directed thoracentesis. Procedure, risks , and benefits discussed with patient and patient  gave informed consent.  H&P dated 7/18/2017 reviewed with no changes.

## 2017-07-20 NOTE — PROGRESS NOTES
FAMILY MEDICINE PROGRESS NOTE  NAME: Mickey Lind  : 1955  MRN: [id number]     LOS: 2 days   Full Code  PROVIDER OF SERVICE:     Chief Complaint:  Infectious colitis    Subjective     Interval History:  History taken from: patient  Subjective: Patient is up in the chair.  He is waiting for his thoracentesis. Complains of lower extremity swelling and abdominal swelling.      Review of Systems:   Review of Systems   Constitutional: Negative for activity change, appetite change, fatigue and fever.   HENT: Negative for ear pain and sore throat.    Eyes: Negative for pain and visual disturbance.   Respiratory: Positive for shortness of breath. Negative for cough.    Cardiovascular: Positive for leg swelling. Negative for chest pain and palpitations.   Gastrointestinal: Positive for abdominal distention. Negative for abdominal pain and nausea.   Endocrine: Negative for cold intolerance and heat intolerance.   Genitourinary: Negative for difficulty urinating and dysuria.   Musculoskeletal: Negative for arthralgias and gait problem.   Skin: Negative for color change and rash.   Neurological: Negative for dizziness, weakness and headaches.   Hematological: Negative for adenopathy. Does not bruise/bleed easily.   Psychiatric/Behavioral: Negative for agitation, confusion and sleep disturbance.       Objective     Vital Signs  Temp:  [97.3 °F (36.3 °C)-98 °F (36.7 °C)] 97.5 °F (36.4 °C)  Heart Rate:  [72-89] 74  Resp:  [18-20] 18  BP: (113-137)/(63-76) 137/75    Physical Exam  Physical Exam   Constitutional: He is oriented to person, place, and time. He appears well-developed and well-nourished.   HENT:   Head: Normocephalic and atraumatic.   Right Ear: External ear normal.   Left Ear: External ear normal.   Nose: Nose normal.   Mouth/Throat: Oropharynx is clear and moist.   Eyes: Conjunctivae and EOM are normal. Pupils are equal, round, and reactive to light.   Neck: Normal range of motion.   Cardiovascular:  Normal rate, regular rhythm, normal heart sounds and intact distal pulses.    Pulmonary/Chest: Effort normal and breath sounds normal.   Abdominal: Soft. Bowel sounds are normal. He exhibits distension. There is tenderness (generalized).   Musculoskeletal: Normal range of motion.   Neurological: He is alert and oriented to person, place, and time.   Skin: Skin is warm and dry.   Psychiatric: He has a normal mood and affect. His behavior is normal. Judgment and thought content normal.       Medication Review    Current Facility-Administered Medications:   •  acetaminophen (TYLENOL) tablet 650 mg, 650 mg, Oral, Q4H PRN, Gaurang Gallegos MD, 650 mg at 07/19/17 2032  •  acyclovir (ZOVIRAX) tablet 400 mg, 400 mg, Oral, Daily, Gaurang Gallgeos MD, 400 mg at 07/19/17 1006  •  atorvastatin (LIPITOR) tablet 10 mg, 10 mg, Oral, Daily, Gaurang Gallegos MD, 10 mg at 07/19/17 1006  •  bisacodyl (DULCOLAX) EC tablet 5 mg, 5 mg, Oral, Daily PRN, Gaurang Gallegos MD, 5 mg at 07/19/17 0516  •  bisacodyl (DULCOLAX) suppository 10 mg, 10 mg, Rectal, Daily PRN, Gaurang Gallegos MD  •  famotidine (PEPCID) tablet 20 mg, 20 mg, Oral, BID, Gaurang Gallegos MD, 20 mg at 07/19/17 1830  •  furosemide (LASIX) injection 40 mg, 40 mg, Intravenous, Once, Ara Bach MD  •  furosemide (LASIX) tablet 40 mg, 40 mg, Oral, Daily, Gaurang Gallegos MD, 40 mg at 07/19/17 1007  •  gabapentin (NEURONTIN) capsule 400 mg, 400 mg, Oral, TID, Gaurang Gallegos MD, 400 mg at 07/19/17 2019  •  HYDROcodone-acetaminophen (NORCO) 5-325 MG per tablet 1 tablet, 1 tablet, Oral, Q8H PRN, Ara Bach MD, 1 tablet at 07/20/17 0016  •  levoFLOXacin (LEVAQUIN) tablet 750 mg, 750 mg, Oral, Q24H, Ara Bach MD, 750 mg at 07/19/17 1008  •  magic butt ointment, , Topical, BID, Carolee Leo MD  •  metroNIDAZOLE (FLAGYL) tablet 500 mg, 500 mg, Oral, Q8H, Ara Bach MD, 500 mg at 07/20/17 0554  •  nicotine (NICODERM CQ) 21 MG/24HR patch 1 patch, 1 patch, Transdermal, Q24H, Gaurang Gallegos MD,  1 patch at 07/20/17 0554  •  nystatin-triamcinolone (MYCOLOG) 987503-7.1 UNIT/GM-% ointment, , Topical, Q12H, Carolee Leo MD  •  ondansetron (ZOFRAN) injection 4 mg, 4 mg, Intravenous, Q6H PRN, Gaurang Gallegos MD, 4 mg at 07/19/17 1047  •  polyethylene glycol (MIRALAX) powder 17 g, 17 g, Oral, Daily, Gaurang Gallegos MD  •  prochlorperazine (COMPAZINE) tablet 10 mg, 10 mg, Oral, Q6H PRN, Gaurang Gallegos MD  •  sertraline (ZOLOFT) tablet 200 mg, 200 mg, Oral, Daily, Gaurang Gallegos MD, 200 mg at 07/19/17 1006  •  sodium chloride 0.9 % flush 1-10 mL, 1-10 mL, Intravenous, PRN, Gaurang Gallegos MD  •  sodium chloride 0.9 % flush 10 mL, 10 mL, Intravenous, PRN, Samuel Barragan MD, 10 mL at 07/18/17 1457  •  sodium chloride tablet 2 g, 2 g, Oral, BID With Meals, Ara Bach MD, 2 g at 07/19/17 1830  •  spironolactone (ALDACTONE) tablet 50 mg, 50 mg, Oral, BID, Gaurang Gallegos MD, 50 mg at 07/19/17 1830  •  sucralfate (CARAFATE) 1 GM/10ML suspension 1 g, 1 g, Oral, Daily, Gaurang Gallegos MD, 1 g at 07/19/17 1010     Diagnostic Data      I reviewed the patient's new clinical results and imaging.      Assessment/Plan     Active Hospital Problems (** Indicates Principal Problem)    Diagnosis Date Noted   • **Infectious colitis [A09] 07/18/2017     -will transition patient on levaquin and flagyl PO  -advance diet, currently on liquid diet  - will trend a CRP, lactic acid, and WBC count   -pain management norco 5/325 mg q8h     • Does not have health insurance [Z59.8] 07/20/2017     Case management on board.   [facility] and [facility]u with me.  Awaiting insurance approval today.   If not, will give him drugs on 4 dollar list.       • Hyponatremia [E87.1] 07/18/2017     on salt tabs  Restrict fluid to 1.8 L/day       • Pleural effusion [J90] 07/10/2017     Worsening on the right side  Continue lasix prn       • Ascites due to alcoholic cirrhosis [K70.31] 07/07/2017     -patient had a CT scan of the abdomen and  pelvis performed which showed a large fluid collection in his abdomen as well as right sided pleural effusion, patient underwent a ultrasound guided paracentesis which they took out 3.9 liters of fluid  -will give patient dose of albumin IV     • Lower abdominal pain [R10.30] 07/07/2017     -secondary to ascites from hep. C as well as alcoholic cirrhosis, patient is afebrile     • Tobacco dependence [F17.200] 07/07/2017     -admits to smoking a PPD, will give 21mg nicotine patch     • Chronic hepatitis C without hepatic coma [B18.2] 02/16/2017     -has been followed by Dr. Montalvo, will continue his home medication of zovirax     • Multiple myeloma, stage 3 [C90.00] 01/20/2017     -has a right sided port in, has been followed by Dr. Mendoza of heme-oncology. However, treatment on hold as patient does not have any insurance, will monitor   CT scan of head showed several punched-out lesions in right and left frontal brain, no acute changes     • End stage liver disease [K72.90] 08/31/2016     -secondary to chronic hepatitis C, is being followed by Dr. Montalvo as an outpatient, will continue to monitor      • Depression [F32.9] 08/31/2016     -continue home medication of zoloft     • Hyperlipidemia [E78.5] 08/31/2016     -continue atorvastatin     • Low back pain with sciatica [M54.40] 08/31/2016     -home neurontin        Resolved Hospital Problems    Diagnosis Date Noted Date Resolved   No resolved problems to display.       I have seen the patient.  I have reviewed the notes, assessments, and/or procedures performed by Ara Bach MD , I concur with her/his documentation and assessment and plan for Mickey Lind.          This document has been electronically signed by Radha Zayas MD on July 20, 2017 8:33 AM

## 2017-07-20 NOTE — PROGRESS NOTES
MMSE done today and is 29. Documented filed on chart by the nurses.          This document has been electronically signed by Ara Bach MD on July 20, 2017 8:51 AM    Ara Bach MD, RODOLFO  [address] [address] [phone]

## 2017-07-21 NOTE — PLAN OF CARE
Problem: Bowel Disease, Inflammatory (Adult)  Goal: Signs and Symptoms of Listed Potential Problems Will be Absent or Manageable (Bowel Disease, Inflammatory)  Outcome: Outcome(s) achieved Date Met:  07/21/17

## 2017-07-21 NOTE — PLAN OF CARE
Problem: Patient Care Overview (Adult)  Goal: Adult Individualization and Mutuality  Outcome: Outcome(s) achieved Date Met:  07/21/17

## 2017-07-21 NOTE — PLAN OF CARE
Problem: Patient Care Overview (Adult)  Goal: Discharge Needs Assessment  Outcome: Outcome(s) achieved Date Met:  07/21/17

## 2017-07-21 NOTE — PLAN OF CARE
Problem: Nutrition, Imbalanced: Inadequate Oral Intake (Adult)  Goal: Improved Oral Intake  Outcome: Outcome(s) achieved Date Met:  07/21/17

## 2017-07-21 NOTE — PLAN OF CARE
Problem: Fall Risk (Adult)  Goal: Absence of Falls  Outcome: Outcome(s) achieved Date Met:  07/21/17

## 2017-07-21 NOTE — PLAN OF CARE
Problem: Nutrition, Imbalanced: Inadequate Oral Intake (Adult)  Goal: Prevent Further Weight Loss  Outcome: Outcome(s) achieved Date Met:  07/21/17

## 2017-07-21 NOTE — PLAN OF CARE
Problem: Pressure Ulcer Risk (Garret Scale) (Adult,Obstetrics,Pediatric)  Goal: Skin Integrity  Outcome: Outcome(s) achieved Date Met:  07/21/17

## 2017-07-21 NOTE — DISCHARGE SUMMARY
FAMILY MEDICINE DISCHARGE SUMMARY       NAME: Mickey Lind   PHYSICIAN: Ara Bach MD  : 1955  MRN: [id number]    ADMITTED: 2017   DISCHARGED: 17      ADMISSION DIAGNOSES     Present on Admission:  • Infectious colitis  • End stage liver disease  • Depression  • Hyperlipidemia  • Low back pain with sciatica  • Ascites due to alcoholic cirrhosis  • Lower abdominal pain  • Tobacco dependence  • Pleural effusion  • Multiple myeloma, stage 3  • Chronic hepatitis C without hepatic coma  • Hyponatremia secondary to SIADH      DISCHARGE DIAGNOSES     Principal Problem:    Infectious colitis  Active Problems:    End stage liver disease    Depression    Low back pain with sciatica    Hyperlipidemia    Multiple myeloma, stage 3    Chronic hepatitis C without hepatic coma    Lower abdominal pain    Ascites due to alcoholic cirrhosis    Tobacco dependence    Pleural effusion    Hyponatremia secondary to SIADH    Does not have health insurance      SERVICE     Family Medicine.   Attending Dr. Zayas  Resident Ara Bach MD    CONSULTS     Consult Orders (all)     Start     Ordered    17 0857  Inpatient Consult to Case Management   Once     Comments:  Patient mentions that he will get insurance this thursday   Provider:  (Not yet assigned)    17 0856    17 0417  Inpatient Consult to Nutrition  Once     Provider:  (Not yet assigned)    17 0416          PROCEDURES     Thoracentesis with 3L extraction from right side    HISTORY OF PRESENT ILLNESS:     Mickey Lind is a 61 y.o. male who presents with abdominal pain, recently discharged from the hospital due to his ascites, pleural effusion, and abdominal pain. Patient has a history of liver cirrhosis, chronic hepatitis C, asthma, alcoholism, low back pain, hyperlipidemia, depression, inguinal hernia, and tobacco abuse. Patient states that for the past four days  since his release from the hospital he has been experiencing diffuse abdominal pain. He describes it as aching and it does not radiate. It is rated at 8/10 at this time. It is constant in timing and has only gotten worse since onset. Patient has continued eating food at baseline and denies any nausea, vomiting, blood in stool, constipation, fever, or diarrhea. Patient has no other complaints or concerns.    DIAGNOSTIC DATA     CT of abdomen  IMPRESSION:  1. New diffuse colonic bowel wall thickening suggesting a diffuse  colitis most likely an infectious colitis as above.  2. Some improvement in right pleural effusion.  3. Large amount of ascites throughout the abdomen and pelvis.  Changes of cirrhosis with evidence of portal hypertension are  again noted.  4. Right inguinal hernia containing nonobstructed small bowel and  some ascites.    Results for AHSAN JONES (MRN [id number]) as of 7/21/2017 07:43   7/21/2017 06:01   Glucose 107 (H)   Sodium 130 (L)   Potassium 3.9   CO2 25.0   Chloride 100   Anion Gap 5.0   Creatinine 0.57 (L)   BUN 17   BUN/Creatinine Ratio 29.8 (H)   Calcium 7.7 (L)   eGFR Non African Amer 145 (H)   Alkaline Phosphatase 151 (H)   Total Protein 5.2 (L)   ALT (SGPT) 44   AST (SGOT) 36   Total Bilirubin 0.3   Albumin 2.50 (L)   Globulin 2.7   A/G Ratio 0.9 (L)      Results for AHSAN JONES (MRN [id number]) as of 7/21/2017 07:43   7/21/2017 05:53   WBC 8.50   RBC 3.49 (L)   Hemoglobin 10.5 (L)   Hematocrit 31.2 (L)   RDW 17.0 (H)   MCV 89.4   MCH 30.1   MCHC 33.7   MPV 11.3   Platelets 117 (L)   RDW-SD 55.9 (H)       Results for AHSAN JONES (MRN [id number]) as of 7/21/2017 07:43   7/20/2017 11:39   Osmolality, Urine 525   Sodium, Urine 161 (H)     CT of head without contrast  IMPRESSION:  CONCLUSION: 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  posterior branches right middle cerebral artery. Involutional,  atrophic changes.     Several tiny punched-out bony lesions in the  right right and left  frontal bone compatible with involvement of myeloma.     Otherwise unremarkable CT brain without contrast. No acute  changes are present.    CXR:  IMPRESSION:  1.  Worsening right pleural effusion with moderate to large right  pleural effusion.  2.  Right lung base patchy opacities suspicious for passive  atelectasis and/or pneumonia.    CT guided thoracentesis    IMPRESSION:  1.  Successful right side CT thoracentesis with removal of 3 L of  yellowish pleural effusion.  Results for HUSTAHSAN (MRN [id number]) as of 7/21/2017 07:43  Bodily fluid 7/20/2017 14:59   Lactate Dehydrogenase (LD), Fluid 289   Protein, Total, Fluid <2.0       HOSPITAL COURSE     Patient was admitted to our facility for new onset abdominal pain. CT of the abdomen revealed infectious colitis. He was started on levaquin and flagyl. He was given zofran for nausea. In the subsequent days on admission patient's sodium was low. Urine studies revealed concern for SIADH. Given the history of multiple myeloma stage 3 cancer, chest xray and CT of head obtained to rule out metastasis. The findings are shown above. After consulting interventional radiology, Dr. Garcia had agreed to do CT guided thoracentesis in order to improve his breathing. 3L was removed from his right side lungs on 7/20/2017. Patient tolerated the procedure well. On 7/21/2017, patient is medically stable and has walked over 300 ft with maintaining his oxygen > 90%. He mentioned that his insurance will start on the 25th of this month. We will restart his other medications at that time. I advised him to take the medications that he can afford, which includes his antibiotics, statin and his BP meds. He is agreeable to this. I will follow-up with him in the office to set him up with the [facility]'s center to continue his chemotherapy. Patient to follow-up with me in 1 week. He voiced understanding to all the above. Upon being medically stable, patient is being  discharged from our facility.     Below is today's assessment    Review of Systems   Constitutional: Positive for fatigue. Negative for activity change, appetite change and fever.   HENT: Negative for ear pain and sore throat.    Eyes: Negative for pain and visual disturbance.   Respiratory: Negative for cough and shortness of breath.    Cardiovascular: Negative for chest pain and palpitations.   Gastrointestinal: Negative for abdominal pain and nausea.   Endocrine: Negative for cold intolerance and heat intolerance.   Genitourinary: Negative for difficulty urinating and dysuria.   Musculoskeletal: Negative for arthralgias and gait problem.   Skin: Negative for color change and rash.   Neurological: Positive for weakness. Negative for dizziness and headaches.   Hematological: Negative for adenopathy. Does not bruise/bleed easily.   Psychiatric/Behavioral: Negative for agitation, confusion and sleep disturbance.     Below is the physical exam from today.    Physical Exam   Constitutional: He is oriented to person, place, and time. He appears well-developed and well-nourished.   HENT:   Head: Normocephalic and atraumatic.   Neck: Normal range of motion. Neck supple.   Cardiovascular: Normal rate, regular rhythm and normal heart sounds.    Pulmonary/Chest: Effort normal and breath sounds normal.   Abdominal: Soft. Bowel sounds are normal. He exhibits no distension. There is no tenderness. There is no rebound.   Musculoskeletal: Normal range of motion.   Neurological: He is alert and oriented to person, place, and time.   Skin: Skin is warm.   Psychiatric: He has a normal mood and affect. His behavior is normal. Judgment and thought content normal.   Nursing note and vitals reviewed.        DISCHARGE CONDITION     Stable    DISPOSITION     home with home health    DISCHARGE MEDICATION        Your medication list      START taking these medications       Instructions Last Dose Given Next Dose Due    ciprofloxacin 750 MG  tablet   Commonly known as:  CIPRO        Take 1 tablet by mouth 2 (Two) Times a Day for 6 days.         metroNIDAZOLE 500 MG tablet   Commonly known as:  FLAGYL        Take 1 tablet by mouth 3 (Three) Times a Day for 6 days.           CONTINUE taking these medications       Instructions Last Dose Given Next Dose Due    atorvastatin 10 MG tablet   Commonly known as:  LIPITOR        Take 1 tablet by mouth Daily.         furosemide 40 MG tablet   Commonly known as:  LASIX        Take 1 tablet by mouth Daily.         GNP ACID REDUCER 75 MG tablet   Generic drug:  raNITIdine              polyethylene glycol powder   Commonly known as:  MIRALAX        Take 17 g by mouth Daily.         prochlorperazine 10 MG tablet   Commonly known as:  COMPAZINE        Take 1 tablet by mouth Every 6 (Six) Hours As Needed for nausea or vomiting.         sertraline 100 MG tablet   Commonly known as:  ZOLOFT        Take 2 tablets by mouth Daily.         spironolactone 50 MG tablet   Commonly known as:  ALDACTONE        Take 1 tablet by mouth 2 (Two) Times a Day for 30 days.         sucralfate 1 GM/10ML suspension   Commonly known as:  CARAFATE        Take 10 mL by mouth Daily.           STOP taking these medications          acyclovir 400 MG tablet   Commonly known as:  ZOVIRAX           dexamethasone 4 MG tablet   Commonly known as:  DECADRON           gabapentin 400 MG capsule   Commonly known as:  NEURONTIN           levoFLOXacin 750 MG tablet   Commonly known as:  LEVAQUIN                Where to Get Your Medications      These medications were sent to [facility] - [place], [place] - [address] [place] [address] - [phone]  - [phone] [address] [address]     Phone:  [phone]    • ciprofloxacin 750 MG tablet   • metroNIDAZOLE 500 MG tablet             INSTRUCTIONS:     Activity Instructions     Activity as Tolerated                   Diet Instructions     Advance Diet As Tolerated                      Special instructions: Patient instructed to call MD or return to ED with worsening shortness of breath, chest pain, fever greater than 100.4 degrees F or any other medical concerns..    FOLLOW UP     Additional Instructions for the Follow-ups that You Need to Schedule     Referral to Home Health    As directed    Face to Face Visit Date:  7/21/2017   Follow-up Provider for Plan of Care?:  I will be treating the patient on an ongoing basis.  Please send me the Plan of Care for signature.   Follow-up Provider:  ARA BACH   Reason/Clinical Findings:  deconditioning   Describe mobility limitations that make leaving home difficult:  deconditioning   Nursing/Therapeutic Services Requested:   Skilled Nursing  Physical Therapy  Occupational Therapy      Skilled nursing orders:   Medication education  Wound care dressing/changes      Instructions:  keep wound on the back s/p thoracentesis clean and dry   PT orders:   Gait Training  Therapeutic exercise  Strengthening  Home safety assessment      Weight Bearing Status:  As Tolerated   Occupational orders:   Activities of daily living  Strengthening  Cognition  Fine motor  Home safety assessment      Frequency:  1 Week 1             Follow-up Information     Follow up with [facility] .    Specialty:  Home Health Services    Contact information:    [address]  [place] [address]          Follow up with [facility] .    Specialty:  Home Health Services    Contact information:    [address] [facility] [address] [phone]          Time: Discharge 30 min    Dr. Zayas is the attending at time of discharge, she is aware of the patient's status and agrees with the above discharge summary.    SIGNATURE     This document has been electronically signed by Ara Bach MD on July 21, 2017 8:28 AM    Ara Bach MD, RODOLFO  [facility]  [address] [place] [address] [phone]

## 2017-07-21 NOTE — PROGRESS NOTES
FAMILY MEDICINE PROGRESS NOTE  NAME: Mickey Lind  : 1955  MRN: [id number]     LOS: 3 days   Full Code  PROVIDER OF SERVICE:     Chief Complaint:  Infectious colitis    Subjective     Interval History:  History taken from: patient  Subjective: Patient is sleeping in his bed. He is easily arousable. He is asking if he can go home today.     Review of Systems:   Review of Systems   Constitutional: Negative for activity change, appetite change, fatigue and fever.   HENT: Negative for ear pain and sore throat.    Eyes: Negative for pain and visual disturbance.   Respiratory: Negative for cough and shortness of breath.    Cardiovascular: Positive for leg swelling. Negative for chest pain and palpitations.   Gastrointestinal: Positive for abdominal distention. Negative for abdominal pain and nausea.   Endocrine: Negative for cold intolerance and heat intolerance.   Genitourinary: Negative for difficulty urinating and dysuria.   Musculoskeletal: Negative for arthralgias and gait problem.   Skin: Negative for color change and rash.   Neurological: Negative for dizziness, weakness and headaches.   Hematological: Negative for adenopathy. Does not bruise/bleed easily.   Psychiatric/Behavioral: Negative for agitation, confusion and sleep disturbance.       Objective     Vital Signs  Temp:  [97.9 °F (36.6 °C)-99 °F (37.2 °C)] 98.4 °F (36.9 °C)  Heart Rate:  [73-93] 81  Resp:  [18-19] 18  BP: (100-134)/(51-70) 134/70    Physical Exam  Physical Exam   Constitutional: He is oriented to person, place, and time. He appears well-developed and well-nourished.   HENT:   Head: Normocephalic and atraumatic.   Right Ear: External ear normal.   Left Ear: External ear normal.   Nose: Nose normal.   Mouth/Throat: Oropharynx is clear and moist.   Eyes: Conjunctivae and EOM are normal. Pupils are equal, round, and reactive to light.   Neck: Normal range of motion.   Cardiovascular: Normal rate, regular rhythm, normal heart sounds  and intact distal pulses.    Pulmonary/Chest: Effort normal. He has wheezes.   Abdominal: Soft. Bowel sounds are normal. He exhibits distension. There is tenderness (generalized).   Musculoskeletal: Normal range of motion.   Neurological: He is alert and oriented to person, place, and time.   Skin: Skin is warm and dry.   Psychiatric: He has a normal mood and affect. His behavior is normal. Judgment and thought content normal.       Medication Review    Current Facility-Administered Medications:   •  acetaminophen (TYLENOL) tablet 650 mg, 650 mg, Oral, Q4H PRN, Gaurang Gallegos MD, 650 mg at 07/19/17 2032  •  acyclovir (ZOVIRAX) tablet 400 mg, 400 mg, Oral, Daily, Gaurang Gallegos MD, 400 mg at 07/21/17 0827  •  atorvastatin (LIPITOR) tablet 10 mg, 10 mg, Oral, Daily, Gaurang Gallegos MD, 10 mg at 07/21/17 0827  •  bisacodyl (DULCOLAX) EC tablet 5 mg, 5 mg, Oral, Daily PRN, Gaurang Gallegos MD, 5 mg at 07/19/17 0516  •  bisacodyl (DULCOLAX) suppository 10 mg, 10 mg, Rectal, Daily PRN, Gaurang Gallegos MD  •  famotidine (PEPCID) tablet 20 mg, 20 mg, Oral, BID, Gaurang Gallegos MD, 20 mg at 07/21/17 0827  •  furosemide (LASIX) injection 40 mg, 40 mg, Intravenous, Once, Ara Bach MD  •  furosemide (LASIX) tablet 40 mg, 40 mg, Oral, Daily, Gauragn Gallegos MD, 40 mg at 07/21/17 0827  •  gabapentin (NEURONTIN) capsule 400 mg, 400 mg, Oral, TID, Gaurang Gallegos MD, 400 mg at 07/21/17 0827  •  HYDROcodone-acetaminophen (NORCO) 5-325 MG per tablet 1 tablet, 1 tablet, Oral, Q8H PRN, Ara Bach MD, 1 tablet at 07/21/17 0409  •  levoFLOXacin (LEVAQUIN) tablet 750 mg, 750 mg, Oral, Q24H, Ara Bach MD, 750 mg at 07/21/17 0827  •  magic butt ointment, , Topical, BID, Carolee Leo MD  •  metroNIDAZOLE (FLAGYL) tablet 500 mg, 500 mg, Oral, Q8H, Ara Bach MD, 500 mg at 07/21/17 0546  •  nicotine (NICODERM CQ) 21 MG/24HR patch 1 patch, 1 patch, Transdermal, Q24H, Gaurang Gallegos MD, 1 patch at 07/21/17 0546  •  nystatin-triamcinolone  (MYCOLOG) 865271-5.1 UNIT/GM-% ointment, , Topical, Q12H, Carolee Leo MD  •  ondansetron (ZOFRAN) injection 4 mg, 4 mg, Intravenous, Q6H PRN, Gaurang Gallegos MD, 4 mg at 07/19/17 1047  •  polyethylene glycol (MIRALAX) powder 17 g, 17 g, Oral, Daily, Gaurang Gallegos MD  •  prochlorperazine (COMPAZINE) tablet 10 mg, 10 mg, Oral, Q6H PRN, Gaurang Gallegos MD  •  sertraline (ZOLOFT) tablet 200 mg, 200 mg, Oral, Daily, Gaurang Gallegos MD, 200 mg at 07/21/17 0827  •  sodium chloride 0.9 % flush 1-10 mL, 1-10 mL, Intravenous, PRN, Gaurang Gallegos MD  •  sodium chloride 0.9 % flush 10 mL, 10 mL, Intravenous, PRN, Samuel Barragan MD, 10 mL at 07/18/17 1457  •  sodium chloride tablet 2 g, 2 g, Oral, BID With Meals, Ara Bach MD, 2 g at 07/21/17 0827  •  spironolactone (ALDACTONE) tablet 50 mg, 50 mg, Oral, BID, Gaurang Gallegos MD, 50 mg at 07/21/17 0827  •  sucralfate (CARAFATE) 1 GM/10ML suspension 1 g, 1 g, Oral, Daily, Gaurang Gallegos MD, 1 g at 07/21/17 0827     Diagnostic Data      I reviewed the patient's new clinical results and imaging.      Assessment/Plan     Active Hospital Problems (** Indicates Principal Problem)    Diagnosis Date Noted   • **Infectious colitis [A09] 07/18/2017     -will transition patient on levaquin and flagyl PO  -advance diet, currently on liquid diet  - will trend a CRP, lactic acid, and WBC count   -pain management norco 5/325 mg q8h     • Does not have health insurance [Z59.8] 07/20/2017     Case management on board.   [facility] refused [facility] and wants to f/u with me.  Awaiting insurance approval today.   If not, will give him drugs on 4 dollar list.       • Hyponatremia [E87.1] 07/18/2017     on salt tabs  Restrict fluid to 1.8 L/day       • Pleural effusion [J90] 07/10/2017     Worsening on the right side  Continue lasix prn       • Ascites due to alcoholic cirrhosis [K70.31] 07/07/2017     -patient had a CT scan of the abdomen and pelvis performed which showed a large fluid collection  in his abdomen as well as right sided pleural effusion, patient underwent a ultrasound guided paracentesis which they took out 3.9 liters of fluid  -will give patient dose of albumin IV     • Lower abdominal pain [R10.30] 07/07/2017     -secondary to ascites from hep. C as well as alcoholic cirrhosis, patient is afebrile     • Tobacco dependence [F17.200] 07/07/2017     -admits to smoking a PPD, will give 21mg nicotine patch     • Chronic hepatitis C without hepatic coma [B18.2] 02/16/2017     -has been followed by Dr. Montalvo, will continue his home medication of zovirax     • Multiple myeloma, stage 3 [C90.00] 01/20/2017     -has a right sided port in, has been followed by Dr. Mendoza of heme-oncology. However, treatment on hold as patient does not have any insurance, will monitor   CT scan of head showed several punched-out lesions in right and left frontal brain, no acute changes     • End stage liver disease [K72.90] 08/31/2016     -secondary to chronic hepatitis C, is being followed by Dr. Montalvo as an outpatient, will continue to monitor      • Depression [F32.9] 08/31/2016     -continue home medication of zoloft     • Hyperlipidemia [E78.5] 08/31/2016     -continue atorvastatin     • Low back pain with sciatica [M54.40] 08/31/2016     -home neurontin        Resolved Hospital Problems    Diagnosis Date Noted Date Resolved   No resolved problems to display.       I have seen the patient.  I have reviewed the notes, assessments, and/or procedures performed by Ara Bach MD , I concur with her/his documentation and assessment and plan for Mickey Lind.          This document has been electronically signed by Radha Zayas MD on July 21, 2017 10:38 AM

## 2017-07-21 NOTE — PLAN OF CARE
Problem: Patient Care Overview (Adult)  Goal: Plan of Care Review  Outcome: Outcome(s) achieved Date Met:  07/21/17

## 2017-07-26 NOTE — PROGRESS NOTES
DATE OF VISIT: 7/26/2017    REASON FOR VISIT:  IgG lambda multiple myeloma on treatment.    HISTORY OF PRESENT ILLNESS:    61-year-old male with a past medical history significant for chronic thrombocytopenia and splenomegaly due to hepatitis C, status posttreatment with Harvoni  by Dr. David was diagnosed with Myeloma on  a bone marrow biopsy by Dr. Mitchell  in December 2016.  Patient started his treatment for multiple myeloma with Velcade and Decadron on January 23, 2017.  Since last chemotherapy was on April 3, 2017 after that he was lost to follow-up secondary to insurance issues.  He is here to reestablish his care.  States he was recently admitted to the hospital for worsening ascites as well as pleural effusion requiring thoracentesis and paracentesis.  Complains of abdominal pain.  Complains of back pain.  Complains of pain in bilateral lower extremity.  Denies any blood in the stool or urine.      PAST MEDICAL HISTORY:    Past Medical History:   Diagnosis Date   • Abdominal pain    • Alcoholic liver damage    • Alcoholic polyneuropathy    • Amblyopia     refractive os   • Anemia    • Ascites    • Ascites    • Asthma    • Astigmatism    • Cellulitis of left lower leg    • Chronic hepatitis C    • Chronic viral hepatitis C    • Cirrhosis of liver    • Claudication    • Encounter for immunization    • Generalized edema    • History of blood transfusion    • History of colon polyps    • Hypermetropia    • Inguinal hernia     - Rih      • Low back pain    • Major depressive disorder     recurrent , moderate   • Multiple myeloma    • Myocardial infarction    • Pain in joint, lower leg    • Peripheral vascular disease    • Recurrent major depressive episodes    • Tinea unguium    • Tobacco use        SOCIAL HISTORY:    Social History   Substance Use Topics   • Smoking status: Current Every Day Smoker     Packs/day: 0.50   • Smokeless tobacco: Never Used      Comment: Smokes approx 1 ppd of Cigarettes.smoking  since last 45 yr   • Alcohol use No      Comment: no alcohol since 7/4/2017       Surgical History :  Past Surgical History:   Procedure Laterality Date   • CARDIAC CATHETERIZATION      (Successful PCI to a totally occluded RCA with a 2.5 x 28 and a 2.5 x 8 mm Xience stent. Nonobstructive disease in the left coronary artery system.)   11/24/2012    • CARDIAC SURGERY     • COLONOSCOPY  10/13/2016   • COLONOSCOPY N/A 3/29/2017    Procedure: COLONOSCOPY;  Surgeon: Jose Daniel David MD;  Location: [facility] ENDOSCOPY;  Service:    • ENDOSCOPY  10/13/2016   • ENDOSCOPY N/A 3/29/2017    Procedure: ESOPHAGOGASTRODUODENOSCOPY (U/S FIRST @0945) ;  Surgeon: Jose Daniel David MD;  Location: [facility] ENDOSCOPY;  Service:    • UPPER GASTROINTESTINAL ENDOSCOPY  10/13/2016       ALLERGIES:    Allergies   Allergen Reactions   • Codeine Other (See Comments)     Childhood, hives   • Other      MRSA COLONIZATION   • Penicillins Swelling       REVIEW OF SYSTEMS:      CONSTITUTIONAL: Positive for  fatigue.  No fever, chills, or night sweats.     HEENT:  No epistaxis, mouth sores, or difficulty swallowing.    RESPIRATORY:  Denies any shortness of breath or cough at present.    CARDIOVASCULAR:  No chest pain or palpitations.    GASTROINTESTINAL:  Complains of chronic generalized abdominal pain. Complains of abdominal distention.  No blood in the stool.  Denies any nausea or vomiting.    GENITOURINARY:  No dysuria or hematuria.    MUSCULOSKELETAL:  Complains of chronic leg pain.  Complains of back pain.    NEUROLOGICAL: Positive chronic tingling and numbness in the lower extremity, denies any recent worsening. No new headache or dizziness.     LYMPHATICS:  Denies any abnormal swollen and anywhere in the body.        PHYSICAL EXAMINATION:      VITAL SIGNS:    /89  Pulse 98  Temp 97.5 °F (36.4 °C)  Resp 24  Wt 147 lb 3.2 oz (66.8 kg)  BMI 21.12 kg/m2    GENERAL:  Not in any distress.     EYES:  Mild pallor. No icterus.    NECK:  No  adenopathy in cervical or supraclavicular area.    RESPIRATORY:  Fair air entry bilaterally. No rhonchi or wheezing.    CARDIOVASCULAR:  S1, S2. Regular rate and rhythm.    ABDOMEN:  Soft, mild tenderness on deep palpation which is unchanged. Bowel sounds present.    EXTREMITIES:  1+ edema bilaterally.  No calf tenderness.    NEUROLOGICAL:  Alert, awake, oriented x3. No motor or sensory deficits.        DIAGNOSTIC DATA:    Glucose   Date Value Ref Range Status   07/21/2017 107 (H) 60 - 100 mg/dL Final     Sodium   Date Value Ref Range Status   07/21/2017 130 (L) 137 - 145 mmol/L Final     Potassium   Date Value Ref Range Status   07/21/2017 3.9 3.5 - 5.1 mmol/L Final     CO2   Date Value Ref Range Status   07/21/2017 25.0 22.0 - 31.0 mmol/L Final     Chloride   Date Value Ref Range Status   07/21/2017 100 95 - 110 mmol/L Final     Anion Gap   Date Value Ref Range Status   07/21/2017 5.0 5.0 - 15.0 mmol/L Final     Creatinine   Date Value Ref Range Status   07/21/2017 0.57 (L) 0.70 - 1.30 mg/dL Final     BUN   Date Value Ref Range Status   07/21/2017 17 7 - 21 mg/dL Final     BUN/Creatinine Ratio   Date Value Ref Range Status   07/21/2017 29.8 (H) 7.0 - 25.0 Final     Calcium   Date Value Ref Range Status   07/21/2017 7.7 (L) 8.4 - 10.2 mg/dL Final     eGFR Non  Amer   Date Value Ref Range Status   07/21/2017 145 (H) 49 - 113 mL/min/1.73 Final     Alkaline Phosphatase   Date Value Ref Range Status   07/21/2017 151 (H) 38 - 126 U/L Final     Total Protein   Date Value Ref Range Status   07/21/2017 5.2 (L) 6.3 - 8.6 g/dL Final     ALT (SGPT)   Date Value Ref Range Status   07/21/2017 44 21 - 72 U/L Final     AST (SGOT)   Date Value Ref Range Status   07/21/2017 36 17 - 59 U/L Final     Total Bilirubin   Date Value Ref Range Status   07/21/2017 0.3 0.2 - 1.3 mg/dL Final     Albumin   Date Value Ref Range Status   07/21/2017 2.50 (L) 3.40 - 4.80 g/dL Final     Globulin   Date Value Ref Range Status   07/21/2017  2.7 2.3 - 3.5 gm/dL Final     A/G Ratio   Date Value Ref Range Status   07/21/2017 0.9 (L) 1.1 - 1.8 g/dL Final     Lab Results   Component Value Date    WBC 8.50 07/21/2017    HGB 10.5 (L) 07/21/2017    HCT 31.2 (L) 07/21/2017    MCV 89.4 07/21/2017     (L) 07/21/2017     Lab Results   Component Value Date    NEUTROABS 6.24 07/21/2017    IRON 95 07/19/2016    TIBC 331 07/19/2016    LABIRON 28.7 07/19/2016    FERRITIN 657.0 (H) 11/11/2016    XGSFOTYP15 636 07/19/2016    FOLATE >20.00 07/19/2016     24-hour urine protein electrophoresis and immunofixation showed:  Component      Latest Ref Rng & Units 1/9/2017 3/7/2017   Total Protein, Urine      Not Estab. mg/dL 499.4 51.4   Protein, 24H Urine      30.0 - 150.0 mg/24 hr 6991.6 (H) 1182.2 (H)   Albumin, U      % 25.6 57.6   Alpha-1-Globulin, U      % 2.9 7.1   Alpha-2-Globulin, U      % 1.0 2.9   Beta Globulin, U      % 3.5 9.5   Gamma Globulin, Urine      % 66.9 22.8   M-Eduardo, % U      Not Observed %  16.7 (H)   M-Eduardo, mg/24 hr      Not Observed mg/24 hr 4544.5 (H) 197 (H)   Immunofixation Result, Urine        Comment   Note        Comment       Free light chain ratio done on the serum on March 9, 2017 showed:  Free Light Chain, Kappa 3.30 - 19.40 mg/L 32.00 (H)   Free Lambda Light Chains 5.71 - 26.30 mg/L 485.00 (H)   Kappa/Lambda Ratio 0.26 - 1.65 0.07 (L)   Resulting Agency  [facility]           Pathology :   Bone marrow biopsy (12/9/16) :  FINAL DIAGNOSIS:  PERIPHERAL SMEAR, REVIEW:       ANEMIA, MODERATE, WITH INADEQUATE RETICULOCYTE RESPONSE.        BONE MARROW ASPIRATION AND BIOPSY FROM LEFT POSTERIOR ILIAC CREST:       PLASMA CELL DYSCRASIA, CONSISTENT WITH MULTIPLE MYELOMA.          ASSESSMENT AND PLAN:        1.  IgG lambda multiple myeloma, ISS stage III.  Patient was diagnosed in December 2016 with a bone marrow biopsy.  Patient was recently started on Velcade and dexamethasone on January 23, 2017.  Patient is here to get cycle 4 day 1 of Velcade.  Patient was also evaluated at [place] bone marrow transplant team, and is not deemed candidate for bone marrow transplant.  And received 4 cycles with Velcade and Decadron last treatment was on April 3, 2017 after that he was lost to follow-up secondary to insurance issue.  Most recent CBC shows hemoglobin is 10.5 and platelet is 117,000.  At this point plan is to repeat myeloma workup with serum protein electrophoresis with immunofixation, a 24-hour urine protein electrophoresis, free light syndrome ratio, beta 2 microglobulin as well as skeletal survey.  We will see him back in about 1 week to go over the result of above.  Patient has evidence of myeloma then we will start him back on Velcade and Decadron which was discussed with patient as well as her his daughter in the room.    2.  Anemia and thrombocytopenia: Most likely secondary to multiple myeloma .  At this point we'll monitored with CBC.    3.  Decompensated liver disease: Patient has a history of hepatitis C recently was admitted to the hospital with decompensated liver cirrhosis with ascites and pleural effusion.  Patient has been followed by Dr. David for same.    4. Health maintenance: Unfortunately patient continues to smoke, patient was counseled about smoking cessation.  He had a colonoscopy last 5 years.  He remains full code.             Rafa Mendoza MD  7/26/2017  4:24 PM

## 2017-07-26 NOTE — PATIENT INSTRUCTIONS
24-Hour Urine Collection  HOW DO I DO A 24-HOUR URINE COLLECTION?  · When you get up in the morning, urinate in the toilet and flush. Write down the time. This will be your start time on the day of collection and your end time on the next morning.  · From then on, collect all of your urine in the plastic jug that is given to you.  · Stop collecting your urine 24 hours after you started.  · If the plastic jug that is given to you already has liquid in it, that is okay. Do not throw out the liquid or rinse out the jug. Some tests need the liquid to be added to your urine.  · Keep your plastic jug cool in an ice chest or keep it in the refrigerator during the test.  · When 24 hours are over, bring your plastic jug to the clinic lab. Keep the jug cool in an ice chest while you are bringing it to the lab.     This information is not intended to replace advice given to you by your health care provider. Make sure you discuss any questions you have with your health care provider.     Document Released: 03/16/2010 Document Revised: 01/08/2016 Document Reviewed: 05/13/2015  [employer] Interactive Patient Education ©2017 [employer]

## 2017-07-26 NOTE — PATIENT INSTRUCTIONS
24-Hour Urine Collection  HOW DO I DO A 24-HOUR URINE COLLECTION?  · When you get up in the morning, urinate in the toilet and flush. Write down the time. This will be your start time on the day of collection and your end time on the next morning.  · From then on, collect all of your urine in the plastic jug that is given to you.  · Stop collecting your urine 24 hours after you started.  · If the plastic jug that is given to you already has liquid in it, that is okay. Do not throw out the liquid or rinse out the jug. Some tests need the liquid to be added to your urine.  · Keep your plastic jug cool in an ice chest or keep it in the refrigerator during the test.  · When 24 hours are over, bring your plastic jug to the clinic lab. Keep the jug cool in an ice chest while you are bringing it to the lab.     This information is not intended to replace advice given to you by your health care provider. Make sure you discuss any questions you have with your health care provider.     Document Released: 03/16/2010 Document Revised: 01/08/2016 Document Reviewed: 05/13/2015  [employer] Interactive Patient Education ©2017 [employer]

## 2017-07-27 NOTE — PATIENT INSTRUCTIONS
Severe arterial occlusive disease bilateral lower extremities. - IMPROVED from previous studies    Restart Statin therapy if OK with PCP for medical management.     Unable to take ASA.     If signs and symptoms of ischemia should occur including but not limited to pale/blue discoloration of limb, increasing pain with ambulation or at rest, or a non-healing wound. Patient is to notify [facility] V[facility] center for immediate evaluation.     Return 6mos -JENSEN

## 2017-07-27 NOTE — PROGRESS NOTES
Subjective   Patient ID: Mickey Lind is a 61 y.o. male is here today for follow-up PVD.    History of Present Illness  The following portions of the patient's history were reviewed and updated as appropriate: allergies, current medications, past family history, past medical history, past social history, past surgical history and problem list.  Peripheral Vascular Disease:  Claudication less than 50 feet, No rest pain, No ischemic tissue loss  PCP: [facility]  61yr old male with PMH significant for chronic severe thrombocytopenia, splenomegaly, cirrhosis, Stg 4 Hep C, alcoholism.  Referred for lifestyle limiting claudication.  Currently not optimal candidate for endovascular intervention secondary to thrombocytopenia and the need for use of antiplatelet medications. No ischemic tissue loss. Now with loss of function of LLE causing falls. Recent dx of Multiple Myeloma stage III, workup for bone marrow transplant per [place].     11/30/15: JENSEN: RIGHT 0.45 Bi/Monophasic (dp/pt) LEFT 0.51 Bi/Mono/Absent (pt)  3/8/16: JENSEN: RIGHT 0.50 Bi/Monophasic (dp/pt) LEFT 0.37 Bi/Mono/Absent (pt)  6/16/16: JENSEN: RIGHT 0.49 Bi/Monophasic (pop-pt) LEFT 0.39 Bi/Mono/Absent (pt)  1/12/17: JENSEN: RIGHT 0.12 Bi/Monophasic (pop-pt) LEFT 0.08 Bi/Mono/Absent (pt)  7/27/17: JENSEN: RIGHT 0.7 Bi/Monophasic (pt/dp) LEFT 0.68 Bi/Monophasic (dp/pt)    Past Medical History:   Diagnosis Date   • Abdominal pain    • Alcoholic liver damage    • Alcoholic polyneuropathy    • Amblyopia     refractive os   • Anemia    • Ascites    • Ascites    • Asthma    • Astigmatism    • Cellulitis of left lower leg    • Chronic hepatitis C    • Chronic viral hepatitis C    • Cirrhosis of liver    • Claudication    • Encounter for immunization    • Generalized edema    • History of blood transfusion    • History of colon polyps    • Hypermetropia    • Inguinal hernia     - Rih      • Low back pain    • Major depressive disorder     recurrent , moderate   • Multiple  myeloma    • Myocardial infarction    • Pain in joint, lower leg    • Peripheral vascular disease    • Recurrent major depressive episodes    • Tinea unguium    • Tobacco use      Past Surgical History:   Procedure Laterality Date   • CARDIAC CATHETERIZATION      (Successful PCI to a totally occluded RCA with a 2.5 x 28 and a 2.5 x 8 mm Xience stent. Nonobstructive disease in the left coronary artery system.)   11/24/2012    • CARDIAC SURGERY     • COLONOSCOPY  10/13/2016   • COLONOSCOPY N/A 3/29/2017    Procedure: COLONOSCOPY;  Surgeon: Jose Daniel David MD;  Location: [facility] ENDOSCOPY;  Service:    • ENDOSCOPY  10/13/2016   • ENDOSCOPY N/A 3/29/2017    Procedure: ESOPHAGOGASTRODUODENOSCOPY (U/S FIRST @0945) ;  Surgeon: Jose Daniel David MD;  Location: [facility] ENDOSCOPY;  Service:    • UPPER GASTROINTESTINAL ENDOSCOPY  10/13/2016         Current Outpatient Prescriptions:   •  ciprofloxacin (CIPRO) 750 MG tablet, Take 1 tablet by mouth 2 (Two) Times a Day for 6 days., Disp: 12 tablet, Rfl: 0  •  furosemide (LASIX) 40 MG tablet, Take 1 tablet by mouth Daily., Disp: 30 tablet, Rfl: 0  •  GNP ACID REDUCER 75 MG tablet, , Disp: , Rfl:   •  metroNIDAZOLE (FLAGYL) 500 MG tablet, Take 1 tablet by mouth 3 (Three) Times a Day for 6 days., Disp: 18 tablet, Rfl: 0  •  polyethylene glycol (MIRALAX) powder, Take 17 g by mouth Daily., Disp: 527 g, Rfl: 1  •  prochlorperazine (COMPAZINE) 10 MG tablet, Take 1 tablet by mouth Every 6 (Six) Hours As Needed for nausea or vomiting., Disp: 60 tablet, Rfl: 1  •  sertraline (ZOLOFT) 100 MG tablet, Take 2 tablets by mouth Daily., Disp: 60 tablet, Rfl: 2  •  spironolactone (ALDACTONE) 50 MG tablet, Take 1 tablet by mouth 2 (Two) Times a Day for 30 days., Disp: 60 tablet, Rfl: 0  •  sucralfate (CARAFATE) 1 GM/10ML suspension, Take 10 mL by mouth Daily., Disp: 30 g, Rfl: 3    Review of Systems   Constitution: Positive for weakness.   Cardiovascular: Positive for claudication.   Respiratory:  Negative for shortness of breath.    Hematologic/Lymphatic: Negative for bleeding problem.   Skin: Positive for color change.   Musculoskeletal: Positive for falls and muscle weakness.   Neurological: Positive for loss of balance and numbness. Negative for paresthesias.   All other systems reviewed and are negative.       Objective    Vitals:    07/27/17 1145   BP: 122/72   Pulse: 83   Resp: 25   Temp: 97.4 °F (36.3 °C)   SpO2: 97%       Physical Exam   Constitutional: He is oriented to person, place, and time. He appears cachectic.   Neck: Carotid bruit is not present.   Cardiovascular: Normal rate.    Pulses:       Dorsalis pedis pulses are 1+ on the right side, and 1+ on the left side.        Posterior tibial pulses are 1+ on the right side, and 0 on the left side.   Pulmonary/Chest: Effort normal.   Abdominal: Soft. He exhibits no distension.   Musculoskeletal: Normal range of motion.   Neurological: He is alert and oriented to person, place, and time. No cranial nerve deficit.   Skin: Skin is warm and dry. There is pallor.   Vitals reviewed.          Assessment/Plan   Independent Review of Radiographic Studies:    Detailed discussion regarding risks, benefits, and treatment plan.  Patient understands, agrees, and wishes to proceed with plan.  Progressing slowly. Currently under evaluation for bone marrow transplant for multiple myeloma.    1. Peripheral vascular disease  Severe peripheral arterial occlusive disease to BLE. NO ischemic tissue loss. Waveforms stable from previous. Will continue to follow if able to receive transplant and respond appropriately will consider to proceed with invasive evaluation at that time.  - Ankle Brachial Index; Future (6mos)  If signs and symptoms of ischemia should occur including but not limited to pale/blue discoloration of limb, increasing pain with ambulation or at rest, or a non-healing wound. Patient is to notify Heart and Vascular center for immediate evaluation.

## 2017-07-31 PROBLEM — R11.0 NAUSEA: Status: ACTIVE | Noted: 2017-01-01

## 2017-08-01 NOTE — PROGRESS NOTES
Adult Outpatient Nutrition  Assessment    Patient Name:  Mickey Lind  YOB: 1955  MRN: [id number]        Assessment Date:  8/1/2017                        Comments: Pt stated stays nauseated all the time. Not on N medication, but was not working when on it.  Weight down to 149.9 lb. Daughter with pt today--plans to purchase supplements. Provided samples   and discount coupons. Goal is nausea control and staying hydrated. Spoke to RN re: unresolved nausea.        Electronically signed by:  Rina Canchola RD  08/01/17 1:29 PM

## 2017-08-01 NOTE — PROGRESS NOTES
DATE OF VISIT: 8/1/2017    REASON FOR VISIT:  IgG lambda multiple myeloma on treatment.    HISTORY OF PRESENT ILLNESS:    61-year-old male with a past medical history significant for chronic thrombocytopenia and splenomegaly due to hepatitis C, status posttreatment with Harvoni  by Dr. David was diagnosed with Myeloma on  a bone marrow biopsy by Dr. Mitchell  in December 2016.  Patient started his treatment for multiple myeloma with Velcade and Decadron on January 23, 2017.  Since last chemotherapy was on April 3, 2017 after that he was lost to follow-up secondary to insurance issues.  Patient had a myeloma workup done recently.  He is here to discuss the result of blood work and further recommendation.  Complains of abdominal pain.  Complains of back pain.  Complains of pain in bilateral lower extremity.  Denies any blood in the stool or urine.      PAST MEDICAL HISTORY:    Past Medical History:   Diagnosis Date   • Abdominal pain    • Alcoholic liver damage    • Alcoholic polyneuropathy    • Amblyopia     refractive os   • Anemia    • Ascites    • Ascites    • Asthma    • Astigmatism    • Cellulitis of left lower leg    • Chronic hepatitis C    • Chronic viral hepatitis C    • Cirrhosis of liver    • Claudication    • Encounter for immunization    • Generalized edema    • History of blood transfusion    • History of colon polyps    • Hypermetropia    • Inguinal hernia     - Rih      • Low back pain    • Major depressive disorder     recurrent , moderate   • Multiple myeloma    • Myocardial infarction    • Pain in joint, lower leg    • Peripheral vascular disease    • Recurrent major depressive episodes    • Tinea unguium    • Tobacco use        SOCIAL HISTORY:    Social History   Substance Use Topics   • Smoking status: Current Every Day Smoker     Packs/day: 0.50   • Smokeless tobacco: Never Used      Comment: Smokes approx 1 ppd of Cigarettes.smoking since last 45 yr   • Alcohol use No      Comment: no alcohol  "since 7/4/2017       Surgical History :  Past Surgical History:   Procedure Laterality Date   • CARDIAC CATHETERIZATION      (Successful PCI to a totally occluded RCA with a 2.5 x 28 and a 2.5 x 8 mm Xience stent. Nonobstructive disease in the left coronary artery system.)   11/24/2012    • CARDIAC SURGERY     • COLONOSCOPY  10/13/2016   • COLONOSCOPY N/A 3/29/2017    Procedure: COLONOSCOPY;  Surgeon: Jose Daniel David MD;  Location: [facility] ENDOSCOPY;  Service:    • ENDOSCOPY  10/13/2016   • ENDOSCOPY N/A 3/29/2017    Procedure: ESOPHAGOGASTRODUODENOSCOPY (U/S FIRST @0945) ;  Surgeon: Jose Daniel David MD;  Location: [facility] ENDOSCOPY;  Service:    • UPPER GASTROINTESTINAL ENDOSCOPY  10/13/2016       ALLERGIES:    Allergies   Allergen Reactions   • Codeine Other (See Comments)     Childhood, hives   • Other      MRSA COLONIZATION   • Penicillins Swelling       REVIEW OF SYSTEMS:      CONSTITUTIONAL: Positive for  fatigue.  No fever, chills, or night sweats.     HEENT:  No epistaxis, mouth sores, or difficulty swallowing.    RESPIRATORY:  Denies any shortness of breath or cough at present.    CARDIOVASCULAR:  No chest pain or palpitations.    GASTROINTESTINAL:  Complains of chronic generalized abdominal pain. Complains of abdominal distention.  No blood in the stool.  Denies any nausea or vomiting.    GENITOURINARY:  No dysuria or hematuria.    MUSCULOSKELETAL:  Complains of chronic leg pain.  Complains of back pain.    NEUROLOGICAL: Positive chronic tingling and numbness in the lower extremity, denies any recent worsening. No new headache or dizziness.     LYMPHATICS:  Denies any abnormal swollen and anywhere in the body.        PHYSICAL EXAMINATION:      VITAL SIGNS:    /92  Pulse 85  Temp 97.4 °F (36.3 °C)  Resp 18  Ht 70\" (177.8 cm)  Wt 149 lb 9.6 oz (67.9 kg)  BMI 21.47 kg/m2    GENERAL:  Not in any distress.     EYES:  Mild pallor. No icterus.    NECK:  No adenopathy in cervical or supraclavicular " area.    RESPIRATORY:  Fair air entry bilaterally. No rhonchi or wheezing.    CARDIOVASCULAR:  S1, S2. Regular rate and rhythm.    ABDOMEN:  Soft, mild tenderness on deep palpation which is unchanged. Bowel sounds present.    EXTREMITIES:  1+ edema bilaterally.  No calf tenderness.    NEUROLOGICAL:  Alert, awake, oriented x3. No motor or sensory deficits.        DIAGNOSTIC DATA:    Glucose   Date Value Ref Range Status   07/21/2017 107 (H) 60 - 100 mg/dL Final     Sodium   Date Value Ref Range Status   07/21/2017 130 (L) 137 - 145 mmol/L Final     Potassium   Date Value Ref Range Status   07/21/2017 3.9 3.5 - 5.1 mmol/L Final     CO2   Date Value Ref Range Status   07/21/2017 25.0 22.0 - 31.0 mmol/L Final     Chloride   Date Value Ref Range Status   07/21/2017 100 95 - 110 mmol/L Final     Anion Gap   Date Value Ref Range Status   07/21/2017 5.0 5.0 - 15.0 mmol/L Final     Creatinine   Date Value Ref Range Status   07/21/2017 0.57 (L) 0.70 - 1.30 mg/dL Final     BUN   Date Value Ref Range Status   07/21/2017 17 7 - 21 mg/dL Final     BUN/Creatinine Ratio   Date Value Ref Range Status   07/21/2017 29.8 (H) 7.0 - 25.0 Final     Calcium   Date Value Ref Range Status   07/21/2017 7.7 (L) 8.4 - 10.2 mg/dL Final     eGFR Non  Amer   Date Value Ref Range Status   07/21/2017 145 (H) 49 - 113 mL/min/1.73 Final     Alkaline Phosphatase   Date Value Ref Range Status   07/21/2017 151 (H) 38 - 126 U/L Final     Total Protein   Date Value Ref Range Status   07/21/2017 5.2 (L) 6.3 - 8.6 g/dL Final     ALT (SGPT)   Date Value Ref Range Status   07/21/2017 44 21 - 72 U/L Final     AST (SGOT)   Date Value Ref Range Status   07/21/2017 36 17 - 59 U/L Final     Total Bilirubin   Date Value Ref Range Status   07/21/2017 0.3 0.2 - 1.3 mg/dL Final     Albumin   Date Value Ref Range Status   07/26/2017 2.5 (L) 2.9 - 4.4 g/dL Final   07/21/2017 2.50 (L) 3.40 - 4.80 g/dL Final     Globulin   Date Value Ref Range Status   07/21/2017  2.7 2.3 - 3.5 gm/dL Final     A/G Ratio   Date Value Ref Range Status   07/26/2017 0.8 0.7 - 1.7 Final   07/21/2017 0.9 (L) 1.1 - 1.8 g/dL Final     Lab Results   Component Value Date    WBC 8.50 07/21/2017    HGB 10.5 (L) 07/21/2017    HCT 31.2 (L) 07/21/2017    MCV 89.4 07/21/2017     (L) 07/21/2017     Lab Results   Component Value Date    NEUTROABS 6.24 07/21/2017    IRON 95 07/19/2016    TIBC 331 07/19/2016    LABIRON 28.7 07/19/2016    FERRITIN 657.0 (H) 11/11/2016    PIDFKSQB26 636 07/19/2016    FOLATE >20.00 07/19/2016        24 hour urine protein electrophoresis and immunofixation showed:  Component      Latest Ref Rng & Units 3/7/2017 7/28/2017   Total Protein, Urine      Not Estab. mg/dL 51.4 116.8   Protein, 24H Urine      30 - 150 mg/24 hr 1182.2 (H) 1051 (H)   Albumin, U      % 57.6 51.5   Alpha-1-Globulin, U      % 7.1 3.6   Alpha-2-Globulin, U      % 2.9 7.2   Beta Globulin, U      % 9.5 10.3   Gamma Globulin, Urine      % 22.8 27.4   M-Eduardo, % U      Not Observed % 16.7 (H) 20.9 (H)   M-Eduardo, mg/24 hr      Not Observed mg/24 hr 197 (H) 220 (H)   Immunofixation Result, Urine       Comment Comment   Note       Comment Comment       Immunoglobulin free light chain ratio showed:  Component      Latest Ref Rng & Units 3/6/2017 7/26/2017   Free Light Chain, Kappa      3.3 - 19.4 mg/L 32.00 (H) 51.6 (H)   Free Lambda Light Chains      5.7 - 26.3 mg/L 485.00 (H) 553.3 (H)   Kappa/Lambda Ratio      0.26 - 1.65 0.07 (L) 0.09 (L)       Serum protein electrophoresis with immunofixation showed:  RACHAEL + PE   Order: [id number]   Status:  Final result   Visible to patient:  No (Not Released) Dx:  Multiple myeloma, stage 3      Ref Range & Units 6d ago     IgG 700 - 1600 mg/dL 1216   IgA 61 - 437 mg/dL 120   IgM 20 - 172 mg/dL 60   Total Protein 6.0 - 8.5 g/dL 5.7 (L)   Albumin 2.9 - 4.4 g/dL 2.5 (L)   Alpha-1-Globulin 0.0 - 0.4 g/dL 0.4   Alpha-2-Globulin 0.4 - 1.0 g/dL 0.7   Beta Globulin 0.7 - 1.3 g/dL 0.7    Gamma Globulin 0.4 - 1.8 g/dL 1.3   M-Eduardo Not Observed g/dL 0.5 (H)   Comments: M-SPIKE INCLUDES BOTH MONOCLONAL PROTEINS.   Globulin 2.2 - 3.9 g/dL 3.2   A/G Ratio 0.7 - 1.7 0.8   Immunofixation Reflex, Serum  Comment   Comments: Immunofixation shows IgG monoclonal protein with lambda light chain   specificity.   Serum RACHAEL reveals the presence of monoclonal free lambda light chains.   Suggest urine RACHAEL for Bence Harris Protein.   Please note  Comment   Comments: Protein electrophoresis scan will follow via computer, mail, or    delivery.   Resulting Agency  [facility]   Narrative   Performed at:  01 - [facility] [address]  [id number]  : Jerad Mcgregor PhD, Phone:  [phone]      Specimen Collected: 07/26/17 10:02 AM Last Resulted: 07/27/17  2:18 PM                   Bone marrow biopsy (12/9/16) :  FINAL DIAGNOSIS:  PERIPHERAL SMEAR, REVIEW:       ANEMIA, MODERATE, WITH INADEQUATE RETICULOCYTE RESPONSE.        BONE MARROW ASPIRATION AND BIOPSY FROM LEFT POSTERIOR ILIAC CREST:       PLASMA CELL DYSCRASIA, CONSISTENT WITH MULTIPLE MYELOMA.          ASSESSMENT AND PLAN:        1.  IgG lambda multiple myeloma, ISS stage III.  Patient was diagnosed in December 2016 with a bone marrow biopsy.  Patient was recently started on Velcade and dexamethasone on January 23, 2017.  Patient is here to get cycle 4 day 1 of Velcade. Patient was also evaluated at [facility] bone marrow transplant team, and is not deemed candidate for bone marrow transplant.  And received 4 cycles with Velcade and Decadron last treatment was on April 3, 2017 after that he was lost to follow-up secondary to insurance issue.  Most recent CBC shows hemoglobin is 10.5 and platelet is 117,000.  Repeat myeloma workup done last week is about same which was done in March 2017.  His hemoglobin and platelet has been stable since March 2017.  Skeletal survey still pending.  At this point we will see him back in about 5  weeks with a repeat CBC, CMP and myeloma workup to be done prior to that.  If he starts becoming anemic or requiring blood transfusion will put him back on chemotherapy with Velcade and Decadron this recommendation were discussed with patient and his daughter in the room.    2.  Anemia and thrombocytopenia: Most likely secondary to multiple myeloma .  At this point we'll monitored with CBC.    3.  Lytic lesion on the skull: The CT of the head done in June 2017 when patient was admitted to the hospital showed lytic lesion.  We will start him on Zometa next week.  Side effect of Zometa including swelling of job, mouth sores, hypocalcemia were discussed with patient and his daughter.  We'll also provide them information about Zometa.    4.  Decompensated liver disease: Patient has a history of hepatitis C recently was admitted to the hospital with decompensated liver cirrhosis with ascites and pleural effusion.  Patient has been followed by Dr. David for same.    5. Health maintenance: Unfortunately patient continues to smoke, patient was counseled about smoking cessation.  He had a colonoscopy last 5 years.  He remains full code.             Rafa Mendoza MD  8/1/2017  4:04 PM

## 2017-08-01 NOTE — PATIENT INSTRUCTIONS
Zoledronic Acid injection (Hypercalcemia, Oncology)  What is this medicine?  ZOLEDRONIC ACID (KITA le isaac ik AS id) lowers the amount of calcium loss from bone. It is used to treat too much calcium in your blood from cancer. It is also used to prevent complications of cancer that has spread to the bone.  This medicine may be used for other purposes; ask your health care provider or pharmacist if you have questions.  COMMON BRAND NAME(S): Zometa  What should I tell my health care provider before I take this medicine?  They need to know if you have any of these conditions:  -aspirin-sensitive asthma  -cancer, especially if you are receiving medicines used to treat cancer  -dental disease or wear dentures  -infection  -kidney disease  -receiving corticosteroids like dexamethasone or prednisone  -an unusual or allergic reaction to zoledronic acid, other medicines, foods, dyes, or preservatives  -pregnant or trying to get pregnant  -breast-feeding  How should I use this medicine?  This medicine is for infusion into a vein. It is given by a health care professional in a hospital or clinic setting.  Talk to your pediatrician regarding the use of this medicine in children. Special care may be needed.  Overdosage: If you think you have taken too much of this medicine contact a poison control center or emergency room at once.  NOTE: This medicine is only for you. Do not share this medicine with others.  What if I miss a dose?  It is important not to miss your dose. Call your doctor or health care professional if you are unable to keep an appointment.  What may interact with this medicine?  -certain antibiotics given by injection  -NSAIDs, medicines for pain and inflammation, like ibuprofen or naproxen  -some diuretics like bumetanide, furosemide  -teriparatide  -thalidomide  This list may not describe all possible interactions. Give your health care provider a list of all the medicines, herbs, non-prescription drugs, or  dietary supplements you use. Also tell them if you smoke, drink alcohol, or use illegal drugs. Some items may interact with your medicine.  What should I watch for while using this medicine?  Visit your doctor or health care professional for regular checkups. It may be some time before you see the benefit from this medicine. Do not stop taking your medicine unless your doctor tells you to. Your doctor may order blood tests or other tests to see how you are doing.  Women should inform their doctor if they wish to become pregnant or think they might be pregnant. There is a potential for serious side effects to an unborn child. Talk to your health care professional or pharmacist for more information.  You should make sure that you get enough calcium and vitamin D while you are taking this medicine. Discuss the foods you eat and the vitamins you take with your health care professional.  Some people who take this medicine have severe bone, joint, and/or muscle pain. This medicine may also increase your risk for jaw problems or a broken thigh bone. Tell your doctor right away if you have severe pain in your jaw, bones, joints, or muscles. Tell your doctor if you have any pain that does not go away or that gets worse.  Tell your dentist and dental surgeon that you are taking this medicine. You should not have major dental surgery while on this medicine. See your dentist to have a dental exam and fix any dental problems before starting this medicine. Take good care of your teeth while on this medicine. Make sure you see your dentist for regular follow-up appointments.  What side effects may I notice from receiving this medicine?  Side effects that you should report to your doctor or health care professional as soon as possible:  -allergic reactions like skin rash, itching or hives, swelling of the face, lips, or tongue  -anxiety, confusion, or depression  -breathing problems  -changes in vision  -eye pain  -feeling faint or  lightheaded, falls  -jaw pain, especially after dental work  -mouth sores  -muscle cramps, stiffness, or weakness  -redness, blistering, peeling or loosening of the skin, including inside the mouth  -trouble passing urine or change in the amount of urine  Side effects that usually do not require medical attention (report to your doctor or health care professional if they continue or are bothersome):  -bone, joint, or muscle pain  -constipation  -diarrhea  -fever  -hair loss  -irritation at site where injected  -loss of appetite  -nausea, vomiting  -stomach upset  -trouble sleeping  -trouble swallowing  -weak or tired  This list may not describe all possible side effects. Call your doctor for medical advice about side effects. You may report side effects to FDA at 0-758-FDA-6557.  Where should I keep my medicine?  This drug is given in a hospital or clinic and will not be stored at home.  NOTE: This sheet is a summary. It may not cover all possible information. If you have questions about this medicine, talk to your doctor, pharmacist, or health care provider.     © 2017, [employer]. (2015-05-16 14:19:39)

## 2017-08-08 PROBLEM — E87.6 HYPOKALEMIA: Status: ACTIVE | Noted: 2017-01-01

## 2017-08-08 PROBLEM — K59.1 FUNCTIONAL DIARRHEA: Status: ACTIVE | Noted: 2017-01-01

## 2017-08-08 PROBLEM — S22.080A COMPRESSION FRACTURE OF TWELFTH THORACIC VERTEBRA (HCC): Status: ACTIVE | Noted: 2017-01-01

## 2017-08-08 PROBLEM — J90 PLEURAL EFFUSION ON RIGHT: Status: ACTIVE | Noted: 2017-01-01

## 2017-08-08 PROBLEM — E83.42 HYPOMAGNESEMIA: Status: ACTIVE | Noted: 2017-01-01

## 2017-08-08 PROBLEM — R10.33 PERIUMBILICAL ABDOMINAL PAIN: Status: ACTIVE | Noted: 2017-01-01

## 2017-08-08 PROBLEM — Z87.19 HISTORY OF CIRRHOSIS: Status: ACTIVE | Noted: 2017-01-01

## 2017-08-08 PROBLEM — R11.2 INTRACTABLE NAUSEA AND VOMITING: Status: ACTIVE | Noted: 2017-01-01

## 2017-08-08 PROBLEM — R64 CACHEXIA (HCC): Status: ACTIVE | Noted: 2017-01-01

## 2017-08-08 PROBLEM — R30.0 DYSURIA: Status: ACTIVE | Noted: 2017-01-01

## 2017-08-08 NOTE — PROGRESS NOTES
Called MD alert value.  Vale Arnold RN  August 8, 2017  1100    Stefanie to eval patient in  for medical floor.  Vale Arnold RN  August 8, 2017  1130    Called for room - 409 per Bed Control.  Vale Arnold RN  August 8, 2017  1200    Called report to Nevin on 4W  Vale Arnold RN  August 8, 2017  12:06 PM
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
## 2017-08-08 NOTE — H&P
HISTORY AND PHYSICAL  NAME: Mickey Lind  : 1955  MRN: [id number]    DATE OF ADMISSION: 17    DATE & TIME SEEN: 17 4:34 PM    PCP: Ara Bach MD    CODE STATUS: Full Code    CHIEF COMPLAINT: Nausea, Vomiting, Diarrhea      HPI:  Mickey Lind is a 61 y.o. male who is an active smoker with a PMHx of Multiple Myeloma Stage 3, IV drug use, and Cirrhosis secondary to Hepatitis C status post Harvoni treatment, who presents with a four day history of nausea, vomiting, and diarrhea. He has had 2 other hospital admissions in the month of July, the most recent one being from - where he was found to have infectious colitis that was treated with Levaquin and Flagyl.  At that time, he was also found to have worsening pleural effusion that was drained by thoracentesis. He reports that he has been having nausea and white, clear emesis as well as mucousy but non-bloody diarrhea. His bowel movements are watery, but are small amounts at a time. He has not had a formed bowel movement for the past 1.5 weeks. He denies exposure to new food, recent travel, or sick contacts. He reports that he has not been eating much, but does endorse an appetite and would like to eat if his nausea can be controlled. Today, the patient [facility] when lab work was notable for hypokalemia and hypomagnesemia. He informed the care team about the symptoms that he had been having for several days, and he was directly admitted to the floor.      CONCURRENT MEDICAL HISTORY:  Past Medical History:   Diagnosis Date   • Abdominal pain    • Alcoholic liver damage    • Alcoholic polyneuropathy    • Amblyopia     refractive os   • Anemia    • Ascites    • Ascites    • Asthma    • Astigmatism    • Cellulitis of left lower leg    • Chronic hepatitis C    • Chronic viral hepatitis C    • Cirrhosis of liver    • Claudication    • Encounter for immunization    • Generalized edema    • History  of blood transfusion    • History of colon polyps    • Hypermetropia    • Inguinal hernia     - Rih      • Low back pain    • Major depressive disorder     recurrent , moderate   • Multiple myeloma    • Myocardial infarction    • Pain in joint, lower leg    • Peripheral vascular disease    • Recurrent major depressive episodes    • Tinea unguium    • Tobacco use        PAST SURGICAL HISTORY:  Past Surgical History:   Procedure Laterality Date   • CARDIAC CATHETERIZATION      (Successful PCI to a totally occluded RCA with a 2.5 x 28 and a 2.5 x 8 mm Xience stent. Nonobstructive disease in the left coronary artery system.)   11/24/2012    • CARDIAC SURGERY     • COLONOSCOPY  10/13/2016   • COLONOSCOPY N/A 3/29/2017    Procedure: COLONOSCOPY;  Surgeon: Jose Daniel David MD;  Location: [facility] ENDOSCOPY;  Service:    • ENDOSCOPY  10/13/2016   • ENDOSCOPY N/A 3/29/2017    Procedure: ESOPHAGOGASTRODUODENOSCOPY (U/S FIRST @0945) ;  Surgeon: Jose Daniel David MD;  Location: [facility] ENDOSCOPY;  Service:    • UPPER GASTROINTESTINAL ENDOSCOPY  10/13/2016       FAMILY HISTORY:  Family History   Problem Relation Age of Onset   • Cancer Mother    • Heart disease Mother    • Cancer Father    • Other Father      ischemic heart disease   • Heart disease Father    • Other Son      depressive disorder   • Diabetes Maternal Grandmother    • Cancer Paternal Grandfather    • No Known Problems Daughter    • No Known Problems Son         SOCIAL HISTORY:  Social History     Social History   • Marital status:      Spouse name: N/A   • Number of children: N/A   • Years of education: N/A     Occupational History   • Not on file.     Social History Main Topics   • Smoking status: Current Every Day Smoker     Packs/day: 0.50   • Smokeless tobacco: Never Used      Comment: Smokes approx 1 ppd of Cigarettes.smoking since last 45 yr   • Alcohol use No      Comment: no alcohol since 7/4/2017   • Drug use: No   • Sexual activity: Defer     Other  Topics Concern   • Not on file     Social History Narrative    , wife  2017. Lives with son and his son's girlfriend.  He has worked as , mine equipment, coal mines.  He served in the [employer] for 1.5 years.  Used to smoke 2 ppd down to 1/2 ppd for 35 years or more.  Drank a 12 pack of beer a day for 20 years, last drink was 1/2 a beer on .  He last drank in 10/2015.  He  drugs 20-30 years ago including cocaine, meth, heroin, pot, and other drugs.  He smoked, snorted, and shot up drugs.       HOME MEDICATIONS:    Current Facility-Administered Medications:   •  acetaminophen (TYLENOL) tablet 650 mg, 650 mg, Oral, Q4H PRN, Carolee Leo MD  •  enoxaparin (LOVENOX) syringe 40 mg, 40 mg, Subcutaneous, Daily, Rafa Mendoza MD, 40 mg at 17 1528  •  famotidine (PEPCID) tablet 20 mg, 20 mg, Oral, Daily, Eva Howard MD  •  furosemide (LASIX) tablet 40 mg, 40 mg, Oral, Daily, Eva Howard MD  •  HYDROmorphone (DILAUDID) injection 1 mg, 1 mg, Intravenous, Q2H PRN, 1 mg at 17 1515 **AND** naloxone (NARCAN) injection 0.4 mg, 0.4 mg, Intravenous, Q5 Min PRN, Carolee Leo MD  •  Morphine sulfate (PF) injection 1 mg, 1 mg, Intravenous, Q4H PRN **AND** naloxone (NARCAN) injection 0.4 mg, 0.4 mg, Intravenous, Q5 Min PRN, Carolee Leo MD  •  nicotine (NICODERM CQ) 21 MG/24HR patch 1 patch, 1 patch, Transdermal, Q24H, Carolee Leo MD  •  ondansetron (ZOFRAN) injection 4 mg, 4 mg, Intravenous, Q6H PRN, Carolee Leo MD  •  potassium chloride (MICRO-K) CR capsule 40 mEq, 40 mEq, Oral, Once, Carolee Leo MD  •  sertraline (ZOLOFT) tablet 200 mg, 200 mg, Oral, Daily, Eva Howard MD  •  sodium chloride 0.9 % flush 1-10 mL, 1-10 mL, Intravenous, PRN, Rafa Mendoza MD  •  sodium chloride 0.9 % flush 1-10 mL, 1-10 mL, Intravenous, PRN, Carolee Leo MD  •  sodium chloride 0.9 % with KCl 20 mEq/L infusion, 75 mL/hr, Intravenous, Continuous, Carolee Leo MD,  Last Rate: 75 mL/hr at 08/08/17 1529, 75 mL/hr at 08/08/17 1529  •  spironolactone (ALDACTONE) tablet 50 mg, 50 mg, Oral, BID, Eva Howard MD  •  sucralfate (CARAFATE) 1 GM/10ML suspension 1 g, 1 g, Oral, Daily, Eva Howard MD    Facility-Administered Medications Ordered in Other Encounters:   •  heparin flush (porcine) 100 UNIT/ML injection 500 Units, 500 Units, Intravenous, PRN, Rafa Mendoza MD  •  sodium chloride 0.9 % flush 10 mL, 10 mL, Intravenous, PRN, Rafa Mendoza MD, 10 mL at 08/08/17 1010    ALLERGIES:  Codeine; Other; and Penicillins    REVIEW OF SYSTEMS  Review of Systems   Constitutional: Positive for appetite change. Negative for activity change, chills and fever.   HENT: Negative for congestion, ear pain, rhinorrhea, sneezing and sore throat.    Eyes: Negative for discharge and visual disturbance.   Respiratory: Positive for shortness of breath. Negative for cough and wheezing.    Cardiovascular: Positive for leg swelling. Negative for chest pain and palpitations.   Gastrointestinal: Positive for abdominal pain, diarrhea, nausea and vomiting. Negative for blood in stool and constipation.   Genitourinary: Positive for dysuria and urgency. Negative for difficulty urinating and hematuria.   Musculoskeletal: Positive for back pain and gait problem ( Walks with cane.). Negative for joint swelling and myalgias.   Skin: Negative for rash and wound.   Neurological: Negative for dizziness, syncope, weakness, light-headedness, numbness and headaches.   Hematological: Does not bruise/bleed easily.   Psychiatric/Behavioral: Negative for confusion, decreased concentration and sleep disturbance.       PHYSICAL EXAM:  Temp:  [96.5 °F (35.8 °C)-97.6 °F (36.4 °C)] 96.7 °F (35.9 °C)  Heart Rate:  [87-90] 87  Resp:  [20] 20  BP: (141-166)/(74-94) 163/74  Body mass index is 22.07 kg/(m^2).     Physical Exam   Constitutional: He is oriented to person, place, and time. He appears cachectic. He is  cooperative.  Non-toxic appearance. He has a sickly appearance. No distress.   HENT:   Head: Normocephalic and atraumatic.   Nose: Nose normal.   Eyes: Conjunctivae and EOM are normal. Right eye exhibits no discharge. Left eye exhibits no discharge. No scleral icterus.   Glasses.   Neck: Normal range of motion. No JVD present. No thyromegaly present.   Cardiovascular: Normal rate, regular rhythm and normal heart sounds.    No murmur heard.  Pulmonary/Chest: Effort normal. No respiratory distress. He has no wheezes. He has no rales.   Decreased breath sounds in right lung base.   Abdominal: Soft. He exhibits distension. There is tenderness ( RUQ, LUQ). There is guarding. There is no rebound.   Musculoskeletal: He exhibits no edema.   Neurological: He is alert and oriented to person, place, and time.   Skin: Skin is warm. Bruising noted. No rash noted. He is not diaphoretic. No erythema.   Psychiatric: He has a normal mood and affect. His behavior is normal. Thought content normal.       DIAGNOSTIC DATA:   Lab Results (last 24 hours)     Procedure Component Value Units Date/Time    CBC & Differential [[id number]] Collected:  08/08/17 1455    Specimen:  Blood Updated:  08/08/17 1500    Narrative:       The following orders were created for panel order CBC & Differential.  Procedure                               Abnormality         Status                     ---------                               -----------         ------                     CBC Auto Differential[[id number]]        Abnormal            Final result                 Please view results for these tests on the individual orders.    CBC Auto Differential [[id number]]  (Abnormal) Collected:  08/08/17 1455    Specimen:  Blood Updated:  08/08/17 1500     WBC 11.14 (H) 10*3/mm3      RBC 3.78 (L) 10*6/mm3      Hemoglobin 11.5 (L) g/dL      Hematocrit 33.4 (L) %      MCV 88.4 fL      MCH 30.4 pg      MCHC 34.4 g/dL      RDW 16.3 (H) %      RDW-SD 52.7 (H) fl       MPV 10.9 fL      Platelets 133 (L) 10*3/mm3      Neutrophil % 74.1 %      Lymphocyte % 14.1 %      Monocyte % 11.0 %      Eosinophil % 0.3 %      Basophil % 0.1 %      Immature Grans % 0.4 %      Neutrophils, Absolute 8.26 10*3/mm3      Lymphocytes, Absolute 1.57 10*3/mm3      Monocytes, Absolute 1.23 (H) 10*3/mm3      Eosinophils, Absolute 0.03 10*3/mm3      Basophils, Absolute 0.01 10*3/mm3      Immature Grans, Absolute 0.04 (H) 10*3/mm3      nRBC 0.0 /100 WBC            Imaging Results (last 24 hours)     Procedure Component Value Units Date/Time    XR Chest PA & Lateral [[id number]] Collected:  08/08/17 1603     Updated:  08/08/17 1625    Narrative:         EXAM:          Radiograph(s), Chest   VIEWS:    PA / Lat ; 2       DATE/TIME:  8/8/2017 4:23 PM CDT               INDICATION:    dyspnea           COMPARISON:  CXR: 7/19/17          FINDINGS:             - lines/tubes:    Right approach indwelling central venous  catheter, unchanged.     - cardiac:         size within normal limits         - mediastinum: contour within normal limits         - lungs:         no focal air space process, pulmonary  interstitial edema, nodule(s)/mass             - pleura:         Small to moderate-sized right pleural fluid  collection, decreased in size since the previous study.                   - osseous:         unremarkable for age                  - misc.:         Impression:       CONCLUSION:        1. Small to moderate-sized right pleural fluid collection,  decreased in size since the previous study.                                    Electronically signed by:  DALI Vee MD  8/8/2017 4:24 PM  CDT Workstation: [facility]    XR Abdomen KUB [[id number]] Collected:  08/08/17 1603     Updated:  08/08/17 1622    Narrative:         EXAM:      Radiograph(s), Abdomen       VIEWS:    1  DATE/TIME:   8/8/2017 4:24 PM CDT       INDICATION:        abdominal pain / discomfort  COMPARISON:   11/2/15                 FINDINGS:              - bowel gas pattern:    nonobstructive       - free air:    none       - soft tissue:    No gross evidence of organomegaly, an abdominal  mass, ascites       - calculi:    none projecting over gallbladder fossa, renal  shadows, or anticipated course of the ureters.       - osseous:      limited assessment, unremarkable for age.       - misc.:        .         Impression:       CONCLUSION:             1. Non-obstructed bowel gas pattern.             If pain or symptoms persist beyond reasonable expectations, a  contrast enhanced CT examination is suggested, as is deemed  clinical appropriate.                                  Electronically signed by:  DALI Vee MD  8/8/2017 4:25 PM  CDT Workstation: [id number]          I reviewed the patient's new clinical results.    ASSESSMENT AND PLAN: This is a 61 y.o. male with:    Active Hospital Problems (** Indicates Principal Problem)    Diagnosis Date Noted   • **Intractable nausea and vomiting [R11.2] 08/08/2017     -Zofran  -Monitor daily  -IV Fluids     • Hypokalemia [E87.6] 08/08/2017     -2.4.  -S/p 40mEq oral x1  -Replacing potassium with NS plus 20mEq K  -Will monitor via daily labs.     • Hypomagnesemia [E83.42] 08/08/2017     -1.4  -S/p 2g Magnesium IV  -Will follow up value tomorrow.     • Pleural effusion on right [J90] 08/08/2017     -s/p thoracentesis on last admission.  -Decreased breath sounds on RLL base  -CXR. Will follow up.     • Cachexia [R64] 08/08/2017     -BMI 22  -Nutrition consult  -Currently endorsing diet.  -CLD.     • Compression fracture of twelfth thoracic vertebra [M48.54XA] 08/08/2017     -Chronic back pain.  -Morphine prn for moderate pain, dilaudid for severe pain.     • Functional diarrhea [K59.1] 08/08/2017     -Abdominal xray  -GI panel  -Will continue to monitor  -Administering IV fluids     • Dysuria [R30.0] 08/08/2017     -Urinalysis with culture  -Will follow up results and continue to monitor.     • Periumbilical  abdominal pain [R10.33] 08/08/2017     -Pt with history of ascites and splenomegaly.  -Will follow up abdominal xray.  -Pain management with morphine and dilaudid prn.     • History of cirrhosis [Z87.19] 08/08/2017     -s/p Harvoni treatment  -Liver markers normal on CMP.     • Multiple myeloma, stage 3 [C90.00] 01/20/2017     -has a right sided port in, has been followed by Dr. Mendoza of heme-oncology.  -CT scan of head showed several punched-out lesions in right and left frontal brain, no acute changes.  -Patient to continue chemotherapy with Velcade and Decadron as outpatient.  -Dr. Mendoza following.        Resolved Hospital Problems    Diagnosis Date Noted Date Resolved   No resolved problems to display.         DVT prophylaxis: SCDs and LEATHA stockings  Code status is Full Code     [facility] # [id number], reviewed and consistent with patient reported medications.      I discussed the patients findings and my recommendations with patient.     Dr. Dodd is the attending on record at time of admission, he is aware of the patient's status and agrees with the above history and physical.      Bambi Ríos M.D. PGY1  [facility]  [address] [place] [address]  Office: [phone]      This document has been electronically signed by Bambi Ríos MD on August 8, 2017 4:34 PM

## 2017-08-09 PROBLEM — E83.42 HYPOMAGNESEMIA: Status: RESOLVED | Noted: 2017-01-01 | Resolved: 2017-01-01

## 2017-08-09 PROBLEM — J90 PLEURAL EFFUSION ON RIGHT: Status: RESOLVED | Noted: 2017-01-01 | Resolved: 2017-01-01

## 2017-08-09 NOTE — THERAPY EVALUATION
Acute Care - Physical Therapy Initial Evaluation  [facility]     Patient Name: Mickey Lind  : 1955  MRN: [id number]  Today's Date: 2017   Onset of Illness/Injury or Date of Surgery Date: 17  Date of Referral to PT: 17  Referring Physician: Dr. Bambi Ríos      Admit Date: 2017     Visit Dx:    ICD-10-CM ICD-9-CM   1. Intractable vomiting with nausea, unspecified vomiting type R11.2 536.2   2. Chronic pain due to neoplasm G89.3 338.3   3. Tobacco dependence F17.200 305.1   4. Hypokalemia E87.6 276.8   5. Hypomagnesemia E83.42 275.2   6. Abnormality of gait and mobility R26.9 781.2   7. Muscle weakness (generalized) M62.81 728.87     Patient Active Problem List   Diagnosis   • End stage liver disease   • Depression   • Low back pain with sciatica   • Hyperlipidemia   • Unilateral inguinal hernia without obstruction or gangrene   • Diarrhea   • Hepatic cirrhosis   • Epigastric pain   • Peripheral vascular disease   • Encounter for venous access device care   • Multiple myeloma, stage 3   • Anemia   • Chronic hepatitis C without hepatic coma   • Lower abdominal pain   • Ascites due to alcoholic cirrhosis   • Tobacco dependence   • Simple chronic bronchitis   • Pleural effusion   • Ulcer of sacral region, stage 1   • CAP (community acquired pneumonia), bilateral   • Does not have health insurance   • Acute respiratory failure with hypoxia   • Colitis   • Infectious colitis   • Hyponatremia   • Does not have health insurance   • Nausea   • Hypokalemia   • Intractable nausea and vomiting   • Cachexia   • Compression fracture of twelfth thoracic vertebra   • Functional diarrhea   • Dysuria   • Periumbilical abdominal pain   • History of cirrhosis     Past Medical History:   Diagnosis Date   • Abdominal pain    • Alcoholic liver damage    • Alcoholic polyneuropathy    • Amblyopia     refractive os   • Anemia    • Ascites    • Ascites    • Asthma    • Astigmatism    • Cellulitis of  left lower leg    • Chronic hepatitis C    • Chronic viral hepatitis C    • Cirrhosis of liver    • Claudication    • Encounter for immunization    • Generalized edema    • History of blood transfusion    • History of colon polyps    • Hypermetropia    • Inguinal hernia     - Rih      • Low back pain    • Major depressive disorder     recurrent , moderate   • Multiple myeloma    • Myocardial infarction    • Pain in joint, lower leg    • Peripheral vascular disease    • Recurrent major depressive episodes    • Tinea unguium    • Tobacco use      Past Surgical History:   Procedure Laterality Date   • CARDIAC CATHETERIZATION      (Successful PCI to a totally occluded RCA with a 2.5 x 28 and a 2.5 x 8 mm Xience stent. Nonobstructive disease in the left coronary artery system.)   11/24/2012    • CARDIAC SURGERY     • COLONOSCOPY  10/13/2016   • COLONOSCOPY N/A 3/29/2017    Procedure: COLONOSCOPY;  Surgeon: Jose Daniel David MD;  Location: [facility] ENDOSCOPY;  Service:    • ENDOSCOPY  10/13/2016   • ENDOSCOPY N/A 3/29/2017    Procedure: ESOPHAGOGASTRODUODENOSCOPY (U/S FIRST @0945) ;  Surgeon: Jose Daniel David MD;  Location: [facility] ENDOSCOPY;  Service:    • UPPER GASTROINTESTINAL ENDOSCOPY  10/13/2016          PT ASSESSMENT (last 72 hours)      PT Evaluation       08/09/17 0935 08/09/17 0900    Rehab Evaluation    Document Type evaluation  - evaluation  -    Subjective Information agree to therapy;complains of;pain  - agree to therapy;complains of;pain  -    Patient Effort, Rehab Treatment  adequate  -    Symptoms Noted During/After Treatment  fatigue  -    General Information    Patient Profile Review yes  - yes  -    Onset of Illness/Injury or Date of Surgery Date 08/08/17  - 08/08/17  -    Referring Physician Dr. Bambi Ríos  - Dr. Bambi Ríos  -    General Observations Pt lying in bed with HOB elevated. Noted to have IV, telemetryl.   - Pt lying in bed with HOB elevated.  Noted to have  IV and telemetry.  Pt states he doesn't think he can do much but agreeable to PT eval.  -LM    Pertinent History Of Current Problem  Admitted with nausea, vomiting, and diarrhea.  Pt found to have hypokalemia - also noted to have Stage 3 Multiple Myeloma.  Pt states he is very tired but agreeable to PT eval.  -LM    Precautions/Limitations fall precautions  - fall precautions  -    Prior Level of Function independent:;all household mobility;gait;ADL's   family does IADL  - independent:;all household mobility;gait;ADL's   Family completes IADL's  -    Equipment Currently Used at Home cane, straight;walker, rolling   uses SC w/ambulation  - cane, straight;walker, rolling   Uses SC with amb;   -LM    Plans/Goals Discussed With  patient;agreed upon  -    Risks Reviewed  patient:;LOB;increased discomfort  -    Benefits Reviewed  patient:;improve function;increase independence;increase strength;increase balance  -    Barriers to Rehab  medically complex  -    Living Environment    Lives With alone  - alone  -    Living Arrangements house  - house  -    Home Accessibility stairs to enter home;bed and bath on same level;tub/shower is not walk in  - stairs to enter home;bed and bath on same level;tub/shower is not walk in  -    Number of Stairs to Enter Home 4  - 4  -LM    Stair Railings at Home none  - none  -    Transportation Available car;family or friend will provide  - car;family or friend will provide  -    Living Environment Comment Son or dtr in law help in and out of steps. Pt family is in and out frequently sometimes pt home alone.  - Pt states he has family that are in/out of the house pretty much 24 hours a day.  -LM    Clinical Impression    Date of Referral to PT  08/08/17  -LM    PT Diagnosis  Abnormality of gait and mobility; Muscle Weakness  -LM    Criteria for Skilled Therapeutic Interventions Met  yes;treatment indicated  -LM    Pathology/Pathophysiology Noted  (Describe Specifically for Each System)  musculoskeletal;pulmonary  -LM    Impairments Found (describe specific impairments)  aerobic capacity/endurance;gait, locomotion, and balance;muscle performance;ROM;sensory Integrity  -LM    Functional Limitations in Following Categories (Describe Specific Limitations)  self-care;home management  -LM    Rehab Potential  good, to achieve stated therapy goals  -LM    Predicted Duration of Therapy Intervention (days/wks)  Until discharge or all goals met  -    Vital Signs    Pre Systolic BP Rehab 161  -  -LM    Pre Treatment Diastolic BP 78  -BH 79  -LM    Post Systolic BP Rehab 154  -  -LM    Post Treatment Diastolic BP 73  -BH 78  -LM    Pretreatment Heart Rate (beats/min) 89  -BH 88  -LM    Intratreatment Heart Rate (beats/min) 93  -BH 96   Post 20 feet of amb  -LM    Posttreatment Heart Rate (beats/min) 87  -BH 89  -LM    Pre SpO2 (%) 94  -BH 94  -LM    O2 Delivery Pre Treatment room air  - room air  -LM    Intra SpO2 (%) 91  -BH 93   Post 20 feet of amb  -LM    O2 Delivery Intra Treatment room air  - room air  -LM    Post SpO2 (%) 95  -BH 94  -LM    O2 Delivery Post Treatment room air  - room air  -LM    Pre Patient Position Supine  - Supine  -LM    Intra Patient Position Sitting  - Sitting  -LM    Post Patient Position Supine  - Supine  -LM    Pain Assessment    Pain Assessment  0-10  -LM    Pain Score 6  -BH 6  -LM    Post Pain Score 6   between 6 and 7  -BH 6  -LM    Pain Location Abdomen   back  - Abdomen   And Back  -    Vision Assessment/Intervention    Visual Impairment WFL with corrective lenses  - WFL with corrective lenses  -    Cognitive Assessment/Intervention    Current Cognitive/Communication Assessment functional  - functional  -LM    Orientation Status oriented x 4  -BH oriented x 4  -LM    Follows Commands/Answers Questions  100% of the time;needs increased time  -LM    Personal Safety  decreased awareness, need for  assist  -    Personal Safety Interventions  fall prevention program maintained;gait belt;muscle strengthening facilitated;nonskid shoes/slippers when out of bed  -    ROM (Range of Motion)    General ROM no range of motion deficits identified  -     General ROM Detail BUE WFL, fair+ digit to nose and digit to thumb oppositoin  - BLE AROM WFL with exception of ankle DF - PROM WFL  -    MMT (Manual Muscle Testing)    General MMT Assessment upper extremity strength deficits identified;lower extremity strength deficits identified   please see PT for LB  -     General MMT Assessment Detail BUE  grossly 4-/5 BUE grossly 3+ to 4-/5  - BLE's - Hip flex - 4-/5; Knee flex/ext - 4-/5; Ankle DF - 2/5  -    Bed Mobility, Assessment/Treatment    Bed Mobility, Assistive Device  bed rails;head of bed elevated  -    Bed Mob, Supine to Sit, Josephine supervision required  - supervision required  -    Bed Mob, Sit to Supine, Josephine  minimum assist (75% patient effort)  -LM    Transfer Assessment/Treatment    Transfers, Sit-Stand Josephine contact guard assist  - contact guard assist  -LM    Transfers, Stand-Sit Josephine contact guard assist  - contact guard assist  -LM    Transfers, Sit-Stand-Sit, Assist Device straight cane  - straight cane  -    Toilet Transfer, Josephine contact guard assist  [facility] contact guard assist  -    Toilet Transfer, Assistive Device straight cane  - --   GB on wall  -LM    Transfer, Comment pt resistive and got upset with gait belt and did not want OT to assist pt but pt very unsteady and unsafe.   - Pt independent with pericare.  -    Gait Assessment/Treatment    Gait, Josephine Level  contact guard assist;minimum assist (75% patient effort)  -    Gait, Assistive Device  straight cane  -    Gait, Distance (Feet)  20   1st - 6 feet; 2nd - 6 feet; 3rd - 20 feet  -    Gait, Gait Deviations  sergio decreased;decreased heel strike;forward  flexed posture   Unsteady  -LM    Gait, Impairments  impaired balance;strength decreased  -LM    Gait, Comment  Pt mainly required CGA but had one LOB requiring Dimas to correct.  -LM    Stairs Assessment/Treatment    Stairs, [place] Level  not tested  -LM    Motor Skills/Interventions    Additional Documentation Balance Skills Training (Group);Fine Motor Coordination Training (Group)  -     Balance Skills Training    Sitting-Level of Assistance Contact guard;Close supervision  -     Standing-Level of Assistance Contact guard  -     Fine Motor Coordination Training    Opposition Right:;Left:;intact   grossly  -     Sensory Assessment/Intervention    Sensory Impairment --   numbness/tingling in feet  - numbness;tingling   Reports chronic in B feet  -    Light Touch LUE;RUE  - LLE;RLE  -LM    LUE Light Touch WNL  -     RUE Light Touch WNL  -     LLE Light Touch  mild impairment  -LM    RLE Light Touch  mild impairment  -LM    Positioning and Restraints    Pre-Treatment Position in bed  - in bed  -    Post Treatment Position  bed  -LM    In Bed  supine;call light within reach;with OT  -LM      08/08/17 6882       General Information    Equipment Currently Used at Home cane, straight;walker, rolling  -     Living Environment    Lives With alone;other (see comments)   pt states family comes in and our almost 24/7  -     Living Arrangements house  -     Home Accessibility no concerns  -     Stair Railings at Home none  -     Type of Financial/Environmental Concern none  -MH     Transportation Available none  -MH       User Key  (r) = Recorded By, (t) = Taken By, (c) = Cosigned By    Initials Name Provider Type    LM Mirlande Dugan, PT Physical Therapist     Dannielle Sanders OTR/L Occupational Therapist     John Alvarez, RN Registered Nurse          Physical Therapy Education     Title: PT OT SLP Therapies (Active)     Topic: Physical Therapy (Active)     Point: Mobility training  (Active)    Learning Progress Summary    Learner Readiness Method Response Comment Documented by Status   Patient Acceptance E NR Reviewed safety with transfers and ambulation.  08/09/17 1107 Active               Point: Precautions (Active)    Learning Progress Summary    Learner Readiness Method Response Comment Documented by Status   Patient Acceptance E NR Reviewed safety with transfers and ambulation.  08/09/17 1107 Active                      User Key     Initials Effective Dates Name Provider Type Discipline     06/15/16 -  Mirlande Dugan, PT Physical Therapist PT                PT Recommendation and Plan  Anticipated Discharge Disposition: home with home health, home with 24/7 care  Planned Therapy Interventions: balance training, bed mobility training, gait training, home exercise program, motor coordination training, neuromuscular re-education, patient/family education, postural re-education, ROM (Range of Motion), stair training, strengthening, stretching, transfer training  PT Frequency: other (see comments) (5-14 times/wk)  Plan of Care Review  Plan Of Care Reviewed With: patient  Outcome Summary/Follow up Plan: PT evaluation completed on this date.  Pt transferred sup-->sit with SBA but required Dimas for LE when transferring sit-->sup.  Pt stood with CGA and ambulated 3 times with the longest being 20 feet.  Pt mainly required CGA but had one LOB requiring Dimas to correct.  Pt noted to be very unsteady on his feet.  Pt will benefit from skilled PT to improve mobility and safety prior to d/c.  Recommend home with 24/7 and  PT at this time.          IP PT Goals       08/09/17 0900          Transfer Training PT LTG    Transfer Training PT LTG, Date Established 08/09/17  -LM      Transfer Training PT LTG, Time to Achieve by discharge  -LM      Transfer Training PT LTG, Activity Type bed to chair /chair to bed  -LM      Transfer Training PT LTG, [place] Level conditional independence  -LM       Transfer Training PT LTG, Assist Device --   AAD  -LM      Transfer Training PT LTG, Outcome goal ongoing  -LM      Gait Training PT LTG    Gait Training Goal PT LTG, Date Established 08/09/17  -LM      Gait Training Goal PT LTG, Time to Achieve by discharge  -LM      Gait Training Goal PT LTG, [place] Level supervision required  -LM      Gait Training Goal PT LTG, Assist Device --   AAD  -LM      Gait Training Goal PT LTG, Distance to Achieve 100 feet  -LM      Gait Training Goal PT LTG, Outcome goal ongoing  -LM      Stair Training PT LTG    Stair Training Goal PT LTG, Date Established 08/09/17  -LM      Stair Training Goal PT LTG, Time to Achieve by discharge  -LM      Stair Training Goal PT LTG, Number of Steps 4 steps  -LM      Stair Training Goal PT LTG, [place] Level contact guard assist  -LM      Stair Training Goal PT LTG, Assist Device --   SC  -LM      Stair Training Goal PT LTG, Outcome goal ongoing  -LM        User Key  (r) = Recorded By, (t) = Taken By, (c) = Cosigned By    Initials Name Provider Type    CORY Dugan PT Physical Therapist                Outcome Measures       08/09/17 0900          How much help from another person do you currently need...    Turning from your back to your side while in flat bed without using bedrails? 3  -LM      Moving from lying on back to sitting on the side of a flat bed without bedrails? 3  -LM      Moving to and from a bed to a chair (including a wheelchair)? 3  -LM      Standing up from a chair using your arms (e.g., wheelchair, bedside chair)? 3  -LM      Climbing 3-5 steps with a railing? 3  -LM      To walk in hospital room? 3  -LM      AM-PAC 6 Clicks Score 18  -LM      Functional Assessment    Outcome Measure Options AM-PAC 6 Clicks Basic Mobility (PT)  -LM        User Key  (r) = Recorded By, (t) = Taken By, (c) = Cosigned By    Initials Name Provider Type    CORY Dugan PT Physical Therapist           Time Calculation:         PT  Charges       08/09/17 0900          Time Calculation    Start Time 0900  -LM      Stop Time 0934  -LM      Time Calculation (min) 34 min  -LM      PT Received On 08/09/17  -LM      PT Goal Re-Cert Due Date 08/22/17  -LM      Time Calculation- PT    Total Timed Code Minutes- PT 8 minute(s)  -LM        User Key  (r) = Recorded By, (t) = Taken By, (c) = Cosigned By    Initials Name Provider Type    LM Mirlande Dugan PT Physical Therapist          Therapy Charges for Today     Code Description Service Date Service Provider Modifiers Qty    43596204277 HC PT MOBILITY CURRENT 8/9/2017 Mirlande Dugan, PT GP, CK 1    68620134643 HC PT MOBILITY PROJECTED 8/9/2017 Mirlande Dugan, PT GP, CJ 1    77920370835 HC PT EVAL MOD COMPLEXITY 2 8/9/2017 Mirlande Dugan, PT GP 1    44282289107 HC GAIT TRAINING EA 15 MIN 8/9/2017 Mirlande Dugan PT GP 1          PT G-Codes  PT Professional Judgement Used?: Yes  Outcome Measure Options: AM-PAC 6 Clicks Basic Mobility (PT)  Score: 18  Functional Limitation: Mobility: Walking and moving around  Mobility: Walking and Moving Around Current Status (): At least 40 percent but less than 60 percent impaired, limited or restricted  Mobility: Walking and Moving Around Goal Status (): At least 20 percent but less than 40 percent impaired, limited or restricted      Mirlande Dugan PT  8/9/2017

## 2017-08-09 NOTE — PLAN OF CARE
Problem: Patient Care Overview (Adult)  Goal: Plan of Care Review  Outcome: Ongoing (interventions implemented as appropriate)    08/09/17 1329   Coping/Psychosocial Response Interventions   Plan Of Care Reviewed With patient   Patient Care Overview   Progress no change   Outcome Evaluation   Outcome Summary/Follow up Plan GI symptoms improving, pt tolerating regular diet. Will add 2 milks on all trays per pt request as well as Ensure to optimize nutrition. Encouraged small frequent meals.         08/09/17 1329   Coping/Psychosocial Response Interventions   Plan Of Care Reviewed With patient   Patient Care Overview   Progress no change   Outcome Evaluation   Outcome Summary/Follow up Plan GI symptoms improving, pt tolerating regular diet. Will add 2 milks on all trays per pt request as well as Ensure to optimize nutrition. Encouraged small frequent meals.

## 2017-08-09 NOTE — PLAN OF CARE
Problem: Patient Care Overview (Adult)  Goal: Plan of Care Review  Outcome: Ongoing (interventions implemented as appropriate)    08/09/17 0935   Coping/Psychosocial Response Interventions   Plan Of Care Reviewed With patient   Outcome Evaluation   Outcome Summary/Follow up Plan OT ra completed this date. Pt t/f sup to sit SBA sit to sup SBA but max difficulty with legs and positioning. Pt sit to stand CGA besides toilet then min A. Pt extended time and efffort sitting in bed to don and doff socks. Pt CGA for mobility to toilet and back to bed with being unsteady. Pt may benefit from skilled OT to address strength, endurance, safety, and independence with ADL to reach PLOF/maximum potential/indepencence. If pt d/c home prior to all goals met pt will need 24/7 assist and HH therapy or may need further therapy before d/c home as pt lives alone.          Problem: Inpatient Occupational Therapy  Goal: Transfer Training Goal 1 STG- OT  Outcome: Ongoing (interventions implemented as appropriate)    08/09/17 0935   Transfer Training OT STG   Transfer Training OT STG, Date Established 08/09/17   Transfer Training OT STG, Time to Achieve by discharge   Transfer Training OT STG, Activity Type all transfers   Transfer Training OT STG, [place] Level conditional independence       Goal: Patient Education Goal LTG- OT  Outcome: Ongoing (interventions implemented as appropriate)    08/09/17 0935   Patient Education OT LTG   Patient Education OT LTG, Date Established 08/09/17   Patient Education OT LTG, Time to Achieve by discharge   Patient Education OT LTG, Education Type HEP;home safety;energy conservation   Patient Education OT LTG, Education Understanding verbalizes understanding;demonstrates adequately;independent       Goal: ADL Goal LTG- OT  Outcome: Ongoing (interventions implemented as appropriate)    08/09/17 0935   ADL OT LTG   ADL OT LTG, Date Established 08/09/17   ADL OT LTG, Time to Achieve by discharge   ADL  OT LTG, Activity Type ADL skills   ADL OT LTG, [place] Level modified independent       Goal: Functional Mobility Goal STG- OT  Outcome: Ongoing (interventions implemented as appropriate)    08/09/17 0935   Functional Mobility OT STG   Functional Mobility Goal OT STG, Date Established 08/09/17   Functional Mobility Goal OT STG, Time to Achieve by discharge   Functional Mobility Goal OT STG, [place] Level modified independent   Functional Mobility Goal OT STG, Distance to Achieve to the bathroom;to the sink  (around room)       Goal: Activity Tolerance Goal LTG- OT  Outcome: Ongoing (interventions implemented as appropriate)    08/09/17 0935   Activity Tolerance OT LTG   Activity Tolerance Goal OT LTG, Date Established 08/09/17   Activity Tolerance Goal OT LTG, Time to Achieve by discharge   Activity Tolerance Goal OT LTG, Activity Level 15 min activity;O2 sat >/equal to 90%;with 1 rest break
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
## 2017-08-09 NOTE — CONSULTS
"Adult Nutrition  Assessment    Patient Name:  Mickey Lind  YOB: 1955  MRN: [id number]  Admit Date:  8/8/2017    Assessment Date:  8/9/2017          Reason for Assessment       08/09/17 1314    Reason for Assessment    Reason For Assessment/Visit admission assessment;identified at risk by screening criteria    Identified At Risk By Screening Criteria MST SCORE 2+;unintentional loss of 10 lbs or more in the past 2 mos    Diagnosis Diagnosis    Oncology Multiple myeloma    Factors Affecting Nutrition Factors    Reported GI Symptoms N & V                Nutrition/Diet History       08/09/17 1315    Nutrition/Diet History    Typical Food/Fluid Intake Pt reports GI symptoms have improved some and he is ready to eat lunch.    Meal/Snack Patterns Normally eats small amounts at one time at home.            Anthropometrics       08/09/17 1315    Anthropometrics    Height 177.8 cm (70\")    Weight 65.3 kg (144 lb)    RD Documented Weight on Admission 68.9 kg (152 lb)    Anthropometrics (Special Considerations)    Height Used for Calculations 1.778 m (5' 10\")    Weight Used for Calculations 65.3 kg (144 lb)    RD Calculated     RD Calculated % IBW 87    Ideal Body Weight (IBW)    Ideal Body Weight (IBW), Male (kg) 76.48    % Ideal Body Weight 85.58    Usual Body Weight (UBW)    Weight Loss 22.7 kg (50 lb)    Weight Loss Time Frame 1 year    Body Mass Index (BMI)    BMI (kg/m2) 20.71            Labs/Tests/Procedures/Meds       08/09/17 1319    Labs/Tests/Procedures/Meds    Labs/Tests Review Reviewed    Medication Review Reviewed, pertinent;Diuretic            Physical Findings       08/09/17 1320    Physical Findings/Assessment    Additional Documentation Physical Appearance (Group)    Physical Appearance    Gastrointestinal abdominal distention;diarrhea;other (see comments)   N/V and diarrhea improving            Estimated/Assessed Needs       08/09/17 1320    Calculation Measurements    Weight Used " "For Calculations 65.3 kg (144 lb)    Height Used for Calculations 1.778 m (5' 10\")    Estimated/Assessed Energy Needs    Energy Need Method [facility]    Age 61    RMR ([facility] Equation) 1459.43    [facility]  Out of bed, ambulatory  1.3    Total estimated needs ([facility]) 1899    Estimated/Assessed Protein Needs    Weight Used for Protein Calculation 65.3 kg (144 lb)    Protein (gm/kg) 0.8    0.8 Gm Protein (gm) 52.25    Estimated Protein Range 52-65    Estimated/Assessed Fluid Needs    Fluid Need Method RDA method;Other (comment)   1625ml (25ml/kg)            Nutrition Prescription Ordered       08/09/17 1323    Nutrition Prescription PO    Current PO Diet Regular            Evaluation of Received Nutrient/Fluid Intake       08/09/17 1323    PO Evaluation    Number of Meals 3    % PO Intake             Comments:      Pt is a 61 year old male admitted with N/V and hypokalemia who is currently undergoing treatment for multiple myeloma and also has a hx of cirrhosis.  RD familiar with pt who reports 50lb wt loss.  Currently GI symptoms have improved and diet advanced to regular diet, pt feels ready to eat and voiced food preferences.  Abdomen distended but no ascites present.  Pt says that he eats small amounts of food at one time usually and RD encouraged him to continue small frequent feedings if better tolerated.  Will add milk and Ensure with pt trays.  RD will monitor.        Electronically signed by:  Jaz Paulson RD  08/09/17 1:23 PM  "

## 2017-08-09 NOTE — PLAN OF CARE
Problem: Patient Care Overview (Adult)  Goal: Plan of Care Review  Outcome: Ongoing (interventions implemented as appropriate)    Problem: Oncology Care (Adult)  Goal: Signs and Symptoms of Listed Potential Problems Will be Absent or Manageable (Oncology Care)  Outcome: Ongoing (interventions implemented as appropriate)    Problem: Pressure Ulcer Risk (Garret Scale) (Adult,Obstetrics,Pediatric)  Goal: Identify Related Risk Factors and Signs and Symptoms  Outcome: Ongoing (interventions implemented as appropriate)  Goal: Skin Integrity  Outcome: Ongoing (interventions implemented as appropriate)

## 2017-08-09 NOTE — PROGRESS NOTES
FAMILY MEDICINE PROGRESS NOTE  NAME: Mickey Lind  : 1955  MRN: [id number]     LOS: 1 day   Full Code  PROVIDER OF SERVICE:     Chief Complaint:  Intractable nausea and vomiting    Subjective     Interval History:  History taken from: patient  Subjective: Patient continues to have some episodes of diarrhea. He is tolerating food now.      Review of Systems:   Review of Systems   Constitutional: Positive for activity change and appetite change. Negative for fatigue and fever.   HENT: Negative for ear pain and sore throat.    Eyes: Negative for pain and visual disturbance.   Respiratory: Negative for cough and shortness of breath.    Cardiovascular: Negative for chest pain and palpitations.   Gastrointestinal: Positive for diarrhea. Negative for abdominal pain, nausea and vomiting.   Endocrine: Negative for cold intolerance and heat intolerance.   Genitourinary: Negative for difficulty urinating and dysuria.   Musculoskeletal: Negative for arthralgias and gait problem.   Skin: Negative for color change and rash.   Neurological: Negative for dizziness, weakness and headaches.   Hematological: Negative for adenopathy. Does not bruise/bleed easily.   Psychiatric/Behavioral: Negative for agitation, confusion and sleep disturbance.       Objective     Vital Signs  Temp:  [96.5 °F (35.8 °C)-98.3 °F (36.8 °C)] 97.1 °F (36.2 °C)  Heart Rate:  [87-94] 87  Resp:  [20] 20  BP: (140-166)/(74-78) 149/75    Physical Exam  Physical Exam   Constitutional: He is oriented to person, place, and time. He appears well-developed and well-nourished.   HENT:   Head: Normocephalic and atraumatic.   Right Ear: External ear normal.   Left Ear: External ear normal.   Nose: Nose normal.   Mouth/Throat: Oropharynx is clear and moist.   Eyes: Conjunctivae and EOM are normal.   Neck: Normal range of motion.   Cardiovascular: Normal rate, regular rhythm, normal heart sounds and intact distal pulses.    Pulmonary/Chest: Effort normal and  breath sounds normal.   Abdominal: Soft. Bowel sounds are normal.   Musculoskeletal: Normal range of motion.   Neurological: He is alert and oriented to person, place, and time.   Skin: Skin is warm and dry.   Psychiatric: He has a normal mood and affect. His behavior is normal. Judgment and thought content normal.       Medication Review    Current Facility-Administered Medications:   •  acetaminophen (TYLENOL) tablet 650 mg, 650 mg, Oral, Q4H PRN, Carolee Leo MD  •  enoxaparin (LOVENOX) syringe 40 mg, 40 mg, Subcutaneous, Daily, Rafa Mendoza MD, 40 mg at 08/09/17 0837  •  famotidine (PEPCID) tablet 20 mg, 20 mg, Oral, Daily, Eva Howard MD, 20 mg at 08/09/17 0838  •  furosemide (LASIX) tablet 40 mg, 40 mg, Oral, Daily, Eva Howard MD, 40 mg at 08/09/17 0838  •  HYDROmorphone (DILAUDID) injection 1 mg, 1 mg, Intravenous, Q2H PRN, 1 mg at 08/08/17 1515 **AND** naloxone (NARCAN) injection 0.4 mg, 0.4 mg, Intravenous, Q5 Min PRN, Carolee Leo MD  •  Morphine sulfate (PF) injection 1 mg, 1 mg, Intravenous, Q4H PRN, 1 mg at 08/09/17 0440 **AND** naloxone (NARCAN) injection 0.4 mg, 0.4 mg, Intravenous, Q5 Min PRN, Carolee Leo MD  •  nicotine (NICODERM CQ) 21 MG/24HR patch 1 patch, 1 patch, Transdermal, Q24H, Carolee Leo MD, 1 patch at 08/08/17 1702  •  ondansetron (ZOFRAN) injection 4 mg, 4 mg, Intravenous, Q6H PRN, Carolee Leo MD  •  potassium chloride (MICRO-K) CR capsule 40 mEq, 40 mEq, Oral, PRN, 40 mEq at 08/09/17 0615 **OR** potassium chloride (KLOR-CON) packet 40 mEq, 40 mEq, Oral, PRN **OR** potassium chloride 10 mEq in 100 mL IVPB, 10 mEq, Intravenous, Q1H PRN, Joyce Kaba MD  •  potassium chloride (KLOR-CON) packet 40 mEq, 40 mEq, Oral, Once, Ara Bach MD  •  sertraline (ZOLOFT) tablet 200 mg, 200 mg, Oral, Daily, Eva Howard MD, 200 mg at 08/09/17 0838  •  sodium chloride 0.9 % flush 1-10 mL, 1-10 mL, Intravenous, PRN, Rafa Mendoza MD  •  sodium chloride 0.9  % flush 1-10 mL, 1-10 mL, Intravenous, PRN, Carolee Leo MD  •  spironolactone (ALDACTONE) tablet 50 mg, 50 mg, Oral, BID, Eav Howard MD, 50 mg at 08/09/17 0838  •  sucralfate (CARAFATE) 1 GM/10ML suspension 1 g, 1 g, Oral, Daily, Eva Howard MD, 1 g at 08/09/17 0843     Diagnostic Data      I reviewed the patient's new clinical results and imaging.      Assessment/Plan     Active Hospital Problems (** Indicates Principal Problem)    Diagnosis Date Noted   • **Intractable nausea and vomiting [R11.2] 08/08/2017     -Zofran  -Monitor daily  -IV Fluids  -advance to soft diet     • Hypokalemia [E87.6] 08/08/2017     -3.4 this am  -S/p 40mEq oral x1  -will give oral replacement      • Cachexia [R64] 08/08/2017     -BMI 22  -Nutrition consult  -Currently endorsing diet.  -CLD.     • Compression fracture of twelfth thoracic vertebra [M48.54XA] 08/08/2017     -Chronic back pain.  -Morphine prn for moderate pain, dilaudid for severe pain.     • Functional diarrhea [K59.1] 08/08/2017     -Abdominal xray  -GI panel  -Will continue to monitor  -Administering IV fluids  -better and stable     • Dysuria [R30.0] 08/08/2017     -Urinalysis with culture  -Will follow up results and continue to monitor.     • Periumbilical abdominal pain [R10.33] 08/08/2017     -Pt with history of ascites and splenomegaly.  -Will follow up abdominal xray.  -Pain management with morphine and dilaudid prn.     • History of cirrhosis [Z87.19] 08/08/2017     -s/p Harvoni treatment  -Liver markers normal on CMP.     • Multiple myeloma, stage 3 [C90.00] 01/20/2017     -has a right sided port in, has been followed by Dr. Mendoza of heme-oncology.  -CT scan of head showed several punched-out lesions in right and left frontal brain, no acute changes.  -Patient to continue chemotherapy with Velcade and Decadron as outpatient.  -Dr. Reji following.        Resolved Hospital Problems    Diagnosis Date Noted Date Resolved   • Hypomagnesemia  [E83.42] 08/08/2017 08/09/2017     -1.4  -S/p 2g Magnesium IV  -Will follow up value tomorrow.     • Pleural effusion on right [J90] 08/08/2017 08/09/2017     -s/p thoracentesis on last admission.  -Decreased breath sounds on RLL base  -CXR. Will follow up.         I have seen the patient.  I have reviewed the notes, assessments, and/or procedures performed by Ara Bach MD , I concur with her/his documentation and assessment and plan for Mickey Lind.          This document has been electronically signed by Radha Zayas MD on August 9, 2017 10:25 AM

## 2017-08-09 NOTE — PLAN OF CARE
Problem: Patient Care Overview (Adult)  Goal: Plan of Care Review  Outcome: Ongoing (interventions implemented as appropriate)    08/09/17 0900   Coping/Psychosocial Response Interventions   Plan Of Care Reviewed With patient   Outcome Evaluation   Outcome Summary/Follow up Plan PT evaluation completed on this date. Pt transferred sup-->sit with SBA but required Dimas for LE when transferring sit-->sup. Pt stood with CGA and ambulated 3 times with the longest being 20 feet. Pt mainly required CGA but had one LOB requiring Dimas to correct. Pt noted to be very unsteady on his feet. Pt will benefit from skilled PT to improve mobility and safety prior to d/c. Recommend home with 24/7 and  PT at this time.       Goal: Discharge Needs Assessment  Outcome: Ongoing (interventions implemented as appropriate)    08/09/17 0900   Discharge Needs Assessment   Discharge Facility/Level Of Care Needs home with home health  (Home with 24/7)   Living Environment   Transportation Available car;family or friend will provide   Self-Care   Equipment Currently Used at Home cane, straight;walker, rolling  (Uses SC with amb; )         Problem: Inpatient Physical Therapy  Goal: Transfer Training Goal 1 LTG- PT  Outcome: Ongoing (interventions implemented as appropriate)    08/09/17 0900   Transfer Training PT LTG   Transfer Training PT LTG, Date Established 08/09/17   Transfer Training PT LTG, Time to Achieve by discharge   Transfer Training PT LTG, Activity Type bed to chair /chair to bed   Transfer Training PT LTG, [place] Level conditional independence   Transfer Training PT LTG, Assist Device (AAD)   Transfer Training PT LTG, Outcome goal ongoing       Goal: Gait Training Goal LTG- PT  Outcome: Ongoing (interventions implemented as appropriate)    08/09/17 0900   Gait Training PT LTG   Gait Training Goal PT LTG, Date Established 08/09/17   Gait Training Goal PT LTG, Time to Achieve by discharge   Gait Training Goal PT LTG,  [place] Level supervision required   Gait Training Goal PT LTG, Assist Device (AAD)   Gait Training Goal PT LTG, Distance to Achieve 100 feet   Gait Training Goal PT LTG, Outcome goal ongoing       Goal: Stair Training Goal LTG- PT  Outcome: Ongoing (interventions implemented as appropriate)    08/09/17 0900   Stair Training PT LTG   Stair Training Goal PT LTG, Date Established 08/09/17   Stair Training Goal PT LTG, Time to Achieve by discharge   Stair Training Goal PT LTG, Number of Steps 4 steps   Stair Training Goal PT LTG, [place] Level contact guard assist   Stair Training Goal PT LTG, Assist Device (SC)   Stair Training Goal PT LTG, Outcome goal ongoing

## 2017-08-09 NOTE — CONSULTS
INPATIENT ONCOLOGY HEMATOLOGY CONSULT NOTE      NAME:  Mickey Lind  YOB: 1955  M.R.N.: [id number]  DATE OF ADMISSION: 8/8/2017  REQUESTING PHYSICIAN: Eva Howard, Jolynn  DATE OF CONSULTATION:  8/9/17    REASON FOR CONSULTATION:    myeloma    Thank you for consulting us!    ASSESSMENT:   Multiple myeloma    DIAGNOSTIC RECOMMENDATIONS:    Protein studies    THERAPEUTIC RECOMMENDATIONS:    Pamidronate 90 mg, to maintain bone integrity, was not given in office on time as scheduled, so will give in hospital per Dr Mendoza's request. Renal function good.                            HISTORY OF PRESENT ILLNESS:    61-year-old male with a past medical history significant for chronic thrombocytopenia and splenomegaly due to hepatitis C, status posttreatment with Harvoni  by Dr. David was diagnosed with Myeloma on  a bone marrow biopsy by Dr. Mitchell  in December 2016.  Patient started his treatment for multiple myeloma with Velcade and Decadron on January 23, 2017.      He has IgG lambda multiple myeloma, ISS stage III.  Patient was diagnosed in December 2016 with a bone marrow biopsy.  Patient was recently started on Velcade and dexamethasone on January 23, 2017.  On 8/1/17 he received cycle 4 day 1 of Velcade. Patient was also evaluated at [place] bone marrow transplant team, and is not deemed candidate for bone marrow transplant.  Before 8/1/17, he received 3 cycles with Velcade and Decadron last treatment was on April 3, 2017 after that he was lost to follow-up secondary to insurance issue.  Most recent CBC shows hemoglobin is 10.5 and platelet is 117,000.  Repeat myeloma workup in July about same which was done in March 2017.  His hemoglobin and platelet has been stable since March 2017.    Xrays show a right pleural effusion and a new T12 compression fracture.    Past Medical History:   Diagnosis Date   • Abdominal pain    • Alcoholic liver damage    • Alcoholic polyneuropathy    • Amblyopia      refractive os   • Anemia    • Ascites    • Ascites    • Asthma    • Astigmatism    • Cellulitis of left lower leg    • Chronic hepatitis C    • Chronic viral hepatitis C    • Cirrhosis of liver    • Claudication    • Encounter for immunization    • Generalized edema    • History of blood transfusion    • History of colon polyps    • Hypermetropia    • Inguinal hernia     - Rih      • Low back pain    • Major depressive disorder     recurrent , moderate   • Multiple myeloma    • Myocardial infarction    • Pain in joint, lower leg    • Peripheral vascular disease    • Recurrent major depressive episodes    • Tinea unguium    • Tobacco use         Past Surgical History:   Procedure Laterality Date   • CARDIAC CATHETERIZATION      (Successful PCI to a totally occluded RCA with a 2.5 x 28 and a 2.5 x 8 mm Xience stent. Nonobstructive disease in the left coronary artery system.)   2012    • CARDIAC SURGERY     • COLONOSCOPY  10/13/2016   • COLONOSCOPY N/A 3/29/2017    Procedure: COLONOSCOPY;  Surgeon: Jose Daniel David MD;  Location: [facility] ENDOSCOPY;  Service:    • ENDOSCOPY  10/13/2016   • ENDOSCOPY N/A 3/29/2017    Procedure: ESOPHAGOGASTRODUODENOSCOPY (U/S FIRST @0945) ;  Surgeon: Jose Daniel David MD;  Location: [facility] ENDOSCOPY;  Service:    • UPPER GASTROINTESTINAL ENDOSCOPY  10/13/2016        Social History     Social History   • Marital status:      Spouse name: N/A   • Number of children: N/A   • Years of education: N/A     Social History Main Topics   • Smoking status: Current Every Day Smoker     Packs/day: 0.50   • Smokeless tobacco: Never Used      Comment: Smokes approx 1 ppd of Cigarettes.smoking since last 45 yr   • Alcohol use No      Comment: no alcohol since 2017   • Drug use: No   • Sexual activity: Defer     Other Topics Concern   • None     Social History Narrative    , wife  2017. Lives with son and his son's girlfriend.  He has worked as , mine equipment,  coal mines.  He served in the [employer] for 1.5 years.  Used to smoke 2 ppd down to 1/2 ppd for 35 years or more.  Drank a 12 pack of beer a day for 20 years, last drink was 1/2 a beer on .  He last drank in 10/2015.  He  drugs 20-30 years ago including cocaine, meth, heroin, pot, and other drugs.  He smoked, snorted, and shot up drugs.        Family History   Problem Relation Age of Onset   • Cancer Mother    • Heart disease Mother    • Cancer Father    • Other Father      ischemic heart disease   • Heart disease Father    • Other Son      depressive disorder   • Diabetes Maternal Grandmother    • Cancer Paternal Grandfather    • No Known Problems Daughter    • No Known Problems Son         Allergies   Allergen Reactions   • Codeine Other (See Comments)     Childhood, hives   • Other      MRSA COLONIZATION   • Penicillins Swelling        Prescriptions Prior to Admission   Medication Sig Dispense Refill Last Dose   • furosemide (LASIX) 40 MG tablet Take 1 tablet by mouth Daily. 30 tablet 2 Taking   • GNP ACID REDUCER 75 MG tablet Take 2 tablets by mouth 2 (Two) Times a Day. 120 tablet 2 Taking   • HYDROmorphone (DILAUDID) 2 MG tablet Take 1 tablet by mouth 3 (Three) Times a Day As Needed for Severe Pain  (7-10). 90 tablet 0 Taking   • polyethylene glycol (MIRALAX) powder Take 17 g by mouth Daily. 527 g 1 Taking   • prochlorperazine (COMPAZINE) 10 MG tablet Take 1 tablet by mouth Every 6 (Six) Hours As Needed for Nausea or Vomiting. 60 tablet 1 Taking   • sertraline (ZOLOFT) 100 MG tablet Take 2 tablets by mouth Daily. 60 tablet 2 Taking   • spironolactone (ALDACTONE) 50 MG tablet Take 1 tablet by mouth 2 (Two) Times a Day for 30 days. 60 tablet 0 Taking   • sucralfate (CARAFATE) 1 GM/10ML suspension Take 10 mL by mouth Daily. 30 g 3 Taking         Current Facility-Administered Medications:   •  acetaminophen (TYLENOL) tablet 650 mg, 650 mg, Oral, Q4H PRN, Carolee Leo MD  •  enoxaparin (LOVENOX) syringe 40 mg,  40 mg, Subcutaneous, Daily, Rafa Mendoza MD, 40 mg at 08/09/17 0837  •  famotidine (PEPCID) tablet 20 mg, 20 mg, Oral, Daily, Eva Howard MD, 20 mg at 08/09/17 0838  •  furosemide (LASIX) tablet 40 mg, 40 mg, Oral, Daily, Eva Howard MD, 40 mg at 08/09/17 0838  •  HYDROmorphone (DILAUDID) injection 1 mg, 1 mg, Intravenous, Q2H PRN, 1 mg at 08/08/17 1515 **AND** naloxone (NARCAN) injection 0.4 mg, 0.4 mg, Intravenous, Q5 Min PRN, Carolee Leo MD  •  Morphine sulfate (PF) injection 1 mg, 1 mg, Intravenous, Q4H PRN, 1 mg at 08/09/17 0440 **AND** naloxone (NARCAN) injection 0.4 mg, 0.4 mg, Intravenous, Q5 Min PRN, Caorlee Leo MD  •  nicotine (NICODERM CQ) 21 MG/24HR patch 1 patch, 1 patch, Transdermal, Q24H, Carolee Leo MD, 1 patch at 08/08/17 1702  •  ondansetron (ZOFRAN) injection 4 mg, 4 mg, Intravenous, Q6H PRN, Carolee Leo MD  •  potassium chloride (MICRO-K) CR capsule 40 mEq, 40 mEq, Oral, PRN, 40 mEq at 08/09/17 0615 **OR** potassium chloride (KLOR-CON) packet 40 mEq, 40 mEq, Oral, PRN **OR** potassium chloride 10 mEq in 100 mL IVPB, 10 mEq, Intravenous, Q1H PRN, Joyce Kaba MD  •  sertraline (ZOLOFT) tablet 200 mg, 200 mg, Oral, Daily, Eva Howard MD, 200 mg at 08/09/17 0838  •  sodium chloride 0.9 % flush 1-10 mL, 1-10 mL, Intravenous, PRN, Rafa Mendoza MD  •  sodium chloride 0.9 % flush 1-10 mL, 1-10 mL, Intravenous, PRN, Carolee Leo MD  •  spironolactone (ALDACTONE) tablet 50 mg, 50 mg, Oral, BID, Eva Howard MD, 50 mg at 08/09/17 0838  •  sucralfate (CARAFATE) 1 GM/10ML suspension 1 g, 1 g, Oral, Daily, Eva Howard MD, 1 g at 08/09/17 0843    REVIEW OF SYSTEMS:  CONSTITUTIONAL: No  complaint of fatigue. Denies any fever, chills or weight loss.     HEENT:  No epistaxis, mouth sores or difficulty swallowing.    RESPIRATORY:  No complaint of shortness of breath or cough. Denies any hemoptysis.    CARDIOVASCULAR:  No chest pain or  palpitations.    GASTROINTESTINAL:  No abdominal pain, nausea, vomiting, diarrhea or blood in the stool.    GENITOURINARY:  Denies any hematuria, dysuria, urgency or nocturia.    MUSCULOSKELETAL: No complaint of bone pain.    LYMPHATICS:  Denies any abnormal swollen glands, lumps or bumps anywhere in the body.    Other six systems reviewed and are negative.      PHYSICAL EXAM:     Temp:  [96.7 °F (35.9 °C)-98.3 °F (36.8 °C)] 97.2 °F (36.2 °C)  Heart Rate:  [87-95] 95  Resp:  [20] 20  BP: (121-163)/(73-77) 121/73    SpO2 Readings from Last 3 Encounters:   08/09/17 92%   07/31/17 94%   07/27/17 97%       General Appearance:    Alert, cooperative, in no acute distress   Head:    Normocephalic, without obvious abnormality, atraumatic   Eyes:            Conjunctivae and sclerae normal, no   icterus, no pallor, corneas clear, PERRLA   Ears:    Ears appear intact with no abnormalities noted   Throat:   No oral lesions, no thrush, oral mucosa moist   Neck:   No adenopathy, supple, trachea midline, no thyromegaly, no   carotid bruit, no JVD   Back:     No kyphosis present, no scoliosis present, no skin lesions,      erythema or scars, no tenderness to percussion or                   palpation, range of motion normal   Lungs:     Clear to auscultation, respirations regular, even and                  unlabored    Heart:    Regular rhythm and normal rate, normal S1 and S2, no            murmur, no gallop, no rub, no click   Chest Wall:    No abnormalities observed   Abdomen:     Normal bowel sounds, no masses, no organomegaly, soft        non-tender, non-distended, no guarding, no rebound                tenderness       Extremities:   Moves all extremities well, no edema, no cyanosis, no             redness   Pulses:   Pulses palpable and equal bilaterally   Skin:   No bleeding, bruising or rash   Lymph nodes:   No palpable adenopathy   Neurologic:   Cranial nerves 2 - 12 grossly intact, sensation intact, DTR       present and  equal bilaterally     CBC:  Lab Results   Component Value Date    WBC 8.26 08/09/2017    HGB 11.1 (L) 08/09/2017    HCT 32.3 (L) 08/09/2017    MCV 88.3 08/09/2017     (L) 08/09/2017       CREATININE:    Results from last 7 days  Lab Units 08/09/17  0629   CREATININE mg/dL 0.47*       TOTAL BILIRUBIN:  No components found for: BILIRUBIN    INR:        IMAGING:  Imaging Results (last 7 days)     Procedure Component Value Units Date/Time    XR Chest PA & Lateral [[id number]] Collected:  08/08/17 1603     Updated:  08/08/17 1625    Narrative:         EXAM:          Radiograph(s), Chest   VIEWS:    PA / Lat ; 2       DATE/TIME:  8/8/2017 4:23 PM CDT               INDICATION:    dyspnea           COMPARISON:  CXR: 7/19/17          FINDINGS:             - lines/tubes:    Right approach indwelling central venous  catheter, unchanged.     - cardiac:         size within normal limits         - mediastinum: contour within normal limits         - lungs:         no focal air space process, pulmonary  interstitial edema, nodule(s)/mass             - pleura:         Small to moderate-sized right pleural fluid  collection, decreased in size since the previous study.                   - osseous:         unremarkable for age                  - misc.:         Impression:       CONCLUSION:        1. Small to moderate-sized right pleural fluid collection,  decreased in size since the previous study.                                    Electronically signed by:  DALI Vee MD  8/8/2017 4:24 PM  CDT Workstation: [id number]    XR Abdomen KUB [[id number]] Collected:  08/08/17 1603     Updated:  08/08/17 1629    Narrative:         EXAM:      Radiograph(s), Abdomen       VIEWS:    1  DATE/TIME:   8/8/2017 4:24 PM CDT       INDICATION:        abdominal pain / discomfort  COMPARISON:   11/2/15                 FINDINGS:             - bowel gas pattern:    nonobstructive       - free air:    none       - soft tissue:    No gross  evidence of organomegaly, an abdominal  mass, ascites       - calculi:    none projecting over gallbladder fossa, renal  shadows, or anticipated course of the ureters.       - osseous:      limited assessment, unremarkable for age.       - misc.:        .         Impression:       CONCLUSION:             1. Non-obstructed bowel gas pattern.             If pain or symptoms persist beyond reasonable expectations, a  contrast enhanced CT examination is suggested, as is deemed  clinical appropriate.                                  Electronically signed by:  DALI Vee MD  8/8/2017 4:25 PM  CDT Workstation: [id number]            Electronically signed by:   Vasiliy Ortiz MD August 9, 2017 12:34 PM

## 2017-08-10 NOTE — PLAN OF CARE
Problem: Patient Care Overview (Adult)  Goal: Plan of Care Review  Outcome: Ongoing (interventions implemented as appropriate)    Problem: Oncology Care (Adult)  Goal: Signs and Symptoms of Listed Potential Problems Will be Absent or Manageable (Oncology Care)  Outcome: Ongoing (interventions implemented as appropriate)    Problem: Pressure Ulcer Risk (Garret Scale) (Adult,Obstetrics,Pediatric)  Goal: Identify Related Risk Factors and Signs and Symptoms  Outcome: Outcome(s) achieved Date Met:  08/10/17  Goal: Skin Integrity  Outcome: Ongoing (interventions implemented as appropriate)

## 2017-08-10 NOTE — THERAPY DISCHARGE NOTE
Acute Care - Physical Therapy Discharge Summary  [facility]       Patient Name: Mickey Lind  : 1955  MRN: [id number]    Today's Date: 8/10/2017  Onset of Illness/Injury or Date of Surgery Date: 17    Date of Referral to PT: 17  Referring Physician: Dr. Bambi Ríos      Admit Date: 2017      PT Recommendation and Plan    Visit Dx:    ICD-10-CM ICD-9-CM   1. Hypomagnesemia syndrome E83.42 275.2   2. Intractable vomiting with nausea, unspecified vomiting type R11.2 536.2   3. Chronic pain due to neoplasm G89.3 338.3   4. Tobacco dependence F17.200 305.1   5. Hypokalemia E87.6 276.8   6. Hypomagnesemia E83.42 275.2   7. Abnormality of gait and mobility R26.9 781.2   8. Muscle weakness (generalized) M62.81 728.87   9. Impaired mobility and ADLs Z74.09 799.89             Outcome Measures       08/10/17 1100 08/10/17 0710 17 0935    How much help from another person do you currently need...    Turning from your back to your side while in flat bed without using bedrails? 3  -TA      Moving from lying on back to sitting on the side of a flat bed without bedrails? 3  -TA      Moving to and from a bed to a chair (including a wheelchair)? 3  -TA      Standing up from a chair using your arms (e.g., wheelchair, bedside chair)? 3  -TA      Climbing 3-5 steps with a railing? 3  -TA      To walk in hospital room? 3  -TA      AM-PAC 6 Clicks Score 18  -TA      How much help from another is currently needed...    Putting on and taking off regular lower body clothing?  3  -KD 3  -BH    Bathing (including washing, rinsing, and drying)  3  -KD 3  -BH    Toileting (which includes using toilet bed pan or urinal)  3  -KD 3  -BH    Putting on and taking off regular upper body clothing  3  -KD 3  -BH    Taking care of personal grooming (such as brushing teeth)  4  -KD 4  -BH    Eating meals  4  -KD 4  -BH    Score  20  -KD 20  -BH    Functional Assessment    Outcome Measure Options AM-PAC 6  Clicks Basic Mobility (PT)  -TA  -[facility] 6 Clicks Daily Activity (OT)  -      08/09/17 0900          How much help from another person do you currently need...    Turning from your back to your side while in flat bed without using bedrails? 3  -LM      Moving from lying on back to sitting on the side of a flat bed without bedrails? 3  -LM      Moving to and from a bed to a chair (including a wheelchair)? 3  -LM      Standing up from a chair using your arms (e.g., wheelchair, bedside chair)? 3  -LM      Climbing 3-5 steps with a railing? 3  -LM      To walk in hospital room? 3  -LM      AM-[facility] 6 Clicks Score 18  -LM      Functional Assessment    Outcome Measure Options AM-[facility] 6 Clicks Basic Mobility (PT)  -LM        User Key  (r) = Recorded By, (t) = Taken By, (c) = Cosigned By    Initials Name Provider Type    LM Mirlande Dugan, PT Physical Therapist     Dannielle Sanders, OTR/L Occupational Therapist    MYRON Arellano PTA Physical Therapy Assistant    ROSANGELA Mcadams, DUNNE/L Occupational Therapy Assistant                PT Charges       08/10/17 1139          Time Calculation    Start Time 1006  -TA      Stop Time 1107  -TA      Time Calculation (min) 61 min  -TA      Time Calculation- PT    Total Timed Code Minutes- PT 61 minute(s)  -TA        User Key  (r) = Recorded By, (t) = Taken By, (c) = Cosigned By    Initials Name Provider Type    MYRON Arellano PTA Physical Therapy Assistant                  IP PT Goals       08/10/17 1549 08/09/17 0900       Transfer Training PT LTG    Transfer Training PT LTG, Date Established  08/09/17  -LM     Transfer Training PT LTG, Time to Achieve  by discharge  -LM     Transfer Training PT LTG, Activity Type  bed to chair /chair to bed  -LM     Transfer Training PT LTG, [place] Level  conditional independence  -LM     Transfer Training PT LTG, Assist Device  --   AAD  -LM     Transfer Training PT  LTG, Date Goal Reviewed 08/10/17  -CZ      Transfer Training PT LTG,  Outcome goal not met  -CZ goal ongoing  -LM     Transfer Training PT LTG, Reason Goal Not Met discharged from facility  -CZ      Gait Training PT LTG    Gait Training Goal PT LTG, Date Established  08/09/17  -LM     Gait Training Goal PT LTG, Time to Achieve  by discharge  -LM     Gait Training Goal PT LTG, [place] Level  supervision required  -LM     Gait Training Goal PT LTG, Assist Device  --   AAD  -LM     Gait Training Goal PT LTG, Distance to Achieve  100 feet  -LM     Gait Training Goal PT LTG, Date Goal Reviewed 08/10/17  -CZ      Gait Training Goal PT LTG, Outcome goal not met  -CZ goal ongoing  -LM     Gait Training Goal PT LTG, Reason Goal Not Met discharged from facility  -CZ      Stair Training PT LTG    Stair Training Goal PT LTG, Date Established  08/09/17  -LM     Stair Training Goal PT LTG, Time to Achieve  by discharge  -LM     Stair Training Goal PT LTG, Number of Steps  4 steps  -LM     Stair Training Goal PT LTG, [place] Level  contact guard assist  -LM     Stair Training Goal PT LTG, Assist Device  --   SC  -LM     Stair Training Goal PT LTG, Outcome goal not met  -CZ goal ongoing  -LM     Stair Training Goal PT LTG, Reason Goal Not Met discharged from facility  -CZ        User Key  (r) = Recorded By, (t) = Taken By, (c) = Cosigned By    Initials Name Provider Type    CORY Dugan, PT Physical Therapist    CZ Sabino Flores, PT Physical Therapist              PT Discharge Summary  Anticipated Discharge Disposition: home with home health  Reason for Discharge: Per MD order, Discharge from facility  Outcomes Achieved: Unable to make functional progress toward goals at this time  Discharge Destination: Home with home health      Sabino Flores, PT   8/10/2017

## 2017-08-10 NOTE — SIGNIFICANT NOTE
08/10/17 0859   Rehab Treatment   Discipline physical therapy assistant   Treatment Not Performed patient unavailable for treatment  (pt in the BR @ present time)

## 2017-08-10 NOTE — PLAN OF CARE
Problem: Patient Care Overview (Adult)  Goal: Adult Individualization and Mutuality  Outcome: Ongoing (interventions implemented as appropriate)    Problem: Oncology Care (Adult)  Goal: Signs and Symptoms of Listed Potential Problems Will be Absent or Manageable (Oncology Care)  Outcome: Ongoing (interventions implemented as appropriate)    Problem: Pressure Ulcer Risk (Garret Scale) (Adult,Obstetrics,Pediatric)  Goal: Identify Related Risk Factors and Signs and Symptoms  Outcome: Ongoing (interventions implemented as appropriate)  Goal: Skin Integrity  Outcome: Ongoing (interventions implemented as appropriate)

## 2017-08-10 NOTE — THERAPY TREATMENT NOTE
Acute Care - Physical Therapy Treatment Note  [facility]     Patient Name: Mickey Lind  : 1955  MRN: [id number]  Today's Date: 8/10/2017  Onset of Illness/Injury or Date of Surgery Date: 17  Date of Referral to PT: 17  Referring Physician: Dr. Bambi Ríos    Admit Date: 2017    Visit Dx:    ICD-10-CM ICD-9-CM   1. Intractable vomiting with nausea, unspecified vomiting type R11.2 536.2   2. Chronic pain due to neoplasm G89.3 338.3   3. Tobacco dependence F17.200 305.1   4. Hypokalemia E87.6 276.8   5. Hypomagnesemia E83.42 275.2   6. Abnormality of gait and mobility R26.9 781.2   7. Muscle weakness (generalized) M62.81 728.87   8. Impaired mobility and ADLs Z74.09 799.89     Patient Active Problem List   Diagnosis   • End stage liver disease   • Depression   • Low back pain with sciatica   • Hyperlipidemia   • Unilateral inguinal hernia without obstruction or gangrene   • Diarrhea   • Hepatic cirrhosis   • Epigastric pain   • Peripheral vascular disease   • Encounter for venous access device care   • Multiple myeloma, stage 3   • Anemia   • Chronic hepatitis C without hepatic coma   • Lower abdominal pain   • Ascites due to alcoholic cirrhosis   • Tobacco dependence   • Simple chronic bronchitis   • Pleural effusion   • Ulcer of sacral region, stage 1   • CAP (community acquired pneumonia), bilateral   • Does not have health insurance   • Acute respiratory failure with hypoxia   • Colitis   • Infectious colitis   • Hyponatremia   • Does not have health insurance   • Nausea   • Hypokalemia   • Intractable nausea and vomiting   • Cachexia   • Compression fracture of twelfth thoracic vertebra   • Functional diarrhea   • Dysuria   • Periumbilical abdominal pain   • History of cirrhosis               Adult Rehabilitation Note       08/10/17 1006 08/10/17 0710       Rehab Assessment/Intervention    Discipline physical therapy assistant  -TA occupational therapy assistant  -KD      Document Type therapy note (daily note)  -TA therapy note (daily note)  -KD     Subjective Information agree to therapy  -TA agree to therapy  -KD     Patient Effort, Rehab Treatment good  -TA      Precautions/Limitations  fall precautions  -KD     Recorded by [TA] Dahiana Arellano PTA [KD] Sylvie Mcadams, DUNNE/L     Vital Signs    Pre Systolic BP Rehab  116  -KD     Pre Treatment Diastolic BP  85  -KD     Pretreatment Heart Rate (beats/min) 90  -TA 93  -KD     Intratreatment Heart Rate (beats/min) 83  -TA      Posttreatment Heart Rate (beats/min) 96  -TA 86  -KD     Pre SpO2 (%) 98  -TA 87  -KD     O2 Delivery Pre Treatment supplemental O2  -TA room air  -KD     Intra SpO2 (%) 98  -TA      Post SpO2 (%) 95  -TA 92  -KD     O2 Delivery Post Treatment  room air  -KD     Pre Patient Position Sitting  -TA Supine  -KD     Intra Patient Position  Standing  -KD     Post Patient Position Supine  -TA Sitting  -KD     Recorded by [TA] Dahiana Arellano PTA [KD] Sylvie Mcadams, DUNNE/L     Pain Assessment    Pain Assessment 0-10  -TA 0-10  -KD     Pain Score 6  -TA 6  -KD     Post Pain Score 6  -TA 6  -KD     Pain Type Acute pain  -TA Chronic pain  -KD     Pain Location Abdomen  -TA Back  -KD     Pain Orientation  Lower  -KD     Pain Descriptors  Aching  -KD     Pain Intervention(s)  Repositioned  -KD     Recorded by [TA] Dahiana Arellano PTA [KD] Sylvie Mcadams, DUNNE/L     Vision Assessment/Intervention    Visual Impairment WFL with corrective lenses  -TA WFL with corrective lenses  -KD     Recorded by [TA] Dahiana Arellano PTA [KD] Sylvie Mcadams, DUNNE/L     Cognitive Assessment/Intervention    Current Cognitive/Communication Assessment functional  -TA functional  -KD     Orientation Status oriented x 4  -TA oriented x 4  -KD     Follows Commands/Answers Questions 100% of the time  -% of the time  -KD     Personal Safety decreased awareness, need for assist  -TA      Personal Safety Interventions fall prevention program  maintained;gait belt;nonskid shoes/slippers when out of bed  -TA fall prevention program maintained;nonskid shoes/slippers when out of bed  -KD     Recorded by [TA] Dahiana Arellano PTA [KD] Sylvie Mcadams, DUNNE/L     Bed Mobility, Assessment/Treatment    Bed Mobility, Assistive Device  bed rails;head of bed elevated  -KD     Bed Mob, Supine to Sit, [place]  conditional independence  -KD     Bed Mob, Sit to Supine, [place] independent  -TA      Bed Mob, Sidelying to Sit, [place]  conditional independence  -KD     Recorded by [TA] Dahiana Arellano PTA [KD] Sylvie Mcadams, DUNNE/L     Transfer Assessment/Treatment    Transfers, Bed-Chair [place] contact guard assist  -TA contact guard assist  -KD     Transfers, Chair-Bed [place] contact guard assist  -TA      Transfers, Bed-Chair-Bed, Assist Device straight cane  -TA      Transfers, Sit-Stand [place] contact guard assist  -TA contact guard assist  -KD     Transfers, Stand-Sit [place] contact guard assist  -TA contact guard assist  -KD     Transfers, Sit-Stand-Sit, Assist Device straight cane  -TA straight cane  -KD     Toilet Transfer, [place] contact guard assist;supervision required   x 2  -TA supervision required  -KD     Toilet Transfer, Assistive Device straight cane  -TA straight cane  -KD     Walk-In Shower Transfer, [place]  not tested  -KD     Bathtub Transfer, [place]  not tested  -KD     Recorded by [TA] Dahiana Arellano PTA [KD] Sylvie Mcadams, DUNNE/L     Gait Assessment/Treatment    Gait, [place] Level contact guard assist  -TA      Gait, Assistive Device straight cane  -TA      Gait, Distance (Feet) 140  -TA      Gait, Gait Deviations sergio decreased;forward flexed posture;decreased heel strike  -TA      Gait, Impairments strength decreased  -TA      Recorded by [TA] Dahiana Arellano PTA      Functional Mobility    Functional Mobility- Ind. Level contact guard assist  -TA contact guard assist  -KD      Functional Mobility- Device straight cane  -TA straight cane  -KD     Functional Mobility-Distance (Feet) 20   x 2  -TA --   10 x 2  -KD     Recorded by [TA] Dahiana Arellano PTA [KD] RAUL Esquivel     Toileting Assessment/Training    Toileting Assess/Train, Assistive Device  grab bars  -KD     Toileting Assess/Train, Position  sitting  -KD     Toileting Assess/Train, [place] Level  conditional independence  -KD     Recorded by  [KD] UZAIR Esquivel/L     Grooming Assessment/Training    Grooming Assess/Train, Assistive Device  --   wash hands  -KD     Grooming Assess/Train, Position  standing;sink side  -KD     Grooming Assess/Train, [place] Level  contact guard assist  -KD     Grooming Assess/Train, Impairments  strength decreased  -KD     Recorded by  [KD] UZAIR Esquivel/L     Balance Skills Training    Static Standing Balance Support  assistive device  -KD     Standing-Balance Activities  --   dynamic  -KD     Standing Balance # of Minutes  --   2  -KD     Recorded by  [KD] UZAIR Esquivel/L     Therapy Exercises    Bilateral Lower Extremities AROM:;15 reps;sitting;ankle pumps/circles;glut sets;hip abduction/adduction;hip flexion;LAQ   HEP issued  -TA      Recorded by [TA] Dahiana Arellano PTA      Positioning and Restraints    Pre-Treatment Position sitting in chair/recliner  -TA in bed  -KD     Post Treatment Position bed  -TA chair  -KD     In Bed supine;call light within reach  -TA      In Chair  sitting;call light within reach;encouraged to call for assist;exit alarm on  -KD     Recorded by [TA] Dahiana Arellano PTA [KD] RAUL Esquivel       User Key  (r) = Recorded By, (t) = Taken By, (c) = Cosigned By    Initials Name Effective Dates    TA Dahiana Arellano PTA 10/17/16 -     KD RAUL Esquivel 10/17/16 -                 IP PT Goals       08/09/17 0900          Transfer Training PT LTG    Transfer Training PT LTG, Date Established 08/09/17  -LM      Transfer Training PT  LTG, Time to Achieve by discharge  -LM      Transfer Training PT LTG, Activity Type bed to chair /chair to bed  -LM      Transfer Training PT LTG, [place] Level conditional independence  -LM      Transfer Training PT LTG, Assist Device --   AAD  -LM      Transfer Training PT LTG, Outcome goal ongoing  -LM      Gait Training PT LTG    Gait Training Goal PT LTG, Date Established 08/09/17  -LM      Gait Training Goal PT LTG, Time to Achieve by discharge  -LM      Gait Training Goal PT LTG, [place] Level supervision required  -LM      Gait Training Goal PT LTG, Assist Device --   AAD  -LM      Gait Training Goal PT LTG, Distance to Achieve 100 feet  -LM      Gait Training Goal PT LTG, Outcome goal ongoing  -LM      Stair Training PT LTG    Stair Training Goal PT LTG, Date Established 08/09/17  -LM      Stair Training Goal PT LTG, Time to Achieve by discharge  -LM      Stair Training Goal PT LTG, Number of Steps 4 steps  -LM      Stair Training Goal PT LTG, [place] Level contact guard assist  -LM      Stair Training Goal PT LTG, Assist Device --   SC  -LM      Stair Training Goal PT LTG, Outcome goal ongoing  -LM        User Key  (r) = Recorded By, (t) = Taken By, (c) = Cosigned By    Initials Name Provider Type    CORY Dugan PT Physical Therapist          Physical Therapy Education     Title: PT OT SLP Therapies (Done)     Topic: Physical Therapy (Done)     Point: Mobility training (Done)    Learning Progress Summary    Learner Readiness Method Response Comment Documented by Status   Patient Acceptance RENNY CENTENO D VU, DU pt issued copies of HEP & Home Safety TA 08/10/17 1134 Done    Acceptance E NR Reviewed safety with transfers and ambulation. LM 08/09/17 1107 Active               Point: Home exercise program (Done)    Learning Progress Summary    Learner Readiness Method Response Comment Documented by Status   Patient Acceptance RENNY CENTENO D VU, DU pt issued copies of HEP & Home Safety TA 08/10/17 1134 Done                Point: Body mechanics (Done)    Learning Progress Summary    Learner Readiness Method Response Comment Documented by Status   Patient Acceptance E,H,D RICKI,VASU pt issued copies of HEP & Home Safety TA 08/10/17 1134 Done               Point: Precautions (Done)    Learning Progress Summary    Learner Readiness Method Response Comment Documented by Status   Patient Acceptance E,H,D RICKI,VASU pt issued copies of HEP & Home Safety TA 08/10/17 1134 Done    Acceptance E NR Reviewed safety with transfers and ambulation.  08/09/17 1107 Active                      User Key     Initials Effective Dates Name Provider Type Discipline     06/15/16 -  Mirlande Dugan, PT Physical Therapist PT    TA 10/17/16 -  Dahiana Arelalno, PTA Physical Therapy Assistant PT                    PT Recommendation and Plan  Anticipated Discharge Disposition: home with home health, home with 24/7 care  Planned Therapy Interventions: balance training, bed mobility training, gait training, home exercise program, motor coordination training, neuromuscular re-education, patient/family education, postural re-education, ROM (Range of Motion), stair training, strengthening, stretching, transfer training  PT Frequency: other (see comments) (5-14 times/wk)  Plan of Care Review  Outcome Summary/Follow up Plan: pt ambultated 140` with SC & CGA , pt unsteady but without LOB, pt would benefit from 24/7 care & HHPT @ D/C          Outcome Measures       08/10/17 1100 08/10/17 0710 08/09/17 0935    How much help from another person do you currently need...    Turning from your back to your side while in flat bed without using bedrails? 3  -TA      Moving from lying on back to sitting on the side of a flat bed without bedrails? 3  -TA      Moving to and from a bed to a chair (including a wheelchair)? 3  -TA      Standing up from a chair using your arms (e.g., wheelchair, bedside chair)? 3  -TA      Climbing 3-5 steps with a railing? 3  -TA      To walk in  hospital room? 3  -TA      AM-PAC 6 Clicks Score 18  -TA      How much help from another is currently needed...    Putting on and taking off regular lower body clothing?  3  -KD 3  -BH    Bathing (including washing, rinsing, and drying)  3  -KD 3  -BH    Toileting (which includes using toilet bed pan or urinal)  3  -KD 3  -BH    Putting on and taking off regular upper body clothing  3  -KD 3  -BH    Taking care of personal grooming (such as brushing teeth)  4  -KD 4  -BH    Eating meals  4  -KD 4  -BH    Score  20  -KD 20  -BH    Functional Assessment    Outcome Measure Options AM-PAC 6 Clicks Basic Mobility (PT)  -TA  AM-[facility] 6 Clicks Daily Activity (OT)  -      08/09/17 0900          How much help from another person do you currently need...    Turning from your back to your side while in flat bed without using bedrails? 3  -LM      Moving from lying on back to sitting on the side of a flat bed without bedrails? 3  -LM      Moving to and from a bed to a chair (including a wheelchair)? 3  -LM      Standing up from a chair using your arms (e.g., wheelchair, bedside chair)? 3  -LM      Climbing 3-5 steps with a railing? 3  -LM      To walk in hospital room? 3  -LM      AM-PAC 6 Clicks Score 18  -LM      Functional Assessment    Outcome Measure Options AM-[facility] 6 Clicks Basic Mobility (PT)  -LM        User Key  (r) = Recorded By, (t) = Taken By, (c) = Cosigned By    Initials Name Provider Type    LM Mirlande Dugan, PT Physical Therapist     Dannielle Sanders, OTR/L Occupational Therapist    MYRON Arellano PTA Physical Therapy Assistant    CHANELL LopezA/L Occupational Therapy Assistant           Time Calculation:         PT Charges       08/10/17 1139          Time Calculation    Start Time 1006  -TA      Stop Time 1107  -TA      Time Calculation (min) 61 min  -TA      Time Calculation- PT    Total Timed Code Minutes- PT 61 minute(s)  -TA        User Key  (r) = Recorded By, (t) = Taken By, (c) = Cosigned By     Initials Name Provider Type    MYRON Arellano PTA Physical Therapy Assistant          Therapy Charges for Today     Code Description Service Date Service Provider Modifiers Qty    23137597712 HC GAIT TRAINING EA 15 MIN 8/10/2017 Dahiana Arellano, BENJAMIN GP 1    43231942714 HC PT SELF CARE/MGMT/TRAIN EA 15 MIN 8/10/2017 Dahiana Arellano PTA GP 1    40741783712 HC PT THER PROC EA 15 MIN 8/10/2017 Dahiana Arellano PTA GP 1    38326053446 HC PT THERAPEUTIC ACT EA 15 MIN 8/10/2017 Dahiana Arellano PTA GP 1          PT G-Codes  PT Professional Judgement Used?: Yes  Outcome Measure Options: AM-PAC 6 Clicks Basic Mobility (PT)  Score: 18  Functional Limitation: Mobility: Walking and moving around  Mobility: Walking and Moving Around Current Status (): At least 40 percent but less than 60 percent impaired, limited or restricted  Mobility: Walking and Moving Around Goal Status (): At least 20 percent but less than 40 percent impaired, limited or restricted    Dahiana Arellano PTA  8/10/2017

## 2017-08-10 NOTE — THERAPY DISCHARGE NOTE
Acute Care - Occupational Therapy Discharge Summary  [facility]     Patient Name: Mickey Lind  : 1955  MRN: [id number]    Today's Date: 8/10/2017  Onset of Illness/Injury or Date of Surgery Date: 17    Date of Referral to OT: 17  Referring Physician: Dr. Bambi Ríos      Admit Date: 2017        OT Recommendation and Plan    Visit Dx:    ICD-10-CM ICD-9-CM   1. Hypomagnesemia syndrome E83.42 275.2   2. Intractable vomiting with nausea, unspecified vomiting type R11.2 536.2   3. Chronic pain due to neoplasm G89.3 338.3   4. Tobacco dependence F17.200 305.1   5. Hypokalemia E87.6 276.8   6. Hypomagnesemia E83.42 275.2   7. Abnormality of gait and mobility R26.9 781.2   8. Muscle weakness (generalized) M62.81 728.87   9. Impaired mobility and ADLs Z74.09 799.89               Time Calculation- OT       08/10/17 0757          Time Calculation- OT    OT Start Time 0710  -KD      OT Stop Time 0744  -KD      OT Time Calculation (min) 34 min  -KD      Total Timed Code Minutes- OT 34 minute(s)  -KD      OT Received On 08/10/17  -        User Key  (r) = Recorded By, (t) = Taken By, (c) = Cosigned By    Initials Name Provider Type     RAUL Esquivel Occupational Therapy Assistant                  OT Goals       08/10/17 1827 08/10/17 0710 17 0935    Transfer Training OT STG    Transfer Training OT STG, Date Established   17  -    Transfer Training OT STG, Time to Achieve   by discharge  -    Transfer Training OT STG, Activity Type   all transfers  -    Transfer Training OT STG, [place] Level   conditional independence  -    Transfer Training OT STG, Date Goal Reviewed 08/10/17  -RM 08/10/17  -     Transfer Training OT STG, Outcome goal not met  - goal not met  -     Transfer Training OT STG, Reason Goal Not Met discharged from facility  -RM      Patient Education OT LTG    Patient Education OT LTG, Date Established   17  -    Patient  Education OT LTG, Time to Achieve   by discharge  -    Patient Education OT LTG, Education Type   HEP;home safety;energy conservation  -    Patient Education OT LTG, Education Understanding   verbalizes understanding;demonstrates adequately;independent  -    Patient Education OT LTG, Date Goal Reviewed 08/10/17  -RM 08/10/17  -     Patient Education OT LTG Outcome goal not met  -RM goal not met  -     Patient Education OT LTG, Reason Goal Not Met discharged from facility  -      ADL OT LTG    ADL OT LTG, Date Established   08/09/17  -    ADL OT LTG, Time to Achieve   by discharge  -    ADL OT LTG, Activity Type   ADL skills  -    ADL OT LTG, [place] Level   modified independent  -    ADL OT LTG, Date Goal Reviewed 08/10/17  -RM 08/10/17  -     ADL OT LTG, Outcome goal not met  - goal not met  -     ADL OT LTG, Reason Goal Not Met discharged from facility  -      Functional Mobility OT STG    Functional Mobility Goal OT STG, Date Established   08/09/17  -    Functional Mobility Goal OT STG, Time to Achieve   by discharge  -    Functional Mobility Goal OT STG, [place] Level   modified independent  -BH    Functional Mobility Goal OT STG, Distance to Achieve   to the bathroom;to the sink   around room  -    Functional Mobility Goal OT STG, Date Goal Reviewed 08/10/17  -RM 08/10/17  -     Functional Mobility Goal OT STG, Outcome goal not met  - goal not met  -     Functional Mobility Goal OT STG, Reason Goal Not Met discharged from facility  -      Activity Tolerance OT LTG    Activity Tolerance Goal OT LTG, Date Established   08/09/17  -    Activity Tolerance Goal OT LTG, Time to Achieve   by discharge  -    Activity Tolerance Goal OT LTG, Activity Level   15 min activity;O2 sat >/equal to 90%;with 1 rest break  -    Activity Tolerance Goal OT LTG, Date Goal Reviewed 08/10/17  -RM 08/10/17  -     Activity Tolerance Goal OT LTG, Outcome goal not met  - goal  not met  -KD     Activity Tolerance Goal OT LTG, Reason Goal Not Met discharged from facility  -        User Key  (r) = Recorded By, (t) = Taken By, (c) = Cosigned By    Initials Name Provider Type     Dannielle Sanders, OTR/L Occupational Therapist     UZAIR Esquivel/L Occupational Therapy Assistant     America Warner, OT Occupational Therapist                Outcome Measures       08/10/17 1100 08/10/17 0710 08/09/17 0935    How much help from another person do you currently need...    Turning from your back to your side while in flat bed without using bedrails? 3  -TA      Moving from lying on back to sitting on the side of a flat bed without bedrails? 3  -TA      Moving to and from a bed to a chair (including a wheelchair)? 3  -TA      Standing up from a chair using your arms (e.g., wheelchair, bedside chair)? 3  -TA      Climbing 3-5 steps with a railing? 3  -TA      To walk in hospital room? 3  -TA      AM-PAC 6 Clicks Score 18  -TA      How much help from another is currently needed...    Putting on and taking off regular lower body clothing?  3  -KD 3  -BH    Bathing (including washing, rinsing, and drying)  3  -KD 3  -BH    Toileting (which includes using toilet bed pan or urinal)  3  -KD 3  -BH    Putting on and taking off regular upper body clothing  3  -KD 3  -BH    Taking care of personal grooming (such as brushing teeth)  4  -KD 4  -BH    Eating meals  4  -KD 4  -BH    Score  20  -KD 20  -BH    Functional Assessment    Outcome Measure Options AM-PAC 6 Clicks Basic Mobility (PT)  -TA  AM-PAC 6 Clicks Daily Activity (OT)  -      08/09/17 0900          How much help from another person do you currently need...    Turning from your back to your side while in flat bed without using bedrails? 3  -LM      Moving from lying on back to sitting on the side of a flat bed without bedrails? 3  -LM      Moving to and from a bed to a chair (including a wheelchair)? 3  -LM      Standing up from a chair  using your arms (e.g., wheelchair, bedside chair)? 3  -LM      Climbing 3-5 steps with a railing? 3  -LM      To walk in hospital room? 3  -LM      AM-PAC 6 Clicks Score 18  -LM      Functional Assessment    Outcome Measure Options AM-PAC 6 Clicks Basic Mobility (PT)  -LM        User Key  (r) = Recorded By, (t) = Taken By, (c) = Cosigned By    Initials Name Provider Type    LM Mirlande Dugan, PT Physical Therapist    NISHA Sanders, OTR/L Occupational Therapist    MYRON Arellano, BENJAMIN Physical Therapy Assistant    ROSANGELA Mcadams, DUNNE/L Occupational Therapy Assistant              OT Discharge Summary  Anticipated Discharge Disposition: home with home health  Reason for Discharge: Per MD order, Discharge from facility  Outcomes Achieved: Refer to plan of care for updates on goals achieved  Discharge Destination: Home with home health      America Warner OT  8/10/2017

## 2017-08-10 NOTE — DISCHARGE SUMMARY
FAMILY MEDICINE DISCHARGE SUMMARY       NAME: Mickey Lind   PHYSICIAN: Ara Bach MD  : 1955  MRN: [id number]    ADMITTED: 2017   DISCHARGED: 08/10/17      ADMISSION DIAGNOSES     Present on Admission:  • Hypokalemia  • Intractable nausea and vomiting  • (Resolved) Hypomagnesemia  • (Resolved) Pleural effusion on right  • Cachexia  • Multiple myeloma, stage 3  • Compression fracture of twelfth thoracic vertebra  • Functional diarrhea  • Dysuria  • Periumbilical abdominal pain        DISCHARGE DIAGNOSES     Principal Problem:    Intractable nausea and vomiting  Active Problems:    Multiple myeloma, stage 3    Hypokalemia    Cachexia    Compression fracture of twelfth thoracic vertebra    Functional diarrhea    Dysuria    Periumbilical abdominal pain    History of cirrhosis      SERVICE     Family Medicine.   Attending Dr. Leo  Resident Ara Bach MD    CONSULTS     Consult Orders (all)     Start     Ordered    17 1620  Inpatient Consult to Hematology & Oncology  Once     Specialty:  Hematology and Oncology  Provider:  Rafa Mendoza MD    17 1620    17 1523  Inpatient Consult to Nutrition  Once     Provider:  (Not yet assigned)    17 1522          PROCEDURES     None    HISTORY OF PRESENT ILLNESS:     Mickey Lind is a 61 y.o. male who is an active smoker with a PMHx of Multiple Myeloma Stage 3, IV drug use, and Cirrhosis secondary to Hepatitis C status post Harvoni treatment, who presents with a four day history of nausea, vomiting, and diarrhea. He has had 2 other hospital admissions in the month of July, the most recent one being from - where he was found to have infectious colitis that was treated with Levaquin and Flagyl.  At that time, he was also found to have worsening pleural effusion that was drained by thoracentesis. He reports that he has been having nausea and white, clear emesis as well as  mucousy but non-bloody diarrhea. His bowel movements are watery, but are small amounts at a time. He has not had a formed bowel movement for the past 1.5 weeks. He denies exposure to new food, recent travel, or sick contacts. He reports that he has not been eating much, but does endorse an appetite and would like to eat if his nausea can be controlled. Today, the patient arrived at the [facility] for his chemotherapy when lab work was notable for hypokalemia and hypomagnesemia. He informed the care team about the symptoms that he had been having for several days, and he was directly admitted to the floor.    DIAGNOSTIC DATA     Results for AHSAN JONES (MRN [id number]) as of 8/10/2017 08:06   8/10/2017 05:48   Glucose 90   Sodium 133 (L)   Potassium 3.3 (L)   CO2 24.0   Chloride 103   Anion Gap 6.0   Creatinine 0.48 (L)   BUN 9   BUN/Creatinine Ratio 18.8   Calcium 7.9 (L)   eGFR Non African Amer 177 (H)   Alkaline Phosphatase 107   Total Protein 5.2 (L)   ALT (SGPT) 30   AST (SGOT) 42   Total Bilirubin 0.7   Albumin 2.60 (L)   Globulin 2.6   A/G Ratio 1.0 (L)   WBC 7.34   RBC 3.74 (L)   Hemoglobin 11.7 (L)   Hematocrit 33.4 (L)   RDW 16.9 (H)   MCV 89.3   MCH 31.3   MCHC 35.0   MPV 10.5   Platelets 120 (L)   RDW-SD 55.9 (H)        HOSPITAL COURSE     Patient was admitted directly from [facility] when he was getting his chemotherapy for hypokalemia and hypomagnesemia. Over the course of hospital days, his potassium and magnesium was replaced appropriately. He was tolerating solid diet and is not having any diarrhea on the day of discharge. He was started on daily potassium supplements. He is on his home oxygen of 2L upon discharge. His hypokalemia and hypomagnesemia could be related to his chemotherapy verus renal verus GI loss. So fraction excretion of magnesium or 24 hour urine magnesium to be collected in the outpatient settings.     Upon being medically stable and being able to tolerate solid diet,  patient was discharged on his home oxygen of 2L on 8/10/17.     DISCHARGE CONDITION     Stable    DISPOSITION     Home with Home health    DISCHARGE MEDICATION        Your medication list      START taking these medications       Instructions Last Dose Given Next Dose Due    ondansetron 4 MG tablet   Commonly known as:  ZOFRAN        Take 1 tablet by mouth Every 8 (Eight) Hours As Needed for Nausea or Vomiting.         potassium chloride 20 MEQ CR tablet   Commonly known as:  K-DUR,KLOR-CON        Take 1 tablet by mouth Daily.           CONTINUE taking these medications       Instructions Last Dose Given Next Dose Due    furosemide 40 MG tablet   Commonly known as:  LASIX        Take 1 tablet by mouth Daily.         GNP ACID REDUCER 75 MG tablet   Generic drug:  raNITIdine        Take 2 tablets by mouth 2 (Two) Times a Day.         HYDROmorphone 2 MG tablet   Commonly known as:  DILAUDID        Take 1 tablet by mouth 3 (Three) Times a Day As Needed for Severe Pain  (7-10).         polyethylene glycol powder   Commonly known as:  MIRALAX        Take 17 g by mouth Daily.         prochlorperazine 10 MG tablet   Commonly known as:  COMPAZINE        Take 1 tablet by mouth Every 6 (Six) Hours As Needed for Nausea or Vomiting.         sertraline 100 MG tablet   Commonly known as:  ZOLOFT        Take 2 tablets by mouth Daily.         spironolactone 50 MG tablet   Commonly known as:  ALDACTONE        Take 1 tablet by mouth 2 (Two) Times a Day for 30 days.         sucralfate 1 GM/10ML suspension   Commonly known as:  CARAFATE        Take 10 mL by mouth Daily.              Where to Get Your Medications      These medications were sent to [facility] [address] [facility] - [phone]  - [phone] [address] [address]     Phone:  [phone]    • ondansetron 4 MG tablet   • potassium chloride 20 MEQ CR tablet             INSTRUCTIONS:     Activity Instructions     Activity as Tolerated                    Diet Instructions     Diet: Regular       Discharge Diet:  Regular                 Special instructions: Patient instructed to call MD or return to ED with worsening shortness of breath, chest pain, fever greater than 100.4 degrees F or any other medical concerns..    FOLLOW UP     Additional Instructions for the Follow-ups that You Need to Schedule     Referral to Home Health    As directed    Face to Face Visit Date:  8/10/2017   Follow-up Provider for Plan of Care?:  I will be treating the patient on an ongoing basis.  Please send me the Plan of Care for signature.   Follow-up Provider:  JOSÉ MIGUEL BACH   Reason/Clinical Findings:  medical compliance   Describe mobility limitations that make leaving home difficult:  deconditioning   Nursing/Therapeutic Services Requested:   Skilled Nursing  Occupational Therapy      Skilled nursing orders:   Medication education  Cardiopulmonary assessments      Occupational orders:   Activities of daily living  Strengthening  Home safety assessment  Fine motor  Energy conservation  Cognition      Frequency:  1 Week 1             Follow-up Information     Follow up with [facility] REFERRAL .    Specialty:  Home Health Services    Contact information:    [address] [place] [address]          Follow up with José Miguel Bach MD. Schedule an appointment as soon as possible for a visit in 4 day(s).    Specialties:  Family Medicine, Emergency Medicine    Contact information:    [address] [place] [address] [phone]            Time: Discharge 30 min    Dr. Leo is the attending at time of discharge, she is aware of the patient's status and agrees with the above discharge summary.    SIGNATURE     This document has been electronically signed by José Miguel Bach MD on August 10, 2017 8:41 AM    José Miguel Bach MD, RODOLFO  [facility]  [address] [place] [phone]  [phone]

## 2017-08-10 NOTE — DISCHARGE PLACEMENT REQUEST
Discharge Planning Assessment  [facility]     Patient Name: Mickey Lind  MRN: [id number]  Today's Date: 8/10/2017    Admit Date: 8/8/2017          Discharge Needs Assessment       08/10/17 1149    Living Environment    Lives With alone    Transportation Available family or friend will provide    Living Environment    Provides Primary Care For no one    Primary Care Provided By --   sons/ and daughter n law assist.    Quality Of Family Relationships supportive    Able to Return to Prior Living Arrangements yes    Living Arrangement Comments lives alone, but has adult children assist    Discharge Needs Assessment    Concerns To Be Addressed discharge planning concerns    Concerns Comments pt. wanted home health to give him more notice for visits and around office visits    Readmission Within The Last 30 Days previous discharge plan unsuccessful   pt. stated that he did not have any labs drawn since last admission    Equipment Currently Used at Home oxygen   [facility]    Discharge Facility/Level Of Care Needs --   [facility] health    Current Discharge Risk chronically ill;lives alone    Discharge Disposition home healthcare service    Discharge Planning Comments pt. lives alone, but has adult children to assist. He said that his daughter told him that he was approved for medicaid [facility]. I spoke with Kaur at [facility] and they will cont. to see pt. and will f/u. I gave her his request to accomodate the visits  with office visits and more notice. I called [facility] pharmacy and his new meds are 27.63 for both and he said that he could afford that. no other needs voiced. pt. was given dcp checklist and asked to let nsg. know if there are anything else he needs prior to discharge.He said that his daughter n law will pick him up. (Fidelia)            Discharge Plan       08/10/17 1130    Case Management/Social Work Plan    Additional Comments Per pharmacist at [facility], he needs MD to call him about  lasix,potassium and spironolactone. I sent a message to dr. sarmiento and informed Bree STRONG and she will call Dr. Bach.       08/10/17 1100    Case Management/Social Work Plan    Plan home with Home health    Patient/Family In Agreement With Plan yes    Additional Comments dcp assessment complete. plan return home today with [facility].     Final Note    Final Note home with [facility]        Discharge Placement     Facility/Agency Request Status Selected? Address Phone Number Fax Number    [facility] Accepted    Yes [address] [facility] [address] [phone]        Expected Discharge Date and Time     Expected Discharge Date Expected Discharge Time    Aug 10, 2017               Demographic Summary     None            Functional Status     None            Psychosocial     None            Abuse/Neglect     None            Legal     None            Substance Abuse     None            Patient Forms     None          Whitney Griffith, LIGIA

## 2017-08-10 NOTE — PLAN OF CARE
Problem: Patient Care Overview (Adult)  Goal: Plan of Care Review  Outcome: Ongoing (interventions implemented as appropriate)    08/10/17 0427 08/10/17 1006   Coping/Psychosocial Response Interventions   Plan Of Care Reviewed With patient --    Patient Care Overview   Progress no change --    Outcome Evaluation   Outcome Summary/Follow up Plan --  pt ambultated 140` with SC & CGA , pt unsteady but without LOB, pt would benefit from 24/7 care & HHPT @ D/C         Problem: Inpatient Physical Therapy  Goal: Transfer Training Goal 1 LTG- PT  Outcome: Ongoing (interventions implemented as appropriate)    08/09/17 0900   Transfer Training PT LTG   Transfer Training PT LTG, Date Established 08/09/17   Transfer Training PT LTG, Time to Achieve by discharge   Transfer Training PT LTG, Activity Type bed to chair /chair to bed   Transfer Training PT LTG, [place] Level conditional independence   Transfer Training PT LTG, Assist Device (AAD)   Transfer Training PT LTG, Outcome goal ongoing       Goal: Gait Training Goal LTG- PT  Outcome: Ongoing (interventions implemented as appropriate)    08/09/17 0900   Gait Training PT LTG   Gait Training Goal PT LTG, Date Established 08/09/17   Gait Training Goal PT LTG, Time to Achieve by discharge   Gait Training Goal PT LTG, [place] Level supervision required   Gait Training Goal PT LTG, Assist Device (AAD)   Gait Training Goal PT LTG, Distance to Achieve 100 feet   Gait Training Goal PT LTG, Outcome goal ongoing       Goal: Stair Training Goal LTG- PT  Outcome: Ongoing (interventions implemented as appropriate)    08/09/17 0900   Stair Training PT LTG   Stair Training Goal PT LTG, Date Established 08/09/17   Stair Training Goal PT LTG, Time to Achieve by discharge   Stair Training Goal PT LTG, Number of Steps 4 steps   Stair Training Goal PT LTG, [place] Level contact guard assist   Stair Training Goal PT LTG, Assist Device (SC)   Stair Training Goal PT LTG, Outcome goal  ongoing

## 2017-08-10 NOTE — PROGRESS NOTES
FAMILY MEDICINE PROGRESS NOTE  NAME: Mickey Lind  : 1955  MRN: [id number]     LOS: 2 days   Full Code  PROVIDER OF SERVICE:     Chief Complaint:  Intractable nausea and vomiting    Subjective     Interval History:  History taken from: patient  Subjective: Patient is a 60 yo  male who was admitted for hypokalemia, nausea, vomiting, and diarrhea.  He is feeling better today.  Eating a tolerating regular diet.  He denies any chest pain.  His chronic pain is stable.  The iv pain meds really seem to help.    Review of Systems:   Review of Systems   Constitutional: Positive for activity change and appetite change. Negative for fatigue and fever.   HENT: Negative for ear pain and sore throat.    Eyes: Negative for pain and visual disturbance.   Respiratory: Negative for cough and shortness of breath.    Cardiovascular: Negative for chest pain and palpitations.   Gastrointestinal: Positive for diarrhea. Negative for abdominal pain, nausea and vomiting.   Endocrine: Negative for cold intolerance and heat intolerance.   Genitourinary: Negative for difficulty urinating and dysuria.   Musculoskeletal: Negative for arthralgias and gait problem.   Skin: Negative for color change and rash.   Neurological: Negative for dizziness, weakness and headaches.   Hematological: Negative for adenopathy. Does not bruise/bleed easily.   Psychiatric/Behavioral: Negative for agitation, confusion and sleep disturbance.       Objective     Vital Signs  Temp:  [97.5 °F (36.4 °C)-98.5 °F (36.9 °C)] 98.5 °F (36.9 °C)  Heart Rate:  [] 101  Resp:  [16-18] 16  BP: (119-168)/(71-88) 128/72    Physical Exam  Physical Exam   Constitutional: He is oriented to person, place, and time. No distress.   Chronically ill   Cardiovascular: Normal rate, regular rhythm and intact distal pulses.  Exam reveals no gallop and no friction rub.    Murmur heard.  Pulmonary/Chest: Effort normal. No respiratory distress. He has no wheezes. He has  no rales.   Diminished breath sounds in the bases   Abdominal: Soft. Bowel sounds are normal. He exhibits no distension. There is tenderness. There is no rebound and no guarding. A hernia is present.   Diffusely tender, no guarding or rebound noted   Neurological: He is alert and oriented to person, place, and time.   Skin: He is not diaphoretic.   Psychiatric: He has a normal mood and affect. His behavior is normal. Judgment and thought content normal.   Nursing note and vitals reviewed.      Medication Review    Current Facility-Administered Medications:   •  acetaminophen (TYLENOL) tablet 650 mg, 650 mg, Oral, Q4H PRN, Carolee Leo MD  •  enoxaparin (LOVENOX) syringe 40 mg, 40 mg, Subcutaneous, Daily, Rafa Mendoza MD, 40 mg at 08/10/17 0842  •  famotidine (PEPCID) tablet 20 mg, 20 mg, Oral, Daily, Eva Howard MD, 20 mg at 08/10/17 0843  •  furosemide (LASIX) tablet 40 mg, 40 mg, Oral, Daily, Eva Howard MD, 40 mg at 08/10/17 0843  •  heparin flush (porcine) 100 UNIT/ML injection 500 Units, 500 Units, Intravenous, Once, Rafa Mendoza MD  •  HYDROmorphone (DILAUDID) injection 1 mg, 1 mg, Intravenous, Q2H PRN, 1 mg at 08/10/17 0922 **AND** naloxone (NARCAN) injection 0.4 mg, 0.4 mg, Intravenous, Q5 Min PRN, Carolee Leo MD  •  magic butt ointment, , Topical, 4x Daily - RT, Ara Bach MD  •  Morphine sulfate (PF) injection 1 mg, 1 mg, Intravenous, Q4H PRN, 1 mg at 08/09/17 0440 **AND** naloxone (NARCAN) injection 0.4 mg, 0.4 mg, Intravenous, Q5 Min PRN, Carolee Leo MD  •  nicotine (NICODERM CQ) 21 MG/24HR patch 1 patch, 1 patch, Transdermal, Q24H, Carolee Leo MD, 1 patch at 08/09/17 1715  •  ondansetron (ZOFRAN) injection 4 mg, 4 mg, Intravenous, Q6H PRN, Carolee Leo MD  •  potassium chloride (KLOR-CON) packet 20 mEq, 20 mEq, Oral, Daily, Ara Bach MD, 20 mEq at 08/10/17 1131  •  potassium chloride (MICRO-K) CR capsule 40 mEq, 40 mEq, Oral, PRN, 40 mEq at 08/10/17 0846 **OR**  potassium chloride (KLOR-CON) packet 40 mEq, 40 mEq, Oral, PRN **OR** potassium chloride 10 mEq in 100 mL IVPB, 10 mEq, Intravenous, Q1H PRN, Joyce Kaba MD  •  sertraline (ZOLOFT) tablet 200 mg, 200 mg, Oral, Daily, Eva Howard MD, 200 mg at 08/10/17 0844  •  sodium chloride 0.9 % flush 1-10 mL, 1-10 mL, Intravenous, PRN, Rafa Mendoza MD, 10 mL at 08/09/17 2102  •  sodium chloride 0.9 % flush 1-10 mL, 1-10 mL, Intravenous, PRN, Carolee Leo MD  •  spironolactone (ALDACTONE) tablet 50 mg, 50 mg, Oral, BID, Eva Howard MD, 50 mg at 08/10/17 0844  •  sucralfate (CARAFATE) 1 GM/10ML suspension 1 g, 1 g, Oral, Daily, Eva Howard MD, 1 g at 08/10/17 0845     Diagnostic Data      I reviewed the patient's new clinical results and imaging.      Assessment/Plan     Active Hospital Problems (** Indicates Principal Problem)    Diagnosis Date Noted   • **Intractable nausea and vomiting [R11.2] 08/08/2017     -Zofran  -Monitor daily  -IV Fluids  -advance to soft diet     • Hypokalemia [E87.6] 08/08/2017     -3.4 this am  -S/p 40mEq oral x1  -will give oral replacement      • Hypomagnesemia [E83.42] 08/08/2017     -1.4  -S/p 2g Magnesium IV  -Will follow up value tomorrow.     • Cachexia [R64] 08/08/2017     -BMI 22  -Nutrition consult  -Currently endorsing diet.  -CLD.     • Compression fracture of twelfth thoracic vertebra [M48.54XA] 08/08/2017     -Chronic back pain.  -Morphine prn for moderate pain, dilaudid for severe pain.     • Functional diarrhea [K59.1] 08/08/2017     -Abdominal xray  -GI panel  -Will continue to monitor  -Administering IV fluids  -better and stable     • Dysuria [R30.0] 08/08/2017     -Urinalysis with culture  -Will follow up results and continue to monitor.     • Periumbilical abdominal pain [R10.33] 08/08/2017     -Pt with history of ascites and splenomegaly.  -Will follow up abdominal xray.  -Pain management with morphine and dilaudid prn.     • History of  cirrhosis [Z87.19] 08/08/2017     -s/p Harvoni treatment  -Liver markers normal on CMP.     • Multiple myeloma, stage 3 [C90.00] 01/20/2017     -has a right sided port in, has been followed by Dr. Mendoza of heme-oncology.  -CT scan of head showed several punched-out lesions in right and left frontal brain, no acute changes.  -Patient to continue chemotherapy with Velcade and Decadron as outpatient.  -Dr. Mendoza following.        Resolved Hospital Problems    Diagnosis Date Noted Date Resolved   • Pleural effusion on right [J90] 08/08/2017 08/09/2017     -s/p thoracentesis on last admission.  -Decreased breath sounds on RLL base  -CXR. Will follow up.       I have reviewed the notes, assessments, and/or procedures performed by Dr. Bach, I concur with her/his documentation of Mickey Paynekendra.      Disposition:Home with home health          This document has been electronically signed by Carolee Leo MD on August 10, 2017 1:36 PM          This document has been electronically signed by Carolee Leo MD on August 10, 2017 1:35 PM

## 2017-08-10 NOTE — TELEPHONE ENCOUNTER
[facility] is calling regarding a Prescription Clarification on this PT.  Medication: POTASSIUM    Clarification: PT is on ALDACTONE AND LASIX, there is a major interaction with these and POTASSIUM. Want to make sure you are aware    Please contact pharmacy at your earliest convenience regarding this matter.    Phone #: [phone]    Thanks.

## 2017-08-10 NOTE — PLAN OF CARE
Problem: Inpatient Physical Therapy  Goal: Transfer Training Goal 1 LTG- PT  Outcome: Unable to achieve outcome(s) by discharge Date Met:  08/10/17    08/09/17 0900 08/10/17 1549   Transfer Training PT LTG   Transfer Training PT LTG, Date Established 08/09/17 --    Transfer Training PT LTG, Time to Achieve by discharge --    Transfer Training PT LTG, Activity Type bed to chair /chair to bed --    Transfer Training PT LTG, [place] Level conditional independence --    Transfer Training PT LTG, Assist Device (AAD) --    Transfer Training PT LTG, Date Goal Reviewed --  08/10/17   Transfer Training PT LTG, Outcome --  goal not met   Transfer Training PT LTG, Reason Goal Not Met --  discharged from facility       Goal: Gait Training Goal LTG- PT  Outcome: Unable to achieve outcome(s) by discharge Date Met:  08/10/17    08/09/17 0900 08/10/17 1549   Gait Training PT LTG   Gait Training Goal PT LTG, Date Established 08/09/17 --    Gait Training Goal PT LTG, Time to Achieve by discharge --    Gait Training Goal PT LTG, [place] Level supervision required --    Gait Training Goal PT LTG, Assist Device (AAD) --    Gait Training Goal PT LTG, Distance to Achieve 100 feet --    Gait Training Goal PT LTG, Date Goal Reviewed --  08/10/17   Gait Training Goal PT LTG, Outcome --  goal not met   Gait Training Goal PT LTG, Reason Goal Not Met --  discharged from facility       Goal: Stair Training Goal LTG- PT  Outcome: Unable to achieve outcome(s) by discharge Date Met:  08/10/17    08/09/17 0900 08/10/17 1549   Stair Training PT LTG   Stair Training Goal PT LTG, Date Established 08/09/17 --    Stair Training Goal PT LTG, Time to Achieve by discharge --    Stair Training Goal PT LTG, Number of Steps 4 steps --    Stair Training Goal PT LTG, [place] Level contact guard assist --    Stair Training Goal PT LTG, Assist Device (SC) --    Stair Training Goal PT LTG, Outcome --  goal not met   Stair Training Goal PT LTG, Reason  Goal Not Met --  discharged from facility

## 2017-08-10 NOTE — THERAPY TREATMENT NOTE
Acute Care - Occupational Therapy Treatment Note  [facility]     Patient Name: Mickey Lind  : 1955  MRN: [id number]  Today's Date: 8/10/2017  Onset of Illness/Injury or Date of Surgery Date: 17  Date of Referral to OT: 17  Referring Physician: Dr. Bambi Ríos      Admit Date: 2017    Visit Dx:     ICD-10-CM ICD-9-CM   1. Intractable vomiting with nausea, unspecified vomiting type R11.2 536.2   2. Chronic pain due to neoplasm G89.3 338.3   3. Tobacco dependence F17.200 305.1   4. Hypokalemia E87.6 276.8   5. Hypomagnesemia E83.42 275.2   6. Abnormality of gait and mobility R26.9 781.2   7. Muscle weakness (generalized) M62.81 728.87   8. Impaired mobility and ADLs Z74.09 799.89     Patient Active Problem List   Diagnosis   • End stage liver disease   • Depression   • Low back pain with sciatica   • Hyperlipidemia   • Unilateral inguinal hernia without obstruction or gangrene   • Diarrhea   • Hepatic cirrhosis   • Epigastric pain   • Peripheral vascular disease   • Encounter for venous access device care   • Multiple myeloma, stage 3   • Anemia   • Chronic hepatitis C without hepatic coma   • Lower abdominal pain   • Ascites due to alcoholic cirrhosis   • Tobacco dependence   • Simple chronic bronchitis   • Pleural effusion   • Ulcer of sacral region, stage 1   • CAP (community acquired pneumonia), bilateral   • Does not have health insurance   • Acute respiratory failure with hypoxia   • Colitis   • Infectious colitis   • Hyponatremia   • Does not have health insurance   • Nausea   • Hypokalemia   • Intractable nausea and vomiting   • Cachexia   • Compression fracture of twelfth thoracic vertebra   • Functional diarrhea   • Dysuria   • Periumbilical abdominal pain   • History of cirrhosis             Adult Rehabilitation Note       08/10/17 0710          Rehab Assessment/Intervention    Discipline occupational therapy assistant  -      Document Type therapy note (daily note)   -KD      Subjective Information agree to therapy  -KD      Precautions/Limitations fall precautions  -KD      Recorded by [KD] UZAIR Esquivel/L      Vital Signs    Pre Systolic BP Rehab 116  -KD      Pre Treatment Diastolic BP 85  -KD      Pretreatment Heart Rate (beats/min) 93  -KD      Posttreatment Heart Rate (beats/min) 86  -KD      Pre SpO2 (%) 87  -KD      O2 Delivery Pre Treatment room air  -KD      Post SpO2 (%) 92  -KD      O2 Delivery Post Treatment room air  -KD      Pre Patient Position Supine  -KD      Intra Patient Position Standing  -KD      Post Patient Position Sitting  -KD      Recorded by [KD] UZAIR Esquivel/L      Pain Assessment    Pain Assessment 0-10  -KD      Pain Score 6  -KD      Post Pain Score 6  -KD      Pain Type Chronic pain  -KD      Pain Location Back  -KD      Pain Orientation Lower  -KD      Pain Descriptors Aching  -KD      Pain Intervention(s) Repositioned  -KD      Recorded by [KD] UZAIR Esquivel/L      Vision Assessment/Intervention    Visual Impairment WFL with corrective lenses  -KD      Recorded by [KD] UZAIR Esquivel/L      Cognitive Assessment/Intervention    Current Cognitive/Communication Assessment functional  -KD      Orientation Status oriented x 4  -KD      Follows Commands/Answers Questions 100% of the time  -KD      Personal Safety Interventions fall prevention program maintained;nonskid shoes/slippers when out of bed  -KD      Recorded by [KD] UZAIR Esquivel/L      Bed Mobility, Assessment/Treatment    Bed Mobility, Assistive Device bed rails;head of bed elevated  -KD      Bed Mob, Supine to Sit, [place] conditional independence  -KD      Bed Mob, Sidelying to Sit, [place] conditional independence  -KD      Recorded by [KD] UZAIR Esquivel/L      Transfer Assessment/Treatment    Transfers, Bed-Chair [place] contact guard assist  -KD      Transfers, Sit-Stand [place] contact guard assist  -KD      Transfers,  Stand-Sit [place] contact guard assist  -KD      Transfers, Sit-Stand-Sit, Assist Device straight cane  -KD      Toilet Transfer, [place] supervision required  -KD      Toilet Transfer, Assistive Device straight cane  -KD      Walk-In Shower Transfer, [place] not tested  -KD      Bathtub Transfer, [place] not tested  -KD      Recorded by [KD] RAUL Esquivel      Functional Mobility    Functional Mobility- Ind. Level contact guard assist  -KD      Functional Mobility- Device straight cane  -KD      Functional Mobility-Distance (Feet) --   10 x 2  -KD      Recorded by [KD] RAUL Esquivel      Toileting Assessment/Training    Toileting Assess/Train, Assistive Device grab bars  -KD      Toileting Assess/Train, Position sitting  -KD      Toileting Assess/Train, Indepen Level conditional independence  -KD      Recorded by [KD] RAUL Esquivel      Grooming Assessment/Training    Grooming Assess/Train, Assistive Device --   wash hands  -KD      Grooming Assess/Train, Position standing;sink side  -KD      Grooming Assess/Train, Indepen Level contact guard assist  -KD      Grooming Assess/Train, Impairments strength decreased  -KD      Recorded by [KD] RAUL Esquivel      Balance Skills Training    Static Standing Balance Support assistive device  -KD      Standing-Balance Activities --   dynamic  -KD      Standing Balance # of Minutes --   2  -KD      Recorded by [KD] RAUL Esquivel      Positioning and Restraints    Pre-Treatment Position in bed  -KD      Post Treatment Position chair  -KD      In Chair sitting;call light within reach;encouraged to call for assist;exit alarm on  -KD      Recorded by [KD] RAUL Esquivel        User Key  (r) = Recorded By, (t) = Taken By, (c) = Cosigned By    Initials Name Effective Dates    KD RAUL Esquivel 10/17/16 -                 OT Goals       08/10/17 0710 08/09/17 0935       Transfer Training OT STG     Transfer Training OT STG, Date Established  08/09/17  [facility]     Transfer Training OT STG, Time to Achieve  by discharge  -     Transfer Training OT STG, Activity Type  all transfers  -     Transfer Training OT STG, [place] Level  conditional independence  -     Transfer Training OT STG, Date Goal Reviewed 08/10/17  -      Transfer Training OT STG, Outcome goal not met  -      Patient Education OT LTG    Patient Education OT LTG, Date Established  08/09/17  -     Patient Education OT LTG, Time to Achieve  by discharge  -     Patient Education OT LTG, Education Type  HEP;home safety;energy conservation  -     Patient Education OT LTG, Education Understanding  verbalizes understanding;demonstrates adequately;independent  -     Patient Education OT LTG, Date Goal Reviewed 08/10/17  -      Patient Education OT LTG Outcome goal not met  -      ADL OT LTG    ADL OT LTG, Date Established  08/09/17  -     ADL OT LTG, Time to Achieve  by discharge  -     ADL OT LTG, Activity Type  ADL skills  -     ADL OT LTG, [place] Level  modified independent  [facility]     ADL OT LTG, Date Goal Reviewed 08/10/17  -      ADL OT LTG, Outcome goal not met  -      Functional Mobility OT STG    Functional Mobility Goal OT STG, Date Established  08/09/17  [facility]     Functional Mobility Goal OT STG, Time to Achieve  by discharge  -     Functional Mobility Goal OT STG, [place] Level  modified independent  [facility]     Functional Mobility Goal OT STG, Distance to Achieve  to the bathroom;to the sink   around room  -     Functional Mobility Goal OT STG, Date Goal Reviewed 08/10/17  -      Functional Mobility Goal OT STG, Outcome goal not met  -      Activity Tolerance OT LTG    Activity Tolerance Goal OT LTG, Date Established  08/09/17  -     Activity Tolerance Goal OT LTG, Time to Achieve  by discharge  -     Activity Tolerance Goal OT LTG, Activity Level  15 min activity;O2 sat >/equal to 90%;with 1 rest  break  -     Activity Tolerance Goal OT LTG, Date Goal Reviewed 08/10/17  -      Activity Tolerance Goal OT LTG, Outcome goal not met  -        User Key  (r) = Recorded By, (t) = Taken By, (c) = Cosigned By    Initials Name Provider Type     Dannielle Sanders, OTR/L Occupational Therapist    UZAIR Lopez/L Occupational Therapy Assistant          Occupational Therapy Education     Title: PT OT SLP Therapies (Active)     Topic: Occupational Therapy (Active)     Point: ADL training (Done)    Description: Instruct learner(s) on proper safety adaptation and remediation techniques during self care or transfers.   Instruct in proper use of assistive devices.    Learning Progress Summary    Learner Readiness Method Response Comment Documented by Status   Patient Acceptance E VU Educated pt about OT and POC. Educated about safety with t/f, mobility, and ADL. Educated to call for assist and not to get up on his own.  08/09/17 1144 Done               Point: Precautions (Done)    Description: Instruct learner(s) on prescribed precautions during self-care and functional transfers.    Learning Progress Summary    Learner Readiness Method Response Comment Documented by Status   Patient Acceptance E VU Educated pt about OT and POC. Educated about safety with t/f, mobility, and ADL. Educated to call for assist and not to get up on his own.  08/09/17 1144 Done               Point: Body mechanics (Done)    Description: Instruct learner(s) on proper positioning and spine alignment during self-care, functional mobility activities and/or exercises.    Learning Progress Summary    Learner Readiness Method Response Comment Documented by Status   Patient Acceptance E VU Educated pt about OT and POC. Educated about safety with t/f, mobility, and ADL. Educated to call for assist and not to get up on his own.  08/09/17 1144 Done                      User Key     Initials Effective Dates Name Provider Type [facility]  10/17/16 -  Dannielle Sanders, OTR/L Occupational Therapist OT                  OT Recommendation and Plan  Anticipated Discharge Disposition: skilled nursing facility, placement for care, home with 24/7 care, home with home health  Planned Therapy Interventions: activity intolerance, adaptive equipment training, ADL retraining, balance training, bed mobility training, fine motor coordination training, home exercise program, ROM (Range of Motion), strengthening, transfer training  Therapy Frequency: other (see comments) (3-14x a week)  Plan of Care Review  Outcome Summary/Follow up Plan: Upon entry pt's 02 87% on RA. Pt educated on PLB tech and nsg made aware. Pt I with bed mobility, CGA for t/f's with straight cane.         Outcome Measures       08/10/17 0710 08/09/17 0935 08/09/17 0900    How much help from another person do you currently need...    Turning from your back to your side while in flat bed without using bedrails?   3  -LM    Moving from lying on back to sitting on the side of a flat bed without bedrails?   3  -LM    Moving to and from a bed to a chair (including a wheelchair)?   3  -LM    Standing up from a chair using your arms (e.g., wheelchair, bedside chair)?   3  -LM    Climbing 3-5 steps with a railing?   3  -LM    To walk in hospital room?   3  -LM    AM-PAC 6 Clicks Score   18  -LM    How much help from another is currently needed...    Putting on and taking off regular lower body clothing? 3  -KD 3  -BH     Bathing (including washing, rinsing, and drying) 3  -KD 3  -BH     Toileting (which includes using toilet bed pan or urinal) 3  -KD 3  -BH     Putting on and taking off regular upper body clothing 3  -KD 3  -BH     Taking care of personal grooming (such as brushing teeth) 4  -KD 4  -BH     Eating meals 4  -KD 4  -BH     Score 20  -KD 20  -BH     Functional Assessment    Outcome Measure Options  AM-PAC 6 Clicks Daily Activity (OT)  -BH AM-PAC 6 Clicks Basic Mobility (PT)  -LM      User Key   (r) = Recorded By, (t) = Taken By, (c) = Cosigned By    Initials Name Provider Type    CORY Dugan, PT Physical Therapist     Dannielle Sanders, OTR/L Occupational Therapist    UZAIR Lopez/L Occupational Therapy Assistant           Time Calculation:         Time Calculation- OT       08/10/17 0757          Time Calculation- OT    OT Start Time 0710  -KD      OT Stop Time 0744  -KD      OT Time Calculation (min) 34 min  -KD      Total Timed Code Minutes- OT 34 minute(s)  -KD      OT Received On 08/10/17  -KD        User Key  (r) = Recorded By, (t) = Taken By, (c) = Cosigned By    Initials Name Provider Type    UZAIR Lopez/L Occupational Therapy Assistant           Therapy Charges for Today     Code Description Service Date Service Provider Modifiers Qty    23018324686 HC OT SELF CARE/MGMT/TRAIN EA 15 MIN 8/10/2017 UZAIR Esquivel/L GO 1    06194299540 HC OT THERAPEUTIC ACT EA 15 MIN 8/10/2017 UZAIR Esquivel/L GO 1          OT G-codes  OT Professional Judgement Used?: Yes  OT Functional Scales Options: AM-PAC 6 Clicks Daily Activity (OT)  Score: 20  Functional Limitation: Self care  Self Care Current Status (): At least 20 percent but less than 40 percent impaired, limited or restricted  Self Care Goal Status (): At least 1 percent but less than 20 percent impaired, limited or restricted    UZAIR Esquivel/RAFAELA  8/10/2017

## 2017-08-10 NOTE — PLAN OF CARE
Problem: Inpatient Occupational Therapy  Goal: Transfer Training Goal 1 STG- OT  Outcome: Unable to achieve outcome(s) by discharge Date Met:  08/10/17    08/09/17 0935 08/10/17 1827   Transfer Training OT STG   Transfer Training OT STG, Date Established 08/09/17 --    Transfer Training OT STG, Time to Achieve by discharge --    Transfer Training OT STG, Activity Type all transfers --    Transfer Training OT STG, [place] Level conditional independence --    Transfer Training OT STG, Date Goal Reviewed --  08/10/17   Transfer Training OT STG, Outcome --  goal not met   Transfer Training OT STG, Reason Goal Not Met --  discharged from facility       Goal: Patient Education Goal LTG- OT  Outcome: Unable to achieve outcome(s) by discharge Date Met:  08/10/17    08/09/17 0935 08/10/17 1827   Patient Education OT LTG   Patient Education OT LTG, Date Established 08/09/17 --    Patient Education OT LTG, Time to Achieve by discharge --    Patient Education OT LTG, Education Type HEP;home safety;energy conservation --    Patient Education OT LTG, Education Understanding verbalizes understanding;demonstrates adequately;independent --    Patient Education OT LTG, Date Goal Reviewed --  08/10/17   Patient Education OT LTG Outcome --  goal not met   Patient Education OT LTG, Reason Goal Not Met --  discharged from facility       Goal: ADL Goal LTG- OT  Outcome: Unable to achieve outcome(s) by discharge Date Met:  08/10/17    08/09/17 0935 08/10/17 1827   ADL OT LTG   ADL OT LTG, Date Established 08/09/17 --    ADL OT LTG, Time to Achieve by discharge --    ADL OT LTG, Activity Type ADL skills --    ADL OT LTG, [place] Level modified independent --    ADL OT LTG, Date Goal Reviewed --  08/10/17   ADL OT LTG, Outcome --  goal not met   ADL OT LTG, Reason Goal Not Met --  discharged from facility       Goal: Functional Mobility Goal STG- OT  Outcome: Unable to achieve outcome(s) by discharge Date Met:  08/10/17    08/09/17  0935 08/10/17 1827   Functional Mobility OT STG   Functional Mobility Goal OT STG, Date Established 08/09/17 --    Functional Mobility Goal OT STG, Time to Achieve by discharge --    Functional Mobility Goal OT STG, [place] Level modified independent --    Functional Mobility Goal OT STG, Distance to Achieve to the bathroom;to the sink  (around room) --    Functional Mobility Goal OT STG, Date Goal Reviewed --  08/10/17   Functional Mobility Goal OT STG, Outcome --  goal not met   Functional Mobility Goal OT STG, Reason Goal Not Met --  discharged from facility       Goal: Activity Tolerance Goal LTG- OT  Outcome: Unable to achieve outcome(s) by discharge Date Met:  08/10/17    08/09/17 0935 08/10/17 1827   Activity Tolerance OT LTG   Activity Tolerance Goal OT LTG, Date Established 08/09/17 --    Activity Tolerance Goal OT LTG, Time to Achieve by discharge --    Activity Tolerance Goal OT LTG, Activity Level 15 min activity;O2 sat >/equal to 90%;with 1 rest break --    Activity Tolerance Goal OT LTG, Date Goal Reviewed --  08/10/17   Activity Tolerance Goal OT LTG, Outcome --  goal not met   Activity Tolerance Goal OT LTG, Reason Goal Not Met --  discharged from facility

## 2017-08-10 NOTE — PLAN OF CARE
Problem: Patient Care Overview (Adult)  Goal: Plan of Care Review  Outcome: Ongoing (interventions implemented as appropriate)    08/10/17 0427 08/10/17 0710   Coping/Psychosocial Response Interventions   Plan Of Care Reviewed With patient --    Patient Care Overview   Progress no change --    Outcome Evaluation   Outcome Summary/Follow up Plan --  Upon entry pt's 02 87% on RA. Pt educated on PLB tech and nsg made aware. Pt I with bed mobility, CGA for t/f's with straight cane.        Goal: Discharge Needs Assessment  Outcome: Ongoing (interventions implemented as appropriate)    08/09/17 0900 08/09/17 0935   Discharge Needs Assessment   Discharge Facility/Level Of Care Needs home with home health  (Home with 24/7) --    Living Environment   Transportation Available --  car;family or friend will provide   Self-Care   Equipment Currently Used at Home --  cane, straight;walker, rolling  (uses SC w/ambulation)         Problem: Inpatient Occupational Therapy  Goal: Transfer Training Goal 1 STG- OT  Outcome: Ongoing (interventions implemented as appropriate)    08/09/17 0935 08/10/17 0710   Transfer Training OT STG   Transfer Training OT STG, Date Established 08/09/17 --    Transfer Training OT STG, Time to Achieve by discharge --    Transfer Training OT STG, Activity Type all transfers --    Transfer Training OT STG, [place] Level conditional independence --    Transfer Training OT STG, Date Goal Reviewed --  08/10/17   Transfer Training OT STG, Outcome --  goal not met       Goal: Patient Education Goal LTG- OT  Outcome: Ongoing (interventions implemented as appropriate)    08/09/17 0935 08/10/17 0710   Patient Education OT LTG   Patient Education OT LTG, Date Established 08/09/17 --    Patient Education OT LTG, Time to Achieve by discharge --    Patient Education OT LTG, Education Type HEP;home safety;energy conservation --    Patient Education OT LTG, Education Understanding verbalizes understanding;demonstrates  adequately;independent --    Patient Education OT LTG, Date Goal Reviewed --  08/10/17   Patient Education OT LTG Outcome --  goal not met       Goal: ADL Goal LTG- OT  Outcome: Ongoing (interventions implemented as appropriate)    08/09/17 0935 08/10/17 0710   ADL OT LTG   ADL OT LTG, Date Established 08/09/17 --    ADL OT LTG, Time to Achieve by discharge --    ADL OT LTG, Activity Type ADL skills --    ADL OT LTG, [place] Level modified independent --    ADL OT LTG, Date Goal Reviewed --  08/10/17   ADL OT LTG, Outcome --  goal not met       Goal: Functional Mobility Goal STG- OT  Outcome: Ongoing (interventions implemented as appropriate)    08/09/17 0935 08/10/17 0710   Functional Mobility OT STG   Functional Mobility Goal OT STG, Date Established 08/09/17 --    Functional Mobility Goal OT STG, Time to Achieve by discharge --    Functional Mobility Goal OT STG, [place] Level modified independent --    Functional Mobility Goal OT STG, Distance to Achieve to the bathroom;to the sink  (around room) --    Functional Mobility Goal OT STG, Date Goal Reviewed --  08/10/17   Functional Mobility Goal OT STG, Outcome --  goal not met       Goal: Activity Tolerance Goal LTG- OT  Outcome: Ongoing (interventions implemented as appropriate)    08/09/17 0935 08/10/17 0710   Activity Tolerance OT LTG   Activity Tolerance Goal OT LTG, Date Established 08/09/17 --    Activity Tolerance Goal OT LTG, Time to Achieve by discharge --    Activity Tolerance Goal OT LTG, Activity Level 15 min activity;O2 sat >/equal to 90%;with 1 rest break --    Activity Tolerance Goal OT LTG, Date Goal Reviewed --  08/10/17   Activity Tolerance Goal OT LTG, Outcome --  goal not met

## 2017-08-14 NOTE — TELEPHONE ENCOUNTER
JAYSHREE WITH [facility] CALLED IN, PATIENT HAS CONSTANT DIARRHEA.  DO YOU WANT THEM TO DO A STOOL CULTURE?    [phone]    THANK YOU

## 2017-08-16 NOTE — TELEPHONE ENCOUNTER
MASSIMO / [facility]  [phone] CALLED ABOUT PATIENT    HE IS HAVING HALLUCINATIONS AND HAS A POSSIBLE BOWEL OBSTRUCTION.  PLEASE RETURN CALL AS SOON AS POSSIBLE.    THANK YOU.

## 2017-08-18 NOTE — PROGRESS NOTES
Subjective:     Mickey Lind is a 61 y.o. male who presents for follow up for     Chief Complaint   Patient presents with   • Fall     hosp f/u     Fall   The accident occurred 3 to 5 days ago. Fall occurred: from toilet. He fell from a height of 1 to 2 ft. There was no blood loss. The pain is present in the back. The pain is at a severity of 5/10. The pain is mild. The symptoms are aggravated by ambulation, standing and pressure on injury. Pertinent negatives include no abdominal pain, bowel incontinence, fever, headaches, hearing loss, hematuria, loss of consciousness, nausea, numbness, tingling, visual change or vomiting. He has tried NSAID and rest for the symptoms. The treatment provided mild relief.   Diarrhea    This is a new problem. The current episode started in the past 7 days. The problem occurs 5 to 10 times per day. The problem has been gradually worsening. The stool consistency is described as mucous. Associated symptoms include weight loss. Pertinent negatives include no abdominal pain, arthralgias, bloating, chills, coughing, fever, headaches, increased  flatus, myalgias, sweats, URI or vomiting. Nothing aggravates the symptoms. Risk factors include recent antibiotic use and recent hospitalization. He has tried nothing for the symptoms. The treatment provided no relief. There is no history of bowel resection, inflammatory bowel disease, irritable bowel syndrome, malabsorption, a recent abdominal surgery or short gut syndrome.         Preventative:  Over the past 2 weeks, have you felt down, depressed, or hopeless?Yes, No   Over the past 2 weeks, have you felt little interest or pleasure in doing things?Yes  Clinical depression screening refused by patient.Not Indicated     On osteoporosis therapy?Not Indicated     Past Medical Hx:  Past Medical History:   Diagnosis Date   • Abdominal pain    • Alcoholic liver damage    • Alcoholic polyneuropathy    • Amblyopia     refractive os   • Anemia    •  Ascites    • Ascites    • Asthma    • Astigmatism    • Cellulitis of left lower leg    • Chronic hepatitis C    • Chronic viral hepatitis C    • Cirrhosis of liver    • Claudication    • Encounter for immunization    • Generalized edema    • History of blood transfusion    • History of colon polyps    • Hypermetropia    • Inguinal hernia     - Rih      • Low back pain    • Major depressive disorder     recurrent , moderate   • Multiple myeloma    • Myocardial infarction    • Pain in joint, lower leg    • Peripheral vascular disease    • Recurrent major depressive episodes    • Tinea unguium    • Tobacco use        Past Surgical Hx:  Past Surgical History:   Procedure Laterality Date   • CARDIAC CATHETERIZATION      (Successful PCI to a totally occluded RCA with a 2.5 x 28 and a 2.5 x 8 mm Xience stent. Nonobstructive disease in the left coronary artery system.)   11/24/2012    • CARDIAC SURGERY     • COLONOSCOPY  10/13/2016   • COLONOSCOPY N/A 3/29/2017    Procedure: COLONOSCOPY;  Surgeon: Jose Daniel David MD;  Location: [facility] ENDOSCOPY;  Service:    • ENDOSCOPY  10/13/2016   • ENDOSCOPY N/A 3/29/2017    Procedure: ESOPHAGOGASTRODUODENOSCOPY (U/S FIRST @0945) ;  Surgeon: Jose Daniel David MD;  Location: [facility] ENDOSCOPY;  Service:    • UPPER GASTROINTESTINAL ENDOSCOPY  10/13/2016       Health Maintenance:  Health Maintenance   Topic Date Due   • LIPID PANEL  10/26/2016   • INFLUENZA VACCINE  09/01/2017   • TDAP/TD VACCINES (2 - Td) 11/08/2026   • COLONOSCOPY  03/29/2027   • PNEUMOCOCCAL VACCINE (19-64 MEDIUM RISK)  Completed   • HEPATITIS C SCREENING  Completed   • ZOSTER VACCINE  Completed       Current Meds:    Current Outpatient Prescriptions:   •  furosemide (LASIX) 40 MG tablet, Take 1 tablet by mouth Daily., Disp: 30 tablet, Rfl: 2  •  GNP ACID REDUCER 75 MG tablet, Take 2 tablets by mouth 2 (Two) Times a Day., Disp: 120 tablet, Rfl: 2  •  HYDROmorphone (DILAUDID) 2 MG tablet, Take 1 tablet by mouth 3 (Three)  Times a Day As Needed for Severe Pain  (7-10)., Disp: 90 tablet, Rfl: 0  •  magnesium chloride 70 MG tablet delayed-release tablet, Take 1 tablet by mouth 2 (Two) Times a Day., Disp: 60 tablet, Rfl: 0  •  ondansetron (ZOFRAN) 4 MG tablet, Take 1 tablet by mouth Every 8 (Eight) Hours As Needed for Nausea or Vomiting., Disp: 30 tablet, Rfl: 0  •  polyethylene glycol (MIRALAX) powder, Take 17 g by mouth Daily., Disp: 527 g, Rfl: 1  •  potassium chloride (K-DUR,KLOR-CON) 20 MEQ CR tablet, Take 1 tablet by mouth Daily., Disp: 30 tablet, Rfl: 0  •  prochlorperazine (COMPAZINE) 10 MG tablet, Take 1 tablet by mouth Every 6 (Six) Hours As Needed for Nausea or Vomiting., Disp: 60 tablet, Rfl: 1  •  sertraline (ZOLOFT) 100 MG tablet, Take 2 tablets by mouth Daily., Disp: 60 tablet, Rfl: 2  •  spironolactone (ALDACTONE) 50 MG tablet, Take 1 tablet by mouth 2 (Two) Times a Day for 30 days., Disp: 60 tablet, Rfl: 0  •  sucralfate (CARAFATE) 1 GM/10ML suspension, Take 10 mL by mouth Daily., Disp: 30 g, Rfl: 3  •  metroNIDAZOLE (FLAGYL) 500 MG tablet, Take 1 tablet by mouth 3 (Three) Times a Day for 7 days., Disp: 21 tablet, Rfl: 0    Allergies:  Codeine; Other; and Penicillins    Family Hx:  Family History   Problem Relation Age of Onset   • Cancer Mother    • Heart disease Mother    • Cancer Father    • Other Father      ischemic heart disease   • Heart disease Father    • Other Son      depressive disorder   • Diabetes Maternal Grandmother    • Cancer Paternal Grandfather    • No Known Problems Daughter    • No Known Problems Son         Social History:  Social History     Social History   • Marital status:      Spouse name: N/A   • Number of children: N/A   • Years of education: N/A     Occupational History   • Not on file.     Social History Main Topics   • Smoking status: Current Every Day Smoker     Packs/day: 0.50   • Smokeless tobacco: Never Used      Comment: Smokes approx 1 ppd of Cigarettes.smoking since last 45  "yr   • Alcohol use No      Comment: no alcohol since 2017   • Drug use: No   • Sexual activity: Defer     Other Topics Concern   • Not on file     Social History Narrative    , wife  2017. Lives with son and his son's girlfriend.  He has worked as , mine equipment, coal mines.  He served in the [employer] for 1.5 years.  Used to smoke 2 ppd down to 1/2 ppd for 35 years or more.  Drank a 12 pack of beer a day for 20 years, last drink was 1/2 a beer on .  He last drank in 10/2015.  He  drugs 20-30 years ago including cocaine, meth, heroin, pot, and other drugs.  He smoked, snorted, and shot up drugs.       Review of Systems   Constitutional: Positive for weight loss. Negative for chills and fever.   Respiratory: Negative for cough.    Gastrointestinal: Positive for diarrhea. Negative for abdominal pain, bloating, bowel incontinence, flatus, nausea and vomiting.   Genitourinary: Negative for hematuria.   Musculoskeletal: Negative for arthralgias and myalgias.   Neurological: Negative for tingling, loss of consciousness, numbness and headaches.         Objective:     /68  Pulse 92  Ht 70\" (177.8 cm)  SpO2 96%    Physical Exam   Constitutional: He is oriented to person, place, and time. He appears well-developed and well-nourished.   HENT:   Head: Normocephalic and atraumatic.   Neck: Normal range of motion. Neck supple.   Cardiovascular: Normal rate, regular rhythm and normal heart sounds.    Pulmonary/Chest: Effort normal and breath sounds normal.   Abdominal: Soft. He exhibits no distension. Bowel sounds are decreased. There is no tenderness. There is no rebound.   Musculoskeletal: Normal range of motion.   Neurological: He is alert and oriented to person, place, and time.   Skin: Skin is warm.   Psychiatric: He has a normal mood and affect. His behavior is normal. Judgment and thought content normal.   Nursing note and vitals reviewed.           Assessment/Plan:     Mickey" was seen today for fall.    Diagnoses and all orders for this visit:    Severe muscle deconditioning  Comments:  will see if he qualifies for nusring home placement  Orders:  -     Ambulatory Referral to Home Health    Hypomagnesemia  Comments:  labs today  Orders:  -     Magnesium; Future    Hypokalemia  Comments:  labs today  Orders:  -     Potassium; Future    Fall, initial encounter  Comments:  xrays will be obtained today  motorized wheelchair script given today  Orders:  -     XR Spine Lumbar 2 or 3 View    Dysthymia    Cachexia    Sprain of lumbar spine, initial encounter    Clostridium difficile colitis  Comments:  flagyl given    Other orders  -     metroNIDAZOLE (FLAGYL) 500 MG tablet; Take 1 tablet by mouth 3 (Three) Times a Day for 7 days.      Advised to talk to  and to get more information about advance directives at the  today.    Follow-up:     Return in about 4 weeks (around 9/15/2017) for Recheck.    GOALS:  Control pain           occasional/rare  eat more fruits and vegetables, decrease soda or juice intake, increase water intake, increase physical activity, reduce screen time, reduce portion size, cut out extra servings, reduce fast food intake, family to eat at dinner table more often, keep TV off during meals, plan meals, eat breakfast and have 3 meals a day    RISK SCORE: 4      This document has been electronically signed by Ara Bach MD on August 18, 2017 2:51 PM    Ara Bach MD, [facility]  [place] [id number] [phone]

## 2017-08-25 NOTE — ED PROVIDER NOTES
Subjective   HPI Comments: Patient came by EMS after he fell backwards in the bathroom about an hour ago.    Had diagnoses of bone cancer.  But has not start treatment yet.    He saying that he may have an appointment with his doctor tomorrow.    Complaining of back pain from the cervical area to the lumbar area since his fall, and chronic abdominal pain for the last 3 years    Patient is well-developed but look chronically emnciated and malnourished    Patient is a 61 y.o. male presenting with back pain.   History provided by:  Patient and EMS personnel  Back Pain   Location:  Generalized  Quality:  Aching  Radiates to:  Does not radiate  Pain severity:  Mild  Onset quality:  Sudden  Duration:  1 hour  Timing:  Constant  Progression:  Waxing and waning  Chronicity:  New  Context: falling and recent illness    Context: not emotional stress, not MCA, not physical stress and not recent injury    Relieved by:  Nothing  Worsened by:  Movement  Ineffective treatments:  None tried  Associated symptoms: no abdominal pain, no bladder incontinence, no bowel incontinence, no chest pain, no dysuria, no fever, no headaches, no leg pain and no numbness    Risk factors: no hx of cancer and not obese        Review of Systems   Constitutional: Negative for activity change, appetite change, fatigue and fever.   HENT: Negative for congestion, facial swelling, mouth sores, nosebleeds, sore throat and trouble swallowing.    Eyes: Negative for discharge, redness and itching.   Respiratory: Negative for apnea, cough and wheezing.    Cardiovascular: Negative for chest pain and palpitations.   Gastrointestinal: Negative for abdominal pain, blood in stool, bowel incontinence and nausea.   Endocrine: Negative for cold intolerance, heat intolerance, polydipsia, polyphagia and polyuria.   Genitourinary: Negative for bladder incontinence, difficulty urinating, dysuria, flank pain, frequency and hematuria.   Musculoskeletal: Positive for back  pain. Negative for gait problem, joint swelling and neck pain.   Skin: Negative.  Negative for color change, pallor and rash.   Allergic/Immunologic: Negative for environmental allergies.   Neurological: Negative for dizziness, seizures, syncope, speech difficulty, light-headedness, numbness and headaches.   Hematological: Negative for adenopathy.   Psychiatric/Behavioral: Negative for agitation, behavioral problems, confusion and sleep disturbance. The patient is not nervous/anxious.        Past Medical History:   Diagnosis Date   • Abdominal pain    • Alcoholic liver damage    • Alcoholic polyneuropathy    • Amblyopia     refractive os   • Anemia    • Ascites    • Ascites    • Asthma    • Astigmatism    • Cellulitis of left lower leg    • Chronic hepatitis C    • Chronic viral hepatitis C    • Cirrhosis of liver    • Claudication    • Encounter for immunization    • Generalized edema    • History of blood transfusion    • History of colon polyps    • Hypermetropia    • Inguinal hernia     - Rih      • Low back pain    • Major depressive disorder     recurrent , moderate   • Multiple myeloma    • Myocardial infarction    • Pain in joint, lower leg    • Peripheral vascular disease    • Recurrent major depressive episodes    • Tinea unguium    • Tobacco use        Allergies   Allergen Reactions   • Codeine Other (See Comments)     Childhood, hives   • Other      MRSA COLONIZATION   • Penicillins Swelling       Past Surgical History:   Procedure Laterality Date   • CARDIAC CATHETERIZATION      (Successful PCI to a totally occluded RCA with a 2.5 x 28 and a 2.5 x 8 mm Xience stent. Nonobstructive disease in the left coronary artery system.)   11/24/2012    • CARDIAC SURGERY     • COLONOSCOPY  10/13/2016   • COLONOSCOPY N/A 3/29/2017    Procedure: COLONOSCOPY;  Surgeon: Jose Daniel David MD;  Location: [facility] ENDOSCOPY;  Service:    • ENDOSCOPY  10/13/2016   • ENDOSCOPY N/A 3/29/2017    Procedure:  ESOPHAGOGASTRODUODENOSCOPY (U/S FIRST @0945) ;  Surgeon: Jose Daniel David MD;  Location: [facility] ENDOSCOPY;  Service:    • UPPER GASTROINTESTINAL ENDOSCOPY  10/13/2016       Family History   Problem Relation Age of Onset   • Cancer Mother    • Heart disease Mother    • Cancer Father    • Other Father      ischemic heart disease   • Heart disease Father    • Other Son      depressive disorder   • Diabetes Maternal Grandmother    • Cancer Paternal Grandfather    • No Known Problems Daughter    • No Known Problems Son        Social History     Social History   • Marital status:      Spouse name: N/A   • Number of children: N/A   • Years of education: N/A     Social History Main Topics   • Smoking status: Current Every Day Smoker     Packs/day: 0.50   • Smokeless tobacco: Never Used      Comment: Smokes approx 1 ppd of Cigarettes.smoking since last 45 yr   • Alcohol use No      Comment: no alcohol since 2017   • Drug use: No   • Sexual activity: Defer     Other Topics Concern   • None     Social History Narrative    , wife  2017. Lives with son and his son's girlfriend.  He has worked as , mine equipment, coal mines.  He served in the [employer] for 1.5 years.  Used to smoke 2 ppd down to 1/2 ppd for 35 years or more.  Drank a 12 pack of beer a day for 20 years, last drink was 1/2 a beer on .  He last drank in 10/2015.  He  drugs 20-30 years ago including cocaine, meth, heroin, pot, and other drugs.  He smoked, snorted, and shot up drugs.           Objective   Physical Exam   Constitutional: He is oriented to person, place, and time. He appears well-developed.   HENT:   Head: Normocephalic and atraumatic.   Nose: Nose normal.   Mouth/Throat: Oropharynx is clear and moist.   Eyes: Conjunctivae and EOM are normal. Pupils are equal, round, and reactive to light.   Neck: Normal range of motion. Neck supple.   Cardiovascular: Normal rate, regular rhythm, normal heart sounds and intact  distal pulses.    Pulmonary/Chest: Effort normal and breath sounds normal.   Abdominal: Soft. Bowel sounds are normal.   Musculoskeletal: Normal range of motion.   Neurological: He is alert and oriented to person, place, and time.   Skin: Skin is warm and dry.   Psychiatric: He has a normal mood and affect. His behavior is normal. Judgment and thought content normal.   Nursing note and vitals reviewed.      ECG 12 Lead    Date/Time: 8/24/2017 11:17 PM  Performed by: LEVI GARCIA  Authorized by: LEVI GARCIA   Interpreted by physician  Comparison: not compared with previous ECG   Rhythm: sinus rhythm  Rate: normal  QRS axis: right  Other findings: prolonged QTc interval  Clinical impression: abnormal ECG               ED Course  ED Course      Labs Reviewed   URINE CULTURE - Abnormal; Notable for the following:        Result Value    Urine Culture   (*)     Value: >100,000 CFU/mL Corynebacterium species, not diphtheriae    All other components within normal limits   URINALYSIS W/ CULTURE IF INDICATED - Abnormal; Notable for the following:     Appearance, UA Cloudy (*)     Ketones, UA 15 mg/dL (1+) (*)     Bilirubin, UA Moderate (2+) (*)     Protein,  mg/dL (2+) (*)     Leuk Esterase, UA Small (1+) (*)     Nitrite, UA Positive (*)     All other components within normal limits   URINE DRUG SCREEN - Abnormal; Notable for the following:     Opiate Screen Positive (*)     All other components within normal limits    Narrative:     Negative Thresholds For Drugs Screened in Urine:     Amphetamines          500 ng/ml  Barbiturates          200 ng/ml  Benzodiazepines       200 ng/ml  Cocaine               150 ng/ml  Methadone             300 ng/mL  Opiates               300 ng/mL  Oxycodone             100 ng/mL  THC                   20 ng/mL    The normal value for all drugs tested is negative. This report includes final unconfirmed screening results.  A positive result by this assay can be, at your request,  sent to the Reference Lab for confirmation by gas chromatography. Unconfirmed results must not be used for non-medical purposes, such as employment or legal testing. Clinical consideration should be applied to any drug of abuse test result, particularly when unconfirmed results are used.   COMPREHENSIVE METABOLIC PANEL - Abnormal; Notable for the following:     Glucose 134 (*)     BUN 28 (*)     Sodium 128 (*)     CO2 19.0 (*)     Calcium 8.0 (*)     Total Protein 5.1 (*)     Albumin 2.50 (*)     AST (SGOT) 62 (*)     A/G Ratio 1.0 (*)     BUN/Creatinine Ratio 25.9 (*)     All other components within normal limits   CBC WITH AUTO DIFFERENTIAL - Abnormal; Notable for the following:     WBC 21.54 (*)     RBC 4.18 (*)     Hemoglobin 13.2 (*)     Hematocrit 36.6 (*)     RDW 17.2 (*)     RDW-SD 54.9 (*)     Platelets 118 (*)     Neutrophil % 88.8 (*)     Lymphocyte % 5.4 (*)     Neutrophils, Absolute 19.12 (*)     Monocytes, Absolute 1.16 (*)     Immature Grans, Absolute 0.08 (*)     All other components within normal limits   TROPONIN (IN-HOUSE) - Abnormal; Notable for the following:     Troponin I 0.065 (*)     All other components within normal limits   PROTIME-INR - Abnormal; Notable for the following:     Protime 25.2 (*)     INR 2.27 (*)     All other components within normal limits    Narrative:     Therapeutic range for most indications is 2.0-3.0 INR,  or 2.5-3.5 for mechanical heart valves.   URINALYSIS, MICROSCOPIC ONLY - Abnormal; Notable for the following:     Bacteria, UA Trace (*)     All other components within normal limits   LIPASE - Normal   RAINBOW DRAW    Narrative:     The following orders were created for panel order [place] Draw.  Procedure                               Abnormality         Status                     ---------                               -----------         ------                     Light Blue Top[[id number]]                                   Final result               Green Top  (Gel)[[id number]]                                  Final result               Lavender Top[[id number]]                                     Final result               Gold Top - SST[[id number]]                                   Final result                 Please view results for these tests on the individual orders.   CBC AND DIFFERENTIAL    Narrative:     The following orders were created for panel order CBC & Differential.  Procedure                               Abnormality         Status                     ---------                               -----------         ------                     CBC Auto Differential[[id number]]        Abnormal            Final result                 Please view results for these tests on the individual orders.   LIGHT BLUE TOP   GREEN TOP   LAVENDER TOP   GOLD TOP - SST        CT Abdomen Pelvis Without Contrast   Final Result   1. Hepatic cirrhosis with portal hypertension.   2. Moderate ascites.   3. Colitis. Differential diagnosis includes infectious versus   inflammatory   4. Nonspecific groundglass attenuation the lungs and tree-in-bud   type infiltration most prominent in the left lower lobe   compatible small airways disease.      Electronically signed by:  Baldo Costa MD  8/24/2017 10:45 PM   CDT Workstation: [email or web address]      XR Spine Lumbar 2 or 3 View   Final Result   Moderate degenerative changes throughout the lumbar   spine. No acute abnormalities are seen.         Electronically signed by:  Santy Harris MD  8/24/2017 10:07 PM   CDT Workstation: [employer]      XR Spine Cervical 2 or 3 View   Final Result   Severe degenerative changes throughout the cervical   spine. No fractures or acute bony abnormalities are seen.         Electronically signed by:  Santy Harris MD  8/24/2017 10:06 PM   CDT Workstation: [employer]      XR Spine Thoracic 3 View   Final Result   No acute fractures are seen. Old compression   involving T12 is stable from previous exams.          Electronically signed by:  Snaty Harris MD  8/24/2017 10:05 PM   CDT Workstation: [email or web address]      XR Chest 1 View   Final Result   Right pleural effusion, decreased since exam of   8/8/2017.         Electronically signed by:  Santy Harris MD  8/24/2017 10:03 PM   CDT Workstation: [email or web address]                      [facility]    Final diagnoses:   Urinary tract infection, site unspecified   Syncope, unspecified syncope type   Fall, initial encounter   Chronic midline back pain, unspecified back location   Colitis   Compression fracture of T12 vertebra, with routine healing, subsequent encounter   Ketonuria   DJD (degenerative joint disease), cervical   Osteoarthritis of lumbar spine, unspecified spinal osteoarthritis complication status   Recurrent right pleural effusion   Leukocytosis, unspecified type   Anemia, unspecified type   Cirrhosis of liver with ascites, unspecified hepatic cirrhosis type   Thrombocytopenia   Malnourished            Chad Monteiro MD  08/25/17 0217       Chad Monteiro MD  09/16/17 0805

## 2017-08-25 NOTE — TELEPHONE ENCOUNTER
ER FOLLOW UP - NO VOICEMAIL SET UP, AND EM CONTACT NOT AVAILABLE.      2ND ER FOLLOW UP CALL - DAUGHTER SAYS PT IS GOING BACK TO THE HOSPITAL, REALLY BAD SYMPTOMS.  DENIED APPT AT THIS TIME.

## 2017-08-25 NOTE — TELEPHONE ENCOUNTER
LCSW spoke with patient's daughter by phone, she called to share recent ED experiences and pt current condition of weakness.  Daughter states pt and family would like to more about hospice care.  SW offered basic hospice information to daughter and provided hospice providers in [place] and [facility] as pt.may be staying in [facility]. Furthermore, JEREMIAH spoke with Dr. Mendoza for input as to pt referral and criteria with Dr. Mendoza in agreement if pt opts.  Daughter states her plan to call back to undersigned on Monday to discuss further as they would like to think about and talk further about options.

## 2017-08-26 LAB — BACTERIA SPEC AEROBE CULT: ABNORMAL

## 2017-08-29 LAB
AMPHET+METHAMPHET UR QL: NEGATIVE
BARBITURATES UR QL SCN: NEGATIVE
BENZODIAZ UR QL SCN: NEGATIVE
CANNABINOIDS SERPL QL: NEGATIVE
COCAINE UR QL: NEGATIVE
METHADONE UR QL SCN: NEGATIVE
OPIATES UR QL: POSITIVE
OXYCODONE UR QL SCN: NEGATIVE

## 2017-08-30 ENCOUNTER — APPOINTMENT (OUTPATIENT)
Dept: ONCOLOGY | Facility: HOSPITAL | Age: 62
End: 2017-08-30

## 2017-09-07 ENCOUNTER — APPOINTMENT (OUTPATIENT)
Dept: ONCOLOGY | Facility: CLINIC | Age: 62
End: 2017-09-07

## 2018-11-01 ENCOUNTER — CLINICAL SUPPORT NO REQUIREMENTS (OUTPATIENT)
Dept: ONCOLOGY | Facility: CLINIC | Age: 63
End: 2018-11-01

## 2018-11-01 DIAGNOSIS — C90.00 MULTIPLE MYELOMA, STAGE 3 (HCC): Primary | ICD-10-CM

## 2025-05-13 NOTE — THERAPY DISCHARGE NOTE
Acute Care - Occupational Therapy Discharge Summary  [facility]     Patient Name: Mickey Lind  : 1955  MRN: [id number]    Today's Date: 2017  Onset of Illness/Injury or Date of Surgery Date: 17    Date of Referral to OT: 17  Referring Physician: Dr. Carolee Leo      Admit Date: 2017        OT Recommendation and Plan    Visit Dx:    ICD-10-CM ICD-9-CM   1. Lower abdominal pain R10.30 789.09   2. Other ascites R18.8 789.59   3. Recurrent right pleural effusion J90 511.9   4. Impaired physical mobility Z74.09 781.99   5. Impaired mobility and ADLs Z74.09 799.89   6. Acute respiratory failure with hypoxia J96.01 518.81   7. Severe muscle deconditioning R29.898 781.99                     OT Goals       17 0837 17 1134 17 1432    Transfer Training OT STG    Transfer Training OT STG, Date Goal Reviewed 17  -RW 17  -RM 17  -CS    Transfer Training OT STG, Outcome  goal ongoing  -RM     Patient Education OT LTG    Patient Education OT LTG, Date Goal Reviewed  17  -RM 17  -CS    Patient Education OT LTG Outcome  goal met  -RM     ADL OT LTG    ADL OT LTG, Date Goal Reviewed 17  -RW 17  -RM 17  -CS    ADL OT LTG, Outcome  goal ongoing  -RM     Activity Tolerance OT STG    Activity Tolerance Goal OT STG, Date Goal Reviewed 17  -RW 17  -RM 17  -CS    Activity Tolerance Goal OT STG, Outcome  goal ongoing  -RM     Endurance OT STG    Endurance Goal OT STG, Date Goal Reviewed 17  -RW 17  -RM 17  -CS    Endurance Goal OT STG, Outcome  goal ongoing  -RM       17 1045 07/10/17 1625 17 1338    Transfer Training OT STG    Transfer Training OT STG, Date Established   17  -    Transfer Training OT STG, Time to Achieve   by discharge  -    Transfer Training OT STG, Activity Type   all transfers  -    Transfer Training OT STG, [place] Level   supervision required  -     Transfer Training OT STG, Date Goal Reviewed 07/11/17  -KD 07/10/17  -     Transfer Training OT STG, Outcome  goal ongoing  [facility]     Patient Education OT LTG    Patient Education OT LTG, Date Established   07/09/17  -    Patient Education OT LTG, Time to Achieve   by discharge  -    Patient Education OT LTG, Education Type   HEP;energy conservation;home safety;positioning;adaptive equipment mgmt;adaptive breathing  -    Patient Education OT LTG, Education Understanding   verbalizes understanding;demonstrates adequately;independent   with caregiver assist as needed  -    Patient Education OT LTG, Date Goal Reviewed 07/11/17  -KD 07/10/17  -     Patient Education OT LTG Outcome goal not met  - goal ongoing  [facility]     ADL OT LTG    ADL OT LTG, Date Established   07/09/17  -    ADL OT LTG, Time to Achieve   by discharge  -    ADL OT LTG, Activity Type   ADL skills  -    ADL OT LTG, Additional Goal   set up feeding/grooming; SBA toileting/bathing/dressing - AE/AD as needed  -    ADL OT LTG, Date Goal Reviewed 07/11/17  -KD 07/10/17  -     ADL OT LTG, Outcome  goal ongoing  [facility]     Activity Tolerance OT STG    Activity Tolerance Goal OT STG, Date Established   07/09/17  -    Activity Tolerance Goal OT STG, Time to Achieve   by discharge  -    Activity Tolerance Goal OT STG, Activity Level   15 min activity;O2 sat >/equal to 88%;with 2 rest breaks  -    Activity Tolerance Goal OT STG, Date Goal Reviewed 07/11/17  -KD 07/10/17  -     Activity Tolerance Goal OT STG, Outcome goal not met  - goal ongoing  [facility]     Endurance OT STG    Endurance Goal OT STG, Date Established   07/09/17  -    Endurance Goal OT STG, Time to Achieve   by discharge  -    Endurance Goal OT STG, Activity Level   endurance 2 fair  -    Endurance Goal OT STG, Date Goal Reviewed 07/11/17  -KD 07/10/17  -     Endurance Goal OT STG, Outcome  goal ongoing  [facility]       User Key  (r) = Recorded By, (t) = Taken By, (c) =  Cosigned By    Initials Name Provider Type     Dannielle Sanders, OTR/L Occupational Therapist    RW Laurel Melendez, OTR/L Occupational Therapist    KD Sylvie Mcadams, DUNNE/L Occupational Therapy Assistant    RENATO Graham DUNNE/L Occupational Therapy Assistant    RM America Warner, OT Occupational Therapist                Outcome Measures       07/13/17 1024 07/12/17 1400 07/12/17 0950    How much help from another person do you currently need...    Turning from your back to your side while in flat bed without using bedrails? 4  -ASTER  4  -ASTER    Moving from lying on back to sitting on the side of a flat bed without bedrails? 4  -ASTER  4  -ASTER    Moving to and from a bed to a chair (including a wheelchair)? 3  -ASTER  3  -ASTER    Standing up from a chair using your arms (e.g., wheelchair, bedside chair)? 3  -ASTER  3  -ASTER    Climbing 3-5 steps with a railing? 3  -ASTER  3  -ASTER    To walk in hospital room? 3  -ASTER  3  -ASTER    AM-PAC 6 Clicks Score 20  -ASTER  20  -ASTER    How much help from another is currently needed...    Putting on and taking off regular lower body clothing?  2  -CS     Bathing (including washing, rinsing, and drying)  2  -CS     Toileting (which includes using toilet bed pan or urinal)  3  -CS     Putting on and taking off regular upper body clothing  3  -CS     Taking care of personal grooming (such as brushing teeth)  3  -CS     Eating meals  4  -CS     Score  17  -CS     Functional Assessment    Outcome Measure Options AM-PAC 6 Clicks Basic Mobility (PT)  -ASTER AM-PAC 6 Clicks Daily Activity (OT)  -CS AM-PAC 6 Clicks Basic Mobility (PT)  -ASTER      07/11/17 1333 07/11/17 1045       How much help from another person do you currently need...    Turning from your back to your side while in flat bed without using bedrails? 4  -ASTER      Moving from lying on back to sitting on the side of a flat bed without bedrails? 4  -ASTER      Moving to and from a bed to a chair (including a wheelchair)? 3  -ASTER      Standing up from  a chair using your arms (e.g., wheelchair, bedside chair)? 3  -ASTER      Climbing 3-5 steps with a railing? 2  -ASTER      To walk in hospital room? 3  -ASTER      AM-PAC 6 Clicks Score 19  -ASTER      How much help from another is currently needed...    Putting on and taking off regular lower body clothing? 2  -KD 2  -KD     Bathing (including washing, rinsing, and drying) 2  -KD 2  -KD     Toileting (which includes using toilet bed pan or urinal) 3  -KD 3  -KD     Putting on and taking off regular upper body clothing 3  -KD 3  -KD     Taking care of personal grooming (such as brushing teeth) 3  -KD 3  -KD     Eating meals 4  -KD 4  -KD     Score 17  -KD 17  -KD     Functional Assessment    Outcome Measure Options AM-PAC 6 Clicks Basic Mobility (PT)  -ASTER        User Key  (r) = Recorded By, (t) = Taken By, (c) = Cosigned By    Initials Name Provider Type    ASTER Jorge Vargas, BENJAMIN Physical Therapy Assistant    KD UZAIR Esquivel/L Occupational Therapy Assistant    UZAIR Sparrow/L Occupational Therapy Assistant              OT Discharge Summary  Anticipated Discharge Disposition: skilled nursing facility, home with 24/7 care, home with home health  Reason for Discharge: Discharge from facility  Outcomes Achieved: Refer to plan of care for updates on goals achieved  Discharge Destination: Home      Laurel Melendez OTR/RAFAELA  7/14/2017    Satisfactory

## (undated) DEVICE — TRAP SXN POLYP QUICKCATCH LF

## (undated) DEVICE — Device: Brand: DISPOSABLE ELECTROSURGICAL SNARE

## (undated) DEVICE — BITEBLOCK ENDO W/STRAP 60F A/ LF DISP

## (undated) DEVICE — SINGLE-USE BIOPSY FORCEPS: Brand: RADIAL JAW 4

## (undated) DEVICE — PAD GRND REM POLYHESIVE A/ DISP

## (undated) DEVICE — CANN SMPL SOFTECH BIFLO ETCO2 A/M 7FT